# Patient Record
Sex: FEMALE | Race: WHITE | NOT HISPANIC OR LATINO | Employment: OTHER | URBAN - METROPOLITAN AREA
[De-identification: names, ages, dates, MRNs, and addresses within clinical notes are randomized per-mention and may not be internally consistent; named-entity substitution may affect disease eponyms.]

---

## 2017-01-12 ENCOUNTER — APPOINTMENT (OUTPATIENT)
Dept: GENERAL RADIOLOGY | Facility: HOSPITAL | Age: 67
End: 2017-01-12

## 2017-01-12 PROCEDURE — 73130 X-RAY EXAM OF HAND: CPT | Performed by: INTERNAL MEDICINE

## 2017-03-28 ENCOUNTER — TRANSCRIBE ORDERS (OUTPATIENT)
Dept: ADMINISTRATIVE | Facility: HOSPITAL | Age: 67
End: 2017-03-28

## 2017-03-28 DIAGNOSIS — R00.2 PALPITATIONS: Primary | ICD-10-CM

## 2017-04-04 ENCOUNTER — APPOINTMENT (OUTPATIENT)
Dept: CARDIOLOGY | Facility: HOSPITAL | Age: 67
End: 2017-04-04
Attending: INTERNAL MEDICINE

## 2017-04-05 ENCOUNTER — HOSPITAL ENCOUNTER (OUTPATIENT)
Dept: CARDIOLOGY | Facility: HOSPITAL | Age: 67
Discharge: HOME | End: 2017-04-05
Attending: INTERNAL MEDICINE | Admitting: INTERNAL MEDICINE

## 2017-04-05 DIAGNOSIS — R00.2 PALPITATIONS: ICD-10-CM

## 2017-04-05 PROCEDURE — 93225 XTRNL ECG REC<48 HRS REC: CPT

## 2017-04-05 PROCEDURE — 93226 XTRNL ECG REC<48 HR SCAN A/R: CPT

## 2017-04-08 PROCEDURE — 93227 XTRNL ECG REC<48 HR R&I: CPT | Performed by: INTERNAL MEDICINE

## 2017-04-24 ENCOUNTER — APPOINTMENT (OUTPATIENT)
Dept: WOMENS IMAGING | Facility: HOSPITAL | Age: 67
End: 2017-04-24

## 2017-04-24 PROCEDURE — G0202 SCR MAMMO BI INCL CAD: HCPCS | Performed by: RADIOLOGY

## 2017-04-24 PROCEDURE — 77063 BREAST TOMOSYNTHESIS BI: CPT | Performed by: RADIOLOGY

## 2017-07-19 ENCOUNTER — TRANSCRIBE ORDERS (OUTPATIENT)
Dept: ADMINISTRATIVE | Facility: HOSPITAL | Age: 67
End: 2017-07-19

## 2017-07-19 DIAGNOSIS — E05.80 SECONDARY HYPERTHYROIDISM: Primary | ICD-10-CM

## 2017-07-26 ENCOUNTER — HOSPITAL ENCOUNTER (OUTPATIENT)
Dept: MRI IMAGING | Facility: HOSPITAL | Age: 67
Discharge: HOME | End: 2017-07-26
Admitting: INTERNAL MEDICINE

## 2017-07-26 DIAGNOSIS — E05.80 SECONDARY HYPERTHYROIDISM: ICD-10-CM

## 2017-07-26 PROCEDURE — 82565 ASSAY OF CREATININE: CPT

## 2017-07-26 PROCEDURE — A9577 INJ MULTIHANCE: HCPCS | Performed by: INTERNAL MEDICINE

## 2017-07-26 PROCEDURE — 0 GADOBENATE DIMEGLUMINE 529 MG/ML SOLUTION: Performed by: INTERNAL MEDICINE

## 2017-07-26 PROCEDURE — 70553 MRI BRAIN STEM W/O & W/DYE: CPT

## 2017-07-26 RX ADMIN — GADOBENATE DIMEGLUMINE 16 ML: 529 INJECTION, SOLUTION INTRAVENOUS at 16:00

## 2017-07-27 LAB — CREAT BLDA-MCNC: 0.9 MG/DL (ref 0.6–1.3)

## 2017-09-22 ENCOUNTER — TELEPHONE (OUTPATIENT)
Dept: SLEEP MEDICINE | Facility: HOSPITAL | Age: 67
End: 2017-09-22

## 2017-10-19 ENCOUNTER — APPOINTMENT (OUTPATIENT)
Dept: SLEEP MEDICINE | Facility: HOSPITAL | Age: 67
End: 2017-10-19
Attending: INTERNAL MEDICINE

## 2017-10-19 ENCOUNTER — OFFICE VISIT (OUTPATIENT)
Dept: SLEEP MEDICINE | Facility: HOSPITAL | Age: 67
End: 2017-10-19
Attending: INTERNAL MEDICINE

## 2017-10-19 VITALS
BODY MASS INDEX: 33.68 KG/M2 | WEIGHT: 183 LBS | HEART RATE: 73 BPM | DIASTOLIC BLOOD PRESSURE: 93 MMHG | HEIGHT: 62 IN | TEMPERATURE: 98.9 F | SYSTOLIC BLOOD PRESSURE: 149 MMHG | OXYGEN SATURATION: 93 %

## 2017-10-19 DIAGNOSIS — Z99.89 OSA ON CPAP: Primary | ICD-10-CM

## 2017-10-19 DIAGNOSIS — G47.21 CIRCADIAN RHYTHM SLEEP DISORDER, DELAYED SLEEP PHASE TYPE: ICD-10-CM

## 2017-10-19 DIAGNOSIS — G47.33 OSA ON CPAP: Primary | ICD-10-CM

## 2017-10-19 DIAGNOSIS — G47.14 HYPERSOMNIA DUE TO ANOTHER MEDICAL CONDITION: ICD-10-CM

## 2017-10-19 PROCEDURE — 99204 OFFICE O/P NEW MOD 45 MIN: CPT | Performed by: INTERNAL MEDICINE

## 2017-10-19 PROCEDURE — G0463 HOSPITAL OUTPT CLINIC VISIT: HCPCS

## 2017-10-19 RX ORDER — AMLODIPINE BESYLATE AND BENAZEPRIL HYDROCHLORIDE 5; 20 MG/1; MG/1
CAPSULE ORAL
COMMUNITY
Start: 2013-02-08 | End: 2018-05-21

## 2017-10-19 RX ORDER — MENTHOL 5.8 MG/1
1 LOZENGE ORAL DAILY
COMMUNITY
Start: 2013-02-08 | End: 2019-11-04 | Stop reason: HOSPADM

## 2017-10-19 RX ORDER — SIMVASTATIN 10 MG
10 TABLET ORAL DAILY
COMMUNITY
Start: 2013-02-08 | End: 2019-01-10

## 2017-10-19 RX ORDER — TRAZODONE HYDROCHLORIDE 50 MG/1
25 TABLET ORAL NIGHTLY PRN
COMMUNITY
Start: 2013-02-08

## 2017-10-27 ENCOUNTER — OFFICE (OUTPATIENT)
Dept: URBAN - METROPOLITAN AREA OTHER 6 | Facility: OTHER | Age: 67
End: 2017-10-27

## 2017-10-27 VITALS
HEIGHT: 62 IN | DIASTOLIC BLOOD PRESSURE: 80 MMHG | HEART RATE: 80 BPM | SYSTOLIC BLOOD PRESSURE: 128 MMHG | WEIGHT: 182 LBS

## 2017-10-27 DIAGNOSIS — K58.9 IRRITABLE BOWEL SYNDROME WITHOUT DIARRHEA: ICD-10-CM

## 2017-10-27 DIAGNOSIS — K21.9 GASTRO-ESOPHAGEAL REFLUX DISEASE WITHOUT ESOPHAGITIS: ICD-10-CM

## 2017-10-27 PROCEDURE — 99212 OFFICE O/P EST SF 10 MIN: CPT

## 2017-10-27 RX ORDER — PANTOPRAZOLE SODIUM 40 MG/1
40 TABLET, DELAYED RELEASE ORAL
Qty: 30 | Refills: 11 | Status: COMPLETED
End: 2020-07-08

## 2017-10-28 PROBLEM — G47.21 CIRCADIAN RHYTHM SLEEP DISORDER, DELAYED SLEEP PHASE TYPE: Status: ACTIVE | Noted: 2017-10-28

## 2017-10-28 PROBLEM — G47.14 HYPERSOMNIA DUE TO ANOTHER MEDICAL CONDITION: Status: ACTIVE | Noted: 2017-10-28

## 2017-10-28 NOTE — PROGRESS NOTES
[facility] New Patient/Consultation       Reason for Consultation: FUENTES    Patient Care Team:  Pierre Chaudhry Jr., MD as PCP - Internal Medicine  Sukhjinder Connelly MD - Sleep Medicine    Chief complaint:  FUENTES    History of present illness:  Thank you for asking me to see your patient.  The patient is a 66 y.o. female who I last saw in 2010.  Reevaluation was needed with a new order for supplies requested.  The patient uses auto CPAP.  Her DME is [facility] and she uses nasal pillows.    She goes to bed between 4 and 6 AM and awakens at noon or 3 PM or later.  She is tired and sleepy upon awakening.  Occasionally she will take a nap.  She has complaints of hypersomnolence and her Frederick Sleepiness Scale is abnormal at 11.  She has gained about 10 pounds in the last 5 years.  She lives alone.  At times she sweats excessively during sleep.  Her dentist that she grinds her teeth.  She has problems falling asleep and staying asleep.  She awakens twice for the bathroom.  Her sleep is generally restless.    Review of Systems:  Recorded on the Sleep Questionnaire.  Unremarkable except for nasal congestion and postnasal drip, hoarseness, painful joints, GERD, cough, dizziness anxiety and depression, problems swallowing and diarrhea at times, she always feels too warm and has excessive thirst.    History:  Past Medical History:   Diagnosis Date   • Depression    • GERD (gastroesophageal reflux disease)    • Hyperlipidemia    • Hypertension    ,   Past Surgical History:   Procedure Laterality Date   • BACK SURGERY     • EXPLORATORY LAPAROTOMY     • HEMORRHOIDECTOMY     • RECTAL SURGERY     ,   Family History   Problem Relation Age of Onset   • Alzheimer's disease Mother    • Heart failure Father    • Diabetes Sister    • Atrial fibrillation Sister    • Stroke Paternal Grandmother     and   Social History   Substance Use Topics   • Smoking status: Never Smoker   • Smokeless tobacco: None   • Alcohol use No  "      Social History:  She is a retired RN.  She will have 1 or 2 teas a day.    Allergies:  Erythromycin     Medication Review: Her medication list was reviewed.    Vital Signs:    Vitals:    10/19/17 1112   BP: 149/93   BP Location: Left arm   Patient Position: Sitting   Pulse: 73   Temp: 98.9 °F (37.2 °C)   TempSrc: Oral   SpO2: 93%   Weight: 183 lb (83 kg)   Height: 62\" (157.5 cm)      Body mass index is 33.47 kg/(m^2).    Physical Exam:  Recorded on the [facility] Physical Exam Form and is unremarkable except for:  A large tongue and normal uvula and 1+ tonsillar hypertrophy.  She has moderate narrowing of the posterior pharyngeal opening and class II-III MP airway.  Trace pretibial edema is present.     Results Review:  Downloads between April 22 and October 18, 2017 compliances 89% and average usage is 5 hours and 39 minutes and average AHI is normal with a leak of 1 hour and 49 minutes.  Average auto CPAP pressure is 10 and her auto CPAP is 8-20.       Impression:   Obstructive sleep apnea adequately treated with auto titrating CPAP with good compliance and usage.    Circadian rhythm sleep disorder, delayed sleep phase type.  Hypersomnolence secondary to the above.    Plan:  Good sleep hygiene measures should be maintained.  Weight loss would be beneficial in this patient who is obese.    Pathophysiology of FUENTES re-described to the patient.  The patient is benefiting from auto CPAP therapy.      The patient will continue auto CPAP.  A new prescription will be sent to her DME for all needed supplies.  She will be evaluated for a new auto CPAP device.  I will see her back in 2 or 3 months if she obtained a new device or I will see her back in one year if she does not.     Thank you for requesting me to assist in this patient's care.    Sukhjinder Connelly MD  10/28/17  12:20 PM          "

## 2017-11-15 ENCOUNTER — TELEPHONE (OUTPATIENT)
Dept: SLEEP MEDICINE | Facility: HOSPITAL | Age: 67
End: 2017-11-15

## 2017-12-14 ENCOUNTER — AMBULATORY SURGICAL CENTER (OUTPATIENT)
Dept: URBAN - METROPOLITAN AREA SURGERY 20 | Facility: SURGERY | Age: 67
End: 2017-12-14

## 2017-12-14 ENCOUNTER — OFFICE (OUTPATIENT)
Dept: URBAN - METROPOLITAN AREA CLINIC 64 | Facility: CLINIC | Age: 67
End: 2017-12-14

## 2017-12-14 DIAGNOSIS — K29.50 UNSPECIFIED CHRONIC GASTRITIS WITHOUT BLEEDING: ICD-10-CM

## 2017-12-14 DIAGNOSIS — R13.10 DYSPHAGIA, UNSPECIFIED: ICD-10-CM

## 2017-12-14 DIAGNOSIS — K21.9 GASTRO-ESOPHAGEAL REFLUX DISEASE WITHOUT ESOPHAGITIS: ICD-10-CM

## 2017-12-14 DIAGNOSIS — K31.7 POLYP OF STOMACH AND DUODENUM: ICD-10-CM

## 2017-12-14 DIAGNOSIS — Z12.11 ENCOUNTER FOR SCREENING FOR MALIGNANT NEOPLASM OF COLON: ICD-10-CM

## 2017-12-14 DIAGNOSIS — K22.2 ESOPHAGEAL OBSTRUCTION: ICD-10-CM

## 2017-12-14 DIAGNOSIS — K57.30 DIVERTICULOSIS OF LARGE INTESTINE WITHOUT PERFORATION OR ABS: ICD-10-CM

## 2017-12-14 PROBLEM — K31.89 OTHER DISEASES OF STOMACH AND DUODENUM: Status: ACTIVE | Noted: 2017-12-14

## 2017-12-14 PROCEDURE — 45380 COLONOSCOPY AND BIOPSY: CPT | Mod: PT | Performed by: INTERNAL MEDICINE

## 2017-12-14 PROCEDURE — 43450 DILATE ESOPHAGUS 1/MULT PASS: CPT | Performed by: INTERNAL MEDICINE

## 2017-12-14 PROCEDURE — 88305 TISSUE EXAM BY PATHOLOGIST: CPT | Performed by: INTERNAL MEDICINE

## 2017-12-14 PROCEDURE — 43239 EGD BIOPSY SINGLE/MULTIPLE: CPT | Performed by: INTERNAL MEDICINE

## 2018-02-06 ENCOUNTER — OFFICE (OUTPATIENT)
Dept: URBAN - METROPOLITAN AREA OTHER 6 | Facility: OTHER | Age: 68
End: 2018-02-06

## 2018-02-06 VITALS
HEIGHT: 62 IN | WEIGHT: 180 LBS | DIASTOLIC BLOOD PRESSURE: 78 MMHG | HEART RATE: 68 BPM | SYSTOLIC BLOOD PRESSURE: 138 MMHG

## 2018-02-06 DIAGNOSIS — R10.11 RIGHT UPPER QUADRANT PAIN: ICD-10-CM

## 2018-02-06 DIAGNOSIS — K21.9 GASTRO-ESOPHAGEAL REFLUX DISEASE WITHOUT ESOPHAGITIS: ICD-10-CM

## 2018-02-06 DIAGNOSIS — K22.4 DYSKINESIA OF ESOPHAGUS: ICD-10-CM

## 2018-02-06 PROCEDURE — 99212 OFFICE O/P EST SF 10 MIN: CPT

## 2018-02-07 ENCOUNTER — TRANSCRIBE ORDERS (OUTPATIENT)
Dept: ADMINISTRATIVE | Facility: HOSPITAL | Age: 68
End: 2018-02-07

## 2018-02-07 DIAGNOSIS — K21.9 GASTRIC REFLUX: Primary | ICD-10-CM

## 2018-02-07 DIAGNOSIS — R10.11 RIGHT UPPER QUADRANT PAIN: ICD-10-CM

## 2018-02-09 ENCOUNTER — APPOINTMENT (OUTPATIENT)
Dept: ULTRASOUND IMAGING | Facility: HOSPITAL | Age: 68
End: 2018-02-09

## 2018-02-12 ENCOUNTER — HOSPITAL ENCOUNTER (OUTPATIENT)
Dept: ULTRASOUND IMAGING | Facility: HOSPITAL | Age: 68
Discharge: HOME | End: 2018-02-12
Admitting: NURSE PRACTITIONER

## 2018-02-12 DIAGNOSIS — K21.9 GASTRIC REFLUX: ICD-10-CM

## 2018-02-12 DIAGNOSIS — R10.11 RIGHT UPPER QUADRANT PAIN: ICD-10-CM

## 2018-02-12 PROCEDURE — 76705 ECHO EXAM OF ABDOMEN: CPT

## 2018-02-21 ENCOUNTER — APPOINTMENT (OUTPATIENT)
Dept: SLEEP MEDICINE | Facility: HOSPITAL | Age: 68
End: 2018-02-21
Attending: INTERNAL MEDICINE

## 2018-03-08 ENCOUNTER — APPOINTMENT (OUTPATIENT)
Dept: SLEEP MEDICINE | Facility: HOSPITAL | Age: 68
End: 2018-03-08
Attending: INTERNAL MEDICINE

## 2018-04-25 ENCOUNTER — APPOINTMENT (OUTPATIENT)
Dept: WOMENS IMAGING | Facility: HOSPITAL | Age: 68
End: 2018-04-25

## 2018-04-25 PROCEDURE — 77063 BREAST TOMOSYNTHESIS BI: CPT | Performed by: RADIOLOGY

## 2018-04-25 PROCEDURE — 77067 SCR MAMMO BI INCL CAD: CPT | Performed by: RADIOLOGY

## 2018-05-02 ENCOUNTER — APPOINTMENT (OUTPATIENT)
Dept: WOMENS IMAGING | Facility: HOSPITAL | Age: 68
End: 2018-05-02

## 2018-05-02 PROCEDURE — 77065 DX MAMMO INCL CAD UNI: CPT | Performed by: RADIOLOGY

## 2018-05-02 PROCEDURE — G0279 TOMOSYNTHESIS, MAMMO: HCPCS | Performed by: RADIOLOGY

## 2018-05-02 PROCEDURE — MDREVIEWSP: Performed by: RADIOLOGY

## 2018-05-02 PROCEDURE — 76641 ULTRASOUND BREAST COMPLETE: CPT | Performed by: RADIOLOGY

## 2018-05-21 ENCOUNTER — LAB (OUTPATIENT)
Dept: LAB | Facility: HOSPITAL | Age: 68
End: 2018-05-21
Attending: INTERNAL MEDICINE

## 2018-05-21 ENCOUNTER — TRANSCRIBE ORDERS (OUTPATIENT)
Dept: ADMINISTRATIVE | Facility: HOSPITAL | Age: 68
End: 2018-05-21

## 2018-05-21 ENCOUNTER — APPOINTMENT (OUTPATIENT)
Dept: GENERAL RADIOLOGY | Facility: HOSPITAL | Age: 68
End: 2018-05-21

## 2018-05-21 ENCOUNTER — HOSPITAL ENCOUNTER (INPATIENT)
Facility: HOSPITAL | Age: 68
LOS: 1 days | Discharge: HOME | End: 2018-05-23
Attending: EMERGENCY MEDICINE | Admitting: INTERNAL MEDICINE

## 2018-05-21 DIAGNOSIS — R07.89 CHEST WALL PAIN: ICD-10-CM

## 2018-05-21 DIAGNOSIS — R07.89 CHEST WALL PAIN: Primary | ICD-10-CM

## 2018-05-21 DIAGNOSIS — I21.4 NSTEMI (NON-ST ELEVATED MYOCARDIAL INFARCTION) (HCC): Primary | ICD-10-CM

## 2018-05-21 LAB
ALBUMIN SERPL-MCNC: 3.9 G/DL (ref 3.5–5.2)
ALBUMIN SERPL-MCNC: 4.1 G/DL (ref 3.5–5.2)
ALBUMIN/GLOB SERPL: 1.3 G/DL
ALBUMIN/GLOB SERPL: 1.5 G/DL
ALP SERPL-CCNC: 111 U/L (ref 39–117)
ALP SERPL-CCNC: 111 U/L (ref 39–117)
ALT SERPL W P-5'-P-CCNC: 11 U/L (ref 1–33)
ALT SERPL W P-5'-P-CCNC: 14 U/L (ref 1–33)
ANION GAP SERPL CALCULATED.3IONS-SCNC: 11.6 MMOL/L
ANION GAP SERPL CALCULATED.3IONS-SCNC: 14.9 MMOL/L
AST SERPL-CCNC: 15 U/L (ref 1–32)
AST SERPL-CCNC: 22 U/L (ref 1–32)
BASOPHILS # BLD AUTO: 0.02 10*3/MM3 (ref 0–0.2)
BASOPHILS NFR BLD AUTO: 0.2 % (ref 0–1.5)
BILIRUB SERPL-MCNC: 0.3 MG/DL (ref 0.1–1.2)
BILIRUB SERPL-MCNC: 0.3 MG/DL (ref 0.1–1.2)
BUN BLD-MCNC: 12 MG/DL (ref 8–23)
BUN BLD-MCNC: 12 MG/DL (ref 8–23)
BUN/CREAT SERPL: 13 (ref 7–25)
BUN/CREAT SERPL: 13.8 (ref 7–25)
CALCIUM SPEC-SCNC: 8.8 MG/DL (ref 8.6–10.5)
CALCIUM SPEC-SCNC: 9.1 MG/DL (ref 8.6–10.5)
CHLORIDE SERPL-SCNC: 103 MMOL/L (ref 98–107)
CHLORIDE SERPL-SCNC: 104 MMOL/L (ref 98–107)
CO2 SERPL-SCNC: 26.1 MMOL/L (ref 22–29)
CO2 SERPL-SCNC: 26.4 MMOL/L (ref 22–29)
CREAT BLD-MCNC: 0.87 MG/DL (ref 0.57–1)
CREAT BLD-MCNC: 0.92 MG/DL (ref 0.57–1)
DEPRECATED RDW RBC AUTO: 44.1 FL (ref 37–54)
EOSINOPHIL # BLD AUTO: 0.31 10*3/MM3 (ref 0–0.7)
EOSINOPHIL NFR BLD AUTO: 3.7 % (ref 0.3–6.2)
ERYTHROCYTE [DISTWIDTH] IN BLOOD BY AUTOMATED COUNT: 13.6 % (ref 11.7–13)
GFR SERPL CREATININE-BSD FRML MDRD: 61 ML/MIN/1.73
GFR SERPL CREATININE-BSD FRML MDRD: 65 ML/MIN/1.73
GLOBULIN UR ELPH-MCNC: 2.8 GM/DL
GLOBULIN UR ELPH-MCNC: 3 GM/DL
GLUCOSE BLD-MCNC: 90 MG/DL (ref 65–99)
GLUCOSE BLD-MCNC: 94 MG/DL (ref 65–99)
HCT VFR BLD AUTO: 40 % (ref 35.6–45.5)
HGB BLD-MCNC: 12.4 G/DL (ref 11.9–15.5)
IMM GRANULOCYTES # BLD: 0 10*3/MM3 (ref 0–0.03)
IMM GRANULOCYTES NFR BLD: 0 % (ref 0–0.5)
LYMPHOCYTES # BLD AUTO: 1.54 10*3/MM3 (ref 0.9–4.8)
LYMPHOCYTES NFR BLD AUTO: 18.4 % (ref 19.6–45.3)
MCH RBC QN AUTO: 27.6 PG (ref 26.9–32)
MCHC RBC AUTO-ENTMCNC: 31 G/DL (ref 32.4–36.3)
MCV RBC AUTO: 89.1 FL (ref 80.5–98.2)
MONOCYTES # BLD AUTO: 0.94 10*3/MM3 (ref 0.2–1.2)
MONOCYTES NFR BLD AUTO: 11.2 % (ref 5–12)
NEUTROPHILS # BLD AUTO: 5.58 10*3/MM3 (ref 1.9–8.1)
NEUTROPHILS NFR BLD AUTO: 66.5 % (ref 42.7–76)
PLATELET # BLD AUTO: 238 10*3/MM3 (ref 140–500)
PMV BLD AUTO: 9.4 FL (ref 6–12)
POTASSIUM BLD-SCNC: 3.7 MMOL/L (ref 3.5–5.2)
POTASSIUM BLD-SCNC: 3.7 MMOL/L (ref 3.5–5.2)
PROT SERPL-MCNC: 6.9 G/DL (ref 6–8.5)
PROT SERPL-MCNC: 6.9 G/DL (ref 6–8.5)
RBC # BLD AUTO: 4.49 10*6/MM3 (ref 3.9–5.2)
SODIUM BLD-SCNC: 141 MMOL/L (ref 136–145)
SODIUM BLD-SCNC: 145 MMOL/L (ref 136–145)
TROPONIN T SERPL-MCNC: 0.18 NG/ML (ref 0–0.03)
TROPONIN T SERPL-MCNC: 0.21 NG/ML (ref 0–0.03)
WBC NRBC COR # BLD: 8.39 10*3/MM3 (ref 4.5–10.7)

## 2018-05-21 PROCEDURE — 80053 COMPREHEN METABOLIC PANEL: CPT | Performed by: PHYSICIAN ASSISTANT

## 2018-05-21 PROCEDURE — 93005 ELECTROCARDIOGRAM TRACING: CPT | Performed by: EMERGENCY MEDICINE

## 2018-05-21 PROCEDURE — 25010000002 ENOXAPARIN PER 10 MG: Performed by: PHYSICIAN ASSISTANT

## 2018-05-21 PROCEDURE — G0378 HOSPITAL OBSERVATION PER HR: HCPCS

## 2018-05-21 PROCEDURE — 36415 COLL VENOUS BLD VENIPUNCTURE: CPT

## 2018-05-21 PROCEDURE — 84484 ASSAY OF TROPONIN QUANT: CPT | Performed by: PHYSICIAN ASSISTANT

## 2018-05-21 PROCEDURE — 80053 COMPREHEN METABOLIC PANEL: CPT

## 2018-05-21 PROCEDURE — 71045 X-RAY EXAM CHEST 1 VIEW: CPT

## 2018-05-21 PROCEDURE — 99284 EMERGENCY DEPT VISIT MOD MDM: CPT

## 2018-05-21 PROCEDURE — 85025 COMPLETE CBC W/AUTO DIFF WBC: CPT | Performed by: PHYSICIAN ASSISTANT

## 2018-05-21 PROCEDURE — 84484 ASSAY OF TROPONIN QUANT: CPT

## 2018-05-21 PROCEDURE — 93010 ELECTROCARDIOGRAM REPORT: CPT | Performed by: INTERNAL MEDICINE

## 2018-05-21 PROCEDURE — 93005 ELECTROCARDIOGRAM TRACING: CPT

## 2018-05-21 RX ORDER — ACETAMINOPHEN 325 MG/1
650 TABLET ORAL EVERY 4 HOURS PRN
Status: DISCONTINUED | OUTPATIENT
Start: 2018-05-21 | End: 2018-05-23 | Stop reason: HOSPADM

## 2018-05-21 RX ORDER — DULOXETIN HYDROCHLORIDE 60 MG/1
120 CAPSULE, DELAYED RELEASE ORAL DAILY
Status: DISCONTINUED | OUTPATIENT
Start: 2018-05-22 | End: 2018-05-23 | Stop reason: HOSPADM

## 2018-05-21 RX ORDER — METOPROLOL TARTRATE 100 MG/1
100 TABLET ORAL EVERY 12 HOURS SCHEDULED
Status: DISCONTINUED | OUTPATIENT
Start: 2018-05-22 | End: 2018-05-21

## 2018-05-21 RX ORDER — ASPIRIN 81 MG/1
81 TABLET ORAL DAILY
Status: DISCONTINUED | OUTPATIENT
Start: 2018-05-22 | End: 2018-05-23 | Stop reason: HOSPADM

## 2018-05-21 RX ORDER — LAMOTRIGINE 100 MG/1
100 TABLET ORAL DAILY
Status: DISCONTINUED | OUTPATIENT
Start: 2018-05-22 | End: 2018-05-23 | Stop reason: HOSPADM

## 2018-05-21 RX ORDER — AMLODIPINE BESYLATE 5 MG/1
5 TABLET ORAL DAILY
Status: DISCONTINUED | OUTPATIENT
Start: 2018-05-22 | End: 2018-05-22

## 2018-05-21 RX ORDER — RANITIDINE 300 MG/1
300 TABLET ORAL DAILY
COMMUNITY
End: 2019-11-04 | Stop reason: HOSPADM

## 2018-05-21 RX ORDER — SODIUM CHLORIDE 0.9 % (FLUSH) 0.9 %
1-10 SYRINGE (ML) INJECTION AS NEEDED
Status: DISCONTINUED | OUTPATIENT
Start: 2018-05-21 | End: 2018-05-23 | Stop reason: HOSPADM

## 2018-05-21 RX ORDER — ASPIRIN 325 MG
325 TABLET ORAL ONCE
Status: COMPLETED | OUTPATIENT
Start: 2018-05-21 | End: 2018-05-21

## 2018-05-21 RX ORDER — TRAZODONE HYDROCHLORIDE 50 MG/1
50 TABLET ORAL NIGHTLY
Status: DISCONTINUED | OUTPATIENT
Start: 2018-05-22 | End: 2018-05-22

## 2018-05-21 RX ORDER — ATORVASTATIN CALCIUM 10 MG/1
10 TABLET, FILM COATED ORAL DAILY
Status: DISCONTINUED | OUTPATIENT
Start: 2018-05-22 | End: 2018-05-23 | Stop reason: HOSPADM

## 2018-05-21 RX ORDER — METOPROLOL TARTRATE 50 MG/1
50 TABLET, FILM COATED ORAL EVERY 12 HOURS SCHEDULED
Status: DISCONTINUED | OUTPATIENT
Start: 2018-05-22 | End: 2018-05-23 | Stop reason: HOSPADM

## 2018-05-21 RX ORDER — LORAZEPAM 1 MG/1
1 TABLET ORAL EVERY 6 HOURS PRN
Status: DISCONTINUED | OUTPATIENT
Start: 2018-05-21 | End: 2018-05-23 | Stop reason: HOSPADM

## 2018-05-21 RX ORDER — PANTOPRAZOLE SODIUM 40 MG/1
40 TABLET, DELAYED RELEASE ORAL DAILY
Status: DISCONTINUED | OUTPATIENT
Start: 2018-05-22 | End: 2018-05-23 | Stop reason: HOSPADM

## 2018-05-21 RX ORDER — ESCITALOPRAM OXALATE 10 MG/1
10 TABLET ORAL DAILY
Status: DISCONTINUED | OUTPATIENT
Start: 2018-05-22 | End: 2018-05-23 | Stop reason: HOSPADM

## 2018-05-21 RX ORDER — SODIUM CHLORIDE 0.9 % (FLUSH) 0.9 %
10 SYRINGE (ML) INJECTION AS NEEDED
Status: DISCONTINUED | OUTPATIENT
Start: 2018-05-21 | End: 2018-05-23 | Stop reason: HOSPADM

## 2018-05-21 RX ADMIN — ASPIRIN 325 MG: 325 TABLET ORAL at 20:50

## 2018-05-21 RX ADMIN — ENOXAPARIN SODIUM 80 MG: 80 INJECTION SUBCUTANEOUS at 20:53

## 2018-05-21 RX ADMIN — NITROGLYCERIN 1 INCH: 20 OINTMENT TOPICAL at 20:51

## 2018-05-22 LAB
ANION GAP SERPL CALCULATED.3IONS-SCNC: 13.2 MMOL/L
BUN BLD-MCNC: 10 MG/DL (ref 8–23)
BUN/CREAT SERPL: 11.9 (ref 7–25)
CALCIUM SPEC-SCNC: 8.7 MG/DL (ref 8.6–10.5)
CHLORIDE SERPL-SCNC: 106 MMOL/L (ref 98–107)
CHOLEST SERPL-MCNC: 174 MG/DL (ref 0–200)
CO2 SERPL-SCNC: 23.8 MMOL/L (ref 22–29)
CREAT BLD-MCNC: 0.84 MG/DL (ref 0.57–1)
DEPRECATED RDW RBC AUTO: 43.9 FL (ref 37–54)
ERYTHROCYTE [DISTWIDTH] IN BLOOD BY AUTOMATED COUNT: 13.6 % (ref 11.7–13)
GFR SERPL CREATININE-BSD FRML MDRD: 68 ML/MIN/1.73
GLUCOSE BLD-MCNC: 92 MG/DL (ref 65–99)
GLUCOSE BLDC GLUCOMTR-MCNC: 99 MG/DL (ref 70–130)
HBA1C MFR BLD: 5.1 % (ref 4.8–5.6)
HCT VFR BLD AUTO: 37.7 % (ref 35.6–45.5)
HDLC SERPL-MCNC: 38 MG/DL (ref 40–60)
HGB BLD-MCNC: 11.8 G/DL (ref 11.9–15.5)
LDLC SERPL CALC-MCNC: 107 MG/DL (ref 0–100)
LDLC/HDLC SERPL: 2.81 {RATIO}
MCH RBC QN AUTO: 27.7 PG (ref 26.9–32)
MCHC RBC AUTO-ENTMCNC: 31.3 G/DL (ref 32.4–36.3)
MCV RBC AUTO: 88.5 FL (ref 80.5–98.2)
PLATELET # BLD AUTO: 234 10*3/MM3 (ref 140–500)
PMV BLD AUTO: 9.5 FL (ref 6–12)
POTASSIUM BLD-SCNC: 3.7 MMOL/L (ref 3.5–5.2)
RBC # BLD AUTO: 4.26 10*6/MM3 (ref 3.9–5.2)
SODIUM BLD-SCNC: 143 MMOL/L (ref 136–145)
TRIGL SERPL-MCNC: 147 MG/DL (ref 0–150)
TROPONIN T SERPL-MCNC: 0.13 NG/ML (ref 0–0.03)
TROPONIN T SERPL-MCNC: 0.17 NG/ML (ref 0–0.03)
VLDLC SERPL-MCNC: 29.4 MG/DL (ref 5–40)
WBC NRBC COR # BLD: 7.09 10*3/MM3 (ref 4.5–10.7)

## 2018-05-22 PROCEDURE — C1894 INTRO/SHEATH, NON-LASER: HCPCS | Performed by: INTERNAL MEDICINE

## 2018-05-22 PROCEDURE — 4A023N7 MEASUREMENT OF CARDIAC SAMPLING AND PRESSURE, LEFT HEART, PERCUTANEOUS APPROACH: ICD-10-PCS | Performed by: INTERNAL MEDICINE

## 2018-05-22 PROCEDURE — 93010 ELECTROCARDIOGRAM REPORT: CPT | Performed by: INTERNAL MEDICINE

## 2018-05-22 PROCEDURE — 25010000002 MIDAZOLAM PER 1 MG: Performed by: INTERNAL MEDICINE

## 2018-05-22 PROCEDURE — 84484 ASSAY OF TROPONIN QUANT: CPT | Performed by: INTERNAL MEDICINE

## 2018-05-22 PROCEDURE — 0 IOPAMIDOL PER 1 ML: Performed by: INTERNAL MEDICINE

## 2018-05-22 PROCEDURE — 80061 LIPID PANEL: CPT | Performed by: INTERNAL MEDICINE

## 2018-05-22 PROCEDURE — 99204 OFFICE O/P NEW MOD 45 MIN: CPT | Performed by: INTERNAL MEDICINE

## 2018-05-22 PROCEDURE — B2151ZZ FLUOROSCOPY OF LEFT HEART USING LOW OSMOLAR CONTRAST: ICD-10-PCS | Performed by: INTERNAL MEDICINE

## 2018-05-22 PROCEDURE — C1760 CLOSURE DEV, VASC: HCPCS | Performed by: INTERNAL MEDICINE

## 2018-05-22 PROCEDURE — 93005 ELECTROCARDIOGRAM TRACING: CPT | Performed by: INTERNAL MEDICINE

## 2018-05-22 PROCEDURE — 25010000002 ONDANSETRON PER 1 MG: Performed by: INTERNAL MEDICINE

## 2018-05-22 PROCEDURE — 93458 L HRT ARTERY/VENTRICLE ANGIO: CPT | Performed by: INTERNAL MEDICINE

## 2018-05-22 PROCEDURE — 25010000002 FENTANYL CITRATE (PF) 100 MCG/2ML SOLUTION: Performed by: INTERNAL MEDICINE

## 2018-05-22 PROCEDURE — C1769 GUIDE WIRE: HCPCS | Performed by: INTERNAL MEDICINE

## 2018-05-22 PROCEDURE — 83036 HEMOGLOBIN GLYCOSYLATED A1C: CPT | Performed by: INTERNAL MEDICINE

## 2018-05-22 PROCEDURE — B2111ZZ FLUOROSCOPY OF MULTIPLE CORONARY ARTERIES USING LOW OSMOLAR CONTRAST: ICD-10-PCS | Performed by: INTERNAL MEDICINE

## 2018-05-22 PROCEDURE — 82962 GLUCOSE BLOOD TEST: CPT

## 2018-05-22 PROCEDURE — 25010000002 HEPARIN (PORCINE) PER 1000 UNITS: Performed by: INTERNAL MEDICINE

## 2018-05-22 PROCEDURE — 85027 COMPLETE CBC AUTOMATED: CPT | Performed by: INTERNAL MEDICINE

## 2018-05-22 PROCEDURE — 80048 BASIC METABOLIC PNL TOTAL CA: CPT | Performed by: INTERNAL MEDICINE

## 2018-05-22 RX ORDER — MIDAZOLAM HYDROCHLORIDE 1 MG/ML
INJECTION INTRAMUSCULAR; INTRAVENOUS AS NEEDED
Status: DISCONTINUED | OUTPATIENT
Start: 2018-05-22 | End: 2018-05-22 | Stop reason: HOSPADM

## 2018-05-22 RX ORDER — TRAZODONE HYDROCHLORIDE 50 MG/1
25 TABLET ORAL NIGHTLY
Status: DISCONTINUED | OUTPATIENT
Start: 2018-05-22 | End: 2018-05-23 | Stop reason: HOSPADM

## 2018-05-22 RX ORDER — ONDANSETRON 4 MG/1
4 TABLET, FILM COATED ORAL EVERY 6 HOURS PRN
Status: DISCONTINUED | OUTPATIENT
Start: 2018-05-22 | End: 2018-05-23 | Stop reason: HOSPADM

## 2018-05-22 RX ORDER — SODIUM CHLORIDE 9 MG/ML
100 INJECTION, SOLUTION INTRAVENOUS CONTINUOUS
Status: DISCONTINUED | OUTPATIENT
Start: 2018-05-22 | End: 2018-05-23 | Stop reason: HOSPADM

## 2018-05-22 RX ORDER — ONDANSETRON 4 MG/1
4 TABLET, ORALLY DISINTEGRATING ORAL EVERY 6 HOURS PRN
Status: DISCONTINUED | OUTPATIENT
Start: 2018-05-22 | End: 2018-05-23 | Stop reason: HOSPADM

## 2018-05-22 RX ORDER — AMLODIPINE BESYLATE 5 MG/1
5 TABLET ORAL ONCE
Status: DISCONTINUED | OUTPATIENT
Start: 2018-05-22 | End: 2018-05-23 | Stop reason: HOSPADM

## 2018-05-22 RX ORDER — ACETAMINOPHEN 325 MG/1
650 TABLET ORAL EVERY 4 HOURS PRN
Status: DISCONTINUED | OUTPATIENT
Start: 2018-05-22 | End: 2018-05-23 | Stop reason: HOSPADM

## 2018-05-22 RX ORDER — ONDANSETRON 2 MG/ML
4 INJECTION INTRAMUSCULAR; INTRAVENOUS EVERY 6 HOURS PRN
Status: DISCONTINUED | OUTPATIENT
Start: 2018-05-22 | End: 2018-05-23 | Stop reason: HOSPADM

## 2018-05-22 RX ORDER — AMLODIPINE BESYLATE 10 MG/1
10 TABLET ORAL DAILY
Status: DISCONTINUED | OUTPATIENT
Start: 2018-05-23 | End: 2018-05-23 | Stop reason: HOSPADM

## 2018-05-22 RX ORDER — LIDOCAINE HYDROCHLORIDE 20 MG/ML
INJECTION, SOLUTION INFILTRATION; PERINEURAL AS NEEDED
Status: DISCONTINUED | OUTPATIENT
Start: 2018-05-22 | End: 2018-05-22 | Stop reason: HOSPADM

## 2018-05-22 RX ORDER — ONDANSETRON 2 MG/ML
4 INJECTION INTRAMUSCULAR; INTRAVENOUS ONCE
Status: COMPLETED | OUTPATIENT
Start: 2018-05-22 | End: 2018-05-22

## 2018-05-22 RX ORDER — HYDROCODONE BITARTRATE AND ACETAMINOPHEN 5; 325 MG/1; MG/1
1 TABLET ORAL EVERY 4 HOURS PRN
Status: DISCONTINUED | OUTPATIENT
Start: 2018-05-22 | End: 2018-05-23 | Stop reason: HOSPADM

## 2018-05-22 RX ORDER — SODIUM CHLORIDE 9 MG/ML
50 INJECTION, SOLUTION INTRAVENOUS CONTINUOUS
Status: ACTIVE | OUTPATIENT
Start: 2018-05-22 | End: 2018-05-23

## 2018-05-22 RX ORDER — SODIUM CHLORIDE 9 MG/ML
INJECTION, SOLUTION INTRAVENOUS CONTINUOUS PRN
Status: COMPLETED | OUTPATIENT
Start: 2018-05-22 | End: 2018-05-22

## 2018-05-22 RX ORDER — FENTANYL CITRATE 50 UG/ML
INJECTION, SOLUTION INTRAMUSCULAR; INTRAVENOUS AS NEEDED
Status: DISCONTINUED | OUTPATIENT
Start: 2018-05-22 | End: 2018-05-22 | Stop reason: HOSPADM

## 2018-05-22 RX ADMIN — ONDANSETRON 4 MG: 2 INJECTION INTRAMUSCULAR; INTRAVENOUS at 12:51

## 2018-05-22 RX ADMIN — DULOXETINE HYDROCHLORIDE 120 MG: 60 CAPSULE, DELAYED RELEASE ORAL at 08:37

## 2018-05-22 RX ADMIN — PANTOPRAZOLE SODIUM 40 MG: 40 TABLET, DELAYED RELEASE ORAL at 08:42

## 2018-05-22 RX ADMIN — LAMOTRIGINE 100 MG: 100 TABLET ORAL at 08:37

## 2018-05-22 RX ADMIN — LORAZEPAM 0.5 MG: 1 TABLET ORAL at 04:52

## 2018-05-22 RX ADMIN — METOPROLOL TARTRATE 50 MG: 50 TABLET, FILM COATED ORAL at 08:37

## 2018-05-22 RX ADMIN — ATORVASTATIN CALCIUM 10 MG: 10 TABLET, FILM COATED ORAL at 08:37

## 2018-05-22 RX ADMIN — NITROGLYCERIN 1 INCH: 20 OINTMENT TOPICAL at 04:57

## 2018-05-22 RX ADMIN — METOPROLOL TARTRATE 50 MG: 50 TABLET, FILM COATED ORAL at 01:12

## 2018-05-22 RX ADMIN — METOPROLOL TARTRATE 50 MG: 50 TABLET, FILM COATED ORAL at 21:38

## 2018-05-22 RX ADMIN — ACETAMINOPHEN 650 MG: 325 TABLET, FILM COATED ORAL at 08:37

## 2018-05-22 RX ADMIN — AMLODIPINE BESYLATE 5 MG: 5 TABLET ORAL at 08:37

## 2018-05-22 RX ADMIN — ASPIRIN 81 MG: 81 TABLET ORAL at 08:37

## 2018-05-22 RX ADMIN — ACETAMINOPHEN 650 MG: 325 TABLET, FILM COATED ORAL at 00:43

## 2018-05-22 RX ADMIN — ESCITALOPRAM 10 MG: 10 TABLET, FILM COATED ORAL at 08:37

## 2018-05-22 RX ADMIN — SODIUM CHLORIDE 100 ML/HR: 9 INJECTION, SOLUTION INTRAVENOUS at 16:28

## 2018-05-22 RX ADMIN — LORAZEPAM 1 MG: 1 TABLET ORAL at 12:32

## 2018-05-22 RX ADMIN — TRAZODONE HYDROCHLORIDE 25 MG: 50 TABLET ORAL at 01:11

## 2018-05-23 ENCOUNTER — APPOINTMENT (OUTPATIENT)
Dept: CARDIOLOGY | Facility: HOSPITAL | Age: 68
End: 2018-05-23
Attending: INTERNAL MEDICINE

## 2018-05-23 VITALS
RESPIRATION RATE: 18 BRPM | HEART RATE: 76 BPM | OXYGEN SATURATION: 95 % | WEIGHT: 182.8 LBS | DIASTOLIC BLOOD PRESSURE: 66 MMHG | HEIGHT: 62 IN | SYSTOLIC BLOOD PRESSURE: 134 MMHG | BODY MASS INDEX: 33.64 KG/M2 | TEMPERATURE: 98.3 F

## 2018-05-23 LAB
ANION GAP SERPL CALCULATED.3IONS-SCNC: 13.1 MMOL/L
ASCENDING AORTA: 2.5 CM
BH CV ECHO MEAS - % IVS THICK: 317.6 %
BH CV ECHO MEAS - ACS: 1.6 CM
BH CV ECHO MEAS - AO MAX PG: 27 MMHG
BH CV ECHO MEAS - AO MEAN PG (FULL): 1 MMHG
BH CV ECHO MEAS - AO MEAN PG: 3 MMHG
BH CV ECHO MEAS - AO ROOT AREA (BSA CORRECTED): 1.4
BH CV ECHO MEAS - AO ROOT AREA: 5.3 CM^2
BH CV ECHO MEAS - AO ROOT DIAM: 2.6 CM
BH CV ECHO MEAS - AO V2 MAX: 133 CM/SEC
BH CV ECHO MEAS - AO V2 MEAN: 83.1 CM/SEC
BH CV ECHO MEAS - AO V2 VTI: 26.5 CM
BH CV ECHO MEAS - AVA(I,A): 1.7 CM^2
BH CV ECHO MEAS - AVA(I,D): 1.7 CM^2
BH CV ECHO MEAS - BSA(HAYCOCK): 1.9 M^2
BH CV ECHO MEAS - BSA: 1.8 M^2
BH CV ECHO MEAS - BZI_BMI: 33.3 KILOGRAMS/M^2
BH CV ECHO MEAS - BZI_METRIC_HEIGHT: 157.5 CM
BH CV ECHO MEAS - BZI_METRIC_WEIGHT: 82.6 KG
BH CV ECHO MEAS - CONTRAST EF (2CH): 57.6 ML/M^2
BH CV ECHO MEAS - CONTRAST EF 4CH: 59.7 ML/M^2
BH CV ECHO MEAS - EDV(MOD-SP2): 59 ML
BH CV ECHO MEAS - EDV(MOD-SP4): 67 ML
BH CV ECHO MEAS - EDV(TEICH): 119.3 ML
BH CV ECHO MEAS - EF(MOD-BP): 59 %
BH CV ECHO MEAS - EF(MOD-SP2): 57.6 %
BH CV ECHO MEAS - EF(MOD-SP4): 59.7 %
BH CV ECHO MEAS - ESV(MOD-SP2): 25 ML
BH CV ECHO MEAS - ESV(MOD-SP4): 27 ML
BH CV ECHO MEAS - IVS/LVPW: 0.85
BH CV ECHO MEAS - IVSD: 0.81 CM
BH CV ECHO MEAS - IVSS: 3.4 CM
BH CV ECHO MEAS - LAT PEAK E' VEL: 10 CM/SEC
BH CV ECHO MEAS - LV DIASTOLIC VOL/BSA (35-75): 36.5 ML/M^2
BH CV ECHO MEAS - LV MASS(C)D: 154.7 GRAMS
BH CV ECHO MEAS - LV MASS(C)DI: 84.2 GRAMS/M^2
BH CV ECHO MEAS - LV MAX PG: 18 MMHG
BH CV ECHO MEAS - LV MEAN PG: 2 MMHG
BH CV ECHO MEAS - LV SYSTOLIC VOL/BSA (12-30): 14.7 ML/M^2
BH CV ECHO MEAS - LV V1 MAX: 91 CM/SEC
BH CV ECHO MEAS - LV V1 MEAN: 62.8 CM/SEC
BH CV ECHO MEAS - LV V1 VTI: 17.9 CM
BH CV ECHO MEAS - LVIDD: 5 CM
BH CV ECHO MEAS - LVLD AP2: 7.1 CM
BH CV ECHO MEAS - LVLD AP4: 7 CM
BH CV ECHO MEAS - LVLS AP2: 6.3 CM
BH CV ECHO MEAS - LVLS AP4: 6.4 CM
BH CV ECHO MEAS - LVOT AREA (M): 2.5 CM^2
BH CV ECHO MEAS - LVOT AREA: 2.5 CM^2
BH CV ECHO MEAS - LVOT DIAM: 1.8 CM
BH CV ECHO MEAS - LVPWD: 0.95 CM
BH CV ECHO MEAS - MED PEAK E' VEL: 13 CM/SEC
BH CV ECHO MEAS - MR MAX PG: 44.9 MMHG
BH CV ECHO MEAS - MR MAX VEL: 335 CM/SEC
BH CV ECHO MEAS - MV A DUR: 0.13 SEC
BH CV ECHO MEAS - MV A MAX VEL: 117 CM/SEC
BH CV ECHO MEAS - MV DEC SLOPE: 577 CM/SEC^2
BH CV ECHO MEAS - MV DEC TIME: 0.17 SEC
BH CV ECHO MEAS - MV E MAX VEL: 87.6 CM/SEC
BH CV ECHO MEAS - MV E/A: 0.75
BH CV ECHO MEAS - MV MEAN PG: 2 MMHG
BH CV ECHO MEAS - MV P1/2T MAX VEL: 90.7 CM/SEC
BH CV ECHO MEAS - MV P1/2T: 46 MSEC
BH CV ECHO MEAS - MV V2 MEAN: 72.7 CM/SEC
BH CV ECHO MEAS - MV V2 VTI: 31.3 CM
BH CV ECHO MEAS - MVA P1/2T LCG: 2.4 CM^2
BH CV ECHO MEAS - MVA(P1/2T): 4.8 CM^2
BH CV ECHO MEAS - MVA(VTI): 1.5 CM^2
BH CV ECHO MEAS - PA MAX PG: 3.8 MMHG
BH CV ECHO MEAS - PA V2 MAX: 97.7 CM/SEC
BH CV ECHO MEAS - PULM A REVS DUR: 0.11 SEC
BH CV ECHO MEAS - PULM A REVS VEL: 32.5 CM/SEC
BH CV ECHO MEAS - PULM DIAS VEL: 34.2 CM/SEC
BH CV ECHO MEAS - PULM S/D: 1.2
BH CV ECHO MEAS - PULM SYS VEL: 41.1 CM/SEC
BH CV ECHO MEAS - QP/QS: 1
BH CV ECHO MEAS - RAP SYSTOLE: 3 MMHG
BH CV ECHO MEAS - RV MEAN PG: 1 MMHG
BH CV ECHO MEAS - RV V1 MEAN: 47.8 CM/SEC
BH CV ECHO MEAS - RV V1 VTI: 13.5 CM
BH CV ECHO MEAS - RVOT AREA: 3.5 CM^2
BH CV ECHO MEAS - RVOT DIAM: 2.1 CM
BH CV ECHO MEAS - RVSP: 25 MMHG
BH CV ECHO MEAS - SI(AO): 76.6 ML/M^2
BH CV ECHO MEAS - SI(LVOT): 24.8 ML/M^2
BH CV ECHO MEAS - SI(MOD-SP2): 18.5 ML/M^2
BH CV ECHO MEAS - SI(MOD-SP4): 21.8 ML/M^2
BH CV ECHO MEAS - SUP REN AO DIAM: 2.1 CM
BH CV ECHO MEAS - SV(AO): 140.7 ML
BH CV ECHO MEAS - SV(LVOT): 45.5 ML
BH CV ECHO MEAS - SV(MOD-SP2): 34 ML
BH CV ECHO MEAS - SV(MOD-SP4): 40 ML
BH CV ECHO MEAS - SV(RVOT): 46.8 ML
BH CV ECHO MEAS - TAPSE (>1.6): 2.3 CM2
BH CV ECHO MEAS - TR MAX VEL: 234 CM/SEC
BH CV ECHO MEASUREMENTS AVERAGE E/E' RATIO: 7.62
BH CV XLRA - RV BASE: 3 CM
BH CV XLRA - TDI S': 13 CM/SEC
BUN BLD-MCNC: 6 MG/DL (ref 8–23)
BUN/CREAT SERPL: 8.7 (ref 7–25)
CALCIUM SPEC-SCNC: 8.6 MG/DL (ref 8.6–10.5)
CHLORIDE SERPL-SCNC: 107 MMOL/L (ref 98–107)
CO2 SERPL-SCNC: 22.9 MMOL/L (ref 22–29)
CREAT BLD-MCNC: 0.69 MG/DL (ref 0.57–1)
DEPRECATED RDW RBC AUTO: 44.4 FL (ref 37–54)
ERYTHROCYTE [DISTWIDTH] IN BLOOD BY AUTOMATED COUNT: 13.6 % (ref 11.7–13)
GFR SERPL CREATININE-BSD FRML MDRD: 85 ML/MIN/1.73
GLUCOSE BLD-MCNC: 82 MG/DL (ref 65–99)
HCT VFR BLD AUTO: 38 % (ref 35.6–45.5)
HGB BLD-MCNC: 11.7 G/DL (ref 11.9–15.5)
LEFT ATRIUM VOLUME INDEX: 26 ML/M2
LV EF 2D ECHO EST: 59 %
MAXIMAL PREDICTED HEART RATE: 155 BPM
MCH RBC QN AUTO: 27.7 PG (ref 26.9–32)
MCHC RBC AUTO-ENTMCNC: 30.8 G/DL (ref 32.4–36.3)
MCV RBC AUTO: 90 FL (ref 80.5–98.2)
PLATELET # BLD AUTO: 203 10*3/MM3 (ref 140–500)
PMV BLD AUTO: 9.6 FL (ref 6–12)
POTASSIUM BLD-SCNC: 3.9 MMOL/L (ref 3.5–5.2)
RBC # BLD AUTO: 4.22 10*6/MM3 (ref 3.9–5.2)
SINUS: 2.6 CM
SODIUM BLD-SCNC: 143 MMOL/L (ref 136–145)
STJ: 2.3 CM
STRESS TARGET HR: 132 BPM
WBC NRBC COR # BLD: 6.75 10*3/MM3 (ref 4.5–10.7)

## 2018-05-23 PROCEDURE — 93005 ELECTROCARDIOGRAM TRACING: CPT | Performed by: INTERNAL MEDICINE

## 2018-05-23 PROCEDURE — 99239 HOSP IP/OBS DSCHRG MGMT >30: CPT | Performed by: INTERNAL MEDICINE

## 2018-05-23 PROCEDURE — 93306 TTE W/DOPPLER COMPLETE: CPT

## 2018-05-23 PROCEDURE — 93010 ELECTROCARDIOGRAM REPORT: CPT | Performed by: INTERNAL MEDICINE

## 2018-05-23 PROCEDURE — 80048 BASIC METABOLIC PNL TOTAL CA: CPT | Performed by: INTERNAL MEDICINE

## 2018-05-23 PROCEDURE — 85027 COMPLETE CBC AUTOMATED: CPT | Performed by: INTERNAL MEDICINE

## 2018-05-23 PROCEDURE — 93306 TTE W/DOPPLER COMPLETE: CPT | Performed by: INTERNAL MEDICINE

## 2018-05-23 RX ORDER — CHLORTHALIDONE 25 MG/1
25 TABLET ORAL DAILY
Status: DISCONTINUED | OUTPATIENT
Start: 2018-05-23 | End: 2018-05-23 | Stop reason: HOSPADM

## 2018-05-23 RX ORDER — AMLODIPINE BESYLATE 10 MG/1
10 TABLET ORAL DAILY
Qty: 30 TABLET | Refills: 6 | Status: SHIPPED | OUTPATIENT
Start: 2018-05-23 | End: 2018-07-26

## 2018-05-23 RX ORDER — CHLORTHALIDONE 25 MG/1
25 TABLET ORAL DAILY
Qty: 30 TABLET | Refills: 6 | Status: SHIPPED | OUTPATIENT
Start: 2018-05-23 | End: 2018-05-30

## 2018-05-23 RX ADMIN — ASPIRIN 81 MG: 81 TABLET ORAL at 09:19

## 2018-05-23 RX ADMIN — AMLODIPINE BESYLATE 10 MG: 10 TABLET ORAL at 11:15

## 2018-05-23 RX ADMIN — ATORVASTATIN CALCIUM 10 MG: 10 TABLET, FILM COATED ORAL at 09:30

## 2018-05-23 RX ADMIN — PANTOPRAZOLE SODIUM 40 MG: 40 TABLET, DELAYED RELEASE ORAL at 09:19

## 2018-05-23 RX ADMIN — METOPROLOL TARTRATE 50 MG: 50 TABLET, FILM COATED ORAL at 09:19

## 2018-05-23 RX ADMIN — LAMOTRIGINE 100 MG: 100 TABLET ORAL at 09:19

## 2018-05-23 RX ADMIN — DULOXETINE HYDROCHLORIDE 120 MG: 60 CAPSULE, DELAYED RELEASE ORAL at 09:19

## 2018-05-23 RX ADMIN — CHLORTHALIDONE 25 MG: 25 TABLET ORAL at 11:15

## 2018-05-23 RX ADMIN — ESCITALOPRAM 10 MG: 10 TABLET, FILM COATED ORAL at 09:19

## 2018-05-24 ENCOUNTER — TELEPHONE (OUTPATIENT)
Dept: CARDIOLOGY | Facility: CLINIC | Age: 68
End: 2018-05-24

## 2018-05-24 NOTE — TELEPHONE ENCOUNTER
This is a CS pt and he is out this week, would you mind addressing this please? If you can't let me know and I will pass it on.  Pt called. She was discharged yesterday from [facility]. She was wanting to know what kind of activity she can do. She knows about the lifting 10lbs, but she wanted to know about the stairs in her home and cleaning activities. She is feeling fine, she just wants to make sure she isn't overdoing it. Please advise.    Thanks,  Karyn

## 2018-05-30 ENCOUNTER — TELEPHONE (OUTPATIENT)
Dept: CARDIOLOGY | Facility: CLINIC | Age: 68
End: 2018-05-30

## 2018-05-30 DIAGNOSIS — I10 ESSENTIAL HYPERTENSION: Primary | ICD-10-CM

## 2018-05-30 RX ORDER — CHLORTHALIDONE 50 MG/1
50 TABLET ORAL DAILY
Qty: 30 TABLET | Refills: 6 | Status: SHIPPED | OUTPATIENT
Start: 2018-05-30 | End: 2018-06-14

## 2018-05-30 NOTE — TELEPHONE ENCOUNTER
Pt called. She said that her BP has been running in 150's/90's. She noted that you added chlorthalidone 25mg qd and increased her amlodipine to 10mg qd. She would like to know if she needs to increase her medication again.   She is also taking metoprolol tart 50mg bid.  She can be reached at #[phone] if need be. Please advise.    Thanks,  Karyn

## 2018-06-13 ENCOUNTER — LAB (OUTPATIENT)
Dept: LAB | Facility: HOSPITAL | Age: 68
End: 2018-06-13

## 2018-06-13 DIAGNOSIS — I10 ESSENTIAL HYPERTENSION: ICD-10-CM

## 2018-06-13 LAB
ANION GAP SERPL CALCULATED.3IONS-SCNC: 16.7 MMOL/L
BUN BLD-MCNC: 25 MG/DL (ref 8–23)
BUN/CREAT SERPL: 16.1 (ref 7–25)
CALCIUM SPEC-SCNC: 9.8 MG/DL (ref 8.6–10.5)
CHLORIDE SERPL-SCNC: 98 MMOL/L (ref 98–107)
CO2 SERPL-SCNC: 26.3 MMOL/L (ref 22–29)
CREAT BLD-MCNC: 1.55 MG/DL (ref 0.57–1)
GFR SERPL CREATININE-BSD FRML MDRD: 34 ML/MIN/1.73
GLUCOSE BLD-MCNC: 96 MG/DL (ref 65–99)
POTASSIUM BLD-SCNC: 3.7 MMOL/L (ref 3.5–5.2)
SODIUM BLD-SCNC: 141 MMOL/L (ref 136–145)

## 2018-06-13 PROCEDURE — 36415 COLL VENOUS BLD VENIPUNCTURE: CPT

## 2018-06-13 PROCEDURE — 80048 BASIC METABOLIC PNL TOTAL CA: CPT

## 2018-06-14 RX ORDER — CARVEDILOL 6.25 MG/1
6.25 TABLET ORAL 2 TIMES DAILY
Qty: 60 TABLET | Refills: 11 | Status: SHIPPED | OUTPATIENT
Start: 2018-06-14 | End: 2018-06-29 | Stop reason: SDUPTHER

## 2018-06-20 ENCOUNTER — TELEPHONE (OUTPATIENT)
Dept: CARDIOLOGY | Facility: CLINIC | Age: 68
End: 2018-06-20

## 2018-06-20 NOTE — TELEPHONE ENCOUNTER
Pt called. She said that since you switched her from the chlorthalidone to the carvedilol 6.25mg bid, she fells much better. She said her BP is running around 120's/80's. She did have some swelling in her right foot, but it has improved some.   She would also like to know if she needs a repeat BMP prior to her 6/29/18 appt. Please advise.    Thanks,  Karyn

## 2018-06-21 ENCOUNTER — OFFICE VISIT (OUTPATIENT)
Dept: SLEEP MEDICINE | Facility: HOSPITAL | Age: 68
End: 2018-06-21
Attending: INTERNAL MEDICINE

## 2018-06-21 DIAGNOSIS — G47.33 OSA ON CPAP: Primary | ICD-10-CM

## 2018-06-21 DIAGNOSIS — G47.21 CIRCADIAN RHYTHM SLEEP DISORDER, DELAYED SLEEP PHASE TYPE: ICD-10-CM

## 2018-06-21 DIAGNOSIS — Z99.89 OSA ON CPAP: Primary | ICD-10-CM

## 2018-06-21 DIAGNOSIS — G47.14 HYPERSOMNIA DUE TO ANOTHER MEDICAL CONDITION: ICD-10-CM

## 2018-06-21 PROCEDURE — 99214 OFFICE O/P EST MOD 30 MIN: CPT | Performed by: INTERNAL MEDICINE

## 2018-06-21 PROCEDURE — G0463 HOSPITAL OUTPT CLINIC VISIT: HCPCS

## 2018-06-21 NOTE — PROGRESS NOTES
Follow Up [facility] Disorders [facility] Note     Chief Complaint:  FUENTES     Primary Care Physician: Pierre Chaudhry Jr., MD    Interval History:   The patient was last seen by me in October.  He did have malignant hypertension with subsequent non-ST elevation myocardial infarction in May of this year.  She is improving.    On the sleep standpoint she is stable.  She does go to bed around 3 or 5 AM and awakens between noon and 3 PM.  She feels like her nasal pillows don't fit well.  Her sinuses are congested.  [place] Sleepiness Scale is abnormal at 10.    Review of Systems:  Recorded on the Sleep Questionnaire.  Unremarkable except for nasal congestion and anxiety and depression.    Past medical history and past family history reviewed in [facility].    Social History:  Caffeine minimal.  She is a retired RN.  Social History     Social History   • Marital status: Single     Social History Main Topics   • Smoking status: Never Smoker   • Alcohol use No   • Drug use: Unknown     Other Topics Concern   • Not on file       Allergies:  Erythromycin     Medication Review:  Her list was reviewed.      Vital Signs:  Height 62 inches, weight 180 pounds and BMI obese at 33.    Physical Exam:    Constitutional:  Well developed white female and appears in no apparent distress.  Awake & oriented times 3.  Normal mood with normal recent and remote memory and normal judgement.  Eyes:  Conjunctivae normal.  Oropharynx:  moist mucous membranes without exudate and a large tongue and normal uvula and 1+ tonsillar hypertrophy narrowing of the posterior pharyngeal opening and class II-III MP airway   Please see sleep disorder Center physical exam form for further details.     Results Review:  DME is [facility] and she uses a nasal interface.  Downloads between December 23 in June 20, 2018 compliances 96%.  Average usage is 7 hours and 27 minutes.  Average AHI is normal without leak.  Average AutoCPAP pressure is 12.1 and her auto CPAP is  8-20.       Impression:   Obstructive sleep apnea adequately treated with auto titrating CPAP with good compliance and usage  Circadian rhythm sleep disorder, delayed sleep phase type  Hypersomnolence secondary to the above      Plan:  Good sleep hygiene measures should be maintained.  Weight loss would be beneficial in this patient who is obese by BMI.  The patient is benefiting from the treatment being provided.     The patient will continue auto CPAP.  The sleep disorder staff did show her a different interfaces.  A new prescription will be sent to her DME for supplies.    The patient will call for any problems and will follow up in 6 months.      Sukhjinder Connelly MD  Sleep Medicine  06/24/18  10:09 AM

## 2018-06-29 ENCOUNTER — OFFICE VISIT (OUTPATIENT)
Dept: CARDIOLOGY | Facility: CLINIC | Age: 68
End: 2018-06-29

## 2018-06-29 VITALS
SYSTOLIC BLOOD PRESSURE: 140 MMHG | BODY MASS INDEX: 33.42 KG/M2 | HEART RATE: 80 BPM | DIASTOLIC BLOOD PRESSURE: 100 MMHG | HEIGHT: 62 IN | WEIGHT: 181.6 LBS

## 2018-06-29 DIAGNOSIS — I10 ESSENTIAL HYPERTENSION: Primary | ICD-10-CM

## 2018-06-29 PROCEDURE — 99214 OFFICE O/P EST MOD 30 MIN: CPT | Performed by: INTERNAL MEDICINE

## 2018-06-29 RX ORDER — CARVEDILOL 12.5 MG/1
12.5 TABLET ORAL 2 TIMES DAILY
Qty: 60 TABLET | Refills: 3 | Status: SHIPPED | OUTPATIENT
Start: 2018-06-29 | End: 2018-07-13 | Stop reason: SDUPTHER

## 2018-06-29 NOTE — PROGRESS NOTES
Katelyn Sr  1950  Date of Office Visit: 06/29/2018  Encounter Provider: Aleks Velazquez MD  Place of Service: [facility] CARDIOLOGY      CHIEF COMPLAINT:  Essential hypertension    HISTORY OF PRESENT ILLNESS:  67 y.o. female with a history of hypertension, hyperlipidemia, sleep apnea (CPAP), GERD. She presented to the ED in May 2018 c/o chest pain that began earlier in the morning. It initially resolved but she had intermittent episodes of squeezing. Per her report her chest pain initially was severe in intensity and then the occasional squeezing was just mild in intensity.  It did not increase with deep inspiration or positional alteration.  She did have associated diaphoresis but no shortness of breath.  This went away on its own prior to coming into the emergency room.  She saw her PCP later in the day and was advised to come to the ED due to a troponin of 0.208. She took 81mg aspirin prior to arrival. On arrival her BP was 210/110 and heart rate was 81. EKG showed NSR, rate 75, non specific T wave changes in lead III and aVF. Labs were unremarkable except troponin of 0.208.  This down trended appropriately.  She underwent coronary angiography with no coronary artery disease.  Her echo showed no aortic pathology of the aortic root or ascending aorta.  She was not hypoxic or tachycardic.  Her blood pressure was controlled with increasing doses of amlodipine and addition of chlorthalidone    She had her chlorthalidone stopped because she had acute kidney injury and volume depletion actually while she was taking that. She was moved over to carvedilol which she has been tolerating very well. She still states that she has dyspnea on exertion with exercise and actually also complains of bilateral thigh discomfort and burning when she walks. This is moderate in intensity and tends to limit her exercise. It has been going on for multiple months. It is resolved with rest.  She denies any chest pain with activity.        Review of Systems   Constitution: Negative for fever, weakness and malaise/fatigue.   HENT: Negative for nosebleeds and sore throat.    Eyes: Negative for blurred vision and double vision.   Cardiovascular: Negative for chest pain, claudication, palpitations and syncope.   Respiratory: Negative for cough, shortness of breath and snoring.    Endocrine: Negative for cold intolerance, heat intolerance and polydipsia.   Skin: Negative for itching, poor wound healing and rash.   Musculoskeletal: Negative for joint pain, joint swelling, muscle weakness and myalgias.   Gastrointestinal: Negative for abdominal pain, melena, nausea and vomiting.   Neurological: Negative for light-headedness, loss of balance, seizures and vertigo.   Psychiatric/Behavioral: Negative for altered mental status and depression.       Past Medical History:   Diagnosis Date   • Depression    • GERD (gastroesophageal reflux disease)    • Hyperlipidemia    • Hypertension        The following portions of the patient's history were reviewed and updated as appropriate: Social history , Family history and Surgical history     Current Outpatient Prescriptions on File Prior to Visit   Medication Sig Dispense Refill   • amLODIPine (NORVASC) 10 MG tablet Take 1 tablet by mouth Daily. 30 tablet 6   • aspirin 81 MG EC tablet Take 81 mg by mouth Daily.     • carvedilol (COREG) 6.25 MG tablet Take 1 tablet by mouth 2 (Two) Times a Day. 60 tablet 11   • DULoxetine (CYMBALTA) 60 MG capsule Take 60 mg by mouth 2 (Two) Times a Day.     • escitalopram (LEXAPRO) 10 MG tablet Take 10 mg by mouth Daily.     • lamoTRIgine (LAMICTAL) 100 MG tablet Take 100 mg by mouth Daily.     • Multiple Vitamins-Iron (QC DAILY MULTIVITAMINS/IRON) tablet Take 1 tablet by mouth Daily.     • pantoprazole (PROTONIX) 40 MG EC tablet Take 40 mg by mouth Daily.     • raNITIdine (ZANTAC) 300 MG tablet Take 300 mg by mouth Daily.     • simvastatin  "(ZOCOR) 10 MG tablet Take 10 mg by mouth Daily.     • traZODone (DESYREL) 50 MG tablet Take 25 mg by mouth Every Night.       No current facility-administered medications on file prior to visit.        Allergies   Allergen Reactions   • Erythromycin Rash       Vitals:    06/29/18 1221   BP: 140/100   Pulse: 80   Weight: 82.4 kg (181 lb 9.6 oz)   Height: 157.5 cm (62\")     Physical Exam   Constitutional: She is oriented to person, place, and time. She appears well-developed and well-nourished.   HENT:   Head: Normocephalic and atraumatic.   Eyes: Conjunctivae and EOM are normal. No scleral icterus.   Neck: Normal range of motion. Neck supple. Normal carotid pulses, no hepatojugular reflux and no JVD present. Carotid bruit is not present. No tracheal deviation present. No thyromegaly present.   Cardiovascular: Normal rate and regular rhythm.  Exam reveals no gallop and no friction rub.    No murmur heard.  Pulses:       Carotid pulses are 2+ on the right side, and 2+ on the left side.       Radial pulses are 2+ on the right side, and 2+ on the left side.        Femoral pulses are 2+ on the right side, and 2+ on the left side.       Dorsalis pedis pulses are 2+ on the right side, and 2+ on the left side.        Posterior tibial pulses are 2+ on the right side, and 2+ on the left side.   Pulmonary/Chest: Breath sounds normal. No respiratory distress. She has no decreased breath sounds. She has no wheezes. She has no rhonchi. She has no rales. She exhibits no tenderness.   Abdominal: Soft. Bowel sounds are normal. She exhibits no distension. There is no tenderness. There is no rebound.   Musculoskeletal: She exhibits no edema or deformity.   Neurological: She is alert and oriented to person, place, and time. She has normal strength. No sensory deficit.   Skin: No rash noted. No erythema.   Psychiatric: She has a normal mood and affect. Her behavior is normal.     No components found for: CBC  No results found for: " CMP  No components found for: LIPID  No results found for: BMP    Procedures     5/23/18  · Left ventricular systolic function is normal. Calculated EF = 59%. Estimated EF was in agreement with the calculated EF. Estimated EF = 59%. Normal left ventricular cavity size and wall thickness noted. All left ventricular wall segments contract normally. Left ventricular diastolic dysfunction is noted (grade I) consistent with impaired relaxation.  · Trace mitral valve regurgitation is present.  · Physiologic tricuspid valve regurgitation is present. Estimated right ventricular systolic pressure from tricuspid regurgitation is normal (<35 mmHg). Calculated right ventricular systolic pressure from tricuspid regurgitation is 25 mmHg.      5/22/18  Conclusions:   1.  Normal coronary angiography  2.  Normal left ventricular size and systolic function.  Distal inferior wall hypokinesis.  Likely catheter induced    DISCUSSION/SUMMARYVery pleasant 67-year-old female with a medical history of hypertension, hyperlipidemia, malignant hypertension, and type 2 myocardial infarction in the setting of that who presented back for followup. She has no coronary artery disease. Her blood pressure is better controlled but still not at goal. She also complains of what sounds to be more myalgias than it does claudication. She has normal peripheral pulses.    1.  Essential hypertension, not quite at goal.  - I am going to continue her amlodipine but I would like to increase her carvedilol to 12.5 mg p.o. b.i.d.   2.  Hyperlipidemia: I am going to hold her simvastatin for the next couple of weeks and see if her myalgias improve. If they do, I will get her back on Pravachol or Livalo.  3.  Acute kidney injury: She had this in the setting of chlorthalidone. I will get her lab work to evaluate.

## 2018-07-13 ENCOUNTER — TELEPHONE (OUTPATIENT)
Dept: CARDIOLOGY | Facility: CLINIC | Age: 68
End: 2018-07-13

## 2018-07-13 RX ORDER — CARVEDILOL 12.5 MG/1
25 TABLET ORAL 2 TIMES DAILY
Qty: 60 TABLET | Refills: 3 | Status: SHIPPED | OUTPATIENT
Start: 2018-07-13 | End: 2018-07-16 | Stop reason: SDUPTHER

## 2018-07-13 NOTE — TELEPHONE ENCOUNTER
Patient called regarding her bp. You wanted an update.  Her bp is 138/82 and heart rate averages in the 80's. Last night though her bp was 155/101 and she took it two more times 146/106, 154/103    The night before that 121/77 after a nap.  Several pressures have been in the 130's/80's, she is on a strange sleep schedule and stated she needs to take the meds more regularly. When she was out of norvasc a couple of days, her bp goes up.  Patient left a second message because the phone cut off.  Her bp does go down with the carvedilol 12.5 and amlodipine 10 mg, but it does run in the 150's still at times.  Do you want to make any changes?    [phone]

## 2018-07-13 NOTE — TELEPHONE ENCOUNTER
I called pt and advised and sent it in. She also said her legs were a little better but not much off of the simvastatin.  She wanted to tell you how wonderful your care in the hospital was.    Karyn

## 2018-07-16 RX ORDER — CARVEDILOL 12.5 MG/1
25 TABLET ORAL 2 TIMES DAILY
Qty: 120 TABLET | Refills: 3 | Status: SHIPPED | OUTPATIENT
Start: 2018-07-16 | End: 2018-11-04 | Stop reason: SDUPTHER

## 2018-07-26 RX ORDER — NIFEDIPINE 90 MG/1
90 TABLET, EXTENDED RELEASE ORAL DAILY
Qty: 30 TABLET | Refills: 6 | Status: SHIPPED | OUTPATIENT
Start: 2018-07-26 | End: 2018-10-24

## 2018-07-26 NOTE — TELEPHONE ENCOUNTER
Patient called to report her BP has been creeping up  147/95, 149/84, 153/88, 156/98  She is taking carvedilol 25mg 1 po bid and norvasc 10mg q am    Also she has been off simvastatin for about a month and cannot tell much difference     [phone]

## 2018-07-26 NOTE — TELEPHONE ENCOUNTER
Please tell her to stop the amlodipine and I will call in a higher dose of nifedipine.  She should call us back in 1 week

## 2018-08-23 ENCOUNTER — TELEPHONE (OUTPATIENT)
Dept: CARDIOLOGY | Facility: CLINIC | Age: 68
End: 2018-08-23

## 2018-08-27 ENCOUNTER — LAB (OUTPATIENT)
Dept: LAB | Facility: HOSPITAL | Age: 68
End: 2018-08-27

## 2018-08-27 ENCOUNTER — TRANSCRIBE ORDERS (OUTPATIENT)
Dept: LAB | Facility: HOSPITAL | Age: 68
End: 2018-08-27

## 2018-08-27 ENCOUNTER — HOSPITAL ENCOUNTER (OUTPATIENT)
Dept: CT IMAGING | Facility: HOSPITAL | Age: 68
Discharge: HOME | End: 2018-08-27
Admitting: INTERNAL MEDICINE

## 2018-08-27 ENCOUNTER — TRANSCRIBE ORDERS (OUTPATIENT)
Dept: ADMINISTRATIVE | Facility: HOSPITAL | Age: 68
End: 2018-08-27

## 2018-08-27 DIAGNOSIS — R10.9 ACUTE ABDOMINAL PAIN: ICD-10-CM

## 2018-08-27 DIAGNOSIS — R10.9 ABDOMINAL PAIN, UNSPECIFIED ABDOMINAL LOCATION: Primary | ICD-10-CM

## 2018-08-27 DIAGNOSIS — R10.9 ACUTE ABDOMINAL PAIN: Primary | ICD-10-CM

## 2018-08-27 DIAGNOSIS — R10.9 ABDOMINAL PAIN, UNSPECIFIED ABDOMINAL LOCATION: ICD-10-CM

## 2018-08-27 PROCEDURE — 82565 ASSAY OF CREATININE: CPT

## 2018-08-27 PROCEDURE — 85652 RBC SED RATE AUTOMATED: CPT

## 2018-08-27 PROCEDURE — 85025 COMPLETE CBC W/AUTO DIFF WBC: CPT

## 2018-08-27 PROCEDURE — 82150 ASSAY OF AMYLASE: CPT

## 2018-08-27 PROCEDURE — 25010000002 IOPAMIDOL 61 % SOLUTION: Performed by: INTERNAL MEDICINE

## 2018-08-27 PROCEDURE — 36415 COLL VENOUS BLD VENIPUNCTURE: CPT

## 2018-08-27 PROCEDURE — 81001 URINALYSIS AUTO W/SCOPE: CPT

## 2018-08-27 PROCEDURE — 74177 CT ABD & PELVIS W/CONTRAST: CPT

## 2018-08-27 PROCEDURE — 80053 COMPREHEN METABOLIC PANEL: CPT

## 2018-08-27 PROCEDURE — 83690 ASSAY OF LIPASE: CPT

## 2018-08-27 RX ADMIN — IOPAMIDOL 85 ML: 612 INJECTION, SOLUTION INTRAVENOUS at 16:18

## 2018-09-18 ENCOUNTER — TELEPHONE (OUTPATIENT)
Dept: CARDIOLOGY | Facility: CLINIC | Age: 68
End: 2018-09-18

## 2018-09-18 NOTE — TELEPHONE ENCOUNTER
See 7/13/18 message.  Pt called. She would like to know if she qualifies for cardiac rehab.  She said she is still has been having leg weakness and cramping. She has also seen her endocrinologist Dr. Salcedo and the office note and labs are in your inbox.   You asked for this. In 7/13 msg.  Her BP has also been running in the 140-150/90's, however she has been off on her medication schedule. I told her to take it regularly and call us back in one week about that.    Can you please advise about the cardiac rehab.    Thanks,  Karyn

## 2018-09-19 NOTE — TELEPHONE ENCOUNTER
Called pt advising. And she said she will contact her PCP about physical therapy.  And will call back next week with BPs.    Karyn

## 2018-10-05 ENCOUNTER — TRANSCRIBE ORDERS (OUTPATIENT)
Dept: ADMINISTRATIVE | Facility: HOSPITAL | Age: 68
End: 2018-10-05

## 2018-10-05 DIAGNOSIS — G95.19 NEUROGENIC CLAUDICATION: Primary | ICD-10-CM

## 2018-10-12 ENCOUNTER — HOSPITAL ENCOUNTER (OUTPATIENT)
Dept: MRI IMAGING | Facility: HOSPITAL | Age: 68
Discharge: HOME | End: 2018-10-12
Attending: INTERNAL MEDICINE | Admitting: INTERNAL MEDICINE

## 2018-10-12 DIAGNOSIS — G95.19 NEUROGENIC CLAUDICATION: ICD-10-CM

## 2018-10-12 PROCEDURE — 72148 MRI LUMBAR SPINE W/O DYE: CPT

## 2018-10-22 ENCOUNTER — TELEPHONE (OUTPATIENT)
Dept: CARDIOLOGY | Facility: CLINIC | Age: 68
End: 2018-10-22

## 2018-10-22 NOTE — TELEPHONE ENCOUNTER
Pt called. She said that she had accidentally ran out of her Nifedipine 90 mg qd. She was going to the store to go pick it up after being out for a couple of days, and noticed she felt better off of it. She said the muscle fatigue she had been feeling was significantly better. And doesn't want to restart that particular medication.  See telephone notes from 7/13/18 and 9/18/18.  What she would like to know, is there anything else she could take in place of Nifedipine?  Please advise.    Thanks,  Karyn

## 2018-10-24 RX ORDER — LOSARTAN POTASSIUM 50 MG/1
50 TABLET ORAL DAILY
Qty: 30 TABLET | Refills: 5 | Status: SHIPPED | OUTPATIENT
Start: 2018-10-24 | End: 2019-01-31

## 2018-10-24 NOTE — TELEPHONE ENCOUNTER
I guess give her a call and see if she had the same problems wall she was taking the amlodipine therapy

## 2018-10-24 NOTE — TELEPHONE ENCOUNTER
She is already on that dose. So Dr. Velazquez said to put her on losartan 50mg.  I called the pt and sent it in.  Karyn

## 2018-11-05 RX ORDER — CARVEDILOL 12.5 MG/1
TABLET ORAL
Qty: 120 TABLET | Refills: 2 | Status: SHIPPED | OUTPATIENT
Start: 2018-11-05 | End: 2019-01-10

## 2018-11-09 ENCOUNTER — OFFICE VISIT (OUTPATIENT)
Dept: NEUROSURGERY | Facility: CLINIC | Age: 68
End: 2018-11-09

## 2018-11-09 VITALS
HEART RATE: 65 BPM | DIASTOLIC BLOOD PRESSURE: 85 MMHG | SYSTOLIC BLOOD PRESSURE: 155 MMHG | WEIGHT: 178.2 LBS | BODY MASS INDEX: 32.79 KG/M2 | HEIGHT: 62 IN

## 2018-11-09 DIAGNOSIS — M51.34 DDD (DEGENERATIVE DISC DISEASE), THORACIC: Primary | ICD-10-CM

## 2018-11-09 DIAGNOSIS — M50.30 DEGENERATION OF CERVICAL INTERVERTEBRAL DISC: ICD-10-CM

## 2018-11-09 PROCEDURE — 99204 OFFICE O/P NEW MOD 45 MIN: CPT | Performed by: PHYSICIAN ASSISTANT

## 2018-11-09 NOTE — PROGRESS NOTES
Subjective   Patient ID: Katelyn Sr is a 68 y.o. female is being seen for consultation today at the request of Pierre Chaudhry Jr.,* for general arm and leg weakness. She denies any cause injury.  She has seen a chiropractor with mild relief.  Mrs. Sr takes Advil 400 mg prn for pain.   Neck Pain    This is a chronic problem. The problem occurs constantly. The problem has been gradually worsening. The quality of the pain is described as aching. The pain is at a severity of 5/10. The pain is moderate. The symptoms are aggravated by position. The pain is worse during the day. Associated symptoms include weakness (bilateral arms anf legs ). She has tried ice and chiropractic manipulation for the symptoms. The treatment provided mild relief.   Back Pain   The problem has been gradually worsening since onset. The quality of the pain is described as aching. The pain is at a severity of 5/10. The pain is moderate. The pain is worse during the day. The symptoms are aggravated by position. Associated symptoms include weakness (bilateral arms anf legs ). Pertinent negatives include no bladder incontinence or bowel incontinence. She has tried ice and chiropractic manipulation for the symptoms. The treatment provided mild relief.   Extremity Weakness    The pain is present in the left arm, right arm, left upper leg and right upper leg. The quality of the pain is described as aching. The pain is at a severity of 2/10.       The following portions of the patient's history were reviewed and updated as appropriate: allergies, current medications, past family history, past medical history, past social history, past surgical history and problem list.    Review of Systems   Gastrointestinal: Negative for bowel incontinence.   Genitourinary: Negative for bladder incontinence and difficulty urinating.   Musculoskeletal: Positive for back pain, extremity weakness and neck pain.   Neurological: Positive for weakness (bilateral  arms anf legs ).   All other systems reviewed and are negative.      Objective   Physical Exam   Constitutional: She is oriented to person, place, and time. She appears well-developed and well-nourished.   HENT:   Head: Normocephalic and atraumatic.   Right Ear: External ear normal.   Left Ear: External ear normal.   Eyes: Pupils are equal, round, and reactive to light. Conjunctivae and EOM are normal. Right eye exhibits no discharge. Left eye exhibits no discharge.   Neck: Normal range of motion. Neck supple. No tracheal deviation present.   Pulmonary/Chest: Effort normal. No stridor. No respiratory distress.   Musculoskeletal: Normal range of motion. She exhibits no edema, tenderness or deformity.   Neurological: She is alert and oriented to person, place, and time. She has normal reflexes. She displays no atrophy, no tremor and normal reflexes. No cranial nerve deficit or sensory deficit. She exhibits normal muscle tone. She displays a negative Romberg sign. She displays no seizure activity. Coordination and gait normal.   No long tract signs    5-/5 bilateral iliopsoas weakness   Skin: Skin is warm and dry.   Psychiatric: She has a normal mood and affect. Her behavior is normal. Judgment and thought content normal.   Nursing note and vitals reviewed.    Neurologic Exam     Mental Status   Oriented to person, place, and time.     Cranial Nerves     CN III, IV, VI   Pupils are equal, round, and reactive to light.  Extraocular motions are normal.       Assessment/Plan   Independent Review of Radiographic Studies:    I did review the lumbar spine MRI from October 16, 2018.  Overall there is no significant change since December 2014.  It does show multilevel degenerative disc disease with dextroscoliosis with the apex at L3-L4.  There is moderate foraminal narrowing bilaterally at L3-L4 but no severe central stenosis.  Medical Decision Making:    Ms. Sr underwent a previous ACDF at C6-C7 by Dr. Nix in  2003.  She was referred back to us by Dr. Chaudhry. She has known lumbar degenerative disc disease and has suffered with intermittent back pain as well as intermittent neck pain for many years.  She states that over the past 3-5 years she has noticed progressive weakness in her arms and legs.  She suffered a NSTEMI in May 2018 secondary to malignant hypertension.  She was originally placed on multiple medications for her blood pressure including Procardia but felt that the Procardia made a sense of weakness worse.  She was then taken off of the Procardia with some improvement in her symptoms.  She denies any focal weakness but just admits to a sense of generalized weakness particularly after any prolonged walking or standing.  No change in her chronic neck or back pain.  No radicular symptoms or numbness or tingling or bowel or bladder dysfunction.    I did review the lumbar spine MRI with the patient.  I really do not see any severe stenosis or severe nerve compression.  I suggested getting a cervical and thoracic MRI to verify that there is no severe central stenosis in the cervical or thoracic spine that could account for her sense of weakness.  I also suggested she try a course of physical therapy.    She will follow-up after the new imaging and call sooner with concerns.        Ivan was seen today for neck pain, back pain and extremity weakness.    Diagnoses and all orders for this visit:    DDD (degenerative disc disease), thoracic  -     MRI Thoracic Spine Without Contrast; Future  -     Ambulatory Referral to Physical Therapy Evaluate and treat (2-3 times per week for 6wks for strengthening)    Degeneration of cervical intervertebral disc  -     MRI Cervical Spine With & Without Contrast; Future  -     Ambulatory Referral to Physical Therapy Evaluate and treat (2-3 times per week for 6wks for strengthening)      Return for follow up after radiology test.

## 2018-11-21 NOTE — PROGRESS NOTES
Subjective   Patient ID: Katelyn Sr is a 68 y.o. female is here today for follow-up for arm and leg weakness after Cervical and Thoracic MRI. She states her symptoms are unchanged since last visit. Mrs. Sr takes Advil 400 mg prn for pain.     She has not started PT.           Back Pain   The problem has been gradually worsening since onset. The quality of the pain is described as aching. The pain is at a severity of 2/10. The pain is mild. The pain is worse during the day. The symptoms are aggravated by position. Associated symptoms include weakness. Pertinent negatives include no bladder incontinence or bowel incontinence. She has tried ice and chiropractic manipulation for the symptoms. The treatment provided mild relief.   Extremity Weakness    The pain is present in the left arm, right arm, left upper leg and right upper leg. This is a chronic problem. The current episode started more than 1 year ago. The problem occurs intermittently. The quality of the pain is described as aching. The pain is at a severity of 2/10.       The following portions of the patient's history were reviewed and updated as appropriate: allergies, current medications, past family history, past medical history, past social history, past surgical history and problem list.    Review of Systems   Gastrointestinal: Negative for bowel incontinence.   Genitourinary: Negative for bladder incontinence and difficulty urinating.   Musculoskeletal: Positive for back pain and extremity weakness.   Neurological: Positive for weakness.   All other systems reviewed and are negative.      Objective   Physical Exam   Constitutional: She is oriented to person, place, and time. She appears well-developed and well-nourished.   HENT:   Head: Normocephalic and atraumatic.   Right Ear: External ear normal.   Left Ear: External ear normal.   Eyes: Conjunctivae and EOM are normal. Pupils are equal, round, and reactive to light. Right eye exhibits no  discharge. Left eye exhibits no discharge.   Neck: Normal range of motion. Neck supple. No tracheal deviation present.   Cardiovascular: Intact distal pulses.   Pulmonary/Chest: Effort normal. No stridor. No respiratory distress.   Musculoskeletal: Normal range of motion. She exhibits no edema, tenderness or deformity.   Neurological: She is alert and oriented to person, place, and time. She has normal strength and normal reflexes. She displays no atrophy, no tremor and normal reflexes. No cranial nerve deficit or sensory deficit. She exhibits normal muscle tone. She displays a negative Romberg sign. She displays no seizure activity. Coordination and gait normal.   No long tract signs   Skin: Skin is warm and dry.   Psychiatric: She has a normal mood and affect. Her behavior is normal. Judgment and thought content normal.   Nursing note and vitals reviewed.    Neurologic Exam     Mental Status   Oriented to person, place, and time.     Cranial Nerves     CN III, IV, VI   Pupils are equal, round, and reactive to light.  Extraocular motions are normal.     Motor Exam     Strength   Strength 5/5 throughout.       Assessment/Plan   Independent Review of Radiographic Studies:    Did review the cervical and thoracic MRIs from November 24.  They do demonstrate multilevel degeneration as well as her previous ACDF at C6-C7.  There is mild stenosis at C4-C5 C5-C6 but no significant stenosis in either the cervical or thoracic spine.  Medical Decision Making:    Ms. Sr underwent a previous ACDF at C6-C7 by Dr. Nix in 2003.  She was referred back to us by Dr. Chaudhry. She has known lumbar degenerative disc disease and has suffered with intermittent back pain as well as intermittent neck pain for many years.  She states that over the past 3-5 years she has noticed progressive weakness in her arms and legs.  She suffered a NSTEMI in May 2018 secondary to malignant hypertension.  She was originally placed on multiple  medications for her blood pressure including Procardia but felt that the Procardia the subjective weakness worse. She was then taken off of the Procardia with some improvement in her symptoms.  She denies any focal weakness but just admits to a sense of generalized weakness particularly after any prolonged walking or standing.  No change in her chronic neck or back pain.  No radicular symptoms or numbness or tingling or bowel or bladder dysfunction.  Her lumbar imaging revealed multilevel degeneration but no severe stenosis or severe nerve compression to account for her sense of leg weakness.  I sent her for the cervical and thoracic MRI as discussed above.    I did review the MRI results with her and explained that I do not appreciate any stenosis to explain her weakness. Her exam does not reveal any weakness or long tract signs.       She admits that she lives a very sedentary lifestyle.  I suggested that she try some PT for overall conditioning and strengthening. She agrees to proceed. She will call as needed.             Katelyn was seen today for neck pain, back pain and extremity weakness.    Diagnoses and all orders for this visit:    Degeneration of cervical intervertebral disc  -     Ambulatory Referral to Physical Therapy Evaluate and treat (2-3 times per week for 8wks for general conditioning and strengthening)    DDD (degenerative disc disease), thoracic  -     Ambulatory Referral to Physical Therapy Evaluate and treat (2-3 times per week for 8wks for general conditioning and strengthening)    Disc degeneration, lumbar  -     Ambulatory Referral to Physical Therapy Evaluate and treat (2-3 times per week for 8wks for general conditioning and strengthening)      Return if symptoms worsen or fail to improve.

## 2018-11-24 ENCOUNTER — HOSPITAL ENCOUNTER (OUTPATIENT)
Dept: MRI IMAGING | Facility: HOSPITAL | Age: 68
Discharge: HOME | End: 2018-11-24
Admitting: PHYSICIAN ASSISTANT

## 2018-11-24 ENCOUNTER — HOSPITAL ENCOUNTER (OUTPATIENT)
Dept: MRI IMAGING | Facility: HOSPITAL | Age: 68
Discharge: HOME | End: 2018-11-24

## 2018-11-24 DIAGNOSIS — M50.30 DEGENERATION OF CERVICAL INTERVERTEBRAL DISC: ICD-10-CM

## 2018-11-24 DIAGNOSIS — M51.34 DDD (DEGENERATIVE DISC DISEASE), THORACIC: ICD-10-CM

## 2018-11-24 PROCEDURE — 72146 MRI CHEST SPINE W/O DYE: CPT

## 2018-11-24 PROCEDURE — 72156 MRI NECK SPINE W/O & W/DYE: CPT

## 2018-11-24 PROCEDURE — A9577 INJ MULTIHANCE: HCPCS | Performed by: PHYSICIAN ASSISTANT

## 2018-11-24 PROCEDURE — 82565 ASSAY OF CREATININE: CPT

## 2018-11-24 PROCEDURE — 0 GADOBENATE DIMEGLUMINE 529 MG/ML SOLUTION: Performed by: PHYSICIAN ASSISTANT

## 2018-11-24 RX ADMIN — GADOBENATE DIMEGLUMINE 16 ML: 529 INJECTION, SOLUTION INTRAVENOUS at 15:02

## 2018-11-26 LAB — CREAT BLDA-MCNC: 0.9 MG/DL (ref 0.6–1.3)

## 2018-11-27 ENCOUNTER — OFFICE VISIT (OUTPATIENT)
Dept: NEUROSURGERY | Facility: CLINIC | Age: 68
End: 2018-11-27

## 2018-11-27 VITALS
DIASTOLIC BLOOD PRESSURE: 94 MMHG | HEART RATE: 85 BPM | SYSTOLIC BLOOD PRESSURE: 168 MMHG | BODY MASS INDEX: 32.76 KG/M2 | HEIGHT: 62 IN | WEIGHT: 178 LBS

## 2018-11-27 DIAGNOSIS — M51.36 DISC DEGENERATION, LUMBAR: ICD-10-CM

## 2018-11-27 DIAGNOSIS — M51.34 DDD (DEGENERATIVE DISC DISEASE), THORACIC: ICD-10-CM

## 2018-11-27 DIAGNOSIS — M50.30 DEGENERATION OF CERVICAL INTERVERTEBRAL DISC: Primary | ICD-10-CM

## 2018-11-27 PROBLEM — M51.369 DISC DEGENERATION, LUMBAR: Status: ACTIVE | Noted: 2018-11-27

## 2018-11-27 PROCEDURE — 99213 OFFICE O/P EST LOW 20 MIN: CPT | Performed by: PHYSICIAN ASSISTANT

## 2018-12-13 ENCOUNTER — APPOINTMENT (OUTPATIENT)
Dept: SLEEP MEDICINE | Facility: HOSPITAL | Age: 68
End: 2018-12-13
Attending: INTERNAL MEDICINE

## 2018-12-19 ENCOUNTER — APPOINTMENT (OUTPATIENT)
Dept: SLEEP MEDICINE | Facility: HOSPITAL | Age: 68
End: 2018-12-19
Attending: INTERNAL MEDICINE

## 2019-01-07 ENCOUNTER — TELEPHONE (OUTPATIENT)
Dept: NEUROSURGERY | Facility: CLINIC | Age: 69
End: 2019-01-07

## 2019-01-07 NOTE — TELEPHONE ENCOUNTER
Yaw with [facility] Physical [facility] [place] called to report that they had the patient scheduled for 2 visits which she no-showed both visits, they got her r/s a third time and she showed up 35 minutes to that visit and they were unable to see her and she would not r/s while there she left without being r/s.

## 2019-01-10 ENCOUNTER — OFFICE VISIT (OUTPATIENT)
Dept: CARDIOLOGY | Facility: CLINIC | Age: 69
End: 2019-01-10

## 2019-01-10 VITALS
BODY MASS INDEX: 31.83 KG/M2 | WEIGHT: 173 LBS | HEART RATE: 75 BPM | SYSTOLIC BLOOD PRESSURE: 128 MMHG | DIASTOLIC BLOOD PRESSURE: 88 MMHG | HEIGHT: 62 IN

## 2019-01-10 DIAGNOSIS — E78.2 MIXED HYPERLIPIDEMIA: ICD-10-CM

## 2019-01-10 DIAGNOSIS — I10 ESSENTIAL HYPERTENSION: Primary | ICD-10-CM

## 2019-01-10 PROCEDURE — 93000 ELECTROCARDIOGRAM COMPLETE: CPT | Performed by: INTERNAL MEDICINE

## 2019-01-10 PROCEDURE — 99214 OFFICE O/P EST MOD 30 MIN: CPT | Performed by: INTERNAL MEDICINE

## 2019-01-10 RX ORDER — CARVEDILOL 25 MG/1
25 TABLET ORAL 2 TIMES DAILY
Qty: 180 TABLET | Refills: 3 | Status: SHIPPED | OUTPATIENT
Start: 2019-01-10 | End: 2019-09-30 | Stop reason: SDUPTHER

## 2019-01-10 NOTE — PROGRESS NOTES
Katelyn Sr  1950  Date of Office Visit: 1/10/19  Encounter Provider: Aleks Velazquez MD  Place of Service: [facility] CARDIOLOGY      CHIEF COMPLAINT:  Essential hypertension    HISTORY OF PRESENT ILLNESS:  68 y.o. female with a history of hypertension, hyperlipidemia, sleep apnea (CPAP), GERD. She presented to the ED in May 2018 c/o chest pain that began earlier in the morning. It initially resolved but she had intermittent episodes of squeezing. Per her report her chest pain initially was severe in intensity and then the occasional squeezing was just mild in intensity.  It did not increase with deep inspiration or positional alteration.  She did have associated diaphoresis but no shortness of breath.  This went away on its own prior to coming into the emergency room.  She saw her PCP later in the day and was advised to come to the ED due to a troponin of 0.208. She took 81mg aspirin prior to arrival. On arrival her BP was 210/110 and heart rate was 81. EKG showed NSR, rate 75, non specific T wave changes in lead III and aVF. Labs were unremarkable except troponin of 0.208.  This down trended appropriately.  She underwent coronary angiography with no coronary artery disease.  Her echo showed no aortic pathology of the aortic root or ascending aorta.  She was not hypoxic or tachycardic.  Her blood pressure was controlled with increasing doses of amlodipine and addition of chlorthalidone    She had her chlorthalidone stopped because she had acute kidney injury and volume depletion actually while she was taking that. She was moved over to carvedilol which she has been tolerating very well.  Since our last visit she has intermittently continued to notice blood pressures that range from 150-160 mmHg systolic.  Her blood pressure today is 128/88.  She is currently on the carvedilol and losartan.  No chest pain consistent with angina.  She does occasionally still have  palpitations that are short in duration and not associated chest pain or syncope.  These come on 2-3 times a week.      Review of Systems   Constitution: Negative for fever, weakness and malaise/fatigue.   HENT: Negative for nosebleeds and sore throat.    Eyes: Negative for blurred vision and double vision.   Cardiovascular: Positive for palpitations. Negative for chest pain, claudication and syncope.   Respiratory: Negative for cough, shortness of breath and snoring.    Endocrine: Negative for cold intolerance, heat intolerance and polydipsia.   Skin: Negative for itching, poor wound healing and rash.   Musculoskeletal: Negative for joint pain, joint swelling, muscle weakness and myalgias.   Gastrointestinal: Negative for abdominal pain, melena, nausea and vomiting.   Neurological: Negative for light-headedness, loss of balance, seizures and vertigo.   Psychiatric/Behavioral: Negative for altered mental status and depression.       Past Medical History:   Diagnosis Date   • Depression    • GERD (gastroesophageal reflux disease)    • Hyperlipidemia    • Hypertension    • Non-STEMI (non-ST elevated myocardial infarction) (CMS/[facility])    • FUENTES on CPAP        The following portions of the patient's history were reviewed and updated as appropriate: Social history , Family history and Surgical history     Current Outpatient Medications on File Prior to Visit   Medication Sig Dispense Refill   • aspirin 81 MG EC tablet Take 81 mg by mouth Daily.     • carvedilol (COREG) 12.5 MG tablet TAKE 2 TABLETS BY MOUTH TWICE A  tablet 2   • DULoxetine (CYMBALTA) 60 MG capsule Take 60 mg by mouth 2 (Two) Times a Day.     • escitalopram (LEXAPRO) 10 MG tablet Take 10 mg by mouth Daily.     • lamoTRIgine (LAMICTAL) 100 MG tablet Take 100 mg by mouth Daily.     • losartan (COZAAR) 50 MG tablet Take 1 tablet by mouth Daily. 30 tablet 5   • Multiple Vitamins-Iron (QC DAILY MULTIVITAMINS/IRON) tablet Take 1 tablet by mouth Daily.     •  "pantoprazole (PROTONIX) 40 MG EC tablet Take 40 mg by mouth Daily.     • raNITIdine (ZANTAC) 300 MG tablet Take 300 mg by mouth Daily.     • traZODone (DESYREL) 50 MG tablet Take 25 mg by mouth Every Night.     • [DISCONTINUED] simvastatin (ZOCOR) 10 MG tablet Take 10 mg by mouth Daily.       No current facility-administered medications on file prior to visit.        Allergies   Allergen Reactions   • Erythromycin Rash       Vitals:    01/10/19 1558   BP: 128/88   BP Location: Left arm   Patient Position: Sitting   Pulse: 75   Weight: 78.5 kg (173 lb)   Height: 157.5 cm (62\")     Physical Exam   Constitutional: She is oriented to person, place, and time. She appears well-developed and well-nourished.   HENT:   Head: Normocephalic and atraumatic.   Eyes: Conjunctivae and EOM are normal. No scleral icterus.   Neck: Normal range of motion. Neck supple. Normal carotid pulses, no hepatojugular reflux and no JVD present. Carotid bruit is not present. No tracheal deviation present. No thyromegaly present.   Cardiovascular: Normal rate and regular rhythm. Exam reveals no gallop and no friction rub.   No murmur heard.  Pulses:       Carotid pulses are 2+ on the right side, and 2+ on the left side.       Radial pulses are 2+ on the right side, and 2+ on the left side.        Femoral pulses are 2+ on the right side, and 2+ on the left side.       Dorsalis pedis pulses are 2+ on the right side, and 2+ on the left side.        Posterior tibial pulses are 2+ on the right side, and 2+ on the left side.   Pulmonary/Chest: Breath sounds normal. No respiratory distress. She has no decreased breath sounds. She has no wheezes. She has no rhonchi. She has no rales. She exhibits no tenderness.   Abdominal: Soft. Bowel sounds are normal. She exhibits no distension. There is no tenderness. There is no rebound.   Musculoskeletal: She exhibits no edema or deformity.   Neurological: She is alert and oriented to person, place, and time. She " has normal strength. No sensory deficit.   Skin: No rash noted. No erythema.   Psychiatric: She has a normal mood and affect. Her behavior is normal.     No components found for: CBC  No results found for: CMP  No components found for: LIPID  No results found for: BMP      ECG 12 Lead  Date/Time: 1/10/2019 4:42 PM  Performed by: Aleks Velazquez MD  Authorized by: Aleks Velazquez MD   Comparison: compared with previous ECG from 6/29/2018  Similar to previous ECG  Rhythm: sinus rhythm  Rate: normal  QRS axis: normal  Clinical impression: normal ECG             5/23/18  · Left ventricular systolic function is normal. Calculated EF = 59%. Estimated EF was in agreement with the calculated EF. Estimated EF = 59%. Normal left ventricular cavity size and wall thickness noted. All left ventricular wall segments contract normally. Left ventricular diastolic dysfunction is noted (grade I) consistent with impaired relaxation.  · Trace mitral valve regurgitation is present.  · Physiologic tricuspid valve regurgitation is present. Estimated right ventricular systolic pressure from tricuspid regurgitation is normal (<35 mmHg). Calculated right ventricular systolic pressure from tricuspid regurgitation is 25 mmHg.      5/22/18  Conclusions:   1.  Normal coronary angiography  2.  Normal left ventricular size and systolic function.  Distal inferior wall hypokinesis.  Likely catheter induced    DISCUSSION/SUMMARYVery pleasant 68-year-old female with a medical history of hypertension, hyperlipidemia, malignant hypertension, and type 2 myocardial infarction in the setting of that who presented back for followup. She has no coronary artery disease. Her blood pressure is better controlled today, however she states that it is intermittently elevated at home.    1.  Essential hypertension, at goal today.  I do worry that her blood pressure cuff is not accurate as her blood pressure today is perfectly.  She will bring this in  for evaluation.  -Continue losartan and carvedilol at the current dose.  2.  Hyperlipidemia: Myalgias have improved off of simvastatin therapy.  I will place an order for a fasting lipid panel for her.

## 2019-01-24 ENCOUNTER — TELEPHONE (OUTPATIENT)
Dept: CARDIOLOGY | Facility: CLINIC | Age: 69
End: 2019-01-24

## 2019-01-24 NOTE — TELEPHONE ENCOUNTER
Pt called back with the following BPs since we saw her last on 1/10/19:    156/103,160/101,145/98  She says it has been running around 150-160's/. HR 60-90.  She said she hasn't had any chest pain, but she has been getting headaches.  She can be reached at #[phone].    Thanks,  Karyn

## 2019-01-25 NOTE — TELEPHONE ENCOUNTER
Pt called stating BP is Hypertensive 160/105, 188/100, 190/99, 188/103, 194/98, 194/105, 148/119 states she has been fatigued with nausea and headaches. Pt states she took her Carvedilol and Losartan. Please advise. Thanks, Sowmya

## 2019-01-25 NOTE — TELEPHONE ENCOUNTER
Pt is taking 50 mg Losartan daily, per Dr. Velazquez increase Losartan to 100 mg daily, have pt to come into office for BP check-up next week. Pt informed of increase of medication and appointment scheduled to come in for BP check per Ling (). Thanks, Sowmya

## 2019-01-25 NOTE — TELEPHONE ENCOUNTER
Please find out if she is taking 50 mg of losartan or 100 mg.  Also see if she has any time next week to bring her blood pressure cuff in for us to check it.  Blood pressure was normal when I saw

## 2019-01-31 ENCOUNTER — CLINICAL SUPPORT (OUTPATIENT)
Dept: CARDIOLOGY | Facility: CLINIC | Age: 69
End: 2019-01-31

## 2019-01-31 ENCOUNTER — LAB (OUTPATIENT)
Dept: LAB | Facility: HOSPITAL | Age: 69
End: 2019-01-31

## 2019-01-31 DIAGNOSIS — E78.2 MIXED HYPERLIPIDEMIA: ICD-10-CM

## 2019-01-31 DIAGNOSIS — I10 ESSENTIAL HYPERTENSION: Primary | ICD-10-CM

## 2019-01-31 DIAGNOSIS — I10 ESSENTIAL HYPERTENSION: ICD-10-CM

## 2019-01-31 LAB
CHOLEST SERPL-MCNC: 284 MG/DL (ref 0–200)
HDLC SERPL-MCNC: 41 MG/DL (ref 40–60)
LDLC SERPL CALC-MCNC: 216 MG/DL (ref 0–100)
LDLC/HDLC SERPL: 5.28 {RATIO}
TRIGL SERPL-MCNC: 133 MG/DL (ref 0–150)
VLDLC SERPL-MCNC: 26.6 MG/DL (ref 5–40)

## 2019-01-31 PROCEDURE — 36415 COLL VENOUS BLD VENIPUNCTURE: CPT

## 2019-01-31 PROCEDURE — 80061 LIPID PANEL: CPT

## 2019-01-31 PROCEDURE — 99211 OFF/OP EST MAY X REQ PHY/QHP: CPT | Performed by: INTERNAL MEDICINE

## 2019-01-31 RX ORDER — HYDRALAZINE HYDROCHLORIDE 50 MG/1
50 TABLET, FILM COATED ORAL 3 TIMES DAILY
Qty: 60 TABLET | Refills: 6 | Status: SHIPPED | OUTPATIENT
Start: 2019-01-31 | End: 2019-09-23 | Stop reason: SDUPTHER

## 2019-01-31 RX ORDER — PRAVASTATIN SODIUM 20 MG
20 TABLET ORAL DAILY
Qty: 30 TABLET | Refills: 6 | Status: SHIPPED | OUTPATIENT
Start: 2019-01-31 | End: 2019-07-29 | Stop reason: SDUPTHER

## 2019-01-31 RX ORDER — NIFEDIPINE 30 MG/1
30 TABLET, EXTENDED RELEASE ORAL DAILY
Qty: 30 TABLET | Refills: 6 | Status: SHIPPED | OUTPATIENT
Start: 2019-01-31 | End: 2019-01-31

## 2019-01-31 RX ORDER — LOSARTAN POTASSIUM 100 MG/1
100 TABLET ORAL DAILY
COMMUNITY
End: 2019-03-18 | Stop reason: SDUPTHER

## 2019-03-18 RX ORDER — LOSARTAN POTASSIUM 50 MG/1
TABLET ORAL
Qty: 90 TABLET | Refills: 1 | OUTPATIENT
Start: 2019-03-18

## 2019-03-18 RX ORDER — LOSARTAN POTASSIUM 100 MG/1
100 TABLET ORAL DAILY
Qty: 90 TABLET | Refills: 1 | Status: SHIPPED | OUTPATIENT
Start: 2019-03-18 | End: 2019-09-19 | Stop reason: SDUPTHER

## 2019-03-18 NOTE — TELEPHONE ENCOUNTER
Left patient a voicemail to call back with correct dose of losartan.    There is 50mg and 100mg listed on her last office visit.

## 2019-07-08 ENCOUNTER — TELEPHONE (OUTPATIENT)
Dept: CARDIOLOGY | Facility: CLINIC | Age: 69
End: 2019-07-08

## 2019-07-08 NOTE — TELEPHONE ENCOUNTER
Pt called. She is having increased palpitations, but is also due for her 6 month follow up.  I called her back and put her on with Catalina for 7/23/19.     Karyn

## 2019-07-23 PROBLEM — R00.2 PALPITATIONS: Status: ACTIVE | Noted: 2019-07-23

## 2019-07-26 ENCOUNTER — OFFICE VISIT (OUTPATIENT)
Dept: CARDIOLOGY | Facility: CLINIC | Age: 69
End: 2019-07-26

## 2019-07-26 ENCOUNTER — LAB (OUTPATIENT)
Dept: LAB | Facility: HOSPITAL | Age: 69
End: 2019-07-26

## 2019-07-26 VITALS
BODY MASS INDEX: 32.61 KG/M2 | OXYGEN SATURATION: 98 % | DIASTOLIC BLOOD PRESSURE: 90 MMHG | HEART RATE: 68 BPM | SYSTOLIC BLOOD PRESSURE: 130 MMHG | HEIGHT: 62 IN | WEIGHT: 177.2 LBS

## 2019-07-26 DIAGNOSIS — R00.2 PALPITATIONS: Primary | ICD-10-CM

## 2019-07-26 DIAGNOSIS — E78.2 MIXED HYPERLIPIDEMIA: ICD-10-CM

## 2019-07-26 DIAGNOSIS — I10 ESSENTIAL HYPERTENSION: ICD-10-CM

## 2019-07-26 DIAGNOSIS — I21.4 NSTEMI (NON-ST ELEVATED MYOCARDIAL INFARCTION) (HCC): ICD-10-CM

## 2019-07-26 DIAGNOSIS — R00.2 PALPITATIONS: ICD-10-CM

## 2019-07-26 PROBLEM — M79.609 EXTREMITY PAIN: Status: ACTIVE | Noted: 2019-07-26

## 2019-07-26 PROBLEM — M51.369 DDD (DEGENERATIVE DISC DISEASE), LUMBAR: Status: ACTIVE | Noted: 2019-07-26

## 2019-07-26 PROBLEM — M54.50 LBP (LOW BACK PAIN): Status: ACTIVE | Noted: 2019-07-26

## 2019-07-26 PROBLEM — M51.36 DDD (DEGENERATIVE DISC DISEASE), LUMBAR: Status: ACTIVE | Noted: 2019-07-26

## 2019-07-26 LAB
ALBUMIN SERPL-MCNC: 4.2 G/DL (ref 3.5–5.2)
ALBUMIN/GLOB SERPL: 1.6 G/DL
ALP SERPL-CCNC: 118 U/L (ref 39–117)
ALT SERPL W P-5'-P-CCNC: 14 U/L (ref 1–33)
ANION GAP SERPL CALCULATED.3IONS-SCNC: 9.9 MMOL/L (ref 5–15)
AST SERPL-CCNC: 16 U/L (ref 1–32)
BILIRUB SERPL-MCNC: 0.5 MG/DL (ref 0.2–1.2)
BUN BLD-MCNC: 16 MG/DL (ref 8–23)
BUN/CREAT SERPL: 16 (ref 7–25)
CALCIUM SPEC-SCNC: 9 MG/DL (ref 8.6–10.5)
CHLORIDE SERPL-SCNC: 103 MMOL/L (ref 98–107)
CHOLEST SERPL-MCNC: 221 MG/DL (ref 0–200)
CO2 SERPL-SCNC: 26.1 MMOL/L (ref 22–29)
CREAT BLD-MCNC: 1 MG/DL (ref 0.57–1)
GFR SERPL CREATININE-BSD FRML MDRD: 55 ML/MIN/1.73
GLOBULIN UR ELPH-MCNC: 2.7 GM/DL
GLUCOSE BLD-MCNC: 95 MG/DL (ref 65–99)
HDLC SERPL-MCNC: 39 MG/DL (ref 40–60)
LDLC SERPL CALC-MCNC: 147 MG/DL (ref 0–100)
LDLC/HDLC SERPL: 3.78 {RATIO}
POTASSIUM BLD-SCNC: 4.3 MMOL/L (ref 3.5–5.2)
PROT SERPL-MCNC: 6.9 G/DL (ref 6–8.5)
SODIUM BLD-SCNC: 139 MMOL/L (ref 136–145)
TRIGL SERPL-MCNC: 173 MG/DL (ref 0–150)
VLDLC SERPL-MCNC: 34.6 MG/DL (ref 5–40)

## 2019-07-26 PROCEDURE — 80061 LIPID PANEL: CPT

## 2019-07-26 PROCEDURE — 80053 COMPREHEN METABOLIC PANEL: CPT

## 2019-07-26 PROCEDURE — 36415 COLL VENOUS BLD VENIPUNCTURE: CPT

## 2019-07-26 PROCEDURE — 93000 ELECTROCARDIOGRAM COMPLETE: CPT | Performed by: NURSE PRACTITIONER

## 2019-07-26 PROCEDURE — 99214 OFFICE O/P EST MOD 30 MIN: CPT | Performed by: NURSE PRACTITIONER

## 2019-07-26 RX ORDER — AMLODIPINE BESYLATE 2.5 MG/1
2.5 TABLET ORAL DAILY
Qty: 30 TABLET | Refills: 0 | Status: SHIPPED | OUTPATIENT
Start: 2019-07-26 | End: 2019-08-22 | Stop reason: SDUPTHER

## 2019-07-26 RX ORDER — LORAZEPAM 0.5 MG/1
0.5 TABLET ORAL EVERY 8 HOURS PRN
Status: ON HOLD | COMMUNITY
End: 2020-07-15

## 2019-07-26 RX ORDER — ARIPIPRAZOLE 5 MG/1
5 TABLET ORAL DAILY
COMMUNITY
End: 2020-05-01 | Stop reason: ALTCHOICE

## 2019-07-26 NOTE — PROGRESS NOTES
Date of Office Visit: 2019  Encounter Provider: CARLIE Reno  Place of Service: [facility] CARDIOLOGY  Patient Name: Katelyn Sr  :1950      Subjective:     Chief Complaint:  Hypertension, palpitations    History of Present Illness:  Katelyn Sr is a pleasant 68 y.o. female who is new to me.  Outside records have been obtained and reviewed by me.     The patient with a history of NSTEMI, hypertension, hyperlipidemia, obstructive sleep apnea, obesity, GERD, hx of cataract surgeries.    2018 presented with chest pain.  She had an elevated troponin and was hypertensive on arrival.  She underwent coronary angiography which showed no coronary disease.  Her echo showed no aortic pathology of the aortic root or ascending aorta.  Medications were adjusted.    1/10/2019 she saw Dr. Velazquez in the office.  Her chlorthalidone had been stopped due to acute kidney injury and volume depletion.  She was transitioned to carvedilol which she had been tolerating well.  She was having high blood pressure readings at home 1 50-1 60 systolic but her blood pressure was good in the office.  She was reporting palpitations short duration occurring a couple times a week.  Apparently she had myalgias to simvastatin which resolved.  Her losartan was increased in 2019. Also looks like she was started on hydralazine for hypertension.    She is presenting today for a 6 follow-up visit and evaluation of increasing palpitations.  At today's visit she reports she has not been monitoring her blood pressure very frequently at home.  She reports she has intermittent palpitations that lasts for a few seconds to a few minutes and her heart rate will get as fast as 160.  They have been occurring more frequently recently.  Occasionally they can happen 2-3 times a day and at least happens several times a week.  It is not really associated with other symptoms.  Typically when she does  check her blood pressure runs around 150s/90s.  She denies any significant shortness of breath or exertional chest pain.  She reports she does not do any exercising due to chronic fatigue, depression and right knee pain.  She reports she does not watch the amount of salt she eats and does admit to eating a lot of lean cuisine meals.  She reports she has a occasional episodes of chest pain which is felt similar to esophageal spasm in the past.  It can radiate up into the neck and jaw and is resolved with tepid water.  She has had an EGD in December in which Dr. Weinberg dilated her esophagus and for a while she did not have recurrence of esophageal spasm.  It started picking up again intermittently over the last couple weeks.  Is no exertional component to her symptoms and does not cause increased shortness of breath, nausea or diaphoresis.  She does have some vision issues and has had right cataract surgery.  She is due to his follow-up with her ophthalmologist.  She does have occasional posterior headaches which she sometimes feels is attributed to cervical spine disease.      Past Medical History:   Diagnosis Date   • Depression    • GERD (gastroesophageal reflux disease)    • Hyperlipidemia    • Hypertension    • Non-STEMI (non-ST elevated myocardial infarction) (CMS/[facility])    • FUENTES on CPAP      Past Surgical History:   Procedure Laterality Date   • BACK SURGERY     • CARDIAC CATHETERIZATION N/A 5/22/2018    Procedure: Left Heart Cath;  Surgeon: Aleks Velazquez MD;  Location: [facility] INVASIVE LOCATION;  Service: Cardiovascular   • CARDIAC CATHETERIZATION N/A 5/22/2018    Procedure: Coronary angiography;  Surgeon: Aleks Velazquez MD;  Location: [facility] INVASIVE LOCATION;  Service: Cardiovascular   • CARDIAC CATHETERIZATION N/A 5/22/2018    Procedure: Left ventriculography;  Surgeon: Aleks Velazquez MD;  Location: [facility] INVASIVE LOCATION;  Service: Cardiovascular   • CARDIAC  CATHETERIZATION N/A 5/22/2018    Procedure: Peripheral angiography;  Surgeon: Aleks Velazquez MD;  Location: [facility] INVASIVE LOCATION;  Service: Cardiovascular   • EXPLORATORY LAPAROTOMY     • HEMORRHOIDECTOMY     • NECK SURGERY  12/03/2003    ACDF C6-7-Dr. Nix    • RECTAL SURGERY       Outpatient Medications Prior to Visit   Medication Sig Dispense Refill   • ARIPiprazole (ABILIFY) 5 MG tablet Take 5 mg by mouth Daily.     • aspirin 81 MG EC tablet Take 81 mg by mouth Daily.     • carvedilol (COREG) 25 MG tablet Take 1 tablet by mouth 2 (Two) Times a Day. 180 tablet 3   • DULoxetine (CYMBALTA) 60 MG capsule Take 60 mg by mouth 2 (Two) Times a Day.     • escitalopram (LEXAPRO) 10 MG tablet Take 10 mg by mouth Daily.     • hydrALAZINE (APRESOLINE) 50 MG tablet Take 1 tablet by mouth 3 (Three) Times a Day. 60 tablet 6   • lamoTRIgine (LAMICTAL) 100 MG tablet Take 100 mg by mouth Daily.     • LORazepam (ATIVAN) 0.5 MG tablet Take 0.5 mg by mouth Every 8 (Eight) Hours As Needed for Anxiety.     • losartan (COZAAR) 100 MG tablet Take 1 tablet by mouth Daily. 90 tablet 1   • Multiple Vitamins-Iron (QC DAILY MULTIVITAMINS/IRON) tablet Take 1 tablet by mouth Daily.     • pantoprazole (PROTONIX) 40 MG EC tablet Take 40 mg by mouth Daily.     • pravastatin (PRAVACHOL) 20 MG tablet Take 1 tablet by mouth Daily. 30 tablet 6   • raNITIdine (ZANTAC) 300 MG tablet Take 300 mg by mouth Daily.     • traZODone (DESYREL) 50 MG tablet Take 25 mg by mouth Every Night.       No facility-administered medications prior to visit.        Allergies as of 07/26/2019 - Reviewed 07/26/2019   Allergen Reaction Noted   • Erythromycin Rash 01/12/2017     Social History     Socioeconomic History   • Marital status: Single     Spouse name: Not on file   • Number of children: 0   • Years of education: BS    • Highest education level: Not on file   Occupational History   • Occupation: RETIRED RN    Tobacco Use   • Smoking status:  Never Smoker   • Tobacco comment: caffeine use    Substance and Sexual Activity   • Alcohol use: No     Family History   Problem Relation Age of Onset   • Alzheimer's disease Mother    • Heart failure Father    • Diabetes Sister    • Atrial fibrillation Sister    • Stroke Paternal Grandmother      Review of Systems   Constitution: Positive for malaise/fatigue. Negative for chills and fever.   HENT: Negative for ear pain, hearing loss, nosebleeds and sore throat.    Eyes: Positive for blurred vision. Negative for double vision, pain, vision loss in left eye and vision loss in right eye.   Cardiovascular: Positive for chest pain (associated with GERD/esophageal spasm - resolves with tepid water), leg swelling (occasional ankles- mild) and palpitations (2-3 times per day several times). Negative for claudication, dyspnea on exertion, irregular heartbeat, near-syncope, orthopnea, paroxysmal nocturnal dyspnea and syncope.   Respiratory: Negative for cough, shortness of breath, snoring (cpap) and wheezing.    Endocrine: Negative for cold intolerance and heat intolerance.   Hematologic/Lymphatic: Negative for bleeding problem.   Skin: Positive for poor wound healing. Negative for color change, itching, rash and unusual hair distribution.   Musculoskeletal: Negative for joint pain and joint swelling.   Gastrointestinal: Negative for abdominal pain, diarrhea, hematochezia, melena, nausea and vomiting.   Genitourinary: Negative for decreased libido, frequency, hematuria, hesitancy and incomplete emptying.   Neurological: Positive for excessive daytime sleepiness, dizziness and light-headedness. Negative for headaches, loss of balance, numbness and seizures.   Psychiatric/Behavioral: Positive for depression. The patient is nervous/anxious.           Objective:     Vitals:    07/26/19 1442 07/26/19 1515   BP: 130/90    BP Location: Left arm    Patient Position: Sitting    Pulse: 67 68   SpO2:  98%   Weight: 80.4 kg (177 lb 3.2  "oz)    Height: 157.5 cm (62\")      Body mass index is 32.41 kg/m².    PHYSICAL EXAM:  Physical Exam   Constitutional: She is oriented to person, place, and time. She appears well-developed and well-nourished. No distress.   Obese   HENT:   Head: Normocephalic and atraumatic.   Eyes: Conjunctivae and EOM are normal. Pupils are equal, round, and reactive to light.   glasses   Neck: No JVD present. Carotid bruit is not present.   Cardiovascular: Normal rate, regular rhythm, normal heart sounds and intact distal pulses.   No murmur heard.  Pulses:       Radial pulses are 2+ on the right side, and 2+ on the left side.        Dorsalis pedis pulses are 2+ on the right side, and 2+ on the left side.        Posterior tibial pulses are 2+ on the right side, and 2+ on the left side.   Trace nonpitting lower extremity edema   Pulmonary/Chest: Effort normal and breath sounds normal. No accessory muscle usage. No tachypnea. No respiratory distress. She has no decreased breath sounds. She has no wheezes. She has no rhonchi. She has no rales. She exhibits no tenderness.   Abdominal: Soft. Bowel sounds are normal. She exhibits no distension. There is no tenderness. There is no rebound and no guarding.   Musculoskeletal: Normal range of motion. She exhibits no edema.   Neurological: She is alert and oriented to person, place, and time.   Skin: Skin is warm, dry and intact. She is not diaphoretic. No erythema.   Psychiatric: She has a normal mood and affect. Her speech is normal and behavior is normal. Judgment and thought content normal. Cognition and memory are normal.   Nursing note and vitals reviewed.        ECG 12 Lead  Date/Time: 7/26/2019 2:46 PM  Performed by: Joan Chambers APRN  Authorized by: Joan Chambers APRN   Comparison: compared with previous ECG from 1/10/2019  Rhythm: sinus rhythm  Rate: normal  BPM: 67    Clinical impression: normal ECG  Comments: Indication: CAD            Assessment:       Diagnosis Plan "   1. Palpitations  Lipid Panel    Comprehensive Metabolic Panel    ECG 12 Lead    Holter Monitor - 48 Hour   2. Essential hypertension  Lipid Panel    Comprehensive Metabolic Panel    ECG 12 Lead   3. Mixed hyperlipidemia  Lipid Panel    Comprehensive Metabolic Panel    ECG 12 Lead   4. NSTEMI (non-ST elevated myocardial infarction) (CMS/[facility])  Lipid Panel    Comprehensive Metabolic Panel    ECG 12 Lead       Plan:     1.  Palpitations: On carvedilol.  Will evaluate with 48-hour Holter monitor.  2.  Hypertension: At times has had issues with significantly elevated blood pressure.  Not been monitoring much at home but her blood pressure is 130/90 today typically when she monitors at home she gets readings in the 150s/90s..  I educated her on low-salt diet.  Will start low-dose amlodipine 2.5 mg daily.  Come back in 2 weeks for blood pressure check with her log and her home blood pressure cuff for verification.  3.  Hyperlipidemia:LDL was 216, total cholesterol 284 January 2019.  I believe she has had an intolerance to simvastatin and was placed on pravastatin.  He is due for labs.  I placed a fasting lipid panel and CMP labs to be checked today.  4.  History of chest pain with elevated troponin in setting hypertension: 5/2018 cardiac catheterization showed normal coronary arteries.  5.  Esophageal spasm: Her chest pain symptoms sound similar to her previous esophageal spasms.  There is no exertional component or exertional shortness of breath.  I am going to try her back on some low-dose amlodipine 2.5 mg daily.  I have asked her to monitor her blood pressure.  Looks like she was taken off of this in the past (10 mg dose) and she cannot recall why but does not believe that she had any type of allergic reaction.  She believes it may have been due to lower extremity edema.  6.  Blurred vision/history of cataracts: She was advised to make an appointment with her ophthalmologist as she is due for follow-up.  7.  Obesity:  She suffers from chronic fatigue, depression and arthritis.  I recommend she try recumbent bike or water aerobics.  8.  FUENTES: CPAP      Further recommendations to follow blood pressure check in 2 weeks and 48-hour Holter monitor/lab results.    Follow up with Dr. Velazquez in 6 months, unless otherwise needed sooner.  I advised the patient to contact our office with any questions or concerns.      It has been a pleasure to participate in this patient's care. Please feel free to contact me with any questions or concerns.     CARLIE Reno  07/26/2019            Your medication list           Accurate as of 7/26/19  3:45 PM. If you have any questions, ask your nurse or doctor.               START taking these medications      Instructions Last Dose Given Next Dose Due   amLODIPine 2.5 MG tablet  Commonly known as:  NORVASC  Started by:  CARLIE Reno      Take 1 tablet by mouth Daily.          CONTINUE taking these medications      Instructions Last Dose Given Next Dose Due   ARIPiprazole 5 MG tablet  Commonly known as:  ABILIFY      Take 5 mg by mouth Daily.       aspirin 81 MG EC tablet      Take 81 mg by mouth Daily.       carvedilol 25 MG tablet  Commonly known as:  COREG      Take 1 tablet by mouth 2 (Two) Times a Day.       DULoxetine 60 MG capsule  Commonly known as:  CYMBALTA      Take 60 mg by mouth 2 (Two) Times a Day.       hydrALAZINE 50 MG tablet  Commonly known as:  APRESOLINE      Take 1 tablet by mouth 3 (Three) Times a Day.       LAMICTAL 100 MG tablet  Generic drug:  lamoTRIgine      Take 100 mg by mouth Daily.       LEXAPRO 10 MG tablet  Generic drug:  escitalopram      Take 10 mg by mouth Daily.       LORazepam 0.5 MG tablet  Commonly known as:  ATIVAN      Take 0.5 mg by mouth Every 8 (Eight) Hours As Needed for Anxiety.       losartan 100 MG tablet  Commonly known as:  COZAAR      Take 1 tablet by mouth Daily.       pantoprazole 40 MG EC tablet  Commonly known as:  PROTONIX      Take 40  mg by mouth Daily.       pravastatin 20 MG tablet  Commonly known as:  PRAVACHOL      Take 1 tablet by mouth Daily.       QC DAILY MULTIVITAMINS/IRON tablet      Take 1 tablet by mouth Daily.       raNITIdine 300 MG tablet  Commonly known as:  ZANTAC      Take 300 mg by mouth Daily.       traZODone 50 MG tablet  Commonly known as:  DESYREL      Take 25 mg by mouth Every Night.             Where to Get Your Medications      These medications were sent to [facility]/pharmacy #[id number] - [place], [place] - [address] AT [facility] - [phone]  - [phone]   [address], [facility] [address]    Phone:  [phone]   · amLODIPine 2.5 MG tablet         The above medication changes may not have been made by this provider.  Medication list was updated to reflect medications patient is currently taking including medication changes and discontinuations made by other healthcare providers.     Dictated utilizing Dragon Dictation System.

## 2019-07-29 ENCOUNTER — TELEPHONE (OUTPATIENT)
Dept: CARDIOLOGY | Facility: CLINIC | Age: 69
End: 2019-07-29

## 2019-07-29 DIAGNOSIS — E78.2 MIXED HYPERLIPIDEMIA: Primary | ICD-10-CM

## 2019-07-29 RX ORDER — PRAVASTATIN SODIUM 40 MG
40 TABLET ORAL DAILY
Qty: 90 TABLET | Refills: 1 | Status: SHIPPED | OUTPATIENT
Start: 2019-07-29 | End: 2019-10-14 | Stop reason: SDUPTHER

## 2019-07-29 NOTE — TELEPHONE ENCOUNTER
Patient was notified of lab results.  Will increase pravastatin from 20 mg daily to 40 mg daily and recheck labs in a few months.  Orders have been placed an d Rx sent to pharmacy.  Educated on diet and exercise. I also recommended that she follow-up with Dr. Chaudhry her PCP and have repeat labs including hemoglobin A1c due to her elevated triglycerides.  She was also encouraged due to low GFR to increase her fluid intake make sure she is drinking eight 8 oz glasses of water daily.  She demonstrated understanding of the plan of care.

## 2019-08-02 ENCOUNTER — TELEPHONE (OUTPATIENT)
Dept: CARDIOLOGY | Facility: CLINIC | Age: 69
End: 2019-08-02

## 2019-08-02 NOTE — TELEPHONE ENCOUNTER
Patient was notified regarding monitor results.  She was encouraged to let us know if she has episodes of palpitations that sustained.

## 2019-08-05 ENCOUNTER — TELEPHONE (OUTPATIENT)
Dept: CARDIOLOGY | Facility: CLINIC | Age: 69
End: 2019-08-05

## 2019-08-05 NOTE — PROGRESS NOTES
Subjective   Patient ID: Katelyn Sr is a 68 y.o. female is here today for follow-up for back pain.     History of Present Illness    {Common H&P Review Areas:77962}    Review of Systems    Objective   Physical Exam  Neurologic Exam    Assessment/Plan   Independent Review of Radiographic Studies:    ***  Medical Decision Making:    ***  There are no diagnoses linked to this encounter.  No Follow-up on file.

## 2019-08-06 ENCOUNTER — OFFICE VISIT (OUTPATIENT)
Dept: NEUROSURGERY | Facility: CLINIC | Age: 69
End: 2019-08-06

## 2019-08-06 VITALS
BODY MASS INDEX: 32.94 KG/M2 | HEART RATE: 69 BPM | SYSTOLIC BLOOD PRESSURE: 131 MMHG | WEIGHT: 179 LBS | HEIGHT: 62 IN | DIASTOLIC BLOOD PRESSURE: 83 MMHG

## 2019-08-06 DIAGNOSIS — M51.34 DDD (DEGENERATIVE DISC DISEASE), THORACIC: ICD-10-CM

## 2019-08-06 DIAGNOSIS — M50.30 DEGENERATION OF CERVICAL INTERVERTEBRAL DISC: ICD-10-CM

## 2019-08-06 DIAGNOSIS — M53.3 SACROILIAC JOINT PAIN: ICD-10-CM

## 2019-08-06 DIAGNOSIS — M51.36 DISC DEGENERATION, LUMBAR: Primary | ICD-10-CM

## 2019-08-06 PROCEDURE — 99213 OFFICE O/P EST LOW 20 MIN: CPT | Performed by: NURSE PRACTITIONER

## 2019-08-06 NOTE — PROGRESS NOTES
"Subjective   Patient ID: Katelyn Sr is a 68 y.o. female is here today for follow-up for back and left thigh pain. She tried PT last visit with no improvement. She would like to have more aggressive PT.    She had a flare up of neck pain over the past month( ACDF 2003 by Dr. Nix). She used ice and massaged the area which helped.  Mrs. Sr takes otc Advil prn for pain.     History of Present Illness  She has chronic neck and back pain and wants referral to a therapist like she had several years ago. She denies persistent radicular symptoms, numbness or weakness in the extremities.       The following portions of the patient's history were reviewed and updated as appropriate: allergies, current medications, past family history, past medical history, past social history, past surgical history and problem list.    Review of Systems   Genitourinary: Negative for difficulty urinating.   Musculoskeletal: Positive for back pain (left thigh ), neck pain and neck stiffness.   Neurological: Negative for weakness and numbness.   All other systems reviewed and are negative.      Objective     Vitals:    08/06/19 1142   BP: 131/83   Pulse: 69   Weight: 81.2 kg (179 lb)   Height: 157.5 cm (62\")     Body mass index is 32.74 kg/m².      Physical Exam   Constitutional: She appears well-developed and well-nourished. No distress.   Eyes: EOM are normal. Pupils are equal, round, and reactive to light.   Neck: Normal range of motion.   Pulmonary/Chest: Effort normal.   Neurological: She has normal strength. No cranial nerve deficit or sensory deficit.   Skin: Skin is dry.   Psychiatric: She has a normal mood and affect.     Neurologic Exam     Cranial Nerves     CN III, IV, VI   Pupils are equal, round, and reactive to light.  Extraocular motions are normal.     Motor Exam     Strength   Strength 5/5 throughout.         Assessment/Plan   Independent Review of Radiographic Studies:      No recent testing    Medical Decision " Making:    She does not have any new symptoms since the MRIs last year. She wants to continue conservative treatment. She has some pain associated with the SI joint so we will also ask the therapists to focus on that as well as the neck and back. We discussed pain management and she says she only has had epidurals several years ago which did not help, but she is interested in discussing other injectable options for pain. She will be referred to Dr Bermudez to discuss those options. Since she has no radiculopathy, weakness or numbness to warrant surgery at this point, we will only plan on seeing her back as needed.     Katelyn was seen today for back pain.    Diagnoses and all orders for this visit:    Disc degeneration, lumbar  -     Ambulatory Referral to Physical Therapy Evaluate and treat (LBP, SI joint pain, neck pain); (any modalities )  -     Ambulatory Referral to Pain Management    Degeneration of cervical intervertebral disc  -     Ambulatory Referral to Physical Therapy Evaluate and treat (LBP, SI joint pain, neck pain); (any modalities )  -     Ambulatory Referral to Pain Management    DDD (degenerative disc disease), thoracic  -     Ambulatory Referral to Physical Therapy Evaluate and treat (LBP, SI joint pain, neck pain); (any modalities )  -     Ambulatory Referral to Pain Management    Sacroiliac joint pain  -     Ambulatory Referral to Physical Therapy Evaluate and treat (LBP, SI joint pain, neck pain); (any modalities )  -     Ambulatory Referral to Pain Management      Return if symptoms worsen or fail to improve.

## 2019-08-20 ENCOUNTER — TELEPHONE (OUTPATIENT)
Dept: NEUROSURGERY | Facility: CLINIC | Age: 69
End: 2019-08-20

## 2019-08-20 NOTE — TELEPHONE ENCOUNTER
Left message on voicemail regarding Dr. Bermudez's office has been trying to get a hold of the patient.  Left Dr. Bermudez's phone number on the patient's voicemail.

## 2019-08-22 RX ORDER — AMLODIPINE BESYLATE 2.5 MG/1
TABLET ORAL
Qty: 30 TABLET | Refills: 0 | Status: SHIPPED | OUTPATIENT
Start: 2019-08-22 | End: 2019-09-24 | Stop reason: SDUPTHER

## 2019-09-19 RX ORDER — LOSARTAN POTASSIUM 100 MG/1
TABLET ORAL
Qty: 90 TABLET | Refills: 1 | Status: SHIPPED | OUTPATIENT
Start: 2019-09-19 | End: 2020-05-18

## 2019-09-23 RX ORDER — HYDRALAZINE HYDROCHLORIDE 50 MG/1
TABLET, FILM COATED ORAL
Qty: 90 TABLET | Refills: 6 | Status: SHIPPED | OUTPATIENT
Start: 2019-09-23 | End: 2020-06-16

## 2019-09-27 RX ORDER — AMLODIPINE BESYLATE 2.5 MG/1
TABLET ORAL
Qty: 30 TABLET | Refills: 11 | Status: SHIPPED | OUTPATIENT
Start: 2019-09-27 | End: 2020-05-19 | Stop reason: SDUPTHER

## 2019-09-30 RX ORDER — CARVEDILOL 25 MG/1
25 TABLET ORAL 2 TIMES DAILY
Qty: 180 TABLET | Refills: 3 | Status: SHIPPED | OUTPATIENT
Start: 2019-09-30 | End: 2020-12-08

## 2019-10-14 RX ORDER — PRAVASTATIN SODIUM 40 MG
40 TABLET ORAL DAILY
Qty: 90 TABLET | Refills: 3 | Status: SHIPPED | OUTPATIENT
Start: 2019-10-14 | End: 2020-11-12

## 2019-10-25 ENCOUNTER — TELEPHONE (OUTPATIENT)
Dept: CARDIOLOGY | Facility: CLINIC | Age: 69
End: 2019-10-25

## 2019-10-25 NOTE — TELEPHONE ENCOUNTER
Pt called. She said that she has been having intermittent sharp chest pain that last 1-2 seconds. She said that she has been having off and on all day for the last few days. She also has been having arm heaviness as well. She said her BP has been running 140-150/80-90s HR 60-80's.   She would like to know if she needs to be seen or have her meds changed.  She can be reached at #[phone].  Please advise.    Thanks,  Karyn

## 2019-10-28 NOTE — TELEPHONE ENCOUNTER
Sounds atypical.  No prior history of coronary disease.  I would see if you can get her in to see Catalina or pain in the next couple of weeks.

## 2019-10-30 PROBLEM — R07.89 ATYPICAL CHEST PAIN: Status: ACTIVE | Noted: 2019-10-30

## 2019-11-04 ENCOUNTER — OFFICE VISIT (OUTPATIENT)
Dept: CARDIOLOGY | Facility: CLINIC | Age: 69
End: 2019-11-04

## 2019-11-04 VITALS
BODY MASS INDEX: 32.46 KG/M2 | DIASTOLIC BLOOD PRESSURE: 78 MMHG | WEIGHT: 176.4 LBS | SYSTOLIC BLOOD PRESSURE: 122 MMHG | HEIGHT: 62 IN | HEART RATE: 67 BPM | OXYGEN SATURATION: 96 % | RESPIRATION RATE: 18 BRPM

## 2019-11-04 DIAGNOSIS — I10 ESSENTIAL HYPERTENSION: ICD-10-CM

## 2019-11-04 DIAGNOSIS — R07.89 ATYPICAL CHEST PAIN: Primary | ICD-10-CM

## 2019-11-04 PROCEDURE — 99213 OFFICE O/P EST LOW 20 MIN: CPT | Performed by: NURSE PRACTITIONER

## 2019-11-04 PROCEDURE — 93000 ELECTROCARDIOGRAM COMPLETE: CPT | Performed by: NURSE PRACTITIONER

## 2019-11-04 NOTE — PROGRESS NOTES
Date of Office Visit: 2019  Encounter Provider: CARLIE Lynne  Place of Service: [facility] CARDIOLOGY  Patient Name: Katelyn Sr  :1950    Chief Complaint   Patient presents with   • Chest Pain     FOLLOW UP   • NSTEMI   :     HPI: Katelyn Sr is a 68 y.o. female, new to me, who presents today for follow-up.  Old records have been obtained and reviewed by me.  She is a patient of Dr. Velazquez's with a past cardiac history significant for hypertension and hyperlipidemia.  In May 2018, she was hospitalized with chest pain.  Her troponin was 0.208 and she had nonspecific T wave changes in leads III and aVF.  She underwent coronary angiography which revealed normal coronary arteries, normal LV size and function, wall hypokinesis which was likely catheter induced.   She was felt to have suffered a type II myocardial infarction secondary to malignant hypertension.  She had an echocardiogram during that hospitalization which revealed a normal LV function with an EF of 59%, grade 1 diastolic dysfunction, and no significant valvular abnormalities.  Her blood pressure was controlled with amlodipine and chlorthalidone.  The chlorthalidone was subsequently discontinued due to acute kidney injury and volume depletion.  She was switched to carvedilol.  She was last seen in the office by Joan Chambers 2019 for increased palpitations.  She did wear a Holter monitor which was benign.  On 10/25/2019, the patient called the office to report an sharp chest pains lasting 1 to 2 seconds well as arm heaviness.  Dr. Velazquez felt the pain sounded atypical but recommended she come in for further evaluation.  She was scheduled to see me for follow-up.   Since she was last in the office, she has overall been doing well from a cardiac standpoint.  She continues to have occasional and random, localized, sharp pains in the center of her chest that last only a few seconds.  She  reports that sometimes these happen 2-3 times a day and sometimes they will not happen for a week.  It is not associated with exertion.  She continues to have somewhat frequent palpitations that are also nonsustained.  She has dealt with positional lightheadedness for quite some time.  She denies any shortness of breath or syncope.  She is a retired cardiac care nurse.  She just finished 8 weeks of physical therapy for her lower back and neck.  She was participating in therapy twice a week and during her appointments would ride the stationary bike.  She was able to do this activity without any difficulty at all.    Past Medical History:   Diagnosis Date   • Depression    • GERD (gastroesophageal reflux disease)    • Hyperlipidemia    • Hypertension    • Non-STEMI (non-ST elevated myocardial infarction) (CMS/[facility])    • FUENTES on CPAP        Past Surgical History:   Procedure Laterality Date   • BACK SURGERY     • CARDIAC CATHETERIZATION N/A 5/22/2018    Procedure: Left Heart Cath;  Surgeon: Aleks Velazquez MD;  Location: [facility] CATH INVASIVE LOCATION;  Service: Cardiovascular   • CARDIAC CATHETERIZATION N/A 5/22/2018    Procedure: Coronary angiography;  Surgeon: Aleks Velazquez MD;  Location:  [facility] CATH INVASIVE LOCATION;  Service: Cardiovascular   • CARDIAC CATHETERIZATION N/A 5/22/2018    Procedure: Left ventriculography;  Surgeon: Aleks Velazquez MD;  Location: [facility] CATH INVASIVE LOCATION;  Service: Cardiovascular   • CARDIAC CATHETERIZATION N/A 5/22/2018    Procedure: Peripheral angiography;  Surgeon: Aleks Velazquez MD;  Location: [facility] CATH INVASIVE LOCATION;  Service: Cardiovascular   • EXPLORATORY LAPAROTOMY     • HEMORRHOIDECTOMY     • NECK SURGERY  12/03/2003    ACDF C6-7-Dr. Nix    • RECTAL SURGERY         Social History     Socioeconomic History   • Marital status: Single     Spouse name: Not on file   • Number of children: 0   • Years of education: BS    • Highest education  level: Not on file   Occupational History   • Occupation: RETIRED RN    Tobacco Use   • Smoking status: Never Smoker   • Smokeless tobacco: Never Used   • Tobacco comment: caffeine use: TEA OCCASSIONALLY   Substance and Sexual Activity   • Alcohol use: No   • Drug use: No   • Sexual activity: Defer       Family History   Problem Relation Age of Onset   • Alzheimer's disease Mother    • Heart failure Father    • Diabetes Sister    • Atrial fibrillation Sister    • Stroke Paternal Grandmother        Review of Systems   Constitution: Positive for malaise/fatigue. Negative for chills and fever.   Cardiovascular: Positive for chest pain, leg swelling and palpitations. Negative for dyspnea on exertion, near-syncope, orthopnea, paroxysmal nocturnal dyspnea and syncope.   Respiratory: Negative for cough and shortness of breath.    Musculoskeletal: Negative for joint pain, joint swelling and myalgias.   Gastrointestinal: Negative for abdominal pain, diarrhea, melena, nausea and vomiting.   Genitourinary: Negative for frequency and hematuria.   Neurological: Positive for light-headedness, numbness and paresthesias. Negative for seizures.   Allergic/Immunologic: Negative.    All other systems reviewed and are negative.      Allergies   Allergen Reactions   • Erythromycin Rash         Current Outpatient Medications:   •  amLODIPine (NORVASC) 2.5 MG tablet, TAKE 1 TABLET BY MOUTH EVERY DAY, Disp: 30 tablet, Rfl: 11  •  aspirin 81 MG EC tablet, Take 81 mg by mouth Daily., Disp: , Rfl:   •  carvedilol (COREG) 25 MG tablet, Take 1 tablet by mouth 2 (Two) Times a Day., Disp: 180 tablet, Rfl: 3  •  DULoxetine (CYMBALTA) 60 MG capsule, Take 60 mg by mouth 2 (Two) Times a Day., Disp: , Rfl:   •  escitalopram (LEXAPRO) 10 MG tablet, Take 10 mg by mouth Daily., Disp: , Rfl:   •  hydrALAZINE (APRESOLINE) 50 MG tablet, TAKE 1 TABLET BY MOUTH THREE TIMES A DAY, Disp: 90 tablet, Rfl: 6  •  lamoTRIgine (LAMICTAL) 100 MG tablet, Take 100 mg by  "mouth Daily., Disp: , Rfl:   •  losartan (COZAAR) 100 MG tablet, TAKE 1 TABLET BY MOUTH EVERY DAY, Disp: 90 tablet, Rfl: 1  •  pantoprazole (PROTONIX) 40 MG EC tablet, Take 40 mg by mouth Daily., Disp: , Rfl:   •  pravastatin (PRAVACHOL) 40 MG tablet, Take 1 tablet by mouth Daily., Disp: 90 tablet, Rfl: 3  •  traZODone (DESYREL) 50 MG tablet, Take 25 mg by mouth Every Night., Disp: , Rfl:   •  ARIPiprazole (ABILIFY) 5 MG tablet, Take 5 mg by mouth Daily., Disp: , Rfl:   •  LORazepam (ATIVAN) 0.5 MG tablet, Take 0.5 mg by mouth Every 8 (Eight) Hours As Needed for Anxiety., Disp: , Rfl:       Objective:     Vitals:    11/04/19 1441 11/04/19 1452   BP: 128/82 122/78   BP Location: Right arm Left arm   Patient Position: Sitting Sitting   Pulse: 67    Resp: 18    SpO2: 96%    Weight: 80 kg (176 lb 6.4 oz)    Height: 157.5 cm (62\")      Body mass index is 32.26 kg/m².    PHYSICAL EXAM:    Physical Exam   Constitutional: She is oriented to person, place, and time. She appears well-developed and well-nourished. No distress.   HENT:   Head: Normocephalic and atraumatic.   Eyes: Pupils are equal, round, and reactive to light.   Neck: No JVD present. No thyromegaly present.   Cardiovascular: Normal rate, regular rhythm, normal heart sounds and intact distal pulses.   No murmur heard.  Pulmonary/Chest: Effort normal and breath sounds normal. No respiratory distress.   Abdominal: Soft. Bowel sounds are normal. She exhibits no distension. There is no splenomegaly or hepatomegaly. There is no tenderness.   Musculoskeletal: Normal range of motion. She exhibits no edema.   Neurological: She is alert and oriented to person, place, and time.   Skin: Skin is warm and dry. She is not diaphoretic. No erythema.   Psychiatric: She has a normal mood and affect. Her behavior is normal. Judgment normal.         ECG 12 Lead  Date/Time: 11/4/2019 3:03 PM  Performed by: Catalina Rojas APRN  Authorized by: Catalina Rojas APRN "   Comparison: compared with previous ECG from 7/26/2019  Similar to previous ECG  Rhythm: sinus rhythm  Rate: normal  BPM: 61    Clinical impression: normal ECG  Comments: Indication: Chest pain              Assessment:       Diagnosis Plan   1. Atypical chest pain  ECG 12 Lead   2. Essential hypertension       Orders Placed This Encounter   Procedures   • ECG 12 Lead     This order was created via procedure documentation          Plan:       Overall, I think she is stable.  The localized and sharp pain that she is describing sounds very atypical.  I do not think it is cardiac in etiology.  She has been able to exert herself in physical therapy without any difficulty.  Her EKG is unchanged.  She had a normal cardiac catheterization in May 2018.  She also had normal LV function at that time.  I think it is unlikely that there has been a significant change since that time.  Her blood pressure today looks well controlled.  I do not think we need to make any changes to her medical regimen.  She will follow-up with Dr. Velazquez in 6 months or sooner if needed.      As always, it has been a pleasure to participate in your patient's care.      Sincerely,         CARLIE Canas

## 2019-12-20 ENCOUNTER — HOSPITAL ENCOUNTER (EMERGENCY)
Facility: HOSPITAL | Age: 69
Discharge: HOME | End: 2019-12-20
Attending: EMERGENCY MEDICINE | Admitting: EMERGENCY MEDICINE

## 2019-12-20 ENCOUNTER — APPOINTMENT (OUTPATIENT)
Dept: GENERAL RADIOLOGY | Facility: HOSPITAL | Age: 69
End: 2019-12-20

## 2019-12-20 VITALS
DIASTOLIC BLOOD PRESSURE: 67 MMHG | RESPIRATION RATE: 16 BRPM | SYSTOLIC BLOOD PRESSURE: 117 MMHG | BODY MASS INDEX: 32.39 KG/M2 | WEIGHT: 176 LBS | TEMPERATURE: 98.4 F | OXYGEN SATURATION: 96 % | HEART RATE: 61 BPM | HEIGHT: 62 IN

## 2019-12-20 DIAGNOSIS — R11.2 NON-INTRACTABLE VOMITING WITH NAUSEA, UNSPECIFIED VOMITING TYPE: ICD-10-CM

## 2019-12-20 DIAGNOSIS — J10.1 INFLUENZA A: Primary | ICD-10-CM

## 2019-12-20 LAB
ALBUMIN SERPL-MCNC: 3.9 G/DL (ref 3.5–5.2)
ALBUMIN/GLOB SERPL: 1.1 G/DL
ALP SERPL-CCNC: 99 U/L (ref 39–117)
ALT SERPL W P-5'-P-CCNC: 19 U/L (ref 1–33)
ANION GAP SERPL CALCULATED.3IONS-SCNC: 15 MMOL/L (ref 5–15)
ANISOCYTOSIS BLD QL: ABNORMAL
AST SERPL-CCNC: 25 U/L (ref 1–32)
B PARAPERT DNA SPEC QL NAA+PROBE: NOT DETECTED
B PERT DNA SPEC QL NAA+PROBE: NOT DETECTED
BASOPHILS # BLD MANUAL: 0.04 10*3/MM3 (ref 0–0.2)
BASOPHILS NFR BLD AUTO: 1 % (ref 0–1.5)
BILIRUB SERPL-MCNC: 0.3 MG/DL (ref 0.2–1.2)
BILIRUB UR QL STRIP: NEGATIVE
BUN BLD-MCNC: 20 MG/DL (ref 8–23)
BUN/CREAT SERPL: 22.7 (ref 7–25)
C PNEUM DNA NPH QL NAA+NON-PROBE: NOT DETECTED
CALCIUM SPEC-SCNC: 8.6 MG/DL (ref 8.6–10.5)
CHLORIDE SERPL-SCNC: 103 MMOL/L (ref 98–107)
CLARITY UR: CLEAR
CO2 SERPL-SCNC: 21 MMOL/L (ref 22–29)
COLOR UR: YELLOW
CREAT BLD-MCNC: 0.88 MG/DL (ref 0.57–1)
DEPRECATED RDW RBC AUTO: 40 FL (ref 37–54)
EOSINOPHIL # BLD MANUAL: 0.08 10*3/MM3 (ref 0–0.4)
EOSINOPHIL NFR BLD MANUAL: 2 % (ref 0.3–6.2)
ERYTHROCYTE [DISTWIDTH] IN BLOOD BY AUTOMATED COUNT: 13.4 % (ref 12.3–15.4)
FLUAV H1 2009 PAND RNA NPH QL NAA+PROBE: DETECTED
FLUAV H1 HA GENE NPH QL NAA+PROBE: NOT DETECTED
FLUAV H3 RNA NPH QL NAA+PROBE: NOT DETECTED
FLUBV RNA ISLT QL NAA+PROBE: NOT DETECTED
GFR SERPL CREATININE-BSD FRML MDRD: 64 ML/MIN/1.73
GLOBULIN UR ELPH-MCNC: 3.4 GM/DL
GLUCOSE BLD-MCNC: 126 MG/DL (ref 65–99)
GLUCOSE UR STRIP-MCNC: NEGATIVE MG/DL
HADV DNA SPEC NAA+PROBE: NOT DETECTED
HCOV 229E RNA SPEC QL NAA+PROBE: NOT DETECTED
HCOV HKU1 RNA SPEC QL NAA+PROBE: NOT DETECTED
HCOV NL63 RNA SPEC QL NAA+PROBE: NOT DETECTED
HCOV OC43 RNA SPEC QL NAA+PROBE: NOT DETECTED
HCT VFR BLD AUTO: 39.4 % (ref 34–46.6)
HGB BLD-MCNC: 13 G/DL (ref 12–15.9)
HGB UR QL STRIP.AUTO: NEGATIVE
HMPV RNA NPH QL NAA+NON-PROBE: NOT DETECTED
HPIV1 RNA SPEC QL NAA+PROBE: NOT DETECTED
HPIV2 RNA SPEC QL NAA+PROBE: NOT DETECTED
HPIV3 RNA NPH QL NAA+PROBE: NOT DETECTED
HPIV4 P GENE NPH QL NAA+PROBE: NOT DETECTED
KETONES UR QL STRIP: NEGATIVE
LEUKOCYTE ESTERASE UR QL STRIP.AUTO: NEGATIVE
LYMPHOCYTES # BLD MANUAL: 0.59 10*3/MM3 (ref 0.7–3.1)
LYMPHOCYTES NFR BLD MANUAL: 15 % (ref 19.6–45.3)
LYMPHOCYTES NFR BLD MANUAL: 9 % (ref 5–12)
M PNEUMO IGG SER IA-ACNC: NOT DETECTED
MCH RBC QN AUTO: 27.1 PG (ref 26.6–33)
MCHC RBC AUTO-ENTMCNC: 33 G/DL (ref 31.5–35.7)
MCV RBC AUTO: 82.3 FL (ref 79–97)
MICROCYTES BLD QL: ABNORMAL
MONOCYTES # BLD AUTO: 0.35 10*3/MM3 (ref 0.1–0.9)
NEUTROPHILS # BLD AUTO: 2.85 10*3/MM3 (ref 1.7–7)
NEUTROPHILS NFR BLD MANUAL: 73 % (ref 42.7–76)
NITRITE UR QL STRIP: NEGATIVE
PH UR STRIP.AUTO: <=5 [PH] (ref 5–8)
PLAT MORPH BLD: NORMAL
PLATELET # BLD AUTO: 218 10*3/MM3 (ref 140–450)
PMV BLD AUTO: 9.4 FL (ref 6–12)
POTASSIUM BLD-SCNC: 3.9 MMOL/L (ref 3.5–5.2)
PROT SERPL-MCNC: 7.3 G/DL (ref 6–8.5)
PROT UR QL STRIP: NEGATIVE
RBC # BLD AUTO: 4.79 10*6/MM3 (ref 3.77–5.28)
RHINOVIRUS RNA SPEC NAA+PROBE: NOT DETECTED
RSV RNA NPH QL NAA+NON-PROBE: NOT DETECTED
SMUDGE CELLS BLD QL SMEAR: ABNORMAL
SODIUM BLD-SCNC: 139 MMOL/L (ref 136–145)
SP GR UR STRIP: 1.02 (ref 1–1.03)
TROPONIN T SERPL-MCNC: <0.01 NG/ML (ref 0–0.03)
UROBILINOGEN UR QL STRIP: NORMAL
WBC NRBC COR # BLD: 3.91 10*3/MM3 (ref 3.4–10.8)

## 2019-12-20 PROCEDURE — 96374 THER/PROPH/DIAG INJ IV PUSH: CPT

## 2019-12-20 PROCEDURE — 85007 BL SMEAR W/DIFF WBC COUNT: CPT | Performed by: EMERGENCY MEDICINE

## 2019-12-20 PROCEDURE — 81003 URINALYSIS AUTO W/O SCOPE: CPT | Performed by: EMERGENCY MEDICINE

## 2019-12-20 PROCEDURE — 0100U HC BIOFIRE FILMARRAY RESP PANEL 2: CPT | Performed by: EMERGENCY MEDICINE

## 2019-12-20 PROCEDURE — 25010000002 ONDANSETRON PER 1 MG: Performed by: EMERGENCY MEDICINE

## 2019-12-20 PROCEDURE — 36415 COLL VENOUS BLD VENIPUNCTURE: CPT

## 2019-12-20 PROCEDURE — 93010 ELECTROCARDIOGRAM REPORT: CPT | Performed by: INTERNAL MEDICINE

## 2019-12-20 PROCEDURE — 84484 ASSAY OF TROPONIN QUANT: CPT | Performed by: EMERGENCY MEDICINE

## 2019-12-20 PROCEDURE — 71046 X-RAY EXAM CHEST 2 VIEWS: CPT

## 2019-12-20 PROCEDURE — 80053 COMPREHEN METABOLIC PANEL: CPT | Performed by: EMERGENCY MEDICINE

## 2019-12-20 PROCEDURE — 93005 ELECTROCARDIOGRAM TRACING: CPT | Performed by: EMERGENCY MEDICINE

## 2019-12-20 PROCEDURE — 99284 EMERGENCY DEPT VISIT MOD MDM: CPT

## 2019-12-20 PROCEDURE — 85025 COMPLETE CBC W/AUTO DIFF WBC: CPT | Performed by: EMERGENCY MEDICINE

## 2019-12-20 RX ORDER — ONDANSETRON 4 MG/1
4 TABLET, FILM COATED ORAL EVERY 6 HOURS
Qty: 12 TABLET | Refills: 0 | Status: SHIPPED | OUTPATIENT
Start: 2019-12-20 | End: 2020-05-01

## 2019-12-20 RX ORDER — OSELTAMIVIR PHOSPHATE 30 MG/1
30 CAPSULE ORAL 2 TIMES DAILY
Qty: 10 CAPSULE | Refills: 0 | Status: SHIPPED | OUTPATIENT
Start: 2019-12-20 | End: 2020-05-01

## 2019-12-20 RX ORDER — ONDANSETRON 2 MG/ML
4 INJECTION INTRAMUSCULAR; INTRAVENOUS ONCE
Status: COMPLETED | OUTPATIENT
Start: 2019-12-20 | End: 2019-12-20

## 2019-12-20 RX ADMIN — ONDANSETRON 4 MG: 2 INJECTION INTRAMUSCULAR; INTRAVENOUS at 10:43

## 2019-12-20 RX ADMIN — SODIUM CHLORIDE 1000 ML: 9 INJECTION, SOLUTION INTRAVENOUS at 08:10

## 2019-12-20 RX ADMIN — SODIUM CHLORIDE 500 ML: 9 INJECTION, SOLUTION INTRAVENOUS at 10:43

## 2019-12-20 NOTE — ED PROVIDER NOTES
" EMERGENCY DEPARTMENT ENCOUNTER    CHIEF COMPLAINT  Chief Complaint: near-syncope  History given by: Pt  History limited by: none  Room Number: 09/09  PMD: Pierre Chaudhry Jr., MD      HPI:  Pt is a 69 y.o. female who presents complaining of intermittent episodes of \"feeling like she was about to pass out\" that began at 345 am this morning. Pt reports body aches, cough, fever (TMax = 100.8F), and chills that began 5 nights ago. She states she has done her best to drink plenty of fluids but is concerned that she is dehydrated. Pt states she is concerned for the \"flu/pneumonia\". She denies any recent diarrhea. She did not get her flu shot this year.     PAST MEDICAL HISTORY  Active Ambulatory Problems     Diagnosis Date Noted   • FUENTES on autoCPAP 10/19/2017   • Circadian rhythm sleep disorder, delayed sleep phase type 10/28/2017   • Hypersomnia due to another medical condition 10/28/2017   • NSTEMI (non-ST elevated myocardial infarction) (CMS/HCC) 05/21/2018   • Essential hypertension 06/29/2018   • Degeneration of cervical intervertebral disc 11/09/2018   • DDD (degenerative disc disease), thoracic 11/09/2018   • Disc degeneration, lumbar 11/27/2018   • Mixed hyperlipidemia 01/10/2019   • Palpitations 07/23/2019   • Extremity pain 07/26/2019   • LBP (low back pain) 07/26/2019   • DDD (degenerative disc disease), lumbar 07/26/2019   • Atypical chest pain 10/30/2019     Resolved Ambulatory Problems     Diagnosis Date Noted   • No Resolved Ambulatory Problems     Past Medical History:   Diagnosis Date   • Depression    • GERD (gastroesophageal reflux disease)    • Hyperlipidemia    • Hypertension    • Non-STEMI (non-ST elevated myocardial infarction) (CMS/[facility])        PAST SURGICAL HISTORY  Past Surgical History:   Procedure Laterality Date   • BACK SURGERY     • CARDIAC CATHETERIZATION N/A 5/22/2018    Procedure: Left Heart Cath;  Surgeon: Aleks Velazquez MD;  Location: [facility] INVASIVE LOCATION;  Service: " Cardiovascular   • CARDIAC CATHETERIZATION N/A 5/22/2018    Procedure: Coronary angiography;  Surgeon: Aleks Velazquez MD;  Location:  [facility] CATH INVASIVE LOCATION;  Service: Cardiovascular   • CARDIAC CATHETERIZATION N/A 5/22/2018    Procedure: Left ventriculography;  Surgeon: Aleks Velazquez MD;  Location:  [facility] CATH INVASIVE LOCATION;  Service: Cardiovascular   • CARDIAC CATHETERIZATION N/A 5/22/2018    Procedure: Peripheral angiography;  Surgeon: Aleks Velazquez MD;  Location:  [facility] CATH INVASIVE LOCATION;  Service: Cardiovascular   • EXPLORATORY LAPAROTOMY     • HEMORRHOIDECTOMY     • NECK SURGERY  12/03/2003    ACDF C6-7-Dr. Nix    • RECTAL SURGERY         FAMILY HISTORY  Family History   Problem Relation Age of Onset   • Alzheimer's disease Mother    • Heart failure Father    • Diabetes Sister    • Atrial fibrillation Sister    • Stroke Paternal Grandmother        SOCIAL HISTORY  Social History     Socioeconomic History   • Marital status: Single     Spouse name: Not on file   • Number of children: 0   • Years of education: BS    • Highest education level: Not on file   Occupational History   • Occupation: RETIRED RN    Tobacco Use   • Smoking status: Never Smoker   • Smokeless tobacco: Never Used   • Tobacco comment: caffeine use: TEA OCCASSIONALLY   Substance and Sexual Activity   • Alcohol use: No   • Drug use: No   • Sexual activity: Defer       ALLERGIES  Erythromycin    REVIEW OF SYSTEMS  Review of Systems   Constitutional: Positive for appetite change, chills and fever.   HENT: Positive for congestion. Negative for sore throat.    Eyes: Positive for pain and discharge.   Respiratory: Positive for cough. Negative for chest tightness and shortness of breath.    Cardiovascular: Negative for chest pain, palpitations and leg swelling.   Gastrointestinal: Positive for nausea and vomiting. Negative for abdominal pain.   Genitourinary: Negative for difficulty urinating.    Musculoskeletal: Positive for arthralgias.   Skin: Negative for rash.   Neurological: Negative for dizziness, speech difficulty, numbness and headaches.       PHYSICAL EXAM  ED Triage Vitals [12/20/19 0735]   Temp Heart Rate Resp BP SpO2   98.4 °F (36.9 °C) 64 16 138/92 98 %      Temp src Heart Rate Source Patient Position BP Location FiO2 (%)   Oral -- -- -- --       Physical Exam   Constitutional: She is oriented to person, place, and time. No distress.   HENT:   Head: Normocephalic and atraumatic.   Eyes: Pupils are equal, round, and reactive to light. EOM are normal.   Neck: Normal range of motion. Neck supple.   Cardiovascular: Normal rate, regular rhythm, normal heart sounds and intact distal pulses. Exam reveals no gallop and no friction rub.   No murmur heard.  Pulmonary/Chest: Effort normal. No respiratory distress. She has no wheezes. She has rhonchi. She has no rales.   Abdominal: Soft. Bowel sounds are normal. She exhibits no distension and no mass. There is no tenderness. There is no rebound and no guarding.   Musculoskeletal: Normal range of motion. She exhibits no edema.   Neurological: She is alert and oriented to person, place, and time. She has normal sensation and normal strength.   Skin: Skin is warm and dry. No rash noted.   Psychiatric: Mood and affect normal.   Nursing note and vitals reviewed.      LAB RESULTS  Lab Results (last 24 hours)     Procedure Component Value Units Date/Time    Respiratory Panel, PCR - Swab, Nasopharynx [[id number]]  (Abnormal) Collected:  12/20/19 0812    Specimen:  Swab from Nasopharynx Updated:  12/20/19 1008     ADENOVIRUS, PCR Not Detected     Coronavirus 229E Not Detected     Coronavirus HKU1 Not Detected     Coronavirus NL63 Not Detected     Coronavirus OC43 Not Detected     Human Metapneumovirus Not Detected     Human Rhinovirus/Enterovirus Not Detected     Influenza B PCR Not Detected     Parainfluenza Virus 1 Not Detected     Parainfluenza Virus 2 Not  Detected     Parainfluenza Virus 3 Not Detected     Parainfluenza Virus 4 Not Detected     Bordetella pertussis pcr Not Detected     Influenza A H1 2009 PCR Detected     Chlamydophila pneumoniae PCR Not Detected     Mycoplasma pneumo by PCR Not Detected     Influenza A H3 Not Detected     Influenza A H1 Not Detected     RSV, PCR Not Detected     Bordetella parapertussis PCR Not Detected    CBC & Differential [[id number]] Collected:  12/20/19 0815    Specimen:  Blood Updated:  12/20/19 0905    Narrative:       The following orders were created for panel order CBC & Differential.  Procedure                               Abnormality         Status                     ---------                               -----------         ------                     CBC Auto Differential[[id number]]        Normal              Final result                 Please view results for these tests on the individual orders.    Comprehensive Metabolic Panel [[id number]]  (Abnormal) Collected:  12/20/19 0815    Specimen:  Blood Updated:  12/20/19 0847     Glucose 126 mg/dL      BUN 20 mg/dL      Creatinine 0.88 mg/dL      Sodium 139 mmol/L      Potassium 3.9 mmol/L      Chloride 103 mmol/L      CO2 21.0 mmol/L      Calcium 8.6 mg/dL      Total Protein 7.3 g/dL      Albumin 3.90 g/dL      ALT (SGPT) 19 U/L      AST (SGOT) 25 U/L      Alkaline Phosphatase 99 U/L      Total Bilirubin 0.3 mg/dL      eGFR Non African Amer 64 mL/min/1.73      Globulin 3.4 gm/dL      A/G Ratio 1.1 g/dL      BUN/Creatinine Ratio 22.7     Anion Gap 15.0 mmol/L     Narrative:       GFR Normal >60  Chronic Kidney Disease <60  Kidney Failure <15      Troponin [[id number]]  (Normal) Collected:  12/20/19 0815    Specimen:  Blood Updated:  12/20/19 0846     Troponin T <0.010 ng/mL     Narrative:       Troponin T Reference Range:  <= 0.03 ng/mL-   Negative for AMI  >0.03 ng/mL-     Abnormal for myocardial necrosis.  Clinicians would have to utilize clinical acumen, EKG, Troponin  and serial changes to determine if it is an Acute Myocardial Infarction or myocardial injury due to an underlying chronic condition.     CBC Auto Differential [[id number]]  (Normal) Collected:  12/20/19 0815    Specimen:  Blood Updated:  12/20/19 0905     WBC 3.91 10*3/mm3      RBC 4.79 10*6/mm3      Hemoglobin 13.0 g/dL      Hematocrit 39.4 %      MCV 82.3 fL      MCH 27.1 pg      MCHC 33.0 g/dL      RDW 13.4 %      RDW-SD 40.0 fl      MPV 9.4 fL      Platelets 218 10*3/mm3     Manual Differential [[id number]]  (Abnormal) Collected:  12/20/19 0815    Specimen:  Blood Updated:  12/20/19 0905     Neutrophil % 73.0 %      Lymphocyte % 15.0 %      Monocyte % 9.0 %      Eosinophil % 2.0 %      Basophil % 1.0 %      Neutrophils Absolute 2.85 10*3/mm3      Lymphocytes Absolute 0.59 10*3/mm3      Monocytes Absolute 0.35 10*3/mm3      Eosinophils Absolute 0.08 10*3/mm3      Basophils Absolute 0.04 10*3/mm3      Anisocytosis Slight/1+     Microcytes Slight/1+     Smudge Cells Slight/1+     Platelet Morphology Normal    Urinalysis With Microscopic If Indicated (No Culture) - Urine, Clean Catch [[id number]]  (Normal) Collected:  12/20/19 0840    Specimen:  Urine, Clean Catch Updated:  12/20/19 0921     Color, UA Yellow     Appearance, UA Clear     pH, UA <=5.0     Specific Gravity, UA 1.017     Glucose, UA Negative     Ketones, UA Negative     Bilirubin, UA Negative     Blood, UA Negative     Protein, UA Negative     Leuk Esterase, UA Negative     Nitrite, UA Negative     Urobilinogen, UA 0.2 E.U./dL    Narrative:       Urine microscopic not indicated.          I ordered the above labs and reviewed the results    RADIOLOGY  XR Chest 2 View   Final Result   Negative.       This report was finalized on 12/20/2019 9:06 AM by Dr. Kevin Casper M.D.               I ordered the above noted radiological studies. Interpreted by radiologist. Discussed with radiologist (). Reviewed by me in PACS.       PROCEDURES  Procedures    EKG           EKG time: 0805  Rhythm/Rate: NSR, 65  P waves and AL: nml  QRS, axis: LAD, PRWP anteriorly   ST and T waves: normal     Interpreted Contemporaneously by me, independently viewed  unchanged compared to prior 5/23/18      PROGRESS AND CONSULTS        1007 labs reviewed. Her respiratory panel is positive for flu A. Troponin negative; Her CBC, UA, and CMP are normal.    1009 Pt recheck. Pt resting in bed comfortably. Notified pt of test results, including respiratory panel that is positive for the flu. Discussed the plan to discharge the pt home. Pt requests more IVFs and something for nausea before discharge. Pt understands and agrees with the plan, all concerns addressed.     1010 Ordered 500 cc bolus of IVF's and 4 of IV Zofran.      MEDICAL DECISION MAKING  Results were reviewed/discussed with the patient and they were also made aware of online access. Pt also made aware that some labs, such as cultures, will not be resulted during ER visit and follow up with PMD is necessary.     MDM       DIAGNOSIS  Final diagnoses:   Influenza A   Non-intractable vomiting with nausea, unspecified vomiting type       DISPOSITION  DISCHARGE    Patient discharged in stable condition.    Reviewed implications of results, diagnosis, meds, responsibility to follow up, warning signs and symptoms of possible worsening, potential complications and reasons to return to ER.    Patient/Family voiced understanding of above instructions.    Discussed plan for discharge, as there is no emergent indication for admission. Patient referred to primary care provider for BP management due to today's BP. Pt/family is agreeable and understands need for follow up and repeat testing.  Pt is aware that discharge does not mean that nothing is wrong but it indicates no emergency is present that requires admission and they must continue care with follow-up as given below or physician of their choice.     FOLLOW-UP  Pierre Chaudhry Jr., MD  [id number]  [address] [address]  [phone]    Schedule an appointment as soon as possible for a visit   If symptoms worsen or do not improve         Medication List      New Prescriptions    ondansetron 4 MG tablet  Commonly known as:  ZOFRAN  Take 1 tablet by mouth Every 6 (Six) Hours.     oseltamivir 30 MG capsule  Commonly known as:  TAMIFLU  Take 1 capsule by mouth 2 (Two) Times a Day.              Latest Documented Vital Signs:  As of 10:16 AM  BP- 117/67 HR- 61 Temp- 98.4 °F (36.9 °C) (Oral) O2 sat- 96%    --  Documentation assistance provided by joseluis Johns for Dr. Hylton.  Information recorded by the scribe was done at my direction and has been verified and validated by me.        Davi Johns  12/20/19 1016       Baldo Hylton MD  12/20/19 1147

## 2020-05-01 ENCOUNTER — TELEMEDICINE (OUTPATIENT)
Dept: CARDIOLOGY | Facility: CLINIC | Age: 70
End: 2020-05-01

## 2020-05-01 VITALS
DIASTOLIC BLOOD PRESSURE: 94 MMHG | WEIGHT: 176 LBS | BODY MASS INDEX: 32.39 KG/M2 | HEART RATE: 88 BPM | HEIGHT: 62 IN | SYSTOLIC BLOOD PRESSURE: 135 MMHG

## 2020-05-01 DIAGNOSIS — R00.2 PALPITATIONS: ICD-10-CM

## 2020-05-01 DIAGNOSIS — I10 ESSENTIAL HYPERTENSION: Primary | ICD-10-CM

## 2020-05-01 DIAGNOSIS — E78.2 MIXED HYPERLIPIDEMIA: ICD-10-CM

## 2020-05-01 PROCEDURE — 99214 OFFICE O/P EST MOD 30 MIN: CPT | Performed by: INTERNAL MEDICINE

## 2020-05-01 RX ORDER — RANITIDINE 300 MG/1
300 TABLET ORAL
COMMUNITY
Start: 2020-03-19 | End: 2020-05-01 | Stop reason: ALTCHOICE

## 2020-05-01 NOTE — PROGRESS NOTES
Date of Office Visit: 2020  Encounter Provider: Aleks Velazquez MD  Place of Service: [facility] CARDIOLOGY  Patient Name: Katelyn Sr  :1950   TELEHEALTH VISIT  VIDEO    Chief Complaint   Patient presents with   • Follow-up     6 month   • Hypertension         HPI  This patient has consented to a telehealth visit via phone The visit was scheduled as a video visit to comply with patient safety concerns in accordance with CDC recommendations.  All vitals recorded within this visit are reported by the patient.  I spent 25 minutes in total including but not limited to the 15 minutes spent in direct conversation with this patient.   68 y.o. female  who presents today for follow-up.  Old records have been obtained and reviewed by me.  She is a patient of mine  with a past cardiac history significant for hypertension and hyperlipidemia.  In May 2018, she was hospitalized with chest pain.  Her troponin was 0.208 and she had nonspecific T wave changes in leads III and aVF.  She underwent coronary angiography which revealed normal coronary arteries, normal LV size and function, wall hypokinesis which was likely catheter induced.   She was felt to have suffered a type II myocardial infarction secondary to malignant hypertension.  She had an echocardiogram during that hospitalization which revealed a normal LV function with an EF of 59%, grade 1 diastolic dysfunction, and no significant valvular abnormalities.  Her blood pressure was controlled with amlodipine and chlorthalidone.  The chlorthalidone was subsequently discontinued due to acute kidney injury and volume depletion.  She was switched to carvedilol.   Per her report she has been doing well with no recurrence of chest pain or dyspnea on exertion. Her blood pressure has been running a little bit up as of late and today it is quite elevated at 181/101, that was early this morning and followup blood pressure that she  got was 135/94. It sounds like it fluctuates and does not seem to be persistently elevated.          Past Medical History:   Diagnosis Date   • Depression    • GERD (gastroesophageal reflux disease)    • Hyperlipidemia    • Hypertension    • Non-STEMI (non-ST elevated myocardial infarction) (CMS/[facility])    • FUENTES on CPAP        Past Surgical History:   Procedure Laterality Date   • BACK SURGERY     • CARDIAC CATHETERIZATION N/A 5/22/2018    Procedure: Left Heart Cath;  Surgeon: Aleks Velazquez MD;  Location:  [facility];  Service: Cardiovascular   • CARDIAC CATHETERIZATION N/A 5/22/2018    Procedure: Coronary angiography;  Surgeon: Aleks Velazquez MD;  Location:  [facility];  Service: Cardiovascular   • CARDIAC CATHETERIZATION N/A 5/22/2018    Procedure: Left ventriculography;  Surgeon: Aleks Velazquez MD;  Location:  [facility];  Service: Cardiovascular   • CARDIAC CATHETERIZATION N/A 5/22/2018    Procedure: Peripheral angiography;  Surgeon: Aleks Velazquez MD;  Location:  [facility];  Service: Cardiovascular   • EXPLORATORY LAPAROTOMY     • HEMORRHOIDECTOMY     • NECK SURGERY  12/03/2003    ACDF C6-7-Dr. Nix    • RECTAL SURGERY         Social History     Socioeconomic History   • Marital status: Single     Spouse name: Not on file   • Number of children: 0   • Years of education: BS    • Highest education level: Not on file   Occupational History   • Occupation: RETIRED RN    Tobacco Use   • Smoking status: Never Smoker   • Smokeless tobacco: Never Used   • Tobacco comment: caffeine use: TEA OCCASSIONALLY   Substance and Sexual Activity   • Alcohol use: No   • Drug use: No   • Sexual activity: Defer       Family History   Problem Relation Age of Onset   • Alzheimer's disease Mother    • Heart failure Father    • Diabetes Sister    • Atrial fibrillation Sister    • Stroke Paternal Grandmother        Review of Systems    Constitution: Positive for malaise/fatigue. Negative for chills and fever.   Cardiovascular: Positive for chest pain, leg swelling and palpitations. Negative for dyspnea on exertion, near-syncope, orthopnea, paroxysmal nocturnal dyspnea and syncope.   Respiratory: Negative for cough and shortness of breath.    Musculoskeletal: Negative for joint pain, joint swelling and myalgias.   Gastrointestinal: Negative for abdominal pain, diarrhea, melena, nausea and vomiting.   Genitourinary: Negative for frequency and hematuria.   Neurological: Positive for light-headedness, numbness and paresthesias. Negative for seizures.   Allergic/Immunologic: Negative.    All other systems reviewed and are negative.      Allergies   Allergen Reactions   • Erythromycin Rash         Current Outpatient Medications:   •  amLODIPine (NORVASC) 2.5 MG tablet, TAKE 1 TABLET BY MOUTH EVERY DAY, Disp: 30 tablet, Rfl: 11  •  aspirin 81 MG EC tablet, Take 81 mg by mouth Daily., Disp: , Rfl:   •  carvedilol (COREG) 25 MG tablet, Take 1 tablet by mouth 2 (Two) Times a Day., Disp: 180 tablet, Rfl: 3  •  DULoxetine (CYMBALTA) 60 MG capsule, Take 60 mg by mouth 2 (Two) Times a Day., Disp: , Rfl:   •  escitalopram (LEXAPRO) 10 MG tablet, Take 10 mg by mouth Daily., Disp: , Rfl:   •  hydrALAZINE (APRESOLINE) 50 MG tablet, TAKE 1 TABLET BY MOUTH THREE TIMES A DAY, Disp: 90 tablet, Rfl: 6  •  lamoTRIgine (LAMICTAL) 100 MG tablet, Take 100 mg by mouth Daily., Disp: , Rfl:   •  LORazepam (ATIVAN) 0.5 MG tablet, Take 0.5 mg by mouth Every 8 (Eight) Hours As Needed for Anxiety., Disp: , Rfl:   •  losartan (COZAAR) 100 MG tablet, TAKE 1 TABLET BY MOUTH EVERY DAY, Disp: 90 tablet, Rfl: 1  •  pantoprazole (PROTONIX) 40 MG EC tablet, Take 40 mg by mouth Daily., Disp: , Rfl:   •  pravastatin (PRAVACHOL) 40 MG tablet, Take 1 tablet by mouth Daily., Disp: 90 tablet, Rfl: 3  •  traZODone (DESYREL) 50 MG tablet, Take 25 mg by mouth Every Night., Disp: , Rfl:      "  Objective:     Vitals:    05/01/20 0600 05/01/20 1245   BP: (!) 181/101 135/94   Pulse:  88   Weight:  79.8 kg (176 lb)   Height:  157.5 cm (62\")     Body mass index is 32.19 kg/m².    PHYSICAL EXAM:  Appears well.   No distress.   Normal respiratory effort.   Normocephalic.   No additional examination.   Telehealth visit secondary to Covid-19 outbreak.           Procedures   5/23/18  · Left ventricular systolic function is normal. Calculated EF = 59%. Estimated EF was in agreement with the calculated EF. Estimated EF = 59%. Normal left ventricular cavity size and wall thickness noted. All left ventricular wall segments contract normally. Left ventricular diastolic dysfunction is noted (grade I) consistent with impaired relaxation.  · Trace mitral valve regurgitation is present.  · Physiologic tricuspid valve regurgitation is present. Estimated right ventricular systolic pressure from tricuspid regurgitation is normal (<35 mmHg). Calculated right ventricular systolic pressure from tricuspid regurgitation is 25 mmHg.      Assessment:      Plan:     This is a very pleasant 69-year-old female with a medical history of type 2 myocardial infarction in the setting of malignant hypertension, normal left ventricular function, who presents back to me for follow up. She last saw Catalina Rojas and at that time, it sounds like she was doing pretty well. As of late, her blood pressure has been a little bit elevated, however, it seems to be variable throughout the day.     1. Essential hypertension.        -I have told her to continue her amlodipine, Carvedilol and hydralazine therapy at the current dose.        -She will monitor her blood pressure over the next couple of weeks and give me a call. I do think that she would tolerate 5        mg of amlodipine daily and probably not have much of an issue of swelling with that. I will await her call back before           calling that higher dose in.        -Continue carvedilol and " hydralazine.        -Duloxetine does cause a slight elevation in blood pressure secondary to its mechanism of action; however, certainly not to the level that she was earlier this morning. I do no think we need to discontinue that medicine.   2. Hyperlipidemia: Continue Pravachol therapy at the current dose.

## 2020-05-08 ENCOUNTER — TELEPHONE (OUTPATIENT)
Dept: CARDIOLOGY | Facility: CLINIC | Age: 70
End: 2020-05-08

## 2020-05-08 NOTE — TELEPHONE ENCOUNTER
Pt called back.  She said that she had another idea what might be causing her high BP. She said she has been seeing Dr. Salcedo for her Thyroid. She said that in the past Dr. Salcedo had mentioned that she was on the Hyperthyroid side, which is why she isn't taking anything for it.  Please advise.    Thanks,  Karyn          On 5/1/20:  Plan:     This is a very pleasant 69-year-old female with a medical history of type 2 myocardial infarction in the setting of malignant hypertension, normal left ventricular function, who presents back to me for follow up. She last saw Catalina Rojas and at that time, it sounds like she was doing pretty well. As of late, her blood pressure has been a little bit elevated, however, it seems to be variable throughout the day.      1. Essential hypertension.        -I have told her to continue her amlodipine, Carvedilol and hydralazine therapy at the current dose.        -She will monitor her blood pressure over the next couple of weeks and give me a call. I do think that she would tolerate 5        mg of amlodipine daily and probably not have much of an issue of swelling with that. I will await her call back before           calling that higher dose in.        -Continue carvedilol and hydralazine.        -Duloxetine does cause a slight elevation in blood pressure secondary to its mechanism of action; however, certainly not to the level that she was earlier this morning. I do no think we need to discontinue that medicine.

## 2020-05-14 NOTE — TELEPHONE ENCOUNTER
I had called her back. She wanted to know if her thyroid could be the cause of her increased blood pressure.  She also said her BP is still running 151/98. She would like to know if she needs to make any medication changes.  Please advise.    Thanks,  Karyn

## 2020-05-15 NOTE — TELEPHONE ENCOUNTER
Pt states she is seeing an endo and she last checked her levels a year ago.  She does state her T3 and T4 slightly elevated and TSH usually normal or elevated. She will contact her PCP since she has not seen endo in over a year. [facility]    Her BP listed from 5/14/20 was before her meds.

## 2020-05-18 RX ORDER — LOSARTAN POTASSIUM 100 MG/1
TABLET ORAL
Qty: 90 TABLET | Refills: 0 | Status: SHIPPED | OUTPATIENT
Start: 2020-05-18 | End: 2020-05-26

## 2020-05-19 RX ORDER — AMLODIPINE BESYLATE 5 MG/1
5 TABLET ORAL DAILY
Qty: 90 TABLET | Refills: 1 | Status: SHIPPED | OUTPATIENT
Start: 2020-05-19 | End: 2021-03-04

## 2020-05-26 RX ORDER — LOSARTAN POTASSIUM 100 MG/1
TABLET ORAL
Qty: 90 TABLET | Refills: 0 | Status: SHIPPED | OUTPATIENT
Start: 2020-05-26 | End: 2021-02-01

## 2020-06-11 ENCOUNTER — OFFICE (OUTPATIENT)
Dept: URBAN - METROPOLITAN AREA CLINIC 66 | Facility: CLINIC | Age: 70
End: 2020-06-11

## 2020-06-11 ENCOUNTER — TELEPHONE (OUTPATIENT)
Dept: CARDIOLOGY | Facility: CLINIC | Age: 70
End: 2020-06-11

## 2020-06-11 VITALS
HEART RATE: 94 BPM | HEIGHT: 62 IN | DIASTOLIC BLOOD PRESSURE: 79 MMHG | WEIGHT: 177 LBS | TEMPERATURE: 97.7 F | SYSTOLIC BLOOD PRESSURE: 128 MMHG

## 2020-06-11 DIAGNOSIS — K21.9 GASTRO-ESOPHAGEAL REFLUX DISEASE WITHOUT ESOPHAGITIS: ICD-10-CM

## 2020-06-11 DIAGNOSIS — R10.13 EPIGASTRIC PAIN: ICD-10-CM

## 2020-06-11 DIAGNOSIS — I10 ESSENTIAL (PRIMARY) HYPERTENSION: ICD-10-CM

## 2020-06-11 DIAGNOSIS — R13.19 OTHER DYSPHAGIA: ICD-10-CM

## 2020-06-11 PROCEDURE — 99214 OFFICE O/P EST MOD 30 MIN: CPT | Performed by: NURSE PRACTITIONER

## 2020-06-11 NOTE — TELEPHONE ENCOUNTER
Pt called back.  She said that her BP has been running 120s/70s, but for the last 3 days it has been running 150s/90s.     She said that doubling the amlodipine to 5mg qd. Has helped.  But admits that she has only been taking Hydralazine 50 bid instead of tid this whole time. She is forgetting mid-day dose.  Still taking carvedilol 25mg bid as prescribed.    I asked for a call back about how long she has been missing the mid-day dose of Hydralazine. Is there another medication she could take that doesn't involve tid dosing?    Please advise.    Thanks,  Karyn

## 2020-06-11 NOTE — TELEPHONE ENCOUNTER
I like the current plan.  5 mg daily of amlodipine and then call us back in 1 week.  I would leave the hydralazine twice daily as it

## 2020-06-16 RX ORDER — HYDRALAZINE HYDROCHLORIDE 50 MG/1
50 TABLET, FILM COATED ORAL 2 TIMES DAILY
Qty: 180 TABLET | Refills: 1 | Status: SHIPPED | OUTPATIENT
Start: 2020-06-16 | End: 2021-04-19

## 2020-06-25 ENCOUNTER — OFFICE (OUTPATIENT)
Dept: URBAN - METROPOLITAN AREA PATHOLOGY 4 | Facility: PATHOLOGY | Age: 70
End: 2020-06-25

## 2020-06-25 ENCOUNTER — AMBULATORY SURGICAL CENTER (OUTPATIENT)
Dept: URBAN - METROPOLITAN AREA SURGERY 20 | Facility: SURGERY | Age: 70
End: 2020-06-25

## 2020-06-25 DIAGNOSIS — K31.89 OTHER DISEASES OF STOMACH AND DUODENUM: ICD-10-CM

## 2020-06-25 DIAGNOSIS — K31.7 POLYP OF STOMACH AND DUODENUM: ICD-10-CM

## 2020-06-25 DIAGNOSIS — D13.1 BENIGN NEOPLASM OF STOMACH: ICD-10-CM

## 2020-06-25 DIAGNOSIS — K29.40 CHRONIC ATROPHIC GASTRITIS WITHOUT BLEEDING: ICD-10-CM

## 2020-06-25 DIAGNOSIS — R13.10 DYSPHAGIA, UNSPECIFIED: ICD-10-CM

## 2020-06-25 DIAGNOSIS — K21.9 GASTRO-ESOPHAGEAL REFLUX DISEASE WITHOUT ESOPHAGITIS: ICD-10-CM

## 2020-06-25 DIAGNOSIS — K22.2 ESOPHAGEAL OBSTRUCTION: ICD-10-CM

## 2020-06-25 PROCEDURE — 43450 DILATE ESOPHAGUS 1/MULT PASS: CPT | Performed by: INTERNAL MEDICINE

## 2020-06-25 PROCEDURE — 43239 EGD BIOPSY SINGLE/MULTIPLE: CPT | Performed by: INTERNAL MEDICINE

## 2020-06-25 PROCEDURE — 88305 TISSUE EXAM BY PATHOLOGIST: CPT | Performed by: INTERNAL MEDICINE

## 2020-07-08 ENCOUNTER — OFFICE (OUTPATIENT)
Dept: URBAN - METROPOLITAN AREA TELEHEALTH 5 | Facility: TELEHEALTH | Age: 70
End: 2020-07-08

## 2020-07-08 VITALS — HEIGHT: 62 IN

## 2020-07-08 DIAGNOSIS — K29.70 GASTRITIS, UNSPECIFIED, WITHOUT BLEEDING: ICD-10-CM

## 2020-07-08 DIAGNOSIS — R13.10 DYSPHAGIA, UNSPECIFIED: ICD-10-CM

## 2020-07-08 DIAGNOSIS — R05 COUGH: ICD-10-CM

## 2020-07-08 DIAGNOSIS — K21.9 GASTRO-ESOPHAGEAL REFLUX DISEASE WITHOUT ESOPHAGITIS: ICD-10-CM

## 2020-07-08 PROCEDURE — 99443: CPT | Mod: 95 | Performed by: NURSE PRACTITIONER

## 2020-07-08 RX ORDER — PANTOPRAZOLE SODIUM 40 MG/1
40 TABLET, DELAYED RELEASE ORAL
Qty: 30 | Refills: 11 | Status: COMPLETED
End: 2020-07-08

## 2020-07-08 RX ORDER — PANTOPRAZOLE SODIUM 40 MG/1
80 TABLET, DELAYED RELEASE ORAL
Qty: 180 | Refills: 3 | Status: ACTIVE
Start: 2020-07-08

## 2020-07-13 ENCOUNTER — HOSPITAL ENCOUNTER (OUTPATIENT)
Facility: HOSPITAL | Age: 70
Setting detail: OBSERVATION
Discharge: HOME | End: 2020-07-17
Attending: EMERGENCY MEDICINE | Admitting: HOSPITALIST

## 2020-07-13 ENCOUNTER — TELEPHONE (OUTPATIENT)
Dept: CARDIOLOGY | Facility: CLINIC | Age: 70
End: 2020-07-13

## 2020-07-13 ENCOUNTER — APPOINTMENT (OUTPATIENT)
Dept: CT IMAGING | Facility: HOSPITAL | Age: 70
End: 2020-07-13

## 2020-07-13 ENCOUNTER — APPOINTMENT (OUTPATIENT)
Dept: GENERAL RADIOLOGY | Facility: HOSPITAL | Age: 70
End: 2020-07-13

## 2020-07-13 DIAGNOSIS — R42 VERTIGO: ICD-10-CM

## 2020-07-13 DIAGNOSIS — H81.21 VESTIBULAR NEURONITIS OF RIGHT EAR: Primary | ICD-10-CM

## 2020-07-13 DIAGNOSIS — R42 DIZZINESS: ICD-10-CM

## 2020-07-13 LAB
ALBUMIN SERPL-MCNC: 4.5 G/DL (ref 3.5–5.2)
ALBUMIN/GLOB SERPL: 1.3 G/DL
ALP SERPL-CCNC: 103 U/L (ref 39–117)
ALT SERPL W P-5'-P-CCNC: 15 U/L (ref 1–33)
ANION GAP SERPL CALCULATED.3IONS-SCNC: 12.7 MMOL/L (ref 5–15)
AST SERPL-CCNC: 15 U/L (ref 1–32)
BASOPHILS # BLD AUTO: 0.02 10*3/MM3 (ref 0–0.2)
BASOPHILS NFR BLD AUTO: 0.3 % (ref 0–1.5)
BILIRUB SERPL-MCNC: 0.5 MG/DL (ref 0–1.2)
BUN SERPL-MCNC: 17 MG/DL (ref 8–23)
BUN/CREAT SERPL: 19.3 (ref 7–25)
CALCIUM SPEC-SCNC: 9.8 MG/DL (ref 8.6–10.5)
CHLORIDE SERPL-SCNC: 104 MMOL/L (ref 98–107)
CO2 SERPL-SCNC: 25.3 MMOL/L (ref 22–29)
CREAT SERPL-MCNC: 0.88 MG/DL (ref 0.57–1)
DEPRECATED RDW RBC AUTO: 42.5 FL (ref 37–54)
EOSINOPHIL # BLD AUTO: 0.26 10*3/MM3 (ref 0–0.4)
EOSINOPHIL NFR BLD AUTO: 3.3 % (ref 0.3–6.2)
ERYTHROCYTE [DISTWIDTH] IN BLOOD BY AUTOMATED COUNT: 14 % (ref 12.3–15.4)
GFR SERPL CREATININE-BSD FRML MDRD: 64 ML/MIN/1.73
GLOBULIN UR ELPH-MCNC: 3.5 GM/DL
GLUCOSE SERPL-MCNC: 100 MG/DL (ref 65–99)
HCT VFR BLD AUTO: 43.9 % (ref 34–46.6)
HGB BLD-MCNC: 14.4 G/DL (ref 12–15.9)
IMM GRANULOCYTES # BLD AUTO: 0.04 10*3/MM3 (ref 0–0.05)
IMM GRANULOCYTES NFR BLD AUTO: 0.5 % (ref 0–0.5)
LYMPHOCYTES # BLD AUTO: 1.46 10*3/MM3 (ref 0.7–3.1)
LYMPHOCYTES NFR BLD AUTO: 18.3 % (ref 19.6–45.3)
MCH RBC QN AUTO: 27.7 PG (ref 26.6–33)
MCHC RBC AUTO-ENTMCNC: 32.8 G/DL (ref 31.5–35.7)
MCV RBC AUTO: 84.6 FL (ref 79–97)
MONOCYTES # BLD AUTO: 0.73 10*3/MM3 (ref 0.1–0.9)
MONOCYTES NFR BLD AUTO: 9.1 % (ref 5–12)
NEUTROPHILS NFR BLD AUTO: 5.49 10*3/MM3 (ref 1.7–7)
NEUTROPHILS NFR BLD AUTO: 68.5 % (ref 42.7–76)
NRBC BLD AUTO-RTO: 0 /100 WBC (ref 0–0.2)
PLATELET # BLD AUTO: 365 10*3/MM3 (ref 140–450)
PMV BLD AUTO: 8.7 FL (ref 6–12)
POTASSIUM SERPL-SCNC: 4 MMOL/L (ref 3.5–5.2)
PROT SERPL-MCNC: 8 G/DL (ref 6–8.5)
RBC # BLD AUTO: 5.19 10*6/MM3 (ref 3.77–5.28)
SODIUM SERPL-SCNC: 142 MMOL/L (ref 136–145)
TROPONIN T SERPL-MCNC: <0.01 NG/ML (ref 0–0.03)
WBC # BLD AUTO: 8 10*3/MM3 (ref 3.4–10.8)

## 2020-07-13 PROCEDURE — 25010000002 ONDANSETRON PER 1 MG: Performed by: PHYSICIAN ASSISTANT

## 2020-07-13 PROCEDURE — 70450 CT HEAD/BRAIN W/O DYE: CPT

## 2020-07-13 PROCEDURE — 93010 ELECTROCARDIOGRAM REPORT: CPT | Performed by: INTERNAL MEDICINE

## 2020-07-13 PROCEDURE — 93005 ELECTROCARDIOGRAM TRACING: CPT | Performed by: PHYSICIAN ASSISTANT

## 2020-07-13 PROCEDURE — 99284 EMERGENCY DEPT VISIT MOD MDM: CPT

## 2020-07-13 PROCEDURE — 84484 ASSAY OF TROPONIN QUANT: CPT | Performed by: PHYSICIAN ASSISTANT

## 2020-07-13 PROCEDURE — 71045 X-RAY EXAM CHEST 1 VIEW: CPT

## 2020-07-13 PROCEDURE — 85025 COMPLETE CBC W/AUTO DIFF WBC: CPT | Performed by: PHYSICIAN ASSISTANT

## 2020-07-13 PROCEDURE — 80053 COMPREHEN METABOLIC PANEL: CPT | Performed by: PHYSICIAN ASSISTANT

## 2020-07-13 PROCEDURE — 96374 THER/PROPH/DIAG INJ IV PUSH: CPT

## 2020-07-13 RX ORDER — ONDANSETRON 2 MG/ML
4 INJECTION INTRAMUSCULAR; INTRAVENOUS ONCE
Status: COMPLETED | OUTPATIENT
Start: 2020-07-13 | End: 2020-07-13

## 2020-07-13 RX ORDER — MECLIZINE HYDROCHLORIDE 25 MG/1
50 TABLET ORAL ONCE
Status: COMPLETED | OUTPATIENT
Start: 2020-07-13 | End: 2020-07-13

## 2020-07-13 RX ADMIN — MECLIZINE HYDROCHLORIDE 50 MG: 25 TABLET ORAL at 23:55

## 2020-07-13 RX ADMIN — ONDANSETRON 4 MG: 2 INJECTION INTRAMUSCULAR; INTRAVENOUS at 23:00

## 2020-07-13 RX ADMIN — SODIUM CHLORIDE 1000 ML: 9 INJECTION, SOLUTION INTRAVENOUS at 23:00

## 2020-07-13 NOTE — TELEPHONE ENCOUNTER
Pt L/M re: having had vertigo all weekend and stated she s/w her PCP and he advised pt to go to ED. Pt stated she did not want to go to the ED due to COVID-19 and wanted to know if we had any other recommendations.  Please advise of recommendations.    RICH Corral

## 2020-07-14 ENCOUNTER — APPOINTMENT (OUTPATIENT)
Dept: MRI IMAGING | Facility: HOSPITAL | Age: 70
End: 2020-07-14

## 2020-07-14 PROBLEM — R42 DIZZINESS: Status: ACTIVE | Noted: 2020-07-14

## 2020-07-14 PROBLEM — H81.93 PERIPHERAL VESTIBULOPATHY OF BOTH EARS: Status: ACTIVE | Noted: 2020-07-14

## 2020-07-14 PROBLEM — M48.02 CERVICAL STENOSIS OF SPINAL CANAL: Status: ACTIVE | Noted: 2020-07-14

## 2020-07-14 PROBLEM — F41.1 GENERALIZED ANXIETY DISORDER: Status: ACTIVE | Noted: 2020-07-14

## 2020-07-14 PROBLEM — F32.A DEPRESSION: Status: ACTIVE | Noted: 2020-07-14

## 2020-07-14 LAB
ANION GAP SERPL CALCULATED.3IONS-SCNC: 10.9 MMOL/L (ref 5–15)
BUN SERPL-MCNC: 13 MG/DL (ref 8–23)
BUN/CREAT SERPL: 16.3 (ref 7–25)
CALCIUM SPEC-SCNC: 8.9 MG/DL (ref 8.6–10.5)
CHLORIDE SERPL-SCNC: 108 MMOL/L (ref 98–107)
CO2 SERPL-SCNC: 22.1 MMOL/L (ref 22–29)
CREAT SERPL-MCNC: 0.8 MG/DL (ref 0.57–1)
DEPRECATED RDW RBC AUTO: 44.2 FL (ref 37–54)
ERYTHROCYTE [DISTWIDTH] IN BLOOD BY AUTOMATED COUNT: 13.7 % (ref 12.3–15.4)
GFR SERPL CREATININE-BSD FRML MDRD: 72 ML/MIN/1.73
GLUCOSE SERPL-MCNC: 89 MG/DL (ref 65–99)
HCT VFR BLD AUTO: 39.3 % (ref 34–46.6)
HGB BLD-MCNC: 12.5 G/DL (ref 12–15.9)
HOLD SPECIMEN: NORMAL
HOLD SPECIMEN: NORMAL
MCH RBC QN AUTO: 27.8 PG (ref 26.6–33)
MCHC RBC AUTO-ENTMCNC: 31.8 G/DL (ref 31.5–35.7)
MCV RBC AUTO: 87.5 FL (ref 79–97)
PLATELET # BLD AUTO: 257 10*3/MM3 (ref 140–450)
PMV BLD AUTO: 8.6 FL (ref 6–12)
POTASSIUM SERPL-SCNC: 3.9 MMOL/L (ref 3.5–5.2)
RBC # BLD AUTO: 4.49 10*6/MM3 (ref 3.77–5.28)
SODIUM SERPL-SCNC: 141 MMOL/L (ref 136–145)
WBC # BLD AUTO: 7.01 10*3/MM3 (ref 3.4–10.8)
WHOLE BLOOD HOLD SPECIMEN: NORMAL
WHOLE BLOOD HOLD SPECIMEN: NORMAL

## 2020-07-14 PROCEDURE — 99204 OFFICE O/P NEW MOD 45 MIN: CPT | Performed by: PSYCHIATRY & NEUROLOGY

## 2020-07-14 PROCEDURE — 70551 MRI BRAIN STEM W/O DYE: CPT

## 2020-07-14 PROCEDURE — 72141 MRI NECK SPINE W/O DYE: CPT

## 2020-07-14 PROCEDURE — 80048 BASIC METABOLIC PNL TOTAL CA: CPT | Performed by: NURSE PRACTITIONER

## 2020-07-14 PROCEDURE — 25010000002 METHYLPREDNISOLONE PER 125 MG: Performed by: PSYCHIATRY & NEUROLOGY

## 2020-07-14 PROCEDURE — G0378 HOSPITAL OBSERVATION PER HR: HCPCS

## 2020-07-14 PROCEDURE — 36415 COLL VENOUS BLD VENIPUNCTURE: CPT | Performed by: NURSE PRACTITIONER

## 2020-07-14 PROCEDURE — 99284 EMERGENCY DEPT VISIT MOD MDM: CPT

## 2020-07-14 PROCEDURE — 85027 COMPLETE CBC AUTOMATED: CPT | Performed by: NURSE PRACTITIONER

## 2020-07-14 PROCEDURE — 96376 TX/PRO/DX INJ SAME DRUG ADON: CPT

## 2020-07-14 PROCEDURE — 96375 TX/PRO/DX INJ NEW DRUG ADDON: CPT

## 2020-07-14 PROCEDURE — 25010000002 LORAZEPAM PER 2 MG: Performed by: PSYCHIATRY & NEUROLOGY

## 2020-07-14 PROCEDURE — 97116 GAIT TRAINING THERAPY: CPT

## 2020-07-14 PROCEDURE — 97162 PT EVAL MOD COMPLEX 30 MIN: CPT

## 2020-07-14 PROCEDURE — 25010000002 ONDANSETRON PER 1 MG: Performed by: NURSE PRACTITIONER

## 2020-07-14 PROCEDURE — 96361 HYDRATE IV INFUSION ADD-ON: CPT

## 2020-07-14 RX ORDER — PANTOPRAZOLE SODIUM 40 MG/1
40 TABLET, DELAYED RELEASE ORAL 2 TIMES DAILY
Status: DISCONTINUED | OUTPATIENT
Start: 2020-07-14 | End: 2020-07-17 | Stop reason: HOSPADM

## 2020-07-14 RX ORDER — CARVEDILOL 25 MG/1
25 TABLET ORAL 2 TIMES DAILY
Status: DISCONTINUED | OUTPATIENT
Start: 2020-07-14 | End: 2020-07-17 | Stop reason: HOSPADM

## 2020-07-14 RX ORDER — SODIUM CHLORIDE 9 MG/ML
100 INJECTION, SOLUTION INTRAVENOUS CONTINUOUS
Status: DISCONTINUED | OUTPATIENT
Start: 2020-07-14 | End: 2020-07-14

## 2020-07-14 RX ORDER — LOSARTAN POTASSIUM 100 MG/1
100 TABLET ORAL DAILY
Status: DISCONTINUED | OUTPATIENT
Start: 2020-07-14 | End: 2020-07-17 | Stop reason: HOSPADM

## 2020-07-14 RX ORDER — ACETAMINOPHEN 160 MG/5ML
650 SOLUTION ORAL EVERY 4 HOURS PRN
Status: DISCONTINUED | OUTPATIENT
Start: 2020-07-14 | End: 2020-07-14

## 2020-07-14 RX ORDER — DULOXETIN HYDROCHLORIDE 60 MG/1
120 CAPSULE, DELAYED RELEASE ORAL NIGHTLY
Status: DISCONTINUED | OUTPATIENT
Start: 2020-07-14 | End: 2020-07-17 | Stop reason: HOSPADM

## 2020-07-14 RX ORDER — TRAZODONE HYDROCHLORIDE 50 MG/1
25 TABLET ORAL NIGHTLY
Status: DISCONTINUED | OUTPATIENT
Start: 2020-07-14 | End: 2020-07-17 | Stop reason: HOSPADM

## 2020-07-14 RX ORDER — ESCITALOPRAM OXALATE 10 MG/1
10 TABLET ORAL DAILY
Status: DISCONTINUED | OUTPATIENT
Start: 2020-07-14 | End: 2020-07-17 | Stop reason: HOSPADM

## 2020-07-14 RX ORDER — ONDANSETRON 4 MG/1
4 TABLET, FILM COATED ORAL EVERY 6 HOURS PRN
Status: DISCONTINUED | OUTPATIENT
Start: 2020-07-14 | End: 2020-07-17 | Stop reason: HOSPADM

## 2020-07-14 RX ORDER — FLUTICASONE PROPIONATE 50 MCG
2 SPRAY, SUSPENSION (ML) NASAL DAILY
COMMUNITY

## 2020-07-14 RX ORDER — LAMOTRIGINE 100 MG/1
100 TABLET ORAL NIGHTLY
Status: DISCONTINUED | OUTPATIENT
Start: 2020-07-14 | End: 2020-07-17 | Stop reason: HOSPADM

## 2020-07-14 RX ORDER — CLONAZEPAM 0.5 MG/1
0.5 TABLET ORAL 2 TIMES DAILY
Status: DISCONTINUED | OUTPATIENT
Start: 2020-07-15 | End: 2020-07-17 | Stop reason: HOSPADM

## 2020-07-14 RX ORDER — METHYLPREDNISOLONE SODIUM SUCCINATE 125 MG/2ML
80 INJECTION, POWDER, LYOPHILIZED, FOR SOLUTION INTRAMUSCULAR; INTRAVENOUS DAILY
Status: COMPLETED | OUTPATIENT
Start: 2020-07-14 | End: 2020-07-15

## 2020-07-14 RX ORDER — SODIUM CHLORIDE 0.9 % (FLUSH) 0.9 %
10 SYRINGE (ML) INJECTION AS NEEDED
Status: DISCONTINUED | OUTPATIENT
Start: 2020-07-14 | End: 2020-07-14

## 2020-07-14 RX ORDER — ACETAMINOPHEN 325 MG/1
650 TABLET ORAL EVERY 4 HOURS PRN
Status: DISCONTINUED | OUTPATIENT
Start: 2020-07-14 | End: 2020-07-17 | Stop reason: HOSPADM

## 2020-07-14 RX ORDER — CLONAZEPAM 0.5 MG/1
0.5 TABLET ORAL ONCE
Status: COMPLETED | OUTPATIENT
Start: 2020-07-14 | End: 2020-07-14

## 2020-07-14 RX ORDER — HYDRALAZINE HYDROCHLORIDE 50 MG/1
50 TABLET, FILM COATED ORAL 2 TIMES DAILY
Status: DISCONTINUED | OUTPATIENT
Start: 2020-07-14 | End: 2020-07-17 | Stop reason: HOSPADM

## 2020-07-14 RX ORDER — FLUTICASONE PROPIONATE 50 MCG
2 SPRAY, SUSPENSION (ML) NASAL NIGHTLY
Status: DISCONTINUED | OUTPATIENT
Start: 2020-07-14 | End: 2020-07-17 | Stop reason: HOSPADM

## 2020-07-14 RX ORDER — RANITIDINE HCL 75 MG
300 TABLET ORAL NIGHTLY
COMMUNITY
End: 2020-11-23

## 2020-07-14 RX ORDER — BISACODYL 5 MG/1
5 TABLET, DELAYED RELEASE ORAL DAILY PRN
Status: DISCONTINUED | OUTPATIENT
Start: 2020-07-14 | End: 2020-07-17 | Stop reason: HOSPADM

## 2020-07-14 RX ORDER — ASPIRIN 81 MG/1
81 TABLET ORAL DAILY
Status: DISCONTINUED | OUTPATIENT
Start: 2020-07-14 | End: 2020-07-17 | Stop reason: HOSPADM

## 2020-07-14 RX ORDER — LORAZEPAM 2 MG/ML
2 INJECTION INTRAMUSCULAR ONCE
Status: COMPLETED | OUTPATIENT
Start: 2020-07-14 | End: 2020-07-14

## 2020-07-14 RX ORDER — SODIUM CHLORIDE 0.9 % (FLUSH) 0.9 %
10 SYRINGE (ML) INJECTION EVERY 12 HOURS SCHEDULED
Status: DISCONTINUED | OUTPATIENT
Start: 2020-07-14 | End: 2020-07-14

## 2020-07-14 RX ORDER — AMLODIPINE BESYLATE 5 MG/1
5 TABLET ORAL DAILY
Status: DISCONTINUED | OUTPATIENT
Start: 2020-07-14 | End: 2020-07-17 | Stop reason: HOSPADM

## 2020-07-14 RX ORDER — PRAVASTATIN SODIUM 40 MG
40 TABLET ORAL NIGHTLY
Status: DISCONTINUED | OUTPATIENT
Start: 2020-07-14 | End: 2020-07-17 | Stop reason: HOSPADM

## 2020-07-14 RX ORDER — ONDANSETRON 2 MG/ML
4 INJECTION INTRAMUSCULAR; INTRAVENOUS EVERY 6 HOURS PRN
Status: DISCONTINUED | OUTPATIENT
Start: 2020-07-14 | End: 2020-07-17 | Stop reason: HOSPADM

## 2020-07-14 RX ORDER — ACETAMINOPHEN 650 MG/1
650 SUPPOSITORY RECTAL EVERY 4 HOURS PRN
Status: DISCONTINUED | OUTPATIENT
Start: 2020-07-14 | End: 2020-07-14

## 2020-07-14 RX ORDER — CALCIUM CARBONATE 200(500)MG
2 TABLET,CHEWABLE ORAL 2 TIMES DAILY PRN
Status: DISCONTINUED | OUTPATIENT
Start: 2020-07-14 | End: 2020-07-17 | Stop reason: HOSPADM

## 2020-07-14 RX ADMIN — CLONAZEPAM 0.5 MG: 0.5 TABLET ORAL at 16:25

## 2020-07-14 RX ADMIN — CARVEDILOL 25 MG: 25 TABLET, FILM COATED ORAL at 20:54

## 2020-07-14 RX ADMIN — ONDANSETRON 4 MG: 2 INJECTION INTRAMUSCULAR; INTRAVENOUS at 20:19

## 2020-07-14 RX ADMIN — DULOXETINE HYDROCHLORIDE 120 MG: 60 CAPSULE, DELAYED RELEASE ORAL at 20:53

## 2020-07-14 RX ADMIN — SODIUM CHLORIDE 100 ML/HR: 9 INJECTION, SOLUTION INTRAVENOUS at 15:13

## 2020-07-14 RX ADMIN — METHYLPREDNISOLONE SODIUM SUCCINATE 80 MG: 125 INJECTION, POWDER, FOR SOLUTION INTRAMUSCULAR; INTRAVENOUS at 16:25

## 2020-07-14 RX ADMIN — ONDANSETRON 4 MG: 2 INJECTION INTRAMUSCULAR; INTRAVENOUS at 03:10

## 2020-07-14 RX ADMIN — SODIUM CHLORIDE 100 ML/HR: 9 INJECTION, SOLUTION INTRAVENOUS at 03:06

## 2020-07-14 RX ADMIN — LORAZEPAM 2 MG: 2 INJECTION INTRAMUSCULAR; INTRAVENOUS at 21:04

## 2020-07-14 RX ADMIN — LAMOTRIGINE 100 MG: 100 TABLET ORAL at 20:56

## 2020-07-14 RX ADMIN — PANTOPRAZOLE SODIUM 40 MG: 40 TABLET, DELAYED RELEASE ORAL at 20:56

## 2020-07-14 RX ADMIN — ASPIRIN 81 MG: 81 TABLET, COATED ORAL at 20:56

## 2020-07-14 RX ADMIN — SODIUM CHLORIDE, PRESERVATIVE FREE 10 ML: 5 INJECTION INTRAVENOUS at 03:09

## 2020-07-14 RX ADMIN — ESCITALOPRAM 10 MG: 10 TABLET, FILM COATED ORAL at 20:53

## 2020-07-14 RX ADMIN — HYDRALAZINE HYDROCHLORIDE 50 MG: 50 TABLET, FILM COATED ORAL at 20:54

## 2020-07-14 NOTE — ED PROVIDER NOTES
Pt presents to the ED c/o  2 days of intermittent dizziness that is worse with head movements or positional change.  Improves when she lies still.  Has some nausea and vomiting with it, has had a couple of episodes in the past of similar.  Denies any associated diarrhea, fevers, chills, chest pains, shortness breath, cough.  Currently after receiving medications, reports feeling somewhat better.     On exam,   General: Awake, alert, no acute distress  HEENT: Mucous membranes moist, atraumatic, normocephalic, EOMI  Neck: Full ROM  Pulm: Symmetric, nonlabored, lungs CTAB  Cardiovascular: Regular rate and rhythm, normal S1/S2, intact distal pulses  GI: Soft, nontender, nondistended, no rebound, no guarding, bowel sounds present  MSK: Full ROM, no deformity  Skin: Warm, dry  Neuro: Alert and oriented x 3, GCS 15, moving all extremities, no focal deficits  Psych: Calm, cooperative      Surgical mask and gloves used during this encounter. Patient in surgical mask.    Plan:   ED Course as of Jul 14 0145   Mon Jul 13, 2020   2329 My interpretation of the EKG performed at 2314 is sinus rhythm rate 67 normal SUMMER QRS and QTc.  No ST elevation or T wave inversion.  No significant change from 12/20/2019    [KA]   2330 My interpretation of the chest x-ray is no acute infiltrate.    [KA]   Tue Jul 14, 2020   0036 Glucose(!): 100 [KA]   0036 Creatinine: 0.88 [KA]   0036 BUN: 17 [KA]   0036 WBC: 8.00 [KA]   0036 Hemoglobin: 14.4 [KA]   0036 Troponin T: <0.010 [KA]   0105 Attempted to ambulate the patient and she was too dizzy and unsteady.  Will call for admission.    [KA]   0143 I discussed the patient with CARLIE Anand for A who agrees to admit on behalf of Dr. Pisano.    [KA]      ED Course User Index  [KA] Orly Babcock PA     Patient's blood work is reassuring, she was feeling better as we tried to ambulate her but she really could not ambulate excessively because she was dizzy and unsteady.  At that point decided  to admit her for further symptomatic care.  This is likely BPPV that is just not improving with medications in the emergency department, may need to consider on the inpatient side potential further neuroimaging if symptoms do not improve or become more constant.     Attestation:  The PATTI and I have discussed this patient's history, physical exam, and treatment plan.  I have reviewed the documentation and personally had a face to face interaction with the patient. I affirm the documentation and agree with the treatment and plan.  The attached note describes my personal findings.          Jeremías Guillen MD  07/14/20 0146

## 2020-07-14 NOTE — H&P
"      Patient Name:  Katelyn Sr  YOB: 1950  MRN:  [id number]  Admit Date:  7/13/2020  Patient Care Team:  Pierre Chaudhry Jr., MD as PCP - General (Internal Medicine)  Pierre Chaudhry Jr., MD as PCP - Internal Medicine      Subjective   History Present Illness     Chief Complaint   Patient presents with   • Dizziness     Vertigo       Ms. Sr is a 69 y.o. non-smoker with a history of hypertension, obstructive sleep apnea, hyperlipidemia, anxiety, depression, NSTEMI, and degenerative disc disease that presents to [facility] complaining of dizziness.  She reports the symptoms began 3 days ago and initially lasted for about 6 hours.  She reports that she was sitting on her couch reading from her tablet when the words on the page began to blur.  She reports she looked up from the tablet to the ceiling and the walls \"were turning.\"  She reports associated nausea at that time.  She states the symptoms did not resolve right away, so she went to bed.  She states that when she woke up around 7 AM, she was still nauseous, but the dizziness had dissipated.  She states that around 7 PM that night, the symptoms returned and she also vomited at that time.  She reports a total of 3 episodes of emesis.  She reports a history of vertigo, states that in the past, the symptoms resolved in 15-20 mins.  She reports chronic neck pain, denies headache, weakness, paresthesias, facial asymmetry, and slurred speech.  Chest x-ray and CT of the head performed in the ED were negative for an acute process.  EKG was negative for ischemic changes.  An attempt to ambulate in the ED was unsuccessful.  She received Zofran and Antivert.       History of Present Illness  Review of Systems   Constitutional: Positive for diaphoresis. Negative for chills and fever.   HENT: Negative.  Negative for congestion and sore throat.    Eyes: Positive for photophobia and visual disturbance.   Respiratory: Negative.  " Negative for cough and shortness of breath.    Cardiovascular: Negative.  Negative for chest pain and palpitations.   Gastrointestinal: Positive for nausea and vomiting. Negative for abdominal pain.   Genitourinary: Negative.    Musculoskeletal: Positive for back pain (chronic) and neck pain (chronic).   Skin: Negative.  Negative for color change and wound.   Neurological: Positive for dizziness and light-headedness. Negative for tremors, facial asymmetry, speech difficulty, weakness, numbness and headaches.   Psychiatric/Behavioral: Negative.         Personal History     Past Medical History:   Diagnosis Date   • Depression    • GERD (gastroesophageal reflux disease)    • Hyperlipidemia    • Hypertension    • Non-STEMI (non-ST elevated myocardial infarction) (CMS/[facility])    • FUENTES on CPAP      Past Surgical History:   Procedure Laterality Date   • BACK SURGERY     • CARDIAC CATHETERIZATION N/A 5/22/2018    Procedure: Left Heart Cath;  Surgeon: Aleks Velazquez MD;  Location: [facility] CATH INVASIVE LOCATION;  Service: Cardiovascular   • CARDIAC CATHETERIZATION N/A 5/22/2018    Procedure: Coronary angiography;  Surgeon: Aleks Velazquez MD;  Location: [facility] CATH INVASIVE LOCATION;  Service: Cardiovascular   • CARDIAC CATHETERIZATION N/A 5/22/2018    Procedure: Left ventriculography;  Surgeon: Aleks Velazquez MD;  Location: [facility] CATH INVASIVE LOCATION;  Service: Cardiovascular   • CARDIAC CATHETERIZATION N/A 5/22/2018    Procedure: Peripheral angiography;  Surgeon: Aleks Velazquez MD;  Location: [facility] CATH INVASIVE LOCATION;  Service: Cardiovascular   • EXPLORATORY LAPAROTOMY     • HEMORRHOIDECTOMY     • NECK SURGERY  12/03/2003    ACDF C6-7-Dr. Nix    • RECTAL SURGERY       Family History   Problem Relation Age of Onset   • Alzheimer's disease Mother    • Heart failure Father    • Diabetes Sister    • Atrial fibrillation Sister    • Stroke Paternal Grandmother      Social History      Tobacco Use   • Smoking status: Never Smoker   • Smokeless tobacco: Never Used   • Tobacco comment: caffeine use: TEA OCCASSIONALLY   Substance Use Topics   • Alcohol use: No   • Drug use: No       (Not in a hospital admission)  Allergies:    Allergies   Allergen Reactions   • Erythromycin Rash       Objective    Objective     Vital Signs  Temp:  [97.4 °F (36.3 °C)] 97.4 °F (36.3 °C)  Heart Rate:  [70-71] 71  Resp:  [16-18] 18  BP: (141-154)/(87-94) 141/94  SpO2:  [96 %-97 %] 96 %  on   ;      Body mass index is 32.01 kg/m².    Physical Exam   Constitutional: She is oriented to person, place, and time. She appears well-developed and well-nourished.   HENT:   Head: Normocephalic and atraumatic.   Eyes: Pupils are equal, round, and reactive to light. Conjunctivae are normal.   Neck: Normal range of motion. Neck supple.   Cardiovascular: Normal rate, regular rhythm and normal heart sounds.   No murmur heard.  Pulmonary/Chest: Effort normal and breath sounds normal.   Abdominal: Soft. Bowel sounds are normal.   Musculoskeletal: Normal range of motion. She exhibits no edema.   Neurological: She is alert and oriented to person, place, and time.   Skin: Skin is warm and dry.   Psychiatric: She has a normal mood and affect. Her behavior is normal.   Nursing note and vitals reviewed.      Results Review:  I reviewed the patient's new clinical results.  I reviewed the patient's new imaging results and agree with the interpretation.  I reviewed the patient's other test results and agree with the interpretation  I personally viewed and interpreted the patient's EKG/Telemetry data  Discussed with ED provider.    Lab Results (last 24 hours)     Procedure Component Value Units Date/Time    Comprehensive Metabolic Panel [[id number]]  (Abnormal) Collected:  07/13/20 2248    Specimen:  Blood Updated:  07/13/20 2327     Glucose 100 mg/dL      BUN 17 mg/dL      Creatinine 0.88 mg/dL      Sodium 142 mmol/L      Potassium 4.0 mmol/L       Chloride 104 mmol/L      CO2 25.3 mmol/L      Calcium 9.8 mg/dL      Total Protein 8.0 g/dL      Albumin 4.50 g/dL      ALT (SGPT) 15 U/L      AST (SGOT) 15 U/L      Alkaline Phosphatase 103 U/L      Total Bilirubin 0.5 mg/dL      eGFR Non African Amer 64 mL/min/1.73      Globulin 3.5 gm/dL      A/G Ratio 1.3 g/dL      BUN/Creatinine Ratio 19.3     Anion Gap 12.7 mmol/L     Narrative:       GFR Normal >60  Chronic Kidney Disease <60  Kidney Failure <15      CBC & Differential [[id number]] Collected:  07/13/20 2248    Specimen:  Blood Updated:  07/13/20 2320    Narrative:       The following orders were created for panel order CBC & Differential.  Procedure                               Abnormality         Status                     ---------                               -----------         ------                     CBC Auto Differential[[id number]]        Abnormal            Final result                 Please view results for these tests on the individual orders.    Troponin [[id number]]  (Normal) Collected:  07/13/20 2248    Specimen:  Blood Updated:  07/13/20 2327     Troponin T <0.010 ng/mL     Narrative:       Troponin T Reference Range:  <= 0.03 ng/mL-   Negative for AMI  >0.03 ng/mL-     Abnormal for myocardial necrosis.  Clinicians would have to utilize clinical acumen, EKG, Troponin and serial changes to determine if it is an Acute Myocardial Infarction or myocardial injury due to an underlying chronic condition.       Results may be falsely decreased if patient taking Biotin.      CBC Auto Differential [[id number]]  (Abnormal) Collected:  07/13/20 2248    Specimen:  Blood Updated:  07/13/20 2320     WBC 8.00 10*3/mm3      RBC 5.19 10*6/mm3      Hemoglobin 14.4 g/dL      Hematocrit 43.9 %      MCV 84.6 fL      MCH 27.7 pg      MCHC 32.8 g/dL      RDW 14.0 %      RDW-SD 42.5 fl      MPV 8.7 fL      Platelets 365 10*3/mm3      Neutrophil % 68.5 %      Lymphocyte % 18.3 %      Monocyte % 9.1 %       Eosinophil % 3.3 %      Basophil % 0.3 %      Immature Grans % 0.5 %      Neutrophils, Absolute 5.49 10*3/mm3      Lymphocytes, Absolute 1.46 10*3/mm3      Monocytes, Absolute 0.73 10*3/mm3      Eosinophils, Absolute 0.26 10*3/mm3      Basophils, Absolute 0.02 10*3/mm3      Immature Grans, Absolute 0.04 10*3/mm3      nRBC 0.0 /100 WBC           Imaging Results (Last 24 Hours)     Procedure Component Value Units Date/Time    CT Head Without Contrast [[id number]] Collected:  07/13/20 2346     Updated:  07/13/20 2356    Narrative:       CT HEAD WITHOUT CONTRAST     HISTORY: Dizziness     COMPARISON: None available.     TECHNIQUE: Axial CT imaging was obtained through the brain. No IV  contrast was administered.     FINDINGS:  No acute intracranial hemorrhage is identified. There is mild atrophy.  There is no midline shift or mass effect. There is mild periventricular  and deep white matter microangiopathic change. The paranasal sinuses and  mastoid air cells appear clear.       Impression:       No acute intracranial findings.     Radiation dose reduction techniques were utilized, including automated  exposure control and exposure modulation based on body size.     This report was finalized on 7/13/2020 11:53 PM by Dr. Samantha Alfredo M.D.       XR Chest 1 View [[id number]] Collected:  07/13/20 2305     Updated:  07/13/20 2309    Narrative:       PORTABLE CHEST RADIOGRAPH     HISTORY: Dizziness     COMPARISON: 05/21/2018     FINDINGS:  Heart size is within normal limits for portable technique. No  pneumothorax, pleural effusion, or acute infiltrate is seen.       Impression:       No acute findings.     This report was finalized on 7/13/2020 11:06 PM by Dr. Samantha Alfredo M.D.             Results for orders placed during the hospital encounter of 05/21/18   Adult Transthoracic Echo Complete W/ Cont if Necessary Per Protocol    Narrative · Left ventricular systolic function is normal. Calculated EF = 59%.    Estimated EF was in agreement with the calculated EF. Estimated EF = 59%.   Normal left ventricular cavity size and wall thickness noted. All left   ventricular wall segments contract normally. Left ventricular diastolic   dysfunction is noted (grade I) consistent with impaired relaxation.  · Trace mitral valve regurgitation is present.  · Physiologic tricuspid valve regurgitation is present. Estimated right   ventricular systolic pressure from tricuspid regurgitation is normal (<35   mmHg). Calculated right ventricular systolic pressure from tricuspid   regurgitation is 25 mmHg.          ECG 12 Lead   Preliminary Result   HEART RATE= 67  bpm   RR Interval= 896  ms   IN Interval= 174  ms   P Horizontal Axis= -12  deg   P Front Axis= 46  deg   QRSD Interval= 79  ms   QT Interval= 425  ms   QRS Axis= 5  deg   T Wave Axis= 38  deg   - OTHERWISE NORMAL ECG -   Sinus rhythm   Low voltage, precordial leads   Electronically Signed By:    Date and Time of Study: 2020-07-13 23:14:41           Assessment/Plan     Active Hospital Problems    Diagnosis POA   • **Dizziness [R42] Yes   • Depression [F32.9] Unknown   • Generalized anxiety disorder [F41.1] Unknown   • Mixed hyperlipidemia [E78.2] Yes   • DDD (degenerative disc disease), thoracic [M51.34] Yes   • Essential hypertension [I10] Yes   • FUENTES on autoCPAP [G47.33, Z99.89] Not Applicable       Dizziness  -Symptoms consistent with vertigo  -No focal neurological deficits on exam  -CT head negative  -Neurology consult, will defer any further diagnostics  -PT/OT consults    Hypertension  -Stable. Continue home regimen  -Monitor    Hyperlipidemia  -Continue daily Aspirin and statin    Obstructive Sleep Apnea  -She did not bring her home CPAP  -Supplemental oxygen q HS PRN to keep sats greater than or equal to 92%    Anxiety/Depression  -Stable. Continue home regimen    -Will address and continue any further appropriate home medications once med rec is completed.    -I  discussed the patients findings and my recommendations with patient.    VTE Prophylaxis - SCDs.  Code Status - Full code.       CARLIE Ndiaye  [facility]  07/14/20  04:57

## 2020-07-14 NOTE — ED NOTES
This rn attempted to ambulate pt. Pt gate unsteady and pt was not able to ambulate. Provider notified      Pankaj Shelton RN  07/14/20 0057

## 2020-07-14 NOTE — PLAN OF CARE
Problem: Patient Care Overview  Goal: Plan of Care Review  Outcome: Ongoing (interventions implemented as appropriate)  Flowsheets (Taken 7/14/2020 1339)  Progress: no change  Plan of Care Reviewed With: patient  Outcome Summary: A&OX4, TRANSFERRED FROM CVI, C/O DIZZINESS, BLURRED VISION, AND OCCIPITAL HA, IVF SOLU MEDROL GIVEN, KLONOPIN GIVEN FOR VERTIGO, MRI SCHEDULED FOR HEAD AND NECK.  VSS, NVI, VOIDING PER BRP, UP C STANDBY ASSISTANCE.

## 2020-07-14 NOTE — ED PROVIDER NOTES
EMERGENCY DEPARTMENT ENCOUNTER    Room Number:  06/06  Date seen:  7/13/2020  Time seen: 11:28 PM  PCP: Pierre Chaudhry Jr., MD  Historian: patient      HPI:  Chief Complaint: dizziness    A complete HPI/ROS/PMH/PSH/SH/FH are unobtainable due to: none    Context: Katelyn Sr is a 69 y.o. female who presents to the ED for evaluation of symptoms that she describes as a spinning sensation that onset acutely 2 days ago and is intermittent, severe, worse changing position or moving her head and improves when she lies still.  It is associated with nausea and vomiting.  She has had vertigo a couple years ago which lasted much more briefly.  It began 2 nights ago and then she was asymptomatic for much of the day yesterday, reoccurred last night and has been severe most of the day today.  She is ambulatory, denies any vision changes, headache, numbness or weakness or speech difficulty.  She also denies fevers chills cough congestion diarrhea abdominal pain and urinary symptoms.        PAST MEDICAL HISTORY  Active Ambulatory Problems     Diagnosis Date Noted   • FUENTES on autoCPAP 10/19/2017   • Circadian rhythm sleep disorder, delayed sleep phase type 10/28/2017   • Hypersomnia due to another medical condition 10/28/2017   • NSTEMI (non-ST elevated myocardial infarction) (CMS/[facility]) 05/21/2018   • Essential hypertension 06/29/2018   • Degeneration of cervical intervertebral disc 11/09/2018   • DDD (degenerative disc disease), thoracic 11/09/2018   • Disc degeneration, lumbar 11/27/2018   • Mixed hyperlipidemia 01/10/2019   • Palpitations 07/23/2019   • Extremity pain 07/26/2019   • LBP (low back pain) 07/26/2019   • DDD (degenerative disc disease), lumbar 07/26/2019   • Atypical chest pain 10/30/2019     Resolved Ambulatory Problems     Diagnosis Date Noted   • No Resolved Ambulatory Problems     Past Medical History:   Diagnosis Date   • Depression    • GERD (gastroesophageal reflux disease)    • Hyperlipidemia    •  Hypertension    • Non-STEMI (non-ST elevated myocardial infarction) (CMS/[facility])          PAST SURGICAL HISTORY  Past Surgical History:   Procedure Laterality Date   • BACK SURGERY     • CARDIAC CATHETERIZATION N/A 5/22/2018    Procedure: Left Heart Cath;  Surgeon: Aleks Velazquez MD;  Location: [facility] CATH INVASIVE LOCATION;  Service: Cardiovascular   • CARDIAC CATHETERIZATION N/A 5/22/2018    Procedure: Coronary angiography;  Surgeon: Aleks Velazquez MD;  Location:  [facility] CATH INVASIVE LOCATION;  Service: Cardiovascular   • CARDIAC CATHETERIZATION N/A 5/22/2018    Procedure: Left ventriculography;  Surgeon: Aleks Velazquez MD;  Location:  [facility] CATH INVASIVE LOCATION;  Service: Cardiovascular   • CARDIAC CATHETERIZATION N/A 5/22/2018    Procedure: Peripheral angiography;  Surgeon: Aleks Velazquez MD;  Location: [facility] CATH [facility]VASIVE LOC[facility];  Service: Cardiovascular   • EXPLORATORY LAPAROTOMY     • HEMORRHOIDECTOMY     • NECK SURGERY  12/03/2003    ACDF C6-7-Dr. Nix    • RECTAL SURGERY           FAMILY HISTORY  Family History   Problem Relation Age of Onset   • Alzheimer's disease Mother    • Heart failure Father    • Diabetes Sister    • Atrial fibrillation Sister    • Stroke Paternal Grandmother          SOCIAL HISTORY  Social History     Socioeconomic History   • Marital status: Single     Spouse name: Not on file   • Number of children: 0   • Years of education: BS    • Highest education level: Not on file   Occupational History   • Occupation: RETIRED RN    Tobacco Use   • Smoking status: Never Smoker   • Smokeless tobacco: Never Used   • Tobacco comment: caffeine use: TEA OCCASSIONALLY   Substance and Sexual Activity   • Alcohol use: No   • Drug use: No   • Sexual activity: Defer         ALLERGIES  Erythromycin        REVIEW OF SYSTEMS  Review of Systems     All systems reviewed and negative except for those discussed in HPI.       PHYSICAL EXAM  ED Triage Vitals   Temp Heart Rate  Resp BP SpO2   07/13/20 2004 07/13/20 2004 07/13/20 2004 07/13/20 2010 07/13/20 2004   97.4 °F (36.3 °C) 70 16 153/92 97 %      Temp src Heart Rate Source Patient Position BP Location FiO2 (%)   07/13/20 2004 07/13/20 2004 -- -- --   Tympanic Monitor             GENERAL: not distressed, appears to not feel well  HENT: atraumatic, normocephalic, cerumen impaction in the right canal, left TM normal  EYES: no scleral icterus PERRLA extraocular movements intact  CV: regular rhythm, regular rate  RESPIRATORY: normal effort CTA B  ABDOMEN: soft, nontender  MUSCULOSKELETAL: no deformity  NEURO: GCS is 15  Cranial nerves II-XII are intact.  No facial droop. Uvula is midline. No tongue deviation. PERRL and EOMI. No nystagmus. There is no dysarthria, aphasia or slurred speech.  Sensation is intact to light touch bilaterally and is symmetrical in the face, BUE and BLE.  Strength is 5/5 and symmetrical in the BUE and BLE.  Finger to nose, heel to shin, and rapid alternating movements are intact.    SKIN: warm, dry    Vital signs and nursing notes reviewed.          LAB RESULTS  Recent Results (from the past 24 hour(s))   Comprehensive Metabolic Panel    Collection Time: 07/13/20 10:48 PM   Result Value Ref Range    Glucose 100 (H) 65 - 99 mg/dL    BUN 17 8 - 23 mg/dL    Creatinine 0.88 0.57 - 1.00 mg/dL    Sodium 142 136 - 145 mmol/L    Potassium 4.0 3.5 - 5.2 mmol/L    Chloride 104 98 - 107 mmol/L    CO2 25.3 22.0 - 29.0 mmol/L    Calcium 9.8 8.6 - 10.5 mg/dL    Total Protein 8.0 6.0 - 8.5 g/dL    Albumin 4.50 3.50 - 5.20 g/dL    ALT (SGPT) 15 1 - 33 U/L    AST (SGOT) 15 1 - 32 U/L    Alkaline Phosphatase 103 39 - 117 U/L    Total Bilirubin 0.5 0.0 - 1.2 mg/dL    eGFR Non African Amer 64 >60 mL/min/1.73    Globulin 3.5 gm/dL    A/G Ratio 1.3 g/dL    BUN/Creatinine Ratio 19.3 7.0 - 25.0    Anion Gap 12.7 5.0 - 15.0 mmol/L   Troponin    Collection Time: 07/13/20 10:48 PM   Result Value Ref Range    Troponin T <0.010 0.000 -  0.030 ng/mL   CBC Auto Differential    Collection Time: 07/13/20 10:48 PM   Result Value Ref Range    WBC 8.00 3.40 - 10.80 10*3/mm3    RBC 5.19 3.77 - 5.28 10*6/mm3    Hemoglobin 14.4 12.0 - 15.9 g/dL    Hematocrit 43.9 34.0 - 46.6 %    MCV 84.6 79.0 - 97.0 fL    MCH 27.7 26.6 - 33.0 pg    MCHC 32.8 31.5 - 35.7 g/dL    RDW 14.0 12.3 - 15.4 %    RDW-SD 42.5 37.0 - 54.0 fl    MPV 8.7 6.0 - 12.0 fL    Platelets 365 140 - 450 10*3/mm3    Neutrophil % 68.5 42.7 - 76.0 %    Lymphocyte % 18.3 (L) 19.6 - 45.3 %    Monocyte % 9.1 5.0 - 12.0 %    Eosinophil % 3.3 0.3 - 6.2 %    Basophil % 0.3 0.0 - 1.5 %    Immature Grans % 0.5 0.0 - 0.5 %    Neutrophils, Absolute 5.49 1.70 - 7.00 10*3/mm3    Lymphocytes, Absolute 1.46 0.70 - 3.10 10*3/mm3    Monocytes, Absolute 0.73 0.10 - 0.90 10*3/mm3    Eosinophils, Absolute 0.26 0.00 - 0.40 10*3/mm3    Basophils, Absolute 0.02 0.00 - 0.20 10*3/mm3    Immature Grans, Absolute 0.04 0.00 - 0.05 10*3/mm3    nRBC 0.0 0.0 - 0.2 /100 WBC       Ordered the above labs and independently reviewed the results.        RADIOLOGY  XR Chest 1 View   Final Result   No acute findings.       This report was finalized on 7/13/2020 11:06 PM by Dr. Samantha Alfredo M.D.          CT Head Without Contrast    (Results Pending)       I ordered the above noted radiological studies. Reviewed by me and discussed with radiologist.  See dictation for official radiology interpretation.    PROCEDURES  Procedures        MEDICATIONS GIVEN IN ER  Medications   sodium chloride 0.9 % bolus 1,000 mL (1,000 mL Intravenous New Bag 7/13/20 2300)   meclizine (ANTIVERT) tablet 50 mg (has no administration in time range)   ondansetron (ZOFRAN) injection 4 mg (4 mg Intravenous Given 7/13/20 2300)             PROGRESS AND CONSULTS    DDX includes but not limited to stroke, peripheral vertigo,     ED Course as of Jul 14 0516   Mon Jul 13, 2020 2329 My interpretation of the EKG performed at 2314 is sinus rhythm rate 67 normal [facility]  QRS and QTc.  No ST elevation or T wave inversion.  No significant change from 12/20/2019    [KA]   2330 My interpretation of the chest x-ray is no acute infiltrate.    [KA]   Tue Jul 14, 2020   0036 Glucose(!): 100 [KA]   0036 Creatinine: 0.88 [KA]   0036 BUN: 17 [KA]   0036 WBC: 8.00 [KA]   0036 Hemoglobin: 14.4 [KA]   0036 Troponin T: <0.010 [KA]   0105 Attempted to ambulate the patient and she was too dizzy and unsteady.  Will call for admission.    [KA]   0143 I discussed the patient with CARLIE Anand for [facility] who agrees to admit on behalf of Dr. Pisano.    [KA]      ED Course User Index  [KA] Orly Babcock PA   The patient has no neurologic deficits.  She does have intractable dizziness, it seems more peripheral as it is exacerbated by position changes and was very intense and abrupt in onset.  At rest her symptoms significantly improved.  Symptoms started 3 days ago, CT brain imaging is unremarkable however due to her persistent symptoms and difficulty with ambulation admission for symptom control and further stroke work-up indicated.  I discussed with her and she is agreeable with the plan.     Reviewed pt's history and workup with Dr. Guillen.  After a bedside evaluation; they agree with the plan of care      Patient was placed in face mask in first look. Patient was wearing facemask each time I entered the room and throughout our encounter. I wore protective equipment throughout this patient encounter including a face mask, eye shield and gloves. Hand hygiene was performed before donning protective equipment and after removal when leaving the room.        DIAGNOSIS  Final diagnoses:   Dizziness               Latest Documented Vital Signs:  As of 23:28  BP- 153/92 HR- 70 Temp- 97.4 °F (36.3 °C) (Tympanic) O2 sat- 97%       Orly Babcock PA  07/14/20 0516       Orly Babcock PA  07/14/20 0517

## 2020-07-14 NOTE — PLAN OF CARE
Problem: Patient Care Overview  Goal: Plan of Care Review  7/14/2020 1312 by Keyur Dasilva, PT Student  Flowsheets (Taken 7/14/2020 1033)  Outcome Summary: Pt is 68 y/o F admitted to hospital c/o dizziness. She reports having dizziness with rotation to the right and with quick neck flexion/extension some of the time.  Pt also c/o intermittent pain behind eyes. Pt had 1-2 brief instances of dizziness with head movements that resolved in under 30 seconds; no nystagmus with bed mobility or head movements.  Pt required CGA to ambulate 250 ft. BP 160s/80 after bed mobility and after gait.  D/c to home with outpatient PT services to address deconditioning and balance deficits, discussed using a cane at home, pt aware.  (Pended)    Note:   Patient was wearing a face mask during this therapy encounter. Therapist used appropriate personal protective equipment including mask and gloves.  Mask used was standard procedure mask. Appropriate PPE was worn during the entire therapy session. Hand hygiene was completed before and after therapy session. Patient is not in enhanced droplet precautions.

## 2020-07-14 NOTE — PLAN OF CARE
Problem: Patient Care Overview  Goal: Plan of Care Review  Flowsheets (Taken 7/14/2020 1033)  Outcome Summary: Pt is 68 y/o F admitted to hospital c/o dizziness. She states she has had spells of vertigo twice in the past that resolved in about 30 minutes. She reports having dizziness with rotation to the right and with quick neck flexion/extension.  Pt also c/o intermittent pain behind eyes. Pt had 1-2 brief instances of dizziness with head movements that resolved in under 30 seconds; no nystagmus with bed mobility or head movements. Pt independently performed bed mobility and sit>stand transfer. Pt required CGA to ambulate 250 ft. Pt BP was elevated pre/during/post treatment. D/c to home with outpatient PT services to address deconditioning and balance deficits. (Pended)  Note:   Patient was wearing a face mask during this therapy encounter. Therapist used appropriate personal protective equipment including mask and gloves.  Mask used was standard procedure mask. Appropriate PPE was worn during the entire therapy session. Hand hygiene was completed before and after therapy session. Patient is not in enhanced droplet precautions.

## 2020-07-14 NOTE — CONSULTS
"  Patient Identification:  NAME:  Katelyn Sr  Age:  69 y.o.   Sex:  female   :  1950   MRN:  [id number]       Chief complaint: Patient's chief complaint is \"vertigo\" reason for the consult dizziness    History of present illness: Patient is a 69-year-old right-handed white female with history of hyperlipidemia hypertension depression who comes to the hospital with a complaint of vertigo it is been going on for a day or 2 in duration quality is true vertigo modifying factors holding her head still when she moves quickly it seems like that she gets a spinning sensation and she can see things moving on the wall for a few seconds in duration.  Context the patient has had vertigo previously similar to this associated symptoms she has left neck pain that has bothered her for a while up to the upper left neck at the base of the skull on the left side and she points to this as thinking that has something to do with her vertigo possibly.  She has no body paresthesias paralysis no syncope  CT of the head by my independent eyeball review is normal    Past medical history:  Past Medical History:   Diagnosis Date   • Depression    • GERD (gastroesophageal reflux disease)    • Hyperlipidemia    • Hypertension    • Non-STEMI (non-ST elevated myocardial infarction) (CMS/[facility])    • FUENTES on CPAP        Past surgical history:  Past Surgical History:   Procedure Laterality Date   • BACK SURGERY     • CARDIAC CATHETERIZATION N/A 2018    Procedure: Left Heart Cath;  Surgeon: Aleks Velazquez MD;  Location: [facility] INVASIVE LOCATION;  Service: Cardiovascular   • CARDIAC CATHETERIZATION N/A 2018    Procedure: Coronary angiography;  Surgeon: Aleks Velazquez MD;  Location: [facility] INVASIVE LOCATION;  Service: Cardiovascular   • CARDIAC CATHETERIZATION N/A 2018    Procedure: Left ventriculography;  Surgeon: Aleks Velazquez MD;  Location: [facility] CATH INVASIVE LOCATION;  Service: " Cardiovascular   • CARDIAC CATHETERIZATION N/A 5/22/2018    Procedure: Peripheral angiography;  Surgeon: Aleks Velazquez MD;  Location: [facility] INVASIVE LOCATION;  Service: Cardiovascular   • EXPLORATORY LAPAROTOMY     • HEMORRHOIDECTOMY     • NECK SURGERY  12/03/2003    ACDF C6-7-Dr. Nix    • RECTAL SURGERY         Allergies:  Erythromycin    Home medications:  Medications Prior to Admission   Medication Sig Dispense Refill Last Dose   • amLODIPine (NORVASC) 5 MG tablet Take 1 tablet by mouth Daily. 90 tablet 1 7/11/2020   • aspirin 81 MG EC tablet Take 81 mg by mouth Daily.   7/11/2020   • carvedilol (COREG) 25 MG tablet Take 1 tablet by mouth 2 (Two) Times a Day. 180 tablet 3 7/11/2020   • DULoxetine (CYMBALTA) 60 MG capsule Take 120 mg by mouth Every Night.   7/11/2020   • escitalopram (LEXAPRO) 10 MG tablet Take 10 mg by mouth Daily.   7/11/2020   • fluticasone (FLONASE) 50 MCG/ACT nasal spray 2 sprays into the nostril(s) as directed by provider Every Night.   7/11/2020   • hydrALAZINE (APRESOLINE) 50 MG tablet Take 1 tablet by mouth 2 (two) times a day. 180 tablet 1 7/11/2020   • lamoTRIgine (LAMICTAL) 100 MG tablet Take 100 mg by mouth Every Night.   7/11/2020   • losartan (COZAAR) 100 MG tablet TAKE 1 TABLET BY MOUTH EVERY DAY 90 tablet 0 7/11/2020   • pantoprazole (PROTONIX) 40 MG EC tablet Take 40 mg by mouth 2 (Two) Times a Day.   7/11/2020   • pravastatin (PRAVACHOL) 40 MG tablet Take 1 tablet by mouth Daily. (Patient taking differently: Take 40 mg by mouth Every Night.) 90 tablet 3 7/11/2020   • raNITIdine (ZANTAC) 75 MG tablet Take 300 mg by mouth Every Night.   7/11/2020   • traZODone (DESYREL) 50 MG tablet Take 25 mg by mouth Every Night.   7/11/2020   • LORazepam (ATIVAN) 0.5 MG tablet Take 0.5 mg by mouth Every 8 (Eight) Hours As Needed for Anxiety.   Taking        Hospital medications:    clonazePAM 0.5 mg Oral Once   [START ON 7/15/2020] clonazePAM 0.5 mg Oral BID   LORazepam 2  mg Intravenous Once   methylPREDNISolone sodium succinate 80 mg Intravenous Daily       sodium chloride 100 mL/hr Last Rate: 100 mL/hr (07/14/20 1513)     •  acetaminophen **OR** [DISCONTINUED] acetaminophen **OR** [DISCONTINUED] acetaminophen  •  bisacodyl  •  calcium carbonate  •  ondansetron **OR** ondansetron    Family history:  Family History   Problem Relation Age of Onset   • Alzheimer's disease Mother    • Heart failure Father    • Diabetes Sister    • Atrial fibrillation Sister    • Stroke Paternal Grandmother        Social history:  Social History     Tobacco Use   • Smoking status: Never Smoker   • Smokeless tobacco: Never Used   • Tobacco comment: caffeine use: TEA OCCASSIONALLY   Substance Use Topics   • Alcohol use: No   • Drug use: No       Review of systems:    She does not hear as well out of her right ear she has some pain in the neck in the upper left part where it meets the skull no double vision ENT complaint shortness of breath chest pain bowel bladder incontinence fever chills or rash no constipation diarrhea no focal body paresthesias no focal paralysis no seizure activity no double vision and currently no vertigo    Objective:  Vitals Ranges:   Temp:  [97.4 °F (36.3 °C)-98.8 °F (37.1 °C)] 98.6 °F (37 °C)  Heart Rate:  [70-89] 89  Resp:  [16-18] 16  BP: (141-166)/(79-94) 166/89      Physical Exam:  Patient is awake alert oriented x3 in no distress she is not the greatest history giver fund of knowledge fair attention span concentration good recent remote memory good language function normal well-developed well-nourished in no distress moderately obese pupils 2 constricting to one 1/2 unable to visualize disc retinas visual fields are full extraocular movements full without nystagmus nasolabial folds palate tongue symmetrical normal hearing facial sensation head turning shoulder shrug motor 5 out of 5 x 4 extremities not a great effort no rigidity atrophy or fasciculations reflexes trace  throughout symmetrical toes downgoing bilaterally sensation normal light touch face both arms and both legs coordination normal in the upper extremities Station and gait was impossible she had enough vertigo I thought she would fall heart regular without murmur neck supple without bruits she is tender in the left upper neck region where at the base of the skull but there is no mass palpable or abnormal bony structure.  Heart regular without murmur extremities no clubbing cyanosis or significant edema visual acuity normal at 3 feet  Results review:   I reviewed the patient's new clinical results.    Data review:  Lab Results (last 24 hours)     Procedure Component Value Units Date/Time    Basic Metabolic Panel [[id number]]  (Abnormal) Collected:  07/14/20 0732    Specimen:  Blood Updated:  07/14/20 0823     Glucose 89 mg/dL      BUN 13 mg/dL      Creatinine 0.80 mg/dL      Sodium 141 mmol/L      Potassium 3.9 mmol/L      Comment: Specimen hemolyzed.  Results may be affected.        Chloride 108 mmol/L      CO2 22.1 mmol/L      Calcium 8.9 mg/dL      eGFR Non African Amer 72 mL/min/1.73      BUN/Creatinine Ratio 16.3     Anion Gap 10.9 mmol/L     Narrative:       GFR Normal >60  Chronic Kidney Disease <60  Kidney Failure <15      CBC (No Diff) [[id number]]  (Normal) Collected:  07/14/20 0733    Specimen:  Blood Updated:  07/14/20 0803     WBC 7.01 10*3/mm3      RBC 4.49 10*6/mm3      Hemoglobin 12.5 g/dL      Hematocrit 39.3 %      MCV 87.5 fL      MCH 27.8 pg      MCHC 31.8 g/dL      RDW 13.7 %      RDW-SD 44.2 fl      MPV 8.6 fL      Platelets 257 10*3/mm3     [place] Draw [[id number]] Collected:  07/13/20 2248    Specimen:  Blood Updated:  07/14/20 0000    Narrative:       The following orders were created for panel order [place] Draw.  Procedure                               Abnormality         Status                     ---------                               -----------         ------                     Light Blue  Top[[id number]]                                   Final result               Green Top (Gel)[[id number]]                                  Final result               Lavender Top[[id number]]                                     Final result               Gold Top - SST[[id number]]                                   Final result                 Please view results for these tests on the individual orders.    Light Blue Top [[id number]] Collected:  07/13/20 2248    Specimen:  Blood Updated:  07/14/20 0000     Extra Tube hold for add-on     Comment: Auto resulted       Green Top (Gel) [[id number]] Collected:  07/13/20 2248    Specimen:  Blood Updated:  07/14/20 0000     Extra Tube Hold for add-ons.     Comment: Auto resulted.       Lavender Top [[id number]] Collected:  07/13/20 2248    Specimen:  Blood Updated:  07/14/20 0000     Extra Tube hold for add-on     Comment: Auto resulted       Gold Top - SST [[id number]] Collected:  07/13/20 2248    Specimen:  Blood Updated:  07/14/20 0000     Extra Tube Hold for add-ons.     Comment: Auto resulted.       Comprehensive Metabolic Panel [[id number]]  (Abnormal) Collected:  07/13/20 2248    Specimen:  Blood Updated:  07/13/20 2327     Glucose 100 mg/dL      BUN 17 mg/dL      Creatinine 0.88 mg/dL      Sodium 142 mmol/L      Potassium 4.0 mmol/L      Chloride 104 mmol/L      CO2 25.3 mmol/L      Calcium 9.8 mg/dL      Total Protein 8.0 g/dL      Albumin 4.50 g/dL      ALT (SGPT) 15 U/L      AST (SGOT) 15 U/L      Alkaline Phosphatase 103 U/L      Total Bilirubin 0.5 mg/dL      eGFR Non African Amer 64 mL/min/1.73      Globulin 3.5 gm/dL      A/G Ratio 1.3 g/dL      BUN/Creatinine Ratio 19.3     Anion Gap 12.7 mmol/L     Narrative:       GFR Normal >60  Chronic Kidney Disease <60  Kidney Failure <15      Troponin [[id number]]  (Normal) Collected:  07/13/20 2248    Specimen:  Blood Updated:  07/13/20 2327     Troponin T <0.010 ng/mL     Narrative:       Troponin T Reference Range:  <= 0.03  ng/mL-   Negative for AMI  >0.03 ng/mL-     Abnormal for myocardial necrosis.  Clinicians would have to utilize clinical acumen, EKG, Troponin and serial changes to determine if it is an Acute Myocardial Infarction or myocardial injury due to an underlying chronic condition.       Results may be falsely decreased if patient taking Biotin.      CBC & Differential [[id number]] Collected:  07/13/20 2248    Specimen:  Blood Updated:  07/13/20 2320    Narrative:       The following orders were created for panel order CBC & Differential.  Procedure                               Abnormality         Status                     ---------                               -----------         ------                     CBC Auto Differential[[id number]]        Abnormal            Final result                 Please view results for these tests on the individual orders.    CBC Auto Differential [[id number]]  (Abnormal) Collected:  07/13/20 2248    Specimen:  Blood Updated:  07/13/20 2320     WBC 8.00 10*3/mm3      RBC 5.19 10*6/mm3      Hemoglobin 14.4 g/dL      Hematocrit 43.9 %      MCV 84.6 fL      MCH 27.7 pg      MCHC 32.8 g/dL      RDW 14.0 %      RDW-SD 42.5 fl      MPV 8.7 fL      Platelets 365 10*3/mm3      Neutrophil % 68.5 %      Lymphocyte % 18.3 %      Monocyte % 9.1 %      Eosinophil % 3.3 %      Basophil % 0.3 %      Immature Grans % 0.5 %      Neutrophils, Absolute 5.49 10*3/mm3      Lymphocytes, Absolute 1.46 10*3/mm3      Monocytes, Absolute 0.73 10*3/mm3      Eosinophils, Absolute 0.26 10*3/mm3      Basophils, Absolute 0.02 10*3/mm3      Immature Grans, Absolute 0.04 10*3/mm3      nRBC 0.0 /100 WBC            Imaging:  Imaging Results (Last 24 Hours)     Procedure Component Value Units Date/Time    CT Head Without Contrast [[id number]] Collected:  07/13/20 2346     Updated:  07/13/20 2356    Narrative:       CT HEAD WITHOUT CONTRAST     HISTORY: Dizziness     COMPARISON: None available.     TECHNIQUE: Axial CT imaging  was obtained through the brain. No IV  contrast was administered.     FINDINGS:  No acute intracranial hemorrhage is identified. There is mild atrophy.  There is no midline shift or mass effect. There is mild periventricular  and deep white matter microangiopathic change. The paranasal sinuses and  mastoid air cells appear clear.       Impression:       No acute intracranial findings.     Radiation dose reduction techniques were utilized, including automated  exposure control and exposure modulation based on body size.     This report was finalized on 7/13/2020 11:53 PM by Dr. Samantha Alrfedo M.D.       XR Chest 1 View [[id number]] Collected:  07/13/20 2305     Updated:  07/13/20 2309    Narrative:       PORTABLE CHEST RADIOGRAPH     HISTORY: Dizziness     COMPARISON: 05/21/2018     FINDINGS:  Heart size is within normal limits for portable technique. No  pneumothorax, pleural effusion, or acute infiltrate is seen.       Impression:       No acute findings.     This report was finalized on 7/13/2020 11:06 PM by Dr. Samantha Alfredo M.D.                Assessment and Plan:     Peripheral vestibulopathy with true vertigo made worse with head movement.  She also has a headache and upper left neck pain suggesting cervical spondylosis and possibly cervical stenosis  Given the vertigo along with the neck pain I will check additional diagnostic testing in the form of a plain MRI of the brain and plain MRI of the cervical spine I will give her Klonopin scheduled for her vertigo and she will get Solu-Medrol 80 mg IV twice.  Thanks      Kevni Barrios MD  07/14/20  16:00

## 2020-07-14 NOTE — PROGRESS NOTES
Discharge Planning Assessment  [facility]     Patient Name: Katelyn Sr  MRN: [id number]  Today's Date: 7/14/2020    Admit Date: 7/13/2020    Discharge Needs Assessment     Row Name 07/14/20 1712       Living Environment    Lives With  alone    Current Living Arrangements  home/apartment/condo    Primary Care Provided by  self    Provides Primary Care For  no one, unable/limited ability to care for self    Family Caregiver if Needed  none    Quality of Family Relationships  helpful    Able to Return to Prior Arrangements  yes       Resource/Environmental Concerns    Resource/Environmental Concerns  home accessibility    Home Accessibility Concerns  stairs to enter home 15 steps with 2 handrails to get to her 2nd floor condo       Transition Planning    Patient/Family Anticipates Transition to  home    Patient/Family Anticipated Services at Transition  none    Transportation Anticipated  family or friend will provide       Discharge Needs Assessment    Concerns to be Addressed  discharge planning    Equipment Currently Used at Home  none    Anticipated Changes Related to Illness  none    Equipment Needed After Discharge  none    Outpatient/Agency/Support Group Needs  outpatient therapy    Discharge Facility/Level of Care Needs  outpatient therapy    Current Discharge Risk  physical impairment        Discharge Plan     Row Name 07/14/20 1702       Plan    Plan  Plans home.      Provided Post Acute Provider List?  Yes    Post Acute Provider List  Home Health;Nursing Home    Provided Post Acute Provider Quality & Resource List?  N/A    N/A Quality & Resource List Comment  The patient was provided with a HH/SNF list and not a print out of the HH/nursing home compare list from Medicare.gov as the patient plans to go to [facility] on [facility] [phone] if outpatient therapy or vestibular rehab is needed.      Delivered To  Patient    Method of Delivery  In person    Patient/Family in Agreement  with Plan  yes    Plan Comments  Met with the patient at bedside; explained role of CCP, verified facesheet, checked Pedro notice and discussed discharge planning needs. The patient plans to return home where she resides alone with her cat.  The patient uses no DME, has 15 steps with 2 handrails to get to her 2nd story condo.  The patient's PCP is Pierre Chaudhry, pharmacy is [facility] on [address] [facility] and she denies any trouble remembering to take her medication or with affording her medication.  The patient denies any POA documents and states that although she drives herself to her appointments she has some friends that can transport her home upon d/c.  The patient was provided with a HH/SNF list and not a print out of the HH/nursing home compare list from Medicare.gov as the patient plans to go to [facility] on [facility] [phone] if outpatient therapy or vestibular rehab is needed.  CCP called to confirm that they do provide vestibular rehab.   CCP will follow for outpatient therapy orders and will assist with arranging any needed therapy upon d/c.  BROOKLYN Martinez         Destination      Coordination has not been started for this encounter.      Durable Medical Equipment      Coordination has not been started for this encounter.      Dialysis/Infusion      Coordination has not been started for this encounter.      Home Medical Care      Coordination has not been started for this encounter.      Therapy      Coordination has not been started for this encounter.      Community Resources      Coordination has not been started for this encounter.          Demographic Summary     Row Name 07/14/20 1706       General Information    Admission Type  observation    Arrived From  home    Referral Source  admission list    Reason for Consult  discharge planning    Preferred Language  English     Used During This Interaction  no        Functional Status     Row Name 07/14/20 4339        Functional Status    Usual Activity Tolerance  good    Current Activity Tolerance  moderate       Functional Status, IADL    Medications  independent    Meal Preparation  independent    Housekeeping  independent    Laundry  independent    Shopping  independent       Mental Status    General Appearance WDL  WDL       Mental Status Summary    Recent Changes in Mental Status/Cognitive Functioning  no changes        Psychosocial    No documentation.       Abuse/Neglect    No documentation.       Legal    No documentation.       Substance Abuse    No documentation.       Patient Forms     Row Name 07/14/20 7998       Patient Forms    Provider Choice List  Delivered    Delivered to  Patient    Method of delivery  In person            BROOKLYN Subramanian

## 2020-07-14 NOTE — THERAPY EVALUATION
Patient Name: Katelyn Sr  : 1950    MRN: [id number]                              Today's Date: 2020       Admit Date: 2020    Visit Dx:     ICD-10-CM ICD-9-CM   1. Dizziness R42 780.4     Patient Active Problem List   Diagnosis   • FUENTES on autoCPAP   • Circadian rhythm sleep disorder, delayed sleep phase type   • Hypersomnia due to another medical condition   • NSTEMI (non-ST elevated myocardial infarction) (CMS/[facility])   • Essential hypertension   • Degeneration of cervical intervertebral disc   • DDD (degenerative disc disease), thoracic   • Disc degeneration, lumbar   • Mixed hyperlipidemia   • Palpitations   • Extremity pain   • LBP (low back pain)   • DDD (degenerative disc disease), lumbar   • Atypical chest pain   • Dizziness   • Depression   • Generalized anxiety disorder     Past Medical History:   Diagnosis Date   • Depression    • GERD (gastroesophageal reflux disease)    • Hyperlipidemia    • Hypertension    • Non-STEMI (non-ST elevated myocardial infarction) (CMS/[facility])    • FUENTES on CPAP      Past Surgical History:   Procedure Laterality Date   • BACK SURGERY     • CARDIAC CATHETERIZATION N/A 2018    Procedure: Left Heart Cath;  Surgeon: Aleks Velazquez MD;  Location: [facility] CATH INVASIVE LOCATION;  Service: Cardiovascular   • CARDIAC CATHETERIZATION N/A 2018    Procedure: Coronary angiography;  Surgeon: Aleks Velazquez MD;  Location: [facility] CATH INVASIVE LOCATION;  Service: Cardiovascular   • CARDIAC CATHETERIZATION N/A 2018    Procedure: Left ventriculography;  Surgeon: lAeks Velazquez MD;  Location: [facility] CATH INVASIVE LOCATION;  Service: Cardiovascular   • CARDIAC CATHETERIZATION N/A 2018    Procedure: Peripheral angiography;  Surgeon: Aleks Velazquez MD;  Location: [facility] INVASIVE LOCATION;  Service: Cardiovascular   • EXPLORATORY LAPAROTOMY     • HEMORRHOIDECTOMY     • NECK SURGERY  2003    ACDF [id number]-Dr. Nix    •  RECTAL SURGERY       General Information     Row Name 07/14/20 1025          PT Evaluation Time/Intention    Document Type  evaluation  (Pended)   -AR     Mode of Treatment  physical therapy;individual therapy  (Pended)   -AR     Row Name 07/14/20 1025          General Information    Patient Profile Reviewed?  yes  (Pended)   -AR     Prior Level of Function  independent:  (Pended)   -AR     Existing Precautions/Restrictions  fall  (Pended)   -AR     Barriers to Rehab  none identified  (Pended)   -AR     Row Name 07/14/20 1025          Relationship/Environment    Lives With  alone  (Pended)   -AR     Row Name 07/14/20 1025          Resource/Environmental Concerns    Current Living Arrangements  home/apartment/condo  (Pended)   -AR     Row Name 07/14/20 1025          Home Main Entrance    Number of Stairs, Main Entrance  ten  (Pended)   -AR     Stair Railings, Main Entrance  railings safe and in good condition  (Pended)   -AR     Row Name 07/14/20 1025          Stairs Within Home, Primary    Number of Stairs, Within Home, Primary  none  (Pended)   -AR     Row Name 07/14/20 1025          Cognitive Assessment/Intervention- PT/OT    Orientation Status (Cognition)  oriented x 4  (Pended)   -AR     Row Name 07/14/20 1025          Safety Issues, Functional Mobility    Impairments Affecting Function (Mobility)  balance;coordination;endurance/activity tolerance;shortness of breath  (Pended)  dizziness  -AR       User Key  (r) = Recorded By, (t) = Taken By, (c) = Cosigned By    Initials Name Provider Type    Keyur Lange, PT Student PT Student        Mobility     Row Name 07/14/20 1027          Bed Mobility Assessment/Treatment    Bed Mobility Assessment/Treatment  bed mobility (all) activities  (Pended)   -AR     [place] Level (Bed Mobility)  independent  (Pended)   -AR     Comment (Bed Mobility)  Bed mobility did not provoke symptoms  (Pended)   -AR     [facility] Name 07/14/20 1027          Sit-Stand Transfer    Sit-Stand  [place] (Transfers)  independent  (Pended)   -AR     Row Name 07/14/20 1027          Gait/Stairs Assessment/Training    [place] Level (Gait)  contact guard  (Pended)   -AR     Distance in Feet (Gait)  250 ft  (Pended)   -AR     Comment (Gait/Stairs)  Pt ambulated with CGA d/t dizziness  (Pended)   -AR       User Key  (r) = Recorded By, (t) = Taken By, (c) = Cosigned By    Initials Name Provider Type    Keyur Lange, PT Student PT Student        Obj/Interventions     Row Name 07/14/20 1029          General ROM    GENERAL ROM COMMENTS  WNL  (Pended)   -AR     Row Name 07/14/20 1029          MMT (Manual Muscle Testing)    General MMT Comments  BLE 4/5; WFL  (Pended)   -AR     Row Name 07/14/20 1029          Static Sitting Balance    Level of [place] (Unsupported Sitting, Static Balance)  independent  (Pended)   -AR     Sitting Position (Unsupported Sitting, Static Balance)  sitting in chair  (Pended)   -AR     Time Able to Maintain Position (Unsupported Sitting, Static Balance)  more than 5 minutes  (Pended)   -AR     Row Name 07/14/20 1029          Dynamic Standing Balance    Level of [place], Reaches Outside Midline (Standing, Dynamic Balance)  contact guard assist  (Pended)   -AR     Comment, Reaches Outside Midline (Standing, Dynamic Balance)  Backwards walking, toe walking, heel walking, side shuffle, and tandem walking all tested; tandem difficult  (Pended)   -AR       User Key  (r) = Recorded By, (t) = Taken By, (c) = Cosigned By    Initials Name Provider Type    Keyur Lange, PT Student PT Student        Goals/Plan     Row Name 07/14/20 1046          Gait Training Goal 1 (PT)    Activity/Assistive Device (Gait Training Goal 1, PT)  gait (walking locomotion)  (Pended)   -AR     [place] Level (Gait Training Goal 1, PT)  independent  (Pended)   -AR     Distance (Gait Goal 1, PT)  300  (Pended)   -AR     Time Frame (Gait Training Goal 1, PT)  short term goal (STG);1 week  (Pended)   -AR        User Key  (r) = Recorded By, (t) = Taken By, (c) = Cosigned By    Initials Name Provider Type    Keyur Lange, PT Student PT Student        Clinical Impression     Row Name 07/14/20 1033          Pain Assessment    Additional Documentation  Pain Scale: Numbers Pre/Post-Treatment (Group)  (Pended)   -AR     Row Name 07/14/20 1033          Pain Scale: Numbers Pre/Post-Treatment    Pain Scale: Numbers, Pretreatment  0/10 - no pain  (Pended)   -AR     Pain Scale: Numbers, Post-Treatment  0/10 - no pain  (Pended)   -AR     Pain Intervention(s)  Repositioned  (Pended)   -AR     Row Name 07/14/20 1033          Plan of Care Review    Plan of Care Reviewed With  patient  (Pended)   -AR     Progress  improving  (Pended)   -AR     Outcome Summary  Pt is 68 y/o F admitted to hospital c/o dizziness. She states she has had spells of vertigo twice in the past that resolved in about 30 minutes. She reports having dizziness with rotation to the right and with quick neck flexion/extension.  Pt also c/o intermittent pain behind eyes. Pt had 1-2 brief instances of dizziness with head movements that resolved in under 30 seconds; no nystagmus with bed mobility or head movements. Pt independently performed bed mobility and sit>stand transfer. Pt required CGA to ambulate 250 ft. Pt BP was elevated pre/during/post treatment. D/c to home with outpatient PT services to address deconditioning and balance deficits.  (Pended)   -AR     Row Name 07/14/20 1033          Physical Therapy Clinical Impression    Criteria for Skilled Interventions Met (PT Clinical Impression)  yes  (Pended)   -AR     Rehab Potential (PT Clinical Summary)  good, to achieve stated therapy goals  (Pended)   -AR     Row Name 07/14/20 1033          Vital Signs    Pre Systolic BP Rehab  148  (Pended)   -AR     Pre Treatment Diastolic BP  85  (Pended)   -AR     Intra Systolic BP Rehab  164  (Pended)   -AR     Intra Treatment Diastolic BP  95  (Pended)   -AR     Post  Systolic BP Rehab  162  (Pended)   -AR     Post Treatment Diastolic BP  88  (Pended)   -AR     Pretreatment Heart Rate (beats/min)  84  (Pended)   -AR     Posttreatment Heart Rate (beats/min)  85  (Pended)   -AR     O2 Delivery Pre Treatment  room air  (Pended)   -AR     O2 Delivery Intra Treatment  room air  (Pended)   -AR     O2 Delivery Post Treatment  room air  (Pended)   -AR     Row Name 07/14/20 1033          Positioning and Restraints    Pre-Treatment Position  in bed  (Pended)   -AR     Post Treatment Position  chair  (Pended)   -AR     In Chair  reclined;notified nsg;call light within reach;encouraged to call for assist  (Pended)   -AR       User Key  (r) = Recorded By, (t) = Taken By, (c) = Cosigned By    Initials Name Provider Type    Keyur Lange, PT Student PT Student        Outcome Measures     Row Name 07/14/20 1048          How much help from another person do you currently need...    Turning from your back to your side while in flat bed without using bedrails?  4  (Pended)   -AR     Moving from lying on back to sitting on the side of a flat bed without bedrails?  4  (Pended)   -AR     Moving to and from a bed to a chair (including a wheelchair)?  4  (Pended)   -AR     Standing up from a chair using your arms (e.g., wheelchair, bedside chair)?  4  (Pended)   -AR     Climbing 3-5 steps with a railing?  3  (Pended)   -AR     To walk in hospital room?  3  (Pended)   -AR     AM-PAC 6 Clicks Score (PT)  22  (Pended)   -AR     Row Name 07/14/20 1048          Functional Assessment    Outcome Measure Options  AM-PAC 6 Clicks Basic Mobility (PT)  (Pended)   -AR       User Key  (r) = Recorded By, (t) = Taken By, (c) = Cosigned By    Initials Name Provider Type    Keyur Lange, PT Student PT Student        Physical Therapy Education                 Title: PT OT SLP Therapies (Done)     Topic: Physical Therapy (Done)     Point: Mobility training (Done)     Description:   Instruct learner(s) on safety and  technique for assisting patient out of bed, chair or wheelchair.  Instruct in the proper use of assistive devices, such as walker, crutches, cane or brace.              Patient Friendly Description:   It's important to get you on your feet again, but we need to do so in a way that is safe for you. Falling has serious consequences, and your personal safety is the most important thing of all.        When it's time to get out of bed, one of us or a family member will sit next to you on the bed to give you support.     If your doctor or nurse tells you to use a walker, crutches, a cane, or a brace, be sure you use it every time you get out of bed, even if you think you don't need it.    Learning Progress Summary           Patient Acceptance, E,TB, VU by AR at 7/14/2020 1049                   Point: Home exercise program (Done)     Description:   Instruct learner(s) on appropriate technique for monitoring, assisting and/or progressing patient with therapeutic exercises and activities.              Learning Progress Summary           Patient Acceptance, E,TB, VU by AR at 7/14/2020 1049                   Point: Body mechanics (Done)     Description:   Instruct learner(s) on proper positioning and spine alignment for patient and/or caregiver during mobility tasks and/or exercises.              Learning Progress Summary           Patient Acceptance, E,TB, VU by AR at 7/14/2020 1049                   Point: Precautions (Done)     Description:   Instruct learner(s) on prescribed precautions during mobility and gait tasks              Learning Progress Summary           Patient Acceptance, E,TB, VU by AR at 7/14/2020 1049                               User Key     Initials Effective Dates Name Provider Type Discipline    AR 07/02/20 -  Keyur Dasilva, PT Student PT Student PT              PT Recommendation and Plan  Planned Therapy Interventions (PT Eval): (P) balance training, gait training, bed mobility training, home exercise  program, manual therapy techniques, motor coordination training, stair training, ROM (range of motion), neuromuscular re-education, strengthening, stretching, vestibular therapy, patient/family education, transfer training  Outcome Summary/Treatment Plan (PT)  Anticipated Discharge Disposition (PT): (P) home with OP services  Plan of Care Reviewed With: (P) patient  Progress: (P) improving  Outcome Summary: (P) Pt is 70 y/o F admitted to hospital c/o dizziness. She states she has had spells of vertigo twice in the past that resolved in about 30 minutes. She reports having dizziness with rotation to the right and with quick neck flexion/extension.  Pt also c/o intermittent pain behind eyes. Pt had 1-2 brief instances of dizziness with head movements that resolved in under 30 seconds; no nystagmus with bed mobility or head movements. Pt independently performed bed mobility and sit>stand transfer. Pt required CGA to ambulate 250 ft. Pt BP was elevated pre/during/post treatment. D/c to home with outpatient PT services to address deconditioning and balance deficits.     Time Calculation:   PT Charges     Row Name 07/14/20 1055             Time Calculation    Start Time  0842  (Pended)   -AR      Stop Time  0904  (Pended)   -AR      Time Calculation (min)  22 min  (Pended)   -AR      PT Non-Billable Time (min)  7 min  (Pended)   -AR      PT Received On  07/14/20  (Pended)   -AR      PT - Next Appointment  07/15/20  (Pended)   -AR      PT Goal Re-Cert Due Date  07/21/20  (Pended)   -AR         Time Calculation- PT    Total Timed Code Minutes- PT  15 minute(s)  (Pended)   -AR        User Key  (r) = Recorded By, (t) = Taken By, (c) = Cosigned By    Initials Name Provider Type    Keyur Lange, PT Student PT Student        Therapy Charges for Today     Code Description Service Date Service Provider Modifiers Qty    95135554608  PT EVAL MOD COMPLEXITY 2 7/14/2020 Keyur Dasilva, PT Student GP 1    29362285132 HC PT EVAL MOD  COMPLEXITY 2 7/14/2020 Keyur Dasilva, PT Student GP 1    [id number] HC GAIT TRAINING EA 15 MIN 7/14/2020 Keyur Dasilva, PT Student GP 1          PT G-Codes  Outcome Measure Options: (P) AM-PAC 6 Clicks Basic Mobility (PT)  AM-PAC 6 Clicks Score (PT): (P) 22    Keyur Dasilva, PT Student  7/14/2020

## 2020-07-15 PROCEDURE — 97166 OT EVAL MOD COMPLEX 45 MIN: CPT

## 2020-07-15 PROCEDURE — 97110 THERAPEUTIC EXERCISES: CPT

## 2020-07-15 PROCEDURE — 25010000002 METHYLPREDNISOLONE PER 125 MG: Performed by: PSYCHIATRY & NEUROLOGY

## 2020-07-15 PROCEDURE — G0378 HOSPITAL OBSERVATION PER HR: HCPCS

## 2020-07-15 PROCEDURE — 99213 OFFICE O/P EST LOW 20 MIN: CPT | Performed by: PSYCHIATRY & NEUROLOGY

## 2020-07-15 PROCEDURE — 97535 SELF CARE MNGMENT TRAINING: CPT

## 2020-07-15 PROCEDURE — 97530 THERAPEUTIC ACTIVITIES: CPT

## 2020-07-15 PROCEDURE — 96376 TX/PRO/DX INJ SAME DRUG ADON: CPT

## 2020-07-15 RX ADMIN — METHYLPREDNISOLONE SODIUM SUCCINATE 80 MG: 125 INJECTION, POWDER, FOR SOLUTION INTRAMUSCULAR; INTRAVENOUS at 08:46

## 2020-07-15 RX ADMIN — ESCITALOPRAM 10 MG: 10 TABLET, FILM COATED ORAL at 08:47

## 2020-07-15 RX ADMIN — PRAVASTATIN SODIUM 40 MG: 40 TABLET ORAL at 20:16

## 2020-07-15 RX ADMIN — LAMOTRIGINE 100 MG: 100 TABLET ORAL at 20:16

## 2020-07-15 RX ADMIN — PANTOPRAZOLE SODIUM 40 MG: 40 TABLET, DELAYED RELEASE ORAL at 20:14

## 2020-07-15 RX ADMIN — CARVEDILOL 25 MG: 25 TABLET, FILM COATED ORAL at 08:47

## 2020-07-15 RX ADMIN — AMLODIPINE BESYLATE 5 MG: 5 TABLET ORAL at 08:47

## 2020-07-15 RX ADMIN — CLONAZEPAM 0.5 MG: 0.5 TABLET ORAL at 08:46

## 2020-07-15 RX ADMIN — HYDRALAZINE HYDROCHLORIDE 50 MG: 50 TABLET, FILM COATED ORAL at 08:47

## 2020-07-15 RX ADMIN — CLONAZEPAM 0.5 MG: 0.5 TABLET ORAL at 20:15

## 2020-07-15 RX ADMIN — DULOXETINE HYDROCHLORIDE 120 MG: 60 CAPSULE, DELAYED RELEASE ORAL at 20:15

## 2020-07-15 RX ADMIN — PANTOPRAZOLE SODIUM 40 MG: 40 TABLET, DELAYED RELEASE ORAL at 08:47

## 2020-07-15 RX ADMIN — LOSARTAN POTASSIUM 100 MG: 100 TABLET, FILM COATED ORAL at 08:47

## 2020-07-15 RX ADMIN — HYDRALAZINE HYDROCHLORIDE 50 MG: 50 TABLET, FILM COATED ORAL at 20:15

## 2020-07-15 RX ADMIN — ASPIRIN 81 MG: 81 TABLET, COATED ORAL at 08:47

## 2020-07-15 RX ADMIN — CARVEDILOL 25 MG: 25 TABLET, FILM COATED ORAL at 20:16

## 2020-07-15 NOTE — PLAN OF CARE
Problem: Patient Care Overview  Goal: Plan of Care Review  Flowsheets  Taken 7/15/2020 0800 by Noelle Montiel, RN  Plan of Care Reviewed With: patient  Taken 7/15/2020 1147 by Alondra Stark, PT  Outcome Summary: pt is unsteady w ambulation 2' persistent dizziness. ambulated 200ft SBA/CGA, navigated stairs w BUE support of handrail SBA/CGA pt educated on vestbular HEP including Mickey Arauz. recommend OP PT to address vertigo/cervicogenic deficits. noted plans for DC home today. recommend assist from family/friends 2' dizziness/unsteadiness.   ..Patient was wearing a face mask during this therapy encounter. Therapist used appropriate personal protective equipment including eye protection, mask, and gloves.  Mask used was standard procedure mask. Appropriate PPE was worn during the entire therapy session. Hand hygiene was completed before and after therapy session. Patient is not in enhanced droplet precautions.

## 2020-07-15 NOTE — PLAN OF CARE
Problem: Patient Care Overview  Goal: Plan of Care Review  Flowsheets (Taken 7/15/2020 9505)  Outcome Summary: Pt is a 69 year old female who lives alone and was previously independent will all ADLs, iADL, and functional and community mobility. Pt has hx of vertigo, but has never had an episode last so long and has had to go to hospital. Pt reports mild dizziness todays date when transtioning from supine to sit. No other report of dizziness during encounter. Pt displays unsteady functional mobility to bathroom CGA. Pt completed toileting and standing grooming ADLs at sink with CGA to SBA for safety with no epsiodes of LOB static standing. Pt anticipated to go home today or tomorrow with OP PT referal to address vestibular issues. Pt annticipated to not need OT services after d/c. Recommend d/c home with assist from friends.    Note:   Appropriate PPE worn throughout encounter. Pt and therapist wore mask. Therapist wore face shield and gloves and completed hand hygiene prior and after encounter. Pt not on advanced droplet precautions. Mask is standard procedure.

## 2020-07-15 NOTE — PLAN OF CARE
Problem: Patient Care Overview  Goal: Plan of Care Review  Outcome: Ongoing (interventions implemented as appropriate)  Flowsheets  Taken 7/14/2020 1652 by Barb Gloria, RN  Progress: no change  Taken 7/15/2020 0002 by Shabnam Almanzar RN  Plan of Care Reviewed With: patient  Note:   Pt alert and oriented when taking over care at midnight, pt tired and resting well, no s.s of distress, vss will cont to monitor

## 2020-07-15 NOTE — THERAPY EVALUATION
Acute Care - Occupational Therapy Initial Evaluation  [facility]     Patient Name: Katelyn Sr  : 1950  MRN: [id number]  Today's Date: 7/15/2020             Admit Date: 2020       ICD-10-CM ICD-9-CM   1. Dizziness R42 780.4     Patient Active Problem List   Diagnosis   • FUENTES on autoCPAP   • Circadian rhythm sleep disorder, delayed sleep phase type   • Hypersomnia due to another medical condition   • NSTEMI (non-ST elevated myocardial infarction) (CMS/[facility])   • Essential hypertension   • Degeneration of cervical intervertebral disc   • DDD (degenerative disc disease), thoracic   • Disc degeneration, lumbar   • Mixed hyperlipidemia   • Palpitations   • Extremity pain   • LBP (low back pain)   • DDD (degenerative disc disease), lumbar   • Atypical chest pain   • Vertigo   • Depression   • Generalized anxiety disorder   • Peripheral vestibulopathy of both ears   • Cervical stenosis of spinal canal     Past Medical History:   Diagnosis Date   • Depression    • GERD (gastroesophageal reflux disease)    • Hyperlipidemia    • Hypertension    • Non-STEMI (non-ST elevated myocardial infarction) (CMS/[facility])    • FUENTES on CPAP      Past Surgical History:   Procedure Laterality Date   • BACK SURGERY     • CARDIAC CATHETERIZATION N/A 2018    Procedure: Left Heart Cath;  Surgeon: Aleks Velazquez MD;  Location: [facility] INVASIVE LOCATION;  Service: Cardiovascular   • CARDIAC CATHETERIZATION N/A 2018    Procedure: Coronary angiography;  Surgeon: Aleks Velazquez MD;  Location: [facility] CATH INVASIVE LOCATION;  Service: Cardiovascular   • CARDIAC CATHETERIZATION N/A 2018    Procedure: Left ventriculography;  Surgeon: Aleks Velazquez MD;  Location: [facility] CATH INVASIVE LOCATION;  Service: Cardiovascular   • CARDIAC CATHETERIZATION N/A 2018    Procedure: Peripheral angiography;  Surgeon: Aleks Velazquez MD;  Location: [facility] CATH INVASIVE LOCATION;  Service: Cardiovascular   •  EXPLORATORY LAPAROTOMY     • HEMORRHOIDECTOMY     • NECK SURGERY  12/03/2003    ACDF C6-7-Dr. Nix    • RECTAL SURGERY            OT ASSESSMENT FLOWSHEET (last 12 hours)      Occupational Therapy Evaluation     Row Name 07/15/20 1349                   OT Evaluation Time/Intention    Subjective Information  no complaints  -        Document Type  evaluation  -        Mode of Treatment  occupational therapy  -        Patient Effort  good  -        Comment  Pt resting in bed, agreeable to OT. Pt reports mild dizziness when transitioning from supine to sit. No other symtpoms reported throughout session.   -           General Information    Patient Profile Reviewed?  yes  -        Patient Observations  alert;cooperative;agree to therapy  -        General Observations of Patient  Pt pleasant and agreeable to therapy.   -        Prior Level of Function  independent:;all household mobility;community mobility;ADL's;home management;cooking;cleaning;driving;shopping  -        Equipment Currently Used at Home  none  -        Existing Precautions/Restrictions  fall  -           Cognitive Assessment/Intervention- PT/OT    Orientation Status (Cognition)  oriented x 4  -        Follows Commands (Cognition)  follows one step commands;over 90% accuracy  -           Bed Mobility Assessment/Treatment    Bed Mobility Assessment/Treatment  supine-sit-supine  -SM        Supine-Sit [place] (Bed Mobility)  conditional independence  -        Sit-Supine [place] (Bed Mobility)  conditional independence  -SM           Functional Mobility    Functional Mobility- Ind. Level  contact guard assist  -        Functional Mobility-Distance (Feet)  30  -        Functional Mobility- Comment  To bathroom and back for ADLs.   -           Transfer Assessment/Treatment    Transfer Assessment/Treatment  toilet transfer  -           Sit-Stand Transfer    Sit-Stand [place] (Transfers)  stand by assist  -            Toilet Transfer    Type (Toilet Transfer)  sit-stand  -SM        [place] Level (Toilet Transfer)  stand by assist  -        Assistive Device (Toilet Transfer)  grab bars/safety frame  -           ADL Assessment/Intervention    BADL Assessment/Intervention  lower body dressing;bathing;upper body dressing;grooming;toileting  -           Lower Body Dressing Assessment/Training    Lower Body Dressing [place] Level  doff;shoes/slippers;don;conditional independence  -SM        Lower Body Dressing Position  edge of bed sitting  -SM        Comment (Lower Body Dressing)  No new symptoms when reaching down to feet.   -SM           Grooming Assessment/Training    [place] Level (Grooming)  grooming skills;supervision  -SM        Grooming Position  sink side;unsupported standing  -        Comment (Grooming)  No LOB with static standing at sink, no AD.  -           Toileting Assessment/Training    [place] Level (Toileting)  toileting skills;perform perineal hygiene;adjust/manage clothing;supervision  -        Assistive Devices (Toileting)  grab bar/safety frame  -        Toileting Position  unsupported sitting;unsupported standing  -SM           General ROM    GENERAL ROM COMMENTS  BUE AROM WFL  -           MMT (Manual Muscle Testing)    General MMT Comments  BUE grossly 4+/5  -SM           Motor Assessment/Interventions    Additional Documentation  Balance (Group)  -SM           Static Sitting Balance    Level of [place] (Unsupported Sitting, Static Balance)  conditional independence  -SM        Sitting Position (Unsupported Sitting, Static Balance)  sitting on edge of bed  -        Time Able to Maintain Position (Unsupported Sitting, Static Balance)  more than 5 minutes  -SM           Static Standing Balance    Level of [place] (Supported Standing, Static Balance)  standby assist;contact guard assist  -           Positioning and Restraints    Pre-Treatment Position  in  "bed  -SM        Post Treatment Position  bed  -SM        In Bed  supine;exit alarm on;encouraged to call for assist;call light within reach  -           Pain Scale: Numbers Pre/Post-Treatment    Pain Scale: Numbers, Pretreatment  0/10 - no pain  -SM        Pain Scale: Numbers, Post-Treatment  0/10 - no pain  -SM           Plan of Care Review    Plan of Care Reviewed With  patient  -SM        Outcome Summary  Pt is a 69 year old female who lives alone and was previously independent will all ADLs, iADL, and functional and community mobility. Pt has hx of vertigo, but has never had an episode last so long and has had to go to hospital. Pt reports mild dizziness todays date when transtioning from supine to sit. No other report of dizziness during encounter. Pt displays unsteady functional mobility to bathroom CGA. Pt completed toileting and standing grooming ADLs at sink with CGA to SBA for safety with no epsiodes of LOB static standing. Pt anticipated to go home today or tomorrow with OP PT referal to address vestibular issues. Pt annticipated to not need OT services after d/c. Recommend d/c home with assist from friends.    -           Clinical Impression (OT)    Functional Level at Time of Evaluation (OT Eval)  Impaired balance with functional mobility.   -SM        Patient/Family Goals Statement (OT Eval)  \"I want to go home and not loose my independence.\"   -        Criteria for Skilled Therapeutic Interventions Met (OT Eval)  yes;treatment indicated  -        Rehab Potential (OT Eval)  good, to achieve stated therapy goals  -        Therapy Frequency (OT Eval)  evaluation only  -        Care Plan Review (OT)  evaluation/treatment results reviewed;care plan/treatment goals reviewed  -SM        Anticipated Discharge Disposition (OT)  home with assist  -SM           OT Goals    Caregiver Training Goal Selection (OT)  caregiver training, OT goal 1  -SM        Additional Documentation  Caregiver Training " Goal Selection (OT) (Row)  -           Caregiver Training Goal 1 (OT)    Caregiver Training Goal 1 (OT)  Pt will demonstrate safe transfer techniques for toileting.   -        Time Frame (Caregiver Training Goal 1, OT)  short term goal (STG);2 weeks  -        Progress/Outcomes (Caregiver Training Goal 1, OT)  goal met  -          User Key  (r) = Recorded By, (t) = Taken By, (c) = Cosigned By    Initials Name Effective Dates     Christine Matamoros OT 04/02/20 -          Occupational Therapy Education                 Title: PT OT SLP Therapies (Done)     Topic: Occupational Therapy (Done)     Point: ADL training (Done)     Description:   Instruct learner(s) on proper safety adaptation and remediation techniques during self care or transfers.   Instruct in proper use of assistive devices.              Learning Progress Summary           Patient Acceptance, E, VU by  at 7/15/2020 0213    Comment:  Pt educated safety with transfer techniques for improved safety with ADLs.                   Point: Home exercise program (Done)     Description:   Instruct learner(s) on appropriate technique for monitoring, assisting and/or progressing therapeutic exercises/activities.              Learning Progress Summary           Patient Acceptance, E, VU by  at 7/15/2020 7894    Comment:  Pt educated safety with transfer techniques for improved safety with ADLs.                   Point: Precautions (Done)     Description:   Instruct learner(s) on prescribed precautions during self-care and functional transfers.              Learning Progress Summary           Patient Acceptance, E, VU by  at 7/15/2020 3258    Comment:  Pt educated safety with transfer techniques for improved safety with ADLs.                   Point: Body mechanics (Done)     Description:   Instruct learner(s) on proper positioning and spine alignment during self-care, functional mobility activities and/or exercises.              Learning Progress  Summary           Patient Acceptance, E, VU by  at 7/15/2020 8947    Comment:  Pt educated safety with transfer techniques for improved safety with ADLs.                               User Key     Initials Effective Dates Name Provider Type Discipline     04/02/20 -  Christine Matamoros OT Occupational Therapist OT                  OT Recommendation and Plan  Outcome Summary/Treatment Plan (OT)  Anticipated Discharge Disposition (OT): home with assist  Therapy Frequency (OT Eval): evaluation only  Plan of Care Review  Plan of Care Reviewed With: patient  Plan of Care Reviewed With: patient  Outcome Summary: Pt is a 69 year old female who lives alone and was previously independent will all ADLs, iADL, and functional and community mobility. Pt has hx of vertigo, but has never had an episode last so long and has had to go to hospital. Pt reports mild dizziness todays date when transtioning from supine to sit. No other report of dizziness during encounter. Pt displays unsteady functional mobility to bathroom CGA. Pt completed toileting and standing grooming ADLs at sink with CGA to SBA for safety with no epsiodes of LOB static standing. Pt anticipated to go home today or tomorrow with OP PT referal to address vestibular issues. Pt annticipated to not need OT services after d/c. Recommend d/c home with assist from friends.      Outcome Measures     Row Name 07/15/20 1500 07/15/20 1200          How much help from another person do you currently need...    Turning from your back to your side while in flat bed without using bedrails?  --  4  -LH     Moving from lying on back to sitting on the side of a flat bed without bedrails?  --  4  -LH     Moving to and from a bed to a chair (including a wheelchair)?  --  3  -LH     Standing up from a chair using your arms (e.g., wheelchair, bedside chair)?  --  3  -LH     Climbing 3-5 steps with a railing?  --  3  -LH     To walk in hospital room?  --  3  -LH     AM-PAC 6 Clicks  Score (PT)  --  20  -        How much help from another is currently needed...    Putting on and taking off regular lower body clothing?  4  -SM  --     Bathing (including washing, rinsing, and drying)  3  -SM  --     Toileting (which includes using toilet bed pan or urinal)  3  -SM  --     Putting on and taking off regular upper body clothing  4  -SM  --     Taking care of personal grooming (such as brushing teeth)  3  -SM  --     Eating meals  3  -SM  --     AM-PAC 6 Clicks Score (OT)  20  -SM  --        Functional Assessment    Outcome Measure Options  AM-PAC 6 Clicks Daily Activity (OT)  -SM  AM-[facility] 6 Clicks Basic Mobility (PT)  -       User Key  (r) = Recorded By, (t) = Taken By, (c) = Cosigned By    Initials Name Provider Type     Alondra Stark, PT Physical Therapist     Christine Matamoros OT Occupational Therapist          Time Calculation:   Time Calculation- OT     Row Name 07/15/20 1504             Time Calculation- OT    OT Start Time  1315  -      OT Stop Time  1348  -      OT Time Calculation (min)  33 min  -      Total Timed Code Minutes- OT  25 minute(s)  -      OT Received On  07/15/20  -        User Key  (r) = Recorded By, (t) = Taken By, (c) = Cosigned By    Initials Name Provider Type    Christine Melo OT Occupational Therapist        Therapy Charges for Today     Code Description Service Date Service Provider Modifiers Qty    54679606057  OT EVAL MOD COMPLEXITY 2 7/15/2020 Christine Matamoros OT GO 1    [id number]  OT SELF CARE/MGMT/TRAIN EA 15 MIN 7/15/2020 Christine Matamoros OT GO 1    [id number]  OT THERAPEUTIC ACT EA 15 MIN 7/15/2020 Christine Matamoros OT GO 1               Christine Matamoros OT  7/15/2020

## 2020-07-15 NOTE — PROGRESS NOTES
Name: Katelyn Sr ADMIT: 2020   : 1950  PCP: Pierre Chaudhry Jr., MD    MRN: [id number] LOS: 0 days   AGE/SEX: 69 y.o. female  ROOM: [facility]     Subjective   Subjective   Nausea has resolved. Continues with seeing wall/ceiling in motion w/turning head. Feels unsteady at times; feels she needs to hold onto objects when walking despite walking a long distance. Lives alone and has 15 steps to get into condo.    Review of Systems   Constitutional: Negative.    HENT: Negative.    Respiratory: Negative.    Cardiovascular: Negative.    Gastrointestinal: Negative.    Genitourinary: Negative.    Musculoskeletal: Positive for neck pain.        Chronic neck pain   Skin: Negative.    Neurological: Positive for dizziness.   Psychiatric/Behavioral: Negative.         Objective   Objective   Vital Signs  Temp:  [97.6 °F (36.4 °C)-98.9 °F (37.2 °C)] 98.3 °F (36.8 °C)  Heart Rate:  [61-85] 79  Resp:  [16-18] 17  BP: (133-160)/() 133/69  SpO2:  [90 %-97 %] 95 %  on  Flow (L/min):  [2] 2;   Device (Oxygen Therapy): room air  Body mass index is 32.01 kg/m².  Physical Exam   Constitutional: She is oriented to person, place, and time. No distress.   HENT:   Head: Normocephalic.   Eyes: Conjunctivae are normal.   Neck: Normal range of motion. Neck supple. No tracheal deviation present.   Cardiovascular: Normal rate and regular rhythm.   Pulmonary/Chest: Effort normal and breath sounds normal. No respiratory distress.   Abdominal: Soft. Bowel sounds are normal.   Musculoskeletal: She exhibits no edema.   Neurological: She is alert and oriented to person, place, and time.   Skin: Skin is warm and dry.   Psychiatric: She has a normal mood and affect.   Vitals reviewed.      Results Review:       I reviewed the patient's new clinical results.  Results from last 7 days   Lab Units 20  0733 20  2248   WBC 10*3/mm3 7.01 8.00   HEMOGLOBIN g/dL 12.5 14.4   PLATELETS 10*3/mm3 257 365     Results from last 7  days   Lab Units 07/14/20  0732 07/13/20  2248   SODIUM mmol/L 141 142   POTASSIUM mmol/L 3.9 4.0   CHLORIDE mmol/L 108* 104   CO2 mmol/L 22.1 25.3   BUN mg/dL 13 17   CREATININE mg/dL 0.80 0.88   GLUCOSE mg/dL 89 100*   Estimated Creatinine Clearance: 66.6 mL/min (by C-G formula based on SCr of 0.8 mg/dL).  Results from last 7 days   Lab Units 07/13/20  2248   ALBUMIN g/dL 4.50   BILIRUBIN mg/dL 0.5   ALK PHOS U/L 103   AST (SGOT) U/L 15   ALT (SGPT) U/L 15     Results from last 7 days   Lab Units 07/14/20  0732 07/13/20  2248   CALCIUM mg/dL 8.9 9.8   ALBUMIN g/dL  --  4.50       No results found for: HGBA1C, POCGLU    MRI Cervical Spine Without Contrast  Narrative: CERVICAL SPINE MRI WITHOUT GADOLINIUM     HISTORY: cervical spondylosis, left neck pain near base of skull;  R42-Dizziness and giddiness     COMPARISON:  11/24/2018.     FINDINGS:  Multiplanar images of the cervical spine obtained without  gadolinium.  Axial images obtained from C2-T1.     The patient is status post anterior cervical discectomy and fusion at  C6-C7. Vertebral body alignment appears stable when compared to that  examination. No marrow signal abnormalities are seen. No acute fracture  or subluxation is identified.     C2-C3: There is no canal stenosis or neural foraminal narrowing.  C3-C4: Broad-based disc bulge results in flattening the thecal sac.  There is some neural foraminal narrowing noted on the left, secondary to  uncovertebral joint hypertrophy.  C4-C5: Broad-based disc osteophyte complex results in flattening of the  sac. There is no significant neural foraminal narrowing.  C5-C6: Disc osteophyte complex is noted. This does result in narrowing  of the canal. The appearance is stable when compared to prior exam.  There is also some neural foraminal narrowing noted on the right, again  not significantly changed.  C6-C7: There is a central disc protrusion, there does appear to be some  canal narrowing. There is no significant  neural foraminal narrowing.  C7-T1: There is no significant canal stenosis or neural foraminal  narrowing.     Perineural cysts are suspected at T1 bilaterally, and T2 on the left.     Impression: 1. Stable degenerative changes, as noted above.     This report was finalized on 7/14/2020 11:22 PM by Dr. Samantha Alfredo M.D.     MRI Brain Without Contrast  Narrative: BRAIN MRI WITHOUT CONTRAST     HISTORY: Syncope/fainting     COMPARISON: 07/26/2017.     FINDINGS:  Multiplanar images of the head were obtained without  gadolinium. No areas of restricted diffusion are seen to suggest acute  infarct. There is diffuse atrophy. There is mild periventricular and  deep white matter microangiopathic change. There is no midline shift or  mass effect. The intracranial flow voids appear intact. There is no  evidence of hemorrhage on susceptibility weighted imaging. Prior MRI on  07/20/2017 suggested asymmetry in the anterior lobe of the pituitary  gland with volume loss posterior laterally to the right with associated  CSF density. 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 The  appearance is stable on the current MRI.     Impression: 1. No acute intracranial abnormality.     This report was finalized on 7/14/2020 11:09 PM by Dr. Samantha Alfredo M.D.           amLODIPine 5 mg Oral Daily   aspirin 81 mg Oral Daily   carvedilol 25 mg Oral BID   clonazePAM 0.5 mg Oral BID   DULoxetine 120 mg Oral Nightly   escitalopram 10 mg Oral Daily   fluticasone 2 spray Nasal Nightly   hydrALAZINE 50 mg Oral BID   lamoTRIgine 100 mg Oral Nightly   losartan 100 mg Oral Daily   pantoprazole 40 mg Oral BID   pravastatin 40 mg Oral Nightly   traZODone 25 mg Oral Nightly      Diet Regular       Assessment/Plan     Active Hospital Problems    Diagnosis  POA   • **Vertigo [R42]  Unknown   • Depression [F32.9]  Unknown   • Generalized anxiety disorder [F41.1]  Unknown   • Peripheral vestibulopathy of both ears [H81.93]  Unknown   •  Cervical stenosis of spinal canal [M48.02]  Unknown   • Mixed hyperlipidemia [E78.2]  Yes   • DDD (degenerative disc disease), thoracic [M51.34]  Yes   • Essential hypertension [I10]  Yes   • FUENTES on autoCPAP [G47.33, Z99.89]  Not Applicable      Resolved Hospital Problems   No resolved problems to display.       69 y.o. female admitted with Vertigo.    · Vertigo sx's still present; nausea has resolved. Received 2 doses IV solumedrol and receiving klonopin BID. MRI brain negative for acute CVA. MRI C spine reported as stable compared to previous.  · PT now recommending assistance/supervision at home due to unsteadiness  · Await Neurology re-eval for further recommendations; feel pt is not safe to discharge today  · F/U JASMINE as needed for chronic neck pain    · SCDs for DVT prophylaxis.  · Full code.  · Discussed with patient, nursing staff, CCP and Dr. Calderon.  · Anticipate discharge home with home health tomorrow       CARLIE May  [facility]  07/15/20  14:31    Patient was wearing facemask when I entered the room and throughout our encounter.  I wore protective equipment throughout this patient encounter including a face mask and gloves.  Hand hygiene was performed before donning protective equipment and after removal when leaving the room.

## 2020-07-15 NOTE — THERAPY TREATMENT NOTE
Acute Care - Physical Therapy Treatment Note  [facility]     Patient Name: Katelyn Sr  : 1950  MRN: [id number]  Today's Date: 7/15/2020             Admit Date: 2020    Visit Dx:    ICD-10-CM ICD-9-CM   1. Dizziness R42 780.4     Patient Active Problem List   Diagnosis   • FUENTES on autoCPAP   • Circadian rhythm sleep disorder, delayed sleep phase type   • Hypersomnia due to another medical condition   • NSTEMI (non-ST elevated myocardial infarction) (CMS/[facility])   • Essential hypertension   • Degeneration of cervical intervertebral disc   • DDD (degenerative disc disease), thoracic   • Disc degeneration, lumbar   • Mixed hyperlipidemia   • Palpitations   • Extremity pain   • LBP (low back pain)   • DDD (degenerative disc disease), lumbar   • Atypical chest pain   • Vertigo   • Depression   • Generalized anxiety disorder   • Peripheral vestibulopathy of both ears   • Cervical stenosis of spinal canal       Therapy Treatment    Rehabilitation Treatment Summary     Row Name 07/15/20 1147             Treatment Time/Intention    Discipline  physical therapist  -      Document Type  therapy note (daily note)  -      Subjective Information  complains of;dizziness  -      Mode of Treatment  individual therapy;physical therapy  -      Existing Precautions/Restrictions  fall  -LH      Recorded by [] Alondra Stark, PT 07/15/20 1152      Row Name 07/15/20 1147             Cognitive Assessment/Intervention- PT/OT    Orientation Status (Cognition)  oriented x 4  -LH      Recorded by [] Alondra Stark, PT 07/15/20 1152      Row Name 07/15/20 1147             Bed Mobility Assessment/Treatment    Bed Mobility Assessment/Treatment  supine-sit;sit-supine  -      Supine-Sit [place] (Bed Mobility)  conditional independence  -      Sit-Supine [place] (Bed Mobility)  conditional independence  -LH      Recorded by [] Alondra Stark, PT 07/15/20 1152      Row Name 07/15/20 1147             Transfer  Assessment/Treatment    Transfer Assessment/Treatment  sit-stand transfer;stand-sit transfer  -LH      Recorded by [] Alondra Stark, PT 07/15/20 1152      Row Name 07/15/20 1147             Sit-Stand Transfer    Sit-Stand [place] (Transfers)  stand by assist  -LH      Recorded by [LH] Alondra Stark, PT 07/15/20 1207      Row Name 07/15/20 1147             Stand-Sit Transfer    Stand-Sit [place] (Transfers)  stand by assist  -LH      Recorded by [] Alondra Stark, PT 07/15/20 1207      Row Name 07/15/20 1147             Gait/Stairs Assessment/Training    [place] Level (Gait)  contact guard;stand by assist  -      Distance in Feet (Gait)  200  -      Pattern (Gait)  swing-through  -      Deviations/Abnormal Patterns (Gait)  [place] decreased  -      [place] Level (Stairs)  contact guard;stand by assist  -      Handrail Location (Stairs)  right side (ascending) BUEs holding onto 1 HR  -      Number of Steps (Stairs)  6  -      Ascending Technique (Stairs)  step-over-step  -      Descending Technique (Stairs)  step-to-step  -      Comment (Gait/Stairs)  occassional staggering LOB 2' dizziness, needing CGA to recover  -LH      Recorded by [] Alondra Stark, PT 07/15/20 1207      Row Name 07/15/20 1147             Motor Skills Assessment/Interventions    Additional Documentation  Therapeutic Exercise (Group);Therapeutic Exercise Interventions (Group)  -LH      Recorded by [] Alondra Stark, PT 07/15/20 1207      Row Name 07/15/20 1147             Therapeutic Exercise    Comment (Therapeutic Exercise)  pt screened for vestibular assessment -  noted nystagmus w tracking to R. pt reports long hx of cervical issues - ACDF 2003, vertigo spell 8 years ago. [place] deferred 2' hx cervical issues. pt educated on and performed Mickey Arauz, 30 sec ea position, 2 cycles. good tolerance, no n/v. HEP bedside for pt.    -LH      Recorded by [] Alondra Stark, PT 07/15/20 1207      Row  Name 07/15/20 1147             Static Sitting Balance    Level of [place] (Unsupported Sitting, Static Balance)  independent  -      Sitting Position (Unsupported Sitting, Static Balance)  sitting on edge of bed  -      Time Able to Maintain Position (Unsupported Sitting, Static Balance)  more than 5 minutes  -      Recorded by [] Alondra Stark, PT 07/15/20 1207      Row Name 07/15/20 1147             Static Standing Balance    Level of [place] (Supported Standing, Static Balance)  standby assist;contact guard assist  -      Recorded by [] Alondra Stark, PT 07/15/20 1207      Row Name 07/15/20 1147             Positioning and Restraints    Pre-Treatment Position  in bed  -      Post Treatment Position  bed  -      In Bed  fowlers;call light within reach;encouraged to call for assist;exit alarm on  -      Recorded by [] Alondra Stark, PT 07/15/20 1207      Row Name 07/15/20 1147             Pain Scale: Numbers Pre/Post-Treatment    Pain Scale: Numbers, Pretreatment  0/10 - no pain  -      Pain Scale: Numbers, Post-Treatment  0/10 - no pain  -      Recorded by [] Alondra Stark, PT 07/15/20 1207        User Key  (r) = Recorded By, (t) = Taken By, (c) = Cosigned By    Initials Name Effective Dates Discipline     Alondra Stark, PT 04/03/18 -  PT                   Physical Therapy Education                 Title: PT OT SLP Therapies (Done)     Topic: Physical Therapy (Done)     Point: Mobility training (Done)     Description:   Instruct learner(s) on safety and technique for assisting patient out of bed, chair or wheelchair.  Instruct in the proper use of assistive devices, such as walker, crutches, cane or brace.              Patient Friendly Description:   It's important to get you on your feet again, but we need to do so in a way that is safe for you. Falling has serious consequences, and your personal safety is the most important thing of all.        When it's time to get out of  bed, one of us or a family member will sit next to you on the bed to give you support.     If your doctor or nurse tells you to use a walker, crutches, a cane, or a brace, be sure you use it every time you get out of bed, even if you think you don't need it.    Learning Progress Summary           Patient Acceptance, H, VU,DU by  at 7/15/2020 1207    Comment:  Mickey Arauz HEP    Acceptance, E,TB, VU by AR at 7/14/2020 1049                   Point: Home exercise program (Done)     Description:   Instruct learner(s) on appropriate technique for monitoring, assisting and/or progressing patient with therapeutic exercises and activities.              Learning Progress Summary           Patient Acceptance, H, VU,DU by  at 7/15/2020 1207    Comment:  Mickey Arauz HEP    Acceptance, E,TB, VU by AR at 7/14/2020 1049                   Point: Body mechanics (Done)     Description:   Instruct learner(s) on proper positioning and spine alignment for patient and/or caregiver during mobility tasks and/or exercises.              Learning Progress Summary           Patient Acceptance, H, VU,DU by  at 7/15/2020 1207    Comment:  Mickey Arauz HEP    Acceptance, E,TB, VU by AR at 7/14/2020 1049                   Point: Precautions (Done)     Description:   Instruct learner(s) on prescribed precautions during mobility and gait tasks              Learning Progress Summary           Patient Acceptance, H, VU,DU by  at 7/15/2020 1207    Comment:  Mickey Arauz HEP    Acceptance, E,TB, VU by AR at 7/14/2020 1049                               User Key     Initials Effective Dates Name Provider Type Discipline     04/03/18 -  Alondra Stark, PT Physical Therapist PT    AR 07/02/20 -  Keyur Dasilva, PT Student PT Student PT                PT Recommendation and Plan     Outcome Summary: pt is unsteady w ambulation 2' persistent dizziness. ambulated 200ft SBA/CGA, navigated stairs w BUE support of handrail. pt educated on vestbular HEP  including Mickey Arauz. recommend OP PT to address vertigo/cerviogenic deficits. noted plans for DC home today. recommend assist from family/friends 2' dizziness/unsteadiness.  Outcome Measures     Row Name 07/15/20 1200             How much help from another person do you currently need...    Turning from your back to your side while in flat bed without using bedrails?  4  -LH      Moving from lying on back to sitting on the side of a flat bed without bedrails?  4  -LH      Moving to and from a bed to a chair (including a wheelchair)?  3  -LH      Standing up from a chair using your arms (e.g., wheelchair, bedside chair)?  3  -LH      Climbing 3-5 steps with a railing?  3  -LH      To walk in hospital room?  3  -      AM-PAC 6 Clicks Score (PT)  20  -         Functional Assessment    Outcome Measure Options  AM-PAC 6 Clicks Basic Mobility (PT)  -        User Key  (r) = Recorded By, (t) = Taken By, (c) = Cosigned By    Initials Name Provider Type     Alondra Stark, PT Physical Therapist         Time Calculation:   PT Charges     Row Name 07/15/20 1208             Time Calculation    Start Time  1048  -      Stop Time  1121  -      Time Calculation (min)  33 min  -      PT Received On  07/15/20  -      PT - Next Appointment  07/16/20  -        User Key  (r) = Recorded By, (t) = Taken By, (c) = Cosigned By    Initials Name Provider Type     Alondra Stark, PT Physical Therapist        Therapy Charges for Today     Code Description Service Date Service Provider Modifiers Qty    58178539485 HC PT THER PROC EA 15 MIN 7/15/2020 Alondra Stark, PT GP 2          PT G-Codes  Outcome Measure Options: AM-PAC 6 Clicks Basic Mobility (PT)  AM-PAC 6 Clicks Score (PT): 20    Alondra Stark PT  7/15/2020

## 2020-07-15 NOTE — PROGRESS NOTES
Case Management Discharge Note           Provided Post Acute Provider List?: Yes  Post Acute Provider List: Home Health, Nursing Home  Provided Post Acute Provider Quality & Resource List?: N/A  N/A Quality & Resource List Comment: The patient was provided with a HH/SNF list and not a print out of the HH/nursing home compare list from Medicare.gov as the patient plans to go to [facility] on [facility] [phone] if outpatient therapy or vestibular rehab is needed.    Delivered To: Patient  Method of Delivery: In person    Destination      No service has been selected for the patient.      Durable Medical Equipment      No service has been selected for the patient.      Dialysis/Infusion      No service has been selected for the patient.      Home Medical Care      No service has been selected for the patient.      Therapy      No service has been selected for the patient.      Community Resources      No service has been selected for the patient.

## 2020-07-15 NOTE — PROGRESS NOTES
Patient Identification:  NAME:  Katelyn Sr  Age:  69 y.o.   Sex:  female   :  1950   MRN:  [id number]       Chief complaint: Vertigo peripheral vestibulopathy cervical spondylosis    History of present illness: Patient feels like her vertigo is better.  She is not talking about her neck discomfort at all I described the normal MRI of the brain and mild spondylosis type changes in the neck but nothing acute.  She asked if she can go home today      Past medical history:  Past Medical History:   Diagnosis Date   • Depression    • GERD (gastroesophageal reflux disease)    • Hyperlipidemia    • Hypertension    • Non-STEMI (non-ST elevated myocardial infarction) (CMS/[facility])    • FUENTES on CPAP        Allergies:  Erythromycin    Home medications:  Medications Prior to Admission   Medication Sig Dispense Refill Last Dose   • amLODIPine (NORVASC) 5 MG tablet Take 1 tablet by mouth Daily. 90 tablet 1 2020   • aspirin 81 MG EC tablet Take 81 mg by mouth Daily.   2020   • carvedilol (COREG) 25 MG tablet Take 1 tablet by mouth 2 (Two) Times a Day. 180 tablet 3 2020   • DULoxetine (CYMBALTA) 60 MG capsule Take 120 mg by mouth Every Night.   2020   • escitalopram (LEXAPRO) 10 MG tablet Take 10 mg by mouth Daily.   2020   • fluticasone (FLONASE) 50 MCG/ACT nasal spray 2 sprays into the nostril(s) as directed by provider Every Night.   2020   • hydrALAZINE (APRESOLINE) 50 MG tablet Take 1 tablet by mouth 2 (two) times a day. 180 tablet 1 2020   • lamoTRIgine (LAMICTAL) 100 MG tablet Take 100 mg by mouth Every Night.   2020   • losartan (COZAAR) 100 MG tablet TAKE 1 TABLET BY MOUTH EVERY DAY 90 tablet 0 2020   • pantoprazole (PROTONIX) 40 MG EC tablet Take 40 mg by mouth 2 (Two) Times a Day.   2020   • pravastatin (PRAVACHOL) 40 MG tablet Take 1 tablet by mouth Daily. (Patient taking differently: Take 40 mg by mouth Every Night.) 90 tablet 3 2020   • raNITIdine  (ZANTAC) 75 MG tablet Take 300 mg by mouth Every Night.   7/11/2020   • traZODone (DESYREL) 50 MG tablet Take 25 mg by mouth Every Night.   7/11/2020   • LORazepam (ATIVAN) 0.5 MG tablet Take 0.5 mg by mouth Every 8 (Eight) Hours As Needed for Anxiety.   Taking        Hospital medications:    amLODIPine 5 mg Oral Daily   aspirin 81 mg Oral Daily   carvedilol 25 mg Oral BID   clonazePAM 0.5 mg Oral BID   DULoxetine 120 mg Oral Nightly   escitalopram 10 mg Oral Daily   fluticasone 2 spray Nasal Nightly   hydrALAZINE 50 mg Oral BID   lamoTRIgine 100 mg Oral Nightly   losartan 100 mg Oral Daily   pantoprazole 40 mg Oral BID   pravastatin 40 mg Oral Nightly   traZODone 25 mg Oral Nightly        •  acetaminophen **OR** [DISCONTINUED] acetaminophen **OR** [DISCONTINUED] acetaminophen  •  bisacodyl  •  calcium carbonate  •  ondansetron **OR** ondansetron      Objective:  Vitals Ranges:   Temp:  [97.6 °F (36.4 °C)-98.9 °F (37.2 °C)] 98.3 °F (36.8 °C)  Heart Rate:  [61-85] 79  Resp:  [16-18] 17  BP: (133-160)/() 133/69      Physical Exam:  She is awake alert seems to move her head a bit more freely extraocular movements full without nystagmus nasolabial folds palate and tongue are symmetrical moves all extremities equally although not the best effort reflexes absent throughout toes downgoing bilaterally    Results review:   I reviewed the patient's new clinical results.    Data review:  Lab Results (last 24 hours)     ** No results found for the last 24 hours. **           Imaging:  Imaging Results (Last 24 Hours)     Procedure Component Value Units Date/Time    MRI Cervical Spine Without Contrast [[id number]] Collected:  07/14/20 2310     Updated:  07/14/20 2325    Narrative:       CERVICAL SPINE MRI WITHOUT GADOLINIUM     HISTORY: cervical spondylosis, left neck pain near base of skull;  R42-Dizziness and giddiness     COMPARISON:  11/24/2018.     FINDINGS:  Multiplanar images of the cervical spine obtained  without  gadolinium.  Axial images obtained from C2-T1.     The patient is status post anterior cervical discectomy and fusion at  C6-C7. Vertebral body alignment appears stable when compared to that  examination. No marrow signal abnormalities are seen. No acute fracture  or subluxation is identified.     C2-C3: There is no canal stenosis or neural foraminal narrowing.  C3-C4: Broad-based disc bulge results in flattening the thecal sac.  There is some neural foraminal narrowing noted on the left, secondary to  uncovertebral joint hypertrophy.  C4-C5: Broad-based disc osteophyte complex results in flattening of the  sac. There is no significant neural foraminal narrowing.  C5-C6: Disc osteophyte complex is noted. This does result in narrowing  of the canal. The appearance is stable when compared to prior exam.  There is also some neural foraminal narrowing noted on the right, again  not significantly changed.  C6-C7: There is a central disc protrusion, there does appear to be some  canal narrowing. There is no significant neural foraminal narrowing.  C7-T1: There is no significant canal stenosis or neural foraminal  narrowing.     Perineural cysts are suspected at T1 bilaterally, and T2 on the left.       Impression:       1. Stable degenerative changes, as noted above.     This report was finalized on 7/14/2020 11:22 PM by Dr. Samantha Alfredo M.D.       MRI Brain Without Contrast [[id number]] Collected:  07/14/20 2302     Updated:  07/14/20 2312    Narrative:       BRAIN MRI WITHOUT CONTRAST     HISTORY: Syncope/fainting     COMPARISON: 07/26/2017.     FINDINGS:  Multiplanar images of the head were obtained without  gadolinium. No areas of restricted diffusion are seen to suggest acute  infarct. There is diffuse atrophy. There is mild periventricular and  deep white matter microangiopathic change. There is no midline shift or  mass effect. The intracranial flow voids appear intact. There is no  evidence of  hemorrhage on susceptibility weighted imaging. Prior MRI on  07/20/2017 suggested asymmetry in the anterior lobe of the pituitary  gland with volume loss posterior laterally to the right with associated  CSF density. 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 The  appearance is stable on the current MRI.       Impression:       1. No acute intracranial abnormality.     This report was finalized on 7/14/2020 11:09 PM by Dr. Samantha Alfredo M.D.                Assessment and Plan:       Vertigo    FUENTES on autoCPAP    Essential hypertension    DDD (degenerative disc disease), thoracic    Mixed hyperlipidemia    Depression    Generalized anxiety disorder    Peripheral vestibulopathy of both ears    Cervical stenosis of spinal canal    She feels like her vertigo is better she wants to go home and I would be okay with that I would stop the Solu-Medrol at this point as she has had 2 doses and she can go home with PRN Klonopin.  She does have mild cervical spondylosis based upon my independent eyeball review of the MRI the neck but there is no evidence of stroke or cord compression.  I will sign off and follow-up PRN reconsult thank      Kevin Barrios MD  07/15/20  15:06

## 2020-07-15 NOTE — PROGRESS NOTES
Clinical Pharmacy Services: Medication History    Katelyn Sr is a 69 y.o. female presenting to [facility] for Dizziness [R42]    She  has a past medical history of Depression, GERD (gastroesophageal reflux disease), Hyperlipidemia, Hypertension, Non-STEMI (non-ST elevated myocardial infarction) (CMS/HCC), and FUENTES on CPAP.    Allergies as of 07/13/2020 - Reviewed 07/13/2020   Allergen Reaction Noted   • Erythromycin Rash 01/12/2017       Medication information was obtained from: Patient, pharmacy and [facility]  Pharmacy and Phone Number:     [facility]/pharmacy #[id number] - [place]  [address] AT [facility]  [phone] [facility] [phone] FX    Prior to Admission Medications     Prescriptions Last Dose Informant Patient Reported? Taking?    amLODIPine (NORVASC) 5 MG tablet 7/11/2020  No Yes    Take 1 tablet by mouth Daily.    aspirin 81 MG EC tablet 7/11/2020 Self Yes Yes    Take 81 mg by mouth Daily.    carvedilol (COREG) 25 MG tablet 7/11/2020  No Yes    Take 1 tablet by mouth 2 (Two) Times a Day.    DULoxetine (CYMBALTA) 60 MG capsule 7/11/2020 Self Yes Yes    Take 120 mg by mouth Every Night.    escitalopram (LEXAPRO) 10 MG tablet 7/11/2020 Self Yes Yes    Take 10 mg by mouth Daily.    fluticasone (FLONASE) 50 MCG/ACT nasal spray 7/11/2020  Yes Yes    2 sprays into the nostril(s) as directed by provider Daily.    hydrALAZINE (APRESOLINE) 50 MG tablet 7/11/2020  No Yes    Take 1 tablet by mouth 2 (two) times a day.    Patient taking differently:  Take 50 mg by mouth 3 (Three) Times a Day.    lamoTRIgine (LAMICTAL) 100 MG tablet 7/11/2020 Self Yes Yes    Take 100 mg by mouth Every Night.    losartan (COZAAR) 100 MG tablet 7/11/2020  No Yes    TAKE 1 TABLET BY MOUTH EVERY DAY    pantoprazole (PROTONIX) 40 MG EC tablet 7/11/2020 Self Yes Yes    Take 40 mg by mouth 2 (Two) Times a Day.    pravastatin (PRAVACHOL) 40 MG tablet 7/11/2020  No Yes    Take 1 tablet by mouth Daily.     Patient taking differently:  Take 40 mg by mouth Every Night.    raNITIdine (ZANTAC) 75 MG tablet 7/11/2020  Yes Yes    Take 300 mg by mouth Every Night.    traZODone (DESYREL) 50 MG tablet 7/11/2020 Self Yes Yes    Take 25 mg by mouth At Night As Needed for Sleep.    LORazepam (ATIVAN) 0.5 MG tablet   Yes No    Take 0.5 mg by mouth Every 8 (Eight) Hours As Needed for Anxiety.            Medication notes: Patient does NOT take ativan at home. Discontinued off admission med rec.    This medication list is complete to the best of my knowledge as of 7/15/2020    Please call if questions.    Catalina Grover RPh  7/15/2020 19:20

## 2020-07-15 NOTE — TELEPHONE ENCOUNTER
12:30 pm  [phone]    Pt L/M stating she went to the ED Monday and was currently admitted in the hosp.     I...    RICH Corral

## 2020-07-16 PROCEDURE — 63710000001 PREDNISONE PER 5 MG: Performed by: NURSE PRACTITIONER

## 2020-07-16 PROCEDURE — 97110 THERAPEUTIC EXERCISES: CPT

## 2020-07-16 PROCEDURE — G0378 HOSPITAL OBSERVATION PER HR: HCPCS

## 2020-07-16 RX ADMIN — AMLODIPINE BESYLATE 5 MG: 5 TABLET ORAL at 09:15

## 2020-07-16 RX ADMIN — ESCITALOPRAM 10 MG: 10 TABLET, FILM COATED ORAL at 09:15

## 2020-07-16 RX ADMIN — LAMOTRIGINE 100 MG: 100 TABLET ORAL at 21:06

## 2020-07-16 RX ADMIN — LOSARTAN POTASSIUM 100 MG: 100 TABLET, FILM COATED ORAL at 09:15

## 2020-07-16 RX ADMIN — DULOXETINE HYDROCHLORIDE 120 MG: 60 CAPSULE, DELAYED RELEASE ORAL at 21:04

## 2020-07-16 RX ADMIN — HYDRALAZINE HYDROCHLORIDE 50 MG: 50 TABLET, FILM COATED ORAL at 09:15

## 2020-07-16 RX ADMIN — PANTOPRAZOLE SODIUM 40 MG: 40 TABLET, DELAYED RELEASE ORAL at 21:03

## 2020-07-16 RX ADMIN — CLONAZEPAM 0.5 MG: 0.5 TABLET ORAL at 21:03

## 2020-07-16 RX ADMIN — CARBAMIDE PEROXIDE 6.5% 5 DROP: 6.5 LIQUID AURICULAR (OTIC) at 12:19

## 2020-07-16 RX ADMIN — TRAZODONE HYDROCHLORIDE 25 MG: 50 TABLET ORAL at 21:05

## 2020-07-16 RX ADMIN — PRAVASTATIN SODIUM 40 MG: 40 TABLET ORAL at 21:06

## 2020-07-16 RX ADMIN — ASPIRIN 81 MG: 81 TABLET, COATED ORAL at 09:15

## 2020-07-16 RX ADMIN — CLONAZEPAM 0.5 MG: 0.5 TABLET ORAL at 09:29

## 2020-07-16 RX ADMIN — FLUTICASONE PROPIONATE 2 SPRAY: 50 SPRAY, METERED NASAL at 21:08

## 2020-07-16 RX ADMIN — HYDRALAZINE HYDROCHLORIDE 50 MG: 50 TABLET, FILM COATED ORAL at 21:03

## 2020-07-16 RX ADMIN — PANTOPRAZOLE SODIUM 40 MG: 40 TABLET, DELAYED RELEASE ORAL at 09:29

## 2020-07-16 RX ADMIN — PREDNISONE 60 MG: 50 TABLET ORAL at 16:03

## 2020-07-16 RX ADMIN — CARBAMIDE PEROXIDE 6.5% 5 DROP: 6.5 LIQUID AURICULAR (OTIC) at 21:07

## 2020-07-16 RX ADMIN — CARVEDILOL 25 MG: 25 TABLET, FILM COATED ORAL at 09:15

## 2020-07-16 RX ADMIN — CARVEDILOL 25 MG: 25 TABLET, FILM COATED ORAL at 21:04

## 2020-07-16 NOTE — PROGRESS NOTES
Name: Katelyn Sr ADMIT: 2020   : 1950  PCP: Pierre Chaudhry Jr., MD    MRN: [id number] LOS: 0 days   AGE/SEX: 69 y.o. female  ROOM: [facility]     Subjective   Subjective   Dizziness still present, possibly worse. No n/v. Occurs still w/turning head to right. Also c/o rt ear ringing, fullness.     Review of Systems   Constitutional: Negative.    HENT: Positive for tinnitus.    Respiratory: Negative.    Cardiovascular: Negative.    Gastrointestinal: Negative.    Genitourinary: Negative.    Musculoskeletal: Negative.    Skin: Negative.    Neurological: Positive for dizziness.   Psychiatric/Behavioral: Negative.         Objective   Objective   Vital Signs  Temp:  [97.6 °F (36.4 °C)-98.5 °F (36.9 °C)] 97.6 °F (36.4 °C)  Heart Rate:  [61-74] 61  Resp:  [16-18] 17  BP: (103-151)/(59-87) 103/59  SpO2:  [91 %-97 %] 96 %  on   ;   Device (Oxygen Therapy): room air  Body mass index is 32.01 kg/m².  Physical Exam   Constitutional: She is oriented to person, place, and time. She appears well-developed. No distress.   HENT:   Head: Normocephalic.   Right Ear: External ear and ear canal normal. There is tenderness.   Left Ear: External ear and ear canal normal.   Rt ear canal w/large amount cerumen; unable to completely visualize TM  Lt ear canal clear; TM dull   Eyes: Conjunctivae are normal.   Neck: No JVD present.   Cardiovascular: Normal rate and regular rhythm.   Pulmonary/Chest: Effort normal and breath sounds normal. No respiratory distress.   Abdominal: Soft. Bowel sounds are normal.   Musculoskeletal: She exhibits edema.   Gianfranco ankle trace   Neurological: She is alert and oriented to person, place, and time.   Skin: Skin is warm and dry.   Psychiatric: She has a normal mood and affect.   Vitals reviewed.      Results Review:       I reviewed the patient's new clinical results.  Results from last 7 days   Lab Units 20  0733 20  2248   WBC 10*3/mm3 7.01 8.00   HEMOGLOBIN g/dL 12.5 14.4    PLATELETS 10*3/mm3 257 365     Results from last 7 days   Lab Units 07/14/20  0732 07/13/20  2248   SODIUM mmol/L 141 142   POTASSIUM mmol/L 3.9 4.0   CHLORIDE mmol/L 108* 104   CO2 mmol/L 22.1 25.3   BUN mg/dL 13 17   CREATININE mg/dL 0.80 0.88   GLUCOSE mg/dL 89 100*   Estimated Creatinine Clearance: 66.6 mL/min (by C-G formula based on SCr of 0.8 mg/dL).  Results from last 7 days   Lab Units 07/13/20  2248   ALBUMIN g/dL 4.50   BILIRUBIN mg/dL 0.5   ALK PHOS U/L 103   AST (SGOT) U/L 15   ALT (SGPT) U/L 15     Results from last 7 days   Lab Units 07/14/20  0732 07/13/20  2248   CALCIUM mg/dL 8.9 9.8   ALBUMIN g/dL  --  4.50       No results found for: HGBA1C, POCGLU    MRI Cervical Spine Without Contrast  Narrative: CERVICAL SPINE MRI WITHOUT GADOLINIUM     HISTORY: cervical spondylosis, left neck pain near base of skull;  R42-Dizziness and giddiness     COMPARISON:  11/24/2018.     FINDINGS:  Multiplanar images of the cervical spine obtained without  gadolinium.  Axial images obtained from C2-T1.     The patient is status post anterior cervical discectomy and fusion at  C6-C7. Vertebral body alignment appears stable when compared to that  examination. No marrow signal abnormalities are seen. No acute fracture  or subluxation is identified.     C2-C3: There is no canal stenosis or neural foraminal narrowing.  C3-C4: Broad-based disc bulge results in flattening the thecal sac.  There is some neural foraminal narrowing noted on the left, secondary to  uncovertebral joint hypertrophy.  C4-C5: Broad-based disc osteophyte complex results in flattening of the  sac. There is no significant neural foraminal narrowing.  C5-C6: Disc osteophyte complex is noted. This does result in narrowing  of the canal. The appearance is stable when compared to prior exam.  There is also some neural foraminal narrowing noted on the right, again  not significantly changed.  C6-C7: There is a central disc protrusion, there does appear to  be some  canal narrowing. There is no significant neural foraminal narrowing.  C7-T1: There is no significant canal stenosis or neural foraminal  narrowing.     Perineural cysts are suspected at T1 bilaterally, and T2 on the left.     Impression: 1. Stable degenerative changes, as noted above.     This report was finalized on 7/14/2020 11:22 PM by Dr. Samantha Alfredo M.D.     MRI Brain Without Contrast  Narrative: BRAIN MRI WITHOUT CONTRAST     HISTORY: Syncope/fainting     COMPARISON: 07/26/2017.     FINDINGS:  Multiplanar images of the head were obtained without  gadolinium. No areas of restricted diffusion are seen to suggest acute  infarct. There is diffuse atrophy. There is mild periventricular and  deep white matter microangiopathic change. There is no midline shift or  mass effect. The intracranial flow voids appear intact. There is no  evidence of hemorrhage on susceptibility weighted imaging. Prior MRI on  07/20/2017 suggested asymmetry in the anterior lobe of the pituitary  gland with volume loss posterior laterally to the right with associated  CSF density. 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 The  appearance is stable on the current MRI.     Impression: 1. No acute intracranial abnormality.     This report was finalized on 7/14/2020 11:09 PM by Dr. Samantha Alfredo M.D.           amLODIPine 5 mg Oral Daily   aspirin 81 mg Oral Daily   carbamide peroxide 5 drop Right Ear BID   carvedilol 25 mg Oral BID   clonazePAM 0.5 mg Oral BID   DULoxetine 120 mg Oral Nightly   escitalopram 10 mg Oral Daily   fluticasone 2 spray Nasal Nightly   hydrALAZINE 50 mg Oral BID   lamoTRIgine 100 mg Oral Nightly   losartan 100 mg Oral Daily   pantoprazole 40 mg Oral BID   pravastatin 40 mg Oral Nightly   predniSONE 60 mg Oral Daily With Breakfast   traZODone 25 mg Oral Nightly      Diet Regular       Assessment/Plan     Active Hospital Problems    Diagnosis  POA   • **Vertigo [R42]  Unknown   •  Depression [F32.9]  Unknown   • Generalized anxiety disorder [F41.1]  Unknown   • Peripheral vestibulopathy of both ears [H81.93]  Unknown   • Cervical stenosis of spinal canal [M48.02]  Unknown   • Mixed hyperlipidemia [E78.2]  Yes   • DDD (degenerative disc disease), thoracic [M51.34]  Yes   • Essential hypertension [I10]  Yes   • FUENTES on autoCPAP [G47.33, Z99.89]  Not Applicable      Resolved Hospital Problems   No resolved problems to display.       69 y.o. female admitted with Vertigo.    · Vertigo sx's still present, feels dizziness is worse. Now w/reports of rt ear ringing/fullness. Ears examined; large amount cerumen rt ear; add debrox gtts. +tenderness w/otoscope. D/W ENT Dr. Rizvi via telephone who suspects vestibular neuronitis. Will add prednisone 60 mg daily x 10 days then taper. Will need outpt follow up. Received 2 doses IV solumedrol previously and receiving klonopin BID. MRI brain negative for acute CVA. MRI C spine reported as stable compared to previous. Neurology has followed, now signed off  · PT now recommending assistance/supervision at home due to unsteadiness  · F/U JASMINE as needed for chronic neck pain     · SCDs for DVT prophylaxis.  · Full code.  · Discussed with patient, nursing staff, Dr. Rizvi and Dr. Calderon.  · Anticipate discharge home vs SNF    CARLIE May  [facility]  07/16/20  15:18    Patient was wearing facemask when I entered the room and throughout our encounter.  I wore protective equipment throughout this patient encounter including a face mask, goggles and gloves.  Hand hygiene was performed before donning protective equipment and after removal when leaving the room.

## 2020-07-16 NOTE — PROGRESS NOTES
Continued Stay Note  [facility]     Patient Name: Katelyn Sr  MRN: [id number]  Today's Date: 7/16/2020    Admit Date: 7/13/2020    Discharge Plan     Row Name 07/16/20 1629       Plan    Plan  Home with [facility] and a cane.      Plan Comments  Spoke to the patient at bedside who states that she does not have any one who can stay with her at home and because of COVID she does not want to stay with her 80 year old sisters in [place] and [place].  The patient adamantly refuses to consider SNF for rehab as an option upon d/c because of COVID.  The patient states that she will be fine at home if CCP can assist her in obtaining a cane.  The patient denied wanting a bedside commode.  The patient states that she has a friend who can assist her with grocery shopping.  The patient states that she would be agreeable to using HH services but not with going to rehab even though she has complained about being dizzy.  The patient stated that she wanted to use [facility] and referral was made to [facility] to follow for HH orders.  [facility] was informed that the patient would need vestibular rehab.  The patient states that after completion of HH P.T. she would want to transfer to [facility] on [facility] [phone] for outpatient P.T.  CCP will follow for ENT consult and will see if the patient continues to refuse SNF for rehab upon d/c. CCP will follow to assist with the patient's d/c home with [facility] and will assist her in obtaining a cane also.  BROOKLYN Martinez        Discharge Codes    No documentation.       Expected Discharge Date and Time     Expected Discharge Date Expected Discharge Time    Jul 15, 2020             BROOKLYN Subramanian

## 2020-07-16 NOTE — PLAN OF CARE
Problem: Patient Care Overview  Goal: Plan of Care Review  Flowsheets (Taken 7/16/2020 0439)  Progress: no change  Plan of Care Reviewed With: patient  Outcome Summary: Pt ambulated to the bathroom x1 assist, and has worsened dizziness with movement. She is very unsteady at times.  No c/o nausea this shift. Will CTM. Hopefully pt can go home today.

## 2020-07-16 NOTE — THERAPY TREATMENT NOTE
Acute Care - Physical Therapy Treatment Note  [facility]     Patient Name: Katelyn Sr  : 1950  MRN: [id number]  Today's Date: 2020             Admit Date: 2020    Visit Dx:    ICD-10-CM ICD-9-CM   1. Dizziness R42 780.4     Patient Active Problem List   Diagnosis   • FUENTES on autoCPAP   • Circadian rhythm sleep disorder, delayed sleep phase type   • Hypersomnia due to another medical condition   • NSTEMI (non-ST elevated myocardial infarction) (CMS/[facility])   • Essential hypertension   • Degeneration of cervical intervertebral disc   • DDD (degenerative disc disease), thoracic   • Disc degeneration, lumbar   • Mixed hyperlipidemia   • Palpitations   • Extremity pain   • LBP (low back pain)   • DDD (degenerative disc disease), lumbar   • Atypical chest pain   • Vertigo   • Depression   • Generalized anxiety disorder   • Peripheral vestibulopathy of both ears   • Cervical stenosis of spinal canal       Therapy Treatment    Rehabilitation Treatment Summary     Row Name 20 1100             Treatment Time/Intention    Discipline  physical therapist  -      Document Type  therapy note (daily note)  -      Subjective Information  complains of;dizziness  -      Mode of Treatment  individual therapy;physical therapy  -      Therapy Frequency (PT Clinical Impression)  daily  -LH      Existing Precautions/Restrictions  fall  -LH      Recorded by [] Alondra Stark, PT 20 1104      Row Name 20 1100             Cognitive Assessment/Intervention- PT/OT    Orientation Status (Cognition)  oriented x 4  -LH      Follows Commands (Cognition)  WNL  -LH      Recorded by [] Alondra Stark, PT 20 1104      Row Name 20 1100             Bed Mobility Assessment/Treatment    Supine-Sit [place] (Bed Mobility)  conditional independence  -      Sit-Supine [place] (Bed Mobility)  conditional independence  -LH      Recorded by [] Alondra Stark, PT 20 1104      Row  Name 07/16/20 1100             Sit-Stand Transfer    Sit-Stand [place] (Transfers)  supervision  -      Recorded by [] Alondra Stark, PT 07/16/20 1104      Row Name 07/16/20 1100             Stand-Sit Transfer    Stand-Sit [place] (Transfers)  supervision  -      Recorded by [] Alondra Stark, PT 07/16/20 1104      Row Name 07/16/20 1100             Gait/Stairs Assessment/Training    [place] Level (Gait)  stand by assist;contact guard  -LH      Distance in Feet (Gait)  90  -LH      Pattern (Gait)  swing-through  -LH      Deviations/Abnormal Patterns (Gait)  alek decreased  -      Comment (Gait/Stairs)  SBA however required CGA 2' 1 lateral LOB, amb in room this AM  -LH      Recorded by [] Alondra Stark, PT 07/16/20 1104      Row Name 07/16/20 1100             Therapeutic Exercise    Comment (Therapeutic Exercise)  persistent R horitzontal nystagmus w tracking. Rashel/Epley again deferred 2' hx ACDF. performed 1 cycle Arevalo Daroff, 30 sec ea position. good tolerance, no nausea however persistent dizziness. noted ENT consult.   -LH      Recorded by [] Alondra Stark, PT 07/16/20 1104      Row Name 07/16/20 1100             Positioning and Restraints    Pre-Treatment Position  in bed  -LH      Post Treatment Position  chair  -LH      In Chair  reclined;call light within reach;encouraged to call for assist;exit alarm on  -LH      Recorded by [] Alondra Stark, PT 07/16/20 1104      Row Name 07/16/20 1100             Pain Scale: Numbers Pre/Post-Treatment    Pain Scale: Numbers, Pretreatment  0/10 - no pain  -      Pain Scale: Numbers, Post-Treatment  0/10 - no pain  -LH      Recorded by [] Alondra Stark, PT 07/16/20 1104        User Key  (r) = Recorded By, (t) = Taken By, (c) = Cosigned By    Initials Name Effective Dates Discipline     Alondra Stark, PT 04/03/18 -  PT                   Physical Therapy Education                 Title: PT OT SLP Therapies (Done)     Topic: Physical  Therapy (Done)     Point: Mobility training (Done)     Description:   Instruct learner(s) on safety and technique for assisting patient out of bed, chair or wheelchair.  Instruct in the proper use of assistive devices, such as walker, crutches, cane or brace.              Patient Friendly Description:   It's important to get you on your feet again, but we need to do so in a way that is safe for you. Falling has serious consequences, and your personal safety is the most important thing of all.        When it's time to get out of bed, one of us or a family member will sit next to you on the bed to give you support.     If your doctor or nurse tells you to use a walker, crutches, a cane, or a brace, be sure you use it every time you get out of bed, even if you think you don't need it.    Learning Progress Summary           Patient Acceptance, H, VU,DU by  at 7/15/2020 1207    Comment:  Mickey Arauz HEP    Acceptance, E,TB, VU by AR at 7/14/2020 1049                   Point: Home exercise program (Done)     Description:   Instruct learner(s) on appropriate technique for monitoring, assisting and/or progressing patient with therapeutic exercises and activities.              Learning Progress Summary           Patient Acceptance, H, VU,DU by  at 7/16/2020 1104    Acceptance, H, VU,DU by  at 7/15/2020 1207    Comment:  Mickey Arauz HEP    Acceptance, E,TB, VU by AR at 7/14/2020 1049                   Point: Body mechanics (Done)     Description:   Instruct learner(s) on proper positioning and spine alignment for patient and/or caregiver during mobility tasks and/or exercises.              Learning Progress Summary           Patient Acceptance, H, VU,DU by  at 7/15/2020 1207    Comment:  Mickey Arauz HEP    Acceptance, E,TB, VU by AR at 7/14/2020 1049                   Point: Precautions (Done)     Description:   Instruct learner(s) on prescribed precautions during mobility and gait tasks              Learning  Progress Summary           Patient Acceptance, H, KOBI,DU by  at 7/15/2020 1207    Comment:  Mickey Arauz HEP    Acceptance, E,TB, KOBI by AR at 7/14/2020 1049                               User Key     Initials Effective Dates Name Provider Type Discipline     04/03/18 -  Alondra Stark, PT Physical Therapist PT    AR 07/02/20 -  Keyur Dasilva, PT Student PT Student PT                PT Recommendation and Plan  Therapy Frequency (PT Clinical Impression): daily  Outcome Summary: pt w persistent dizziness, noted R lateral nystagmus w scanning activity. educated on vestibular therex. pt ambulatory household distances this AM SBA however 1 staggering LOB req CGA to recover. noted ENT consult- pt c/o R ear fullness and loss of hearing/dullness at times. will cont to monitor.  Outcome Measures     Row Name 07/16/20 1100 07/15/20 1500 07/15/20 1200       How much help from another person do you currently need...    Turning from your back to your side while in flat bed without using bedrails?  4  -LH  --  4  -LH    Moving from lying on back to sitting on the side of a flat bed without bedrails?  4  -LH  --  4  -LH    Moving to and from a bed to a chair (including a wheelchair)?  3  -LH  --  3  -LH    Standing up from a chair using your arms (e.g., wheelchair, bedside chair)?  3  -LH  --  3  -LH    Climbing 3-5 steps with a railing?  3  -LH  --  3  -LH    To walk in hospital room?  3  -LH  --  3  -LH    AM-PAC 6 Clicks Score (PT)  20  -LH  --  20  -LH       How much help from another is currently needed...    Putting on and taking off regular lower body clothing?  --  4  -SM  --    Bathing (including washing, rinsing, and drying)  --  3  -SM  --    Toileting (which includes using toilet bed pan or urinal)  --  3  -SM  --    Putting on and taking off regular upper body clothing  --  4  -SM  --    Taking care of personal grooming (such as brushing teeth)  --  3  -SM  --    Eating meals  --  3  -SM  --    AM-PAC 6 Clicks Score  (OT)  --  20  -  --       Functional Assessment    Outcome Measure Options  AM-PAC 6 Clicks Basic Mobility (PT)  -  AM-PAC 6 Clicks Daily Activity (OT)  -  AM-PAC 6 Clicks Basic Mobility (PT)  -      User Key  (r) = Recorded By, (t) = Taken By, (c) = Cosigned By    Initials Name Provider Type     Alondra Stark, PT Physical Therapist     Christine Matamoros, OT Occupational Therapist         Time Calculation:   PT Charges     Row Name 07/16/20 1105             Time Calculation    Start Time  1020  -      Stop Time  1049  -      Time Calculation (min)  29 min  -      PT Received On  07/16/20  -      PT - Next Appointment  07/17/20  -        User Key  (r) = Recorded By, (t) = Taken By, (c) = Cosigned By    Initials Name Provider Type     Alondra Stark, PT Physical Therapist        Therapy Charges for Today     Code Description Service Date Service Provider Modifiers Qty    26724990834 HC PT THER PROC EA 15 MIN 7/15/2020 Alondra Stark, PT GP 2    53732044811 HC PT THER PROC EA 15 MIN 7/16/2020 Alondra Stark, PT GP 2          PT G-Codes  Outcome Measure Options: AM-PAC 6 Clicks Basic Mobility (PT)  AM-PAC 6 Clicks Score (PT): 20  AM-PAC 6 Clicks Score (OT): 20    Alondra Stark PT  7/16/2020

## 2020-07-16 NOTE — PLAN OF CARE
Problem: Patient Care Overview  Goal: Plan of Care Review  Flowsheets  Taken 7/16/2020 0431 by Allie Elmore, RN  Plan of Care Reviewed With: patient  Taken 7/16/2020 1059 by Alondra Stark, PT  Outcome Summary: pt w persistent dizziness, noted persistent R horizontal nystagmus w scanning activity. educated on vestibular therex. pt ambulatory household distances this AM SBA however 1 staggering LOB req CGA to recover. noted ENT consult- pt c/o R ear fullness and loss of hearing/dullness at times. will cont to monitor.   ..Patient was wearing a face mask during this therapy encounter. Therapist used appropriate personal protective equipment including eye protection, mask, and gloves.  Mask used was standard procedure mask. Appropriate PPE was worn during the entire therapy session. Hand hygiene was completed before and after therapy session. Patient is not in enhanced droplet precautions.

## 2020-07-17 VITALS
HEIGHT: 62 IN | BODY MASS INDEX: 32.2 KG/M2 | SYSTOLIC BLOOD PRESSURE: 172 MMHG | RESPIRATION RATE: 16 BRPM | HEART RATE: 80 BPM | TEMPERATURE: 97.7 F | DIASTOLIC BLOOD PRESSURE: 96 MMHG | WEIGHT: 175 LBS | OXYGEN SATURATION: 95 %

## 2020-07-17 PROBLEM — H81.20 VESTIBULAR NEURONITIS: Status: ACTIVE | Noted: 2020-07-17

## 2020-07-17 PROCEDURE — G0378 HOSPITAL OBSERVATION PER HR: HCPCS

## 2020-07-17 PROCEDURE — 63710000001 PREDNISONE PER 5 MG: Performed by: NURSE PRACTITIONER

## 2020-07-17 PROCEDURE — 97110 THERAPEUTIC EXERCISES: CPT

## 2020-07-17 RX ORDER — CLONAZEPAM 0.5 MG/1
0.5 TABLET ORAL 2 TIMES DAILY
Qty: 15 TABLET | Refills: 0 | Status: SHIPPED | OUTPATIENT
Start: 2020-07-17 | End: 2020-07-25

## 2020-07-17 RX ORDER — PREDNISONE 10 MG/1
60 TABLET ORAL
Qty: 40 TABLET | Refills: 0 | Status: SHIPPED | OUTPATIENT
Start: 2020-07-18 | End: 2021-08-17

## 2020-07-17 RX ADMIN — PANTOPRAZOLE SODIUM 40 MG: 40 TABLET, DELAYED RELEASE ORAL at 09:26

## 2020-07-17 RX ADMIN — LOSARTAN POTASSIUM 100 MG: 100 TABLET, FILM COATED ORAL at 09:27

## 2020-07-17 RX ADMIN — CARVEDILOL 25 MG: 25 TABLET, FILM COATED ORAL at 09:27

## 2020-07-17 RX ADMIN — AMLODIPINE BESYLATE 5 MG: 5 TABLET ORAL at 09:27

## 2020-07-17 RX ADMIN — ASPIRIN 81 MG: 81 TABLET, COATED ORAL at 09:26

## 2020-07-17 RX ADMIN — CARBAMIDE PEROXIDE 6.5% 5 DROP: 6.5 LIQUID AURICULAR (OTIC) at 09:25

## 2020-07-17 RX ADMIN — HYDRALAZINE HYDROCHLORIDE 50 MG: 50 TABLET, FILM COATED ORAL at 09:26

## 2020-07-17 RX ADMIN — ESCITALOPRAM 10 MG: 10 TABLET, FILM COATED ORAL at 09:25

## 2020-07-17 RX ADMIN — CLONAZEPAM 0.5 MG: 0.5 TABLET ORAL at 09:26

## 2020-07-17 RX ADMIN — PREDNISONE 60 MG: 50 TABLET ORAL at 09:25

## 2020-07-17 NOTE — PROGRESS NOTES
Continued Stay Note  [facility]     Patient Name: Katelyn Sr  MRN: [id number]  Today's Date: 7/17/2020    Admit Date: 7/13/2020    Discharge Plan     Row Name 07/17/20 1237       Plan    Plan  Home via private auto    Patient/Family in Agreement with Plan  yes    Plan Comments  spoke with pt on the phone after talking with [facility]. Confirmed with pt that she no longer wants to go home with HH, she would prefer to go to OP PT at DC. Updated pt to contact her PCP after DC should she change her mind about HH once she is home. Pt verbalized understanding. Pt states her friend Ling Guerra [phone] will drive her home at DC. CCP to follow....................JW        Discharge Codes    No documentation.       Expected Discharge Date and Time     Expected Discharge Date Expected Discharge Time    Jul 17, 2020             Noelle Arciniega RN

## 2020-07-17 NOTE — PROGRESS NOTES
Case Management Discharge Note      Final Note: Home with outpatient therapy.  BROOKLYN Martinez    Provided Post Acute Provider List?: Yes  Post Acute Provider List: Home Health, Nursing Home  Provided Post Acute Provider Quality & Resource List?: N/A  N/A Quality & Resource List Comment: The patient was provided with a HH/SNF list and not a print out of the HH/nursing home compare list from Medicare.gov as the patient plans to go to [facility] on [facility] [phone] if outpatient therapy or vestibular rehab is needed.    Delivered To: Patient  Method of Delivery: In person    Destination      No service has been selected for the patient.      Durable Medical Equipment      No service has been selected for the patient.      Dialysis/Infusion      No service has been selected for the patient.      Home Medical Care      No service has been selected for the patient.      Therapy      No service has been selected for the patient.      Community Resources      No service has been selected for the patient.        Transportation Services  Private: Car    Final Discharge Disposition Code: 01 - home or self-care

## 2020-07-17 NOTE — PLAN OF CARE
Problem: Patient Care Overview  Goal: Plan of Care Review  Flowsheets (Taken 7/17/2020 0926)  Outcome Summary: Pt noted dec in symptoms upon waking up for therapy this AM. Pt independent in bed mobility and STS. Pt noted no dizziness with standing and walking but still demonstrates unsteadiness at times. Pt appears to demonstrate anticipatory unstreadiness from first day being asymptomatic since tuesday. Pt demonstrated improvement in walking req supervision and no overt LOB but notes inc fatigue. Pt performed 1 cycle of Arevalo-Daroff independently with noted nausea that lasted under 5 sec with left position but otherwise no nystagmus noted throughout treatment. Will continue to monistor progress.   ..Patient was intermittently wearing a face mask during this therapy encounter. Therapist used appropriate personal protective equipment including eye protection, mask, and gloves.  Mask used was standard procedure mask. Appropriate PPE was worn during the entire therapy session. Hand hygiene was completed before and after therapy session. Patient is not in enhanced droplet precautions.

## 2020-07-17 NOTE — PROGRESS NOTES
Continued Stay Note  [facility]     Patient Name: Katelyn Sr  MRN: [id number]  Today's Date: 7/17/2020    Admit Date: 7/13/2020    Discharge Plan     Row Name 07/17/20 1431       Plan    Plan  Home with outpatient therapy.    Plan Comments  Discussed the order for vestibular rehab with the patient and she confirmed that she wanted to go to [facility] on [address] [phone]. Spoke to Divya with [facility] who requested the patient's facesheet and order for therapy be faxed to her which was sent with the patient's permission.  Fax number [phone].  Divya stated that she would contact the patient to arrange for her therapy.  The patient was provided with the address and phone number for [facility] and a copy of the order.  BROOKLYN Martinez    Row Name 07/17/20 3977       Plan    Plan  Home via private auto    Patient/Family in Agreement with Plan  yes    Plan Comments  spoke with pt on the phone after talking with E[facility]. Confirmed with pt that she no longer wants to go home with HH, she would prefer to go to [place] at [place]. Updated pt to contact her PCP after DC should she change her mind about HH once she is home. Pt verbalized understanding. Pt states her friend Ling Guerra [phone] will drive her home at [place]. CCP to follow....................JW        Discharge Codes    No documentation.       Expected Discharge Date and Time     Expected Discharge Date Expected Discharge Time    Jul 17, 2020             BROOKLYN Subramanian

## 2020-07-17 NOTE — THERAPY TREATMENT NOTE
Acute Care - Physical Therapy Treatment Note  [facility]     Patient Name: Katelyn Sr  : 1950  MRN: [id number]  Today's Date: 2020             Admit Date: 2020    Visit Dx:    ICD-10-CM ICD-9-CM   1. Dizziness R42 780.4     Patient Active Problem List   Diagnosis   • FUENTES on autoCPAP   • Circadian rhythm sleep disorder, delayed sleep phase type   • Hypersomnia due to another medical condition   • NSTEMI (non-ST elevated myocardial infarction) (CMS/HCC)   • Essential hypertension   • Degeneration of cervical intervertebral disc   • DDD (degenerative disc disease), thoracic   • Disc degeneration, lumbar   • Mixed hyperlipidemia   • Palpitations   • Extremity pain   • LBP (low back pain)   • DDD (degenerative disc disease), lumbar   • Atypical chest pain   • Vertigo   • Depression   • Generalized anxiety disorder   • Peripheral vestibulopathy of both ears   • Cervical stenosis of spinal canal       Therapy Treatment    Rehabilitation Treatment Summary     Row Name 20             Treatment Time/Intention    Discipline  physical therapist  (Pended)   -AP      Document Type  therapy note (daily note)  (Pended)   -AP      Subjective Information  no complaints  (Pended)   -AP      Mode of Treatment  individual therapy;physical therapy  (Pended)   -AP      Therapy Frequency (PT Clinical Impression)  5 times/wk  (Pended)   -AP      Patient Effort  good  (Pended)   -AP      Existing Precautions/Restrictions  fall  (Pended)   -AP      Recorded by [AP] Enrrique Christian, PT Student 20      Row Name 20             Cognitive Assessment/Intervention- PT/OT    Orientation Status (Cognition)  oriented x 4  (Pended)   -AP      Recorded by [AP] Enrrique Christian, PT Student 20      Row Name 20             Safety Issues, Functional Mobility    Impairments Affecting Function (Mobility)  balance;coordination;endurance/activity tolerance;shortness of breath  (Pended)    -AP      Recorded by [AP] Enrrique Christian PT Student 07/17/20 0925      Row Name 07/17/20 0900             Bed Mobility Assessment/Treatment    Supine-Sit Galax (Bed Mobility)  independent  (Pended)   -AP      Comment (Bed Mobility)  Pt demonstrated improvement in bed mobility and reduced symptoms today  (Pended)   -AP      Recorded by [AP] Enrrique Christian, PT Student 07/17/20 0925      Row Name 07/17/20 0900             Sit-Stand Transfer    Sit-Stand Galax (Transfers)  supervision  (Pended)   -AP      Recorded by [AP] Enrrique Christian PT Student 07/17/20 0925      Row Name 07/17/20 0900             Stand-Sit Transfer    Stand-Sit Galax (Transfers)  independent  (Pended)   -AP      Recorded by [AP] Enrrique Christian PT Student 07/17/20 0925      Row Name 07/17/20 0900             Gait/Stairs Assessment/Training    Gait/Stairs Assessment/Training  gait/ambulation independence  (Pended)   -AP      Galax Level (Gait)  supervision  (Pended)   -AP      Distance in Feet (Gait)  150ft  (Pended)   -AP      Pattern (Gait)  swing-through  (Pended)   -AP      Deviations/Abnormal Patterns (Gait)  alek decreased  (Pended)   -AP      Comment (Gait/Stairs)  Pt reported asymptomatic today however req supervision with noted recovery from veering to left or right. but no overt LOB.   (Pended)   -AP      Recorded by [AP] Enrrique Christian, PT Student 07/17/20 0925      Row Name 07/17/20 0900             Therapeutic Exercise    Comment (Therapeutic Exercise)  Patito White/ Epley differed 2' hx of ACDF. Performed one cycle of Arevalo-Daroff independently with good tolerance but noted slight nausea that lasted less than 5 sec when coming up to sitting from left postition during Arevalo-Daroff.  No noted nystagmus.   (Pended)   -AP      Recorded by [AP] Enrrique Christian, PT Student 07/17/20 0925      Row Name 07/17/20 0900             Static Sitting Balance    Level of Galax (Unsupported Sitting, Static Balance)   independent  (Pended)   -AP      Sitting Position (Unsupported Sitting, Static Balance)  long sitting on bed  (Pended)   -AP      Time Able to Maintain Position (Unsupported Sitting, Static Balance)  more than 5 minutes  (Pended)   -AP      Recorded by [AP] Enrrique Christian PT Student 07/17/20 0925      Row Name 07/17/20 0900             Positioning and Restraints    Pre-Treatment Position  in bed  (Pended)   -AP      Post Treatment Position  chair  (Pended)   -AP      In Chair  notified nsg;reclined;call light within reach;encouraged to call for assist;exit alarm on  (Pended)   -AP      Recorded by [AP] Enrrique Christian PT Student 07/17/20 0925      Row Name 07/17/20 0900             Pain Scale: Numbers Pre/Post-Treatment    Pain Scale: Numbers, Pretreatment  0/10 - no pain  (Pended)   -AP      Pain Scale: Numbers, Post-Treatment  0/10 - no pain  (Pended)   -AP      Recorded by [AP] Enrrique Christian PT Student 07/17/20 0925        User Key  (r) = Recorded By, (t) = Taken By, (c) = Cosigned By    Initials Name Effective Dates Discipline    AP Enrrique Christian PT Student 06/23/20 -  PT                   Physical Therapy Education                 Title: PT OT SLP Therapies (Done)     Topic: Physical Therapy (Done)     Point: Mobility training (Done)     Description:   Instruct learner(s) on safety and technique for assisting patient out of bed, chair or wheelchair.  Instruct in the proper use of assistive devices, such as walker, crutches, cane or brace.              Patient Friendly Description:   It's important to get you on your feet again, but we need to do so in a way that is safe for you. Falling has serious consequences, and your personal safety is the most important thing of all.        When it's time to get out of bed, one of us or a family member will sit next to you on the bed to give you support.     If your doctor or nurse tells you to use a walker, crutches, a cane, or a brace, be sure you use it every time you get  out of bed, even if you think you don't need it.    Learning Progress Summary           Patient Acceptance, E,TB, VU,DU by AP at 7/17/2020 0925    Acceptance, H, VU,DU by  at 7/15/2020 1207    Comment:  Arevalo Carlitocarlos HEP    Acceptance, E,TB, VU by AR at 7/14/2020 1049                   Point: Home exercise program (Done)     Description:   Instruct learner(s) on appropriate technique for monitoring, assisting and/or progressing patient with therapeutic exercises and activities.              Learning Progress Summary           Patient Acceptance, E,TB, VU,DU by AP at 7/17/2020 0925    Acceptance, H, VU,DU by  at 7/16/2020 1104    Acceptance, H, VU,DU by  at 7/15/2020 1207    Comment:  Mickey Estebancarlos HEP    Acceptance, E,TB, VU by AR at 7/14/2020 1049                   Point: Body mechanics (Done)     Description:   Instruct learner(s) on proper positioning and spine alignment for patient and/or caregiver during mobility tasks and/or exercises.              Learning Progress Summary           Patient Acceptance, E,TB, VU,DU by AP at 7/17/2020 0925    Acceptance, H, VU,DU by  at 7/15/2020 1207    Comment:  Mickey Estebancarlos HEP    Acceptance, E,TB, VU by AR at 7/14/2020 1049                   Point: Precautions (Done)     Description:   Instruct learner(s) on prescribed precautions during mobility and gait tasks              Learning Progress Summary           Patient Acceptance, E,TB, VU,DU by AP at 7/17/2020 0925    Acceptance, H, VU,DU by  at 7/15/2020 1207    Comment:  Mickey Estebancarlos HEP    Acceptance, E,TB, VU by AR at 7/14/2020 1049                               User Key     Initials Effective Dates Name Provider Type Discipline     04/03/18 -  Alondra Stark, PT Physical Therapist PT    AP 06/23/20 -  Enrrique Christian, PT Student PT Student PT    AR 07/02/20 -  Keyur Dasilva, PT Student PT Student PT                PT Recommendation and Plan  Therapy Frequency (PT Clinical Impression): (P) 5 times/wk  Outcome  Summary: (P) Pt noted dec in symptoms upon waking up for therapy this AM. Pt independent in bed mobility and STS. Pt noted no dizziness with standing and walking but still demonstrates unsteadiness at times. Pt appears to demonstrate anticipatory unstreadiness from first day being asymptomatic since tuesday. Pt demonstrated improvement in walking req supervision and no overt LOB but notes inc fatigue. Will continue to monistor progress.  Outcome Measures     Row Name 07/17/20 0900 07/16/20 1100 07/15/20 1500       How much help from another person do you currently need...    Turning from your back to your side while in flat bed without using bedrails?  4  (Pended)   -AP  4  -LH  --    Moving from lying on back to sitting on the side of a flat bed without bedrails?  4  (Pended)   -AP  4  -LH  --    Moving to and from a bed to a chair (including a wheelchair)?  4  (Pended)   -AP  3  -LH  --    Standing up from a chair using your arms (e.g., wheelchair, bedside chair)?  4  (Pended)   -AP  3  -LH  --    Climbing 3-5 steps with a railing?  3  (Pended)   -AP  3  -LH  --    To walk in hospital room?  4  (Pended)   -AP  3  -LH  --    AM-PAC 6 Clicks Score (PT)  23  (Pended)   -AP  20  -LH  --       How much help from another is currently needed...    Putting on and taking off regular lower body clothing?  --  --  4  -SM    Bathing (including washing, rinsing, and drying)  --  --  3  -SM    Toileting (which includes using toilet bed pan or urinal)  --  --  3  -SM    Putting on and taking off regular upper body clothing  --  --  4  -SM    Taking care of personal grooming (such as brushing teeth)  --  --  3  -SM    Eating meals  --  --  3  -SM    AM-PAC 6 Clicks Score (OT)  --  --  20  -SM       Functional Assessment    Outcome Measure Options  AM-PAC 6 Clicks Basic Mobility (PT)  (Pended)   -AP  AM-PAC 6 Clicks Basic Mobility (PT)  -  AM-PAC 6 Clicks Daily Activity (OT)  -    Row Name 07/15/20 1200             How much  help from another person do you currently need...    Turning from your back to your side while in flat bed without using bedrails?  4  -LH      Moving from lying on back to sitting on the side of a flat bed without bedrails?  4  -LH      Moving to and from a bed to a chair (including a wheelchair)?  3  -LH      Standing up from a chair using your arms (e.g., wheelchair, bedside chair)?  3  -LH      Climbing 3-5 steps with a railing?  3  -LH      To walk in hospital room?  3  -LH      AM-PAC 6 Clicks Score (PT)  20  -         Functional Assessment    Outcome Measure Options  AM-PAC 6 Clicks Basic Mobility (PT)  -        User Key  (r) = Recorded By, (t) = Taken By, (c) = Cosigned By    Initials Name Provider Type     Alondra Stark, PT Physical Therapist    Christine Melo, OT Occupational Therapist    Enrrique Briggs, PT Student PT Student         Time Calculation:   PT Charges     Row Name 07/17/20 0925             Time Calculation    Start Time  0838  (Pended)   -AP      Stop Time  0856  (Pended)   -AP      Time Calculation (min)  18 min  (Pended)   -AP      PT Received On  07/17/20  (Pended)   -AP      PT - Next Appointment  07/20/20  (Pended)   -AP        User Key  (r) = Recorded By, (t) = Taken By, (c) = Cosigned By    Initials Name Provider Type    Enrrique Briggs, PT Student PT Student        Therapy Charges for Today     Code Description Service Date Service Provider Modifiers Qty    [id number] HC PT THER PROC EA 15 MIN 7/17/2020 Enrrique Christian PT Student GP 1          PT G-Codes  Outcome Measure Options: (P) AM-PAC 6 Clicks Basic Mobility (PT)  AM-PAC 6 Clicks Score (PT): (P) 23  AM-PAC 6 Clicks Score (OT): 20    Enrrique Christian PT Student  7/17/2020

## 2020-07-17 NOTE — PLAN OF CARE
"PATIENT HAD A PLEASANT NIGHT, VSS, I ENCOURAGED PATIENT TO STAY IN HOSPITAL AFTER SHE STATED \"SHE SHOULD JUST GO HOME, SHE COULD STAY AT HOME AND GET THE SAME THING DONE AS IN THIS HOSPITAL,\" DID EDUCATION ONCE AGAIN ON EAR DROPS. NO OTHER ISSUES NOTED.      Problem: Patient Care Overview  Goal: Plan of Care Review  Outcome: Ongoing (interventions implemented as appropriate)  Flowsheets  Taken 7/16/2020 0431 by Allie Elmore, RN  Progress: no change  Taken 7/16/2020 2024 by Gil Hernández, RN  Plan of Care Reviewed With: patient  Goal: Individualization and Mutuality  Outcome: Ongoing (interventions implemented as appropriate)  Goal: Discharge Needs Assessment  Outcome: Ongoing (interventions implemented as appropriate)  Goal: Interprofessional Rounds/Family Conf  Outcome: Ongoing (interventions implemented as appropriate)     "

## 2020-07-17 NOTE — DISCHARGE SUMMARY
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        Hospital Course     Ms. Sr is a 69 y.o. female with a history of hypertension, obstructive sleep apnea, hyperlipidemia, anxiety, depression, NSTEMI, and degenerative disc disease who presented to [facility] initially complaining of 3 day hx of dizziness described as ceiling and walls \"turning\".  Please see the admitting history and physical for further details.  She was found to have vertigo and was admitted to the hospital for further evaluation and treatment. CTH was negative, followed by MRI brain that showed no acute abnormality. Neurology was consulted. MRI C spine was performed due to neck pain that showed stable degenerative changes. She was given IV solumedrol and klonopin without much improvement in symptoms. She mentioned rt ear ringing and feeling of fullness. Discussed with ENT who felt pt had vestibular neuronitis and recommended continued prednisone and follow up in office on Tuesday. At discharge, dizziness has improved. She will be discharged home with plan for outpatient PT/vestibular rehab.           Day of Discharge     Sitting up in chair. Dizziness has improved. " No nausea. Agrees with discharge home.    Physical Exam:  Temp:  [97.7 °F (36.5 °C)-98 °F (36.7 °C)] 97.7 °F (36.5 °C)  Heart Rate:  [66-80] 80  Resp:  [16-17] 16  BP: (142-172)/(80-96) 172/96  Body mass index is 32.01 kg/m².  Physical Exam   Constitutional: She is oriented to person, place, and time. She appears well-developed. No distress.   HENT:   Head: Normocephalic.   Eyes: Conjunctivae are normal.   Neck: Neck supple. No tracheal deviation present.   Cardiovascular: Normal rate and regular rhythm.   Pulmonary/Chest: Effort normal and breath sounds normal. No respiratory distress.   Abdominal: Soft. Bowel sounds are normal.   Musculoskeletal: She exhibits no edema.   Neurological: She is alert and oriented to person, place, and time.   Skin: Skin is warm and dry.   Psychiatric: She has a normal mood and affect.   Vitals reviewed.      Consultants     Consult Orders (all) (From admission, onward)     Start     Ordered    07/16/20 1009  Inpatient ENT Consult  Once     Specialty:  Otolaryngology  Provider:  Jalen Yan MD    07/16/20 1008    07/14/20 0145  Inpatient Neurology Consult General  Once     Specialty:  Neurology  Provider:  Kevin Barrios MD    07/14/20 0147    07/14/20 0112  A (on-call MD unless specified) Details  Once     Specialty:  Hospitalist  Provider:  (Not yet assigned)    07/14/20 0111              Procedures     Imaging Results (All)     Procedure Component Value Units Date/Time    MRI Cervical Spine Without Contrast [[id number]] Collected:  07/14/20 2310     Updated:  07/14/20 2325    Narrative:       CERVICAL SPINE MRI WITHOUT GADOLINIUM     HISTORY: cervical spondylosis, left neck pain near base of skull;  R42-Dizziness and giddiness     COMPARISON:  11/24/2018.     FINDINGS:  Multiplanar images of the cervical spine obtained without  gadolinium.  Axial images obtained from C2-T1.     The patient is status post anterior cervical discectomy and fusion at  C6-C7. Vertebral body  alignment appears stable when compared to that  examination. No marrow signal abnormalities are seen. No acute fracture  or subluxation is identified.     C2-C3: There is no canal stenosis or neural foraminal narrowing.  C3-C4: Broad-based disc bulge results in flattening the thecal sac.  There is some neural foraminal narrowing noted on the left, secondary to  uncovertebral joint hypertrophy.  C4-C5: Broad-based disc osteophyte complex results in flattening of the  sac. There is no significant neural foraminal narrowing.  C5-C6: Disc osteophyte complex is noted. This does result in narrowing  of the canal. The appearance is stable when compared to prior exam.  There is also some neural foraminal narrowing noted on the right, again  not significantly changed.  C6-C7: There is a central disc protrusion, there does appear to be some  canal narrowing. There is no significant neural foraminal narrowing.  C7-T1: There is no significant canal stenosis or neural foraminal  narrowing.     Perineural cysts are suspected at T1 bilaterally, and T2 on the left.       Impression:       1. Stable degenerative changes, as noted above.     This report was finalized on 7/14/2020 11:22 PM by Dr. Samantha Alfredo M.D.       MRI Brain Without Contrast [[id number]] Collected:  07/14/20 2302     Updated:  07/14/20 2312    Narrative:       BRAIN MRI WITHOUT CONTRAST     HISTORY: Syncope/fainting     COMPARISON: 07/26/2017.     FINDINGS:  Multiplanar images of the head were obtained without  gadolinium. No areas of restricted diffusion are seen to suggest acute  infarct. There is diffuse atrophy. There is mild periventricular and  deep white matter microangiopathic change. There is no midline shift or  mass effect. The intracranial flow voids appear intact. There is no  evidence of hemorrhage on susceptibility weighted imaging. Prior MRI on  07/20/2017 suggested asymmetry in the anterior lobe of the pituitary  gland with volume loss  posterior laterally to the right with associated  CSF density. 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 The  appearance is stable on the current MRI.       Impression:       1. No acute intracranial abnormality.     This report was finalized on 7/14/2020 11:09 PM by Dr. Samantha Alfredo M.D.       CT Head Without Contrast [[id number]] Collected:  07/13/20 2346     Updated:  07/13/20 2356    Narrative:       CT HEAD WITHOUT CONTRAST     HISTORY: Dizziness     COMPARISON: None available.     TECHNIQUE: Axial CT imaging was obtained through the brain. No IV  contrast was administered.     FINDINGS:  No acute intracranial hemorrhage is identified. There is mild atrophy.  There is no midline shift or mass effect. There is mild periventricular  and deep white matter microangiopathic change. The paranasal sinuses and  mastoid air cells appear clear.       Impression:       No acute intracranial findings.     Radiation dose reduction techniques were utilized, including automated  exposure control and exposure modulation based on body size.     This report was finalized on 7/13/2020 11:53 PM by Dr. Samantha Alfredo M.D.       XR Chest 1 View [[id number]] Collected:  07/13/20 2305     Updated:  07/13/20 2309    Narrative:       PORTABLE CHEST RADIOGRAPH     HISTORY: Dizziness     COMPARISON: 05/21/2018     FINDINGS:  Heart size is within normal limits for portable technique. No  pneumothorax, pleural effusion, or acute infiltrate is seen.       Impression:       No acute findings.     This report was finalized on 7/13/2020 11:06 PM by Dr. Samantha Alfredo M.D.             Pertinent Labs     Results from last 7 days   Lab Units 07/14/20  0733 07/13/20  2248   WBC 10*3/mm3 7.01 8.00   HEMOGLOBIN g/dL 12.5 14.4   PLATELETS 10*3/mm3 257 365     Results from last 7 days   Lab Units 07/14/20  0732 07/13/20  2248   SODIUM mmol/L 141 142   POTASSIUM mmol/L 3.9 4.0   CHLORIDE mmol/L 108* 104   CO2 mmol/L 22.1 25.3    BUN mg/dL 13 17   CREATININE mg/dL 0.80 0.88   GLUCOSE mg/dL 89 100*   Estimated Creatinine Clearance: 66.6 mL/min (by C-G formula based on SCr of 0.8 mg/dL).  Results from last 7 days   Lab Units 07/13/20  2248   ALBUMIN g/dL 4.50   BILIRUBIN mg/dL 0.5   ALK PHOS U/L 103   AST (SGOT) U/L 15   ALT (SGPT) U/L 15     Results from last 7 days   Lab Units 07/14/20  0732 07/13/20  2248   CALCIUM mg/dL 8.9 9.8   ALBUMIN g/dL  --  4.50       Results from last 7 days   Lab Units 07/13/20  2248   TROPONIN T ng/mL <0.010           Invalid input(s): LDLCALC        Test Results Pending at Discharge       Discharge Details        Discharge Medications      New Medications      Instructions Start Date   clonazePAM 0.5 MG tablet  Commonly known as:  KlonoPIN   0.5 mg, Oral, 2 Times Daily      predniSONE 10 MG tablet  Commonly known as:  DELTASONE   60 mg, Oral, Daily With Breakfast   Start Date:  July 18, 2020        Changes to Medications      Instructions Start Date   hydrALAZINE 50 MG tablet  Commonly known as:  APRESOLINE  What changed:  when to take this   50 mg, Oral, 2 times daily      pravastatin 40 MG tablet  Commonly known as:  PRAVACHOL  What changed:  when to take this   40 mg, Oral, Daily         Continue These Medications      Instructions Start Date   amLODIPine 5 MG tablet  Commonly known as:  NORVASC   5 mg, Oral, Daily      aspirin 81 MG EC tablet   81 mg, Oral, Daily      carvedilol 25 MG tablet  Commonly known as:  COREG   25 mg, Oral, 2 Times Daily      DULoxetine 60 MG capsule  Commonly known as:  CYMBALTA   120 mg, Oral, Nightly      fluticasone 50 MCG/ACT nasal spray  Commonly known as:  FLONASE   2 sprays, Nasal, Daily      LaMICtal 100 MG tablet  Generic drug:  lamoTRIgine   100 mg, Oral, Nightly      Lexapro 10 MG tablet  Generic drug:  escitalopram   10 mg, Oral, Daily      losartan 100 MG tablet  Commonly known as:  COZAAR   TAKE 1 TABLET BY MOUTH EVERY DAY      pantoprazole 40 MG EC tablet  Commonly  known as:  PROTONIX   40 mg, Oral, 2 Times Daily      raNITIdine 75 MG tablet  Commonly known as:  ZANTAC   300 mg, Oral, Nightly      traZODone 50 MG tablet  Commonly known as:  DESYREL   25 mg, Oral, Nightly PRN         Stop These Medications    LORazepam 0.5 MG tablet  Commonly known as:  ATIVAN            Allergies   Allergen Reactions   • Erythromycin Rash         Discharge Disposition:  Home-Health Care Svc    Discharge Diet:  Diet Order   Procedures   • Diet Regular       Discharge Activity:   Activity Instructions     Activity as Tolerated      Activity as Tolerated            CODE STATUS:    Code Status and Medical Interventions:   Ordered at: 07/14/20 0147     Code Status:    CPR     Medical Interventions (Level of Support Prior to Arrest):    Full       No future appointments.  Additional Instructions for the Follow-ups that You Need to Schedule     Ambulatory Referral to Physical Therapy Evaluate and treat; Strengthening   As directed      Vestibular rehab    Order Comments:  Vestibular rehab     Specialty needed:  Evaluate and treat    Exercises:  Strengthening           Follow-up Information     Pierre Chaudhry Jr., MD. Schedule an appointment as soon as possible for a visit in 2 week(s).    Specialty:  Internal Medicine  Contact information:  [address]  [place] [id number]  [phone]             Tr Nix MD Follow up.    Specialty:  Neurosurgery  Why:  Follow up as needed  Contact information:  [address]  [place] [id number]  [phone]             Miley Rizvi MD. Schedule an appointment as soon as possible for a visit on 7/21/2020.    Specialty:  Otolaryngology  Contact information:  [address] [facility] [address]  [phone]             [facility] [place] [facility] [place] .    Specialty:  Home Health Services  Contact information:  [address]  [facility] [address]                  Additional Instructions for the Follow-ups that You Need to Schedule     Ambulatory Referral to Physical Therapy Evaluate and treat; Strengthening   As directed      Vestibular rehab    Order Comments:  Vestibular rehab     Specialty needed:  Evaluate and treat    Exercises:  Strengthening           Time Spent on Discharge:  Greater than 30 minutes      CARLIE May  [facility]  07/17/20  2:18 PM

## 2020-10-01 ENCOUNTER — TELEPHONE (OUTPATIENT)
Dept: CARDIOLOGY | Facility: CLINIC | Age: 70
End: 2020-10-01

## 2020-10-01 NOTE — TELEPHONE ENCOUNTER
Patient called to report she has had 2 episodes of feeling like her heart is pounding and states that her rate is up to 100. I tried to call and get more information. No answer, left message.    Thank you,  Kate Stevenson RN  [facility]  Triage

## 2020-10-02 NOTE — TELEPHONE ENCOUNTER
I called to check on Ms. Sr.  She states that she feels fine and hasn't had any problems since yesterday morning. She did mention that she may have missed a couple of doses of her carvedilol.    I instructed her to call if any further problems or complaint.    Thank you,  Kate Stevenson RN  [facility]  Triage

## 2020-11-12 DIAGNOSIS — E78.5 HYPERLIPIDEMIA LDL GOAL <70: Primary | ICD-10-CM

## 2020-11-12 RX ORDER — PRAVASTATIN SODIUM 40 MG
40 TABLET ORAL NIGHTLY
Qty: 90 TABLET | Refills: 3 | Status: SHIPPED | OUTPATIENT
Start: 2020-11-12 | End: 2022-02-21

## 2020-11-12 NOTE — TELEPHONE ENCOUNTER
Called left message for patient that she needed to have labs done and Dr. Velazquez has placed order for her to have done at the [facility].

## 2020-11-23 ENCOUNTER — OFFICE VISIT (OUTPATIENT)
Dept: CARDIOLOGY | Facility: CLINIC | Age: 70
End: 2020-11-23

## 2020-11-23 VITALS
DIASTOLIC BLOOD PRESSURE: 71 MMHG | SYSTOLIC BLOOD PRESSURE: 126 MMHG | BODY MASS INDEX: 32.2 KG/M2 | WEIGHT: 175 LBS | HEIGHT: 62 IN | HEART RATE: 83 BPM

## 2020-11-23 DIAGNOSIS — R00.2 PALPITATIONS: ICD-10-CM

## 2020-11-23 DIAGNOSIS — E78.5 HYPERLIPIDEMIA LDL GOAL <70: Primary | ICD-10-CM

## 2020-11-23 DIAGNOSIS — I10 ESSENTIAL HYPERTENSION: ICD-10-CM

## 2020-11-23 PROCEDURE — 99214 OFFICE O/P EST MOD 30 MIN: CPT | Performed by: INTERNAL MEDICINE

## 2020-11-23 NOTE — PROGRESS NOTES
Date of Office Visit: 20  Encounter Provider: Aleks Velazquez MD  Place of Service: [facility] CARDIOLOGY  Patient Name: Katelyn Sr  :1950   TELEHEALTH VISIT  VIDEO    Chief Complaint   Patient presents with   • Follow-up     6 month   • Hypertension   • S/P NSTEMI       HPI  This patient has consented to a telehealth visit via phone The visit was scheduled as a video visit to comply with patient safety concerns in accordance with CDC recommendations.  All vitals recorded within this visit are reported by the patient.  I spent 25 minutes in total including but not limited to the 15 minutes spent in direct conversation with this patient.     70 y.o. female  who presents today for follow-up.  Old records have been obtained and reviewed by me.  She is a patient of mine  with a past cardiac history significant for hypertension and hyperlipidemia.  In May 2018, she was hospitalized with chest pain.  Her troponin was 0.208 and she had nonspecific T wave changes in leads III and aVF.  She underwent coronary angiography which revealed normal coronary arteries, normal LV size and function, wall hypokinesis which was likely catheter induced.   She was felt to have suffered a type II myocardial infarction secondary to malignant hypertension.  She had an echocardiogram during that hospitalization which revealed a normal LV function with an EF of 59%, grade 1 diastolic dysfunction, and no significant valvular abnormalities.  Her blood pressure was controlled with amlodipine and chlorthalidone.  The chlorthalidone was subsequently discontinued due to acute kidney injury and volume depletion.  She was switched to carvedilol.     Since last visit she has been doing very well. She denies any chest pain or dyspnea on exertion. No orthopnea or PND. No edema.     Her blood pressure has been markedly better controlled on her current medical regimen.         Past Medical History:    Diagnosis Date   • Depression    • GERD (gastroesophageal reflux disease)    • Hyperlipidemia    • Hypertension    • Non-STEMI (non-ST elevated myocardial infarction) (CMS/[facility]ly [facility])    • FUENTES on CPAP        Past Surgical History:   Procedure Laterality Date   • BACK SURGERY     • CARDIAC CATHETERIZATION N/A 5/22/2018    Procedure: Left Heart Cath;  Surgeon: Aleks Velazquez MD;  Location:  [facility];  Service: Cardiovascular   • CARDIAC CATHETERIZATION N/A 5/22/2018    Procedure: Coronary angiography;  Surgeon: Aleks Velazquez MD;  Location:  [facility];  Service: Cardiovascular   • CARDIAC CATHETERIZATION N/A 5/22/2018    Procedure: Left ventriculography;  Surgeon: Aleks Velazquez MD;  Location:  [facility];  Service: Cardiovascular   • CARDIAC CATHETERIZATION N/A 5/22/2018    Procedure: Peripheral angiography;  Surgeon: Aleks Velazquez MD;  Location:  [facility];  Service: Cardiovascular   • EXPLORATORY LAPAROTOMY     • HEMORRHOIDECTOMY     • NECK SURGERY  12/03/2003    ACDF C6-7-Dr. Nix    • RECTAL SURGERY         Social History     Socioeconomic History   • Marital status: Single     Spouse name: Not on file   • Number of children: 0   • Years of education: BS    • Highest education level: Not on file   Occupational History   • Occupation: RETIRED RN    Tobacco Use   • Smoking status: Never Smoker   • Smokeless tobacco: Never Used   • Tobacco comment: caffeine use: TEA OCCASSIONALLY   Substance and Sexual Activity   • Alcohol use: No   • Drug use: No   • Sexual activity: Defer       Family History   Problem Relation Age of Onset   • Alzheimer's disease Mother    • Heart failure Father    • Diabetes Sister    • Atrial fibrillation Sister    • Stroke Paternal Grandmother        Review of Systems   Constitution: Positive for malaise/fatigue. Negative for chills and fever.   Cardiovascular: Positive for chest pain, leg  swelling and palpitations. Negative for dyspnea on exertion, near-syncope, orthopnea, paroxysmal nocturnal dyspnea and syncope.   Respiratory: Negative for cough and shortness of breath.    Musculoskeletal: Negative for joint pain, joint swelling and myalgias.   Gastrointestinal: Negative for abdominal pain, diarrhea, melena, nausea and vomiting.   Genitourinary: Negative for frequency and hematuria.   Neurological: Positive for light-headedness, numbness and paresthesias. Negative for seizures.   Allergic/Immunologic: Negative.    All other systems reviewed and are negative.      Allergies   Allergen Reactions   • Erythromycin Rash         Current Outpatient Medications:   •  amLODIPine (NORVASC) 5 MG tablet, Take 1 tablet by mouth Daily., Disp: 90 tablet, Rfl: 1  •  aspirin 81 MG EC tablet, Take 81 mg by mouth Daily., Disp: , Rfl:   •  carvedilol (COREG) 25 MG tablet, Take 1 tablet by mouth 2 (Two) Times a Day., Disp: 180 tablet, Rfl: 3  •  DULoxetine (CYMBALTA) 60 MG capsule, Take 120 mg by mouth Every Night., Disp: , Rfl:   •  escitalopram (LEXAPRO) 10 MG tablet, Take 10 mg by mouth Daily., Disp: , Rfl:   •  fluticasone (FLONASE) 50 MCG/ACT nasal spray, 2 sprays into the nostril(s) as directed by provider Daily., Disp: , Rfl:   •  hydrALAZINE (APRESOLINE) 50 MG tablet, Take 1 tablet by mouth 2 (two) times a day., Disp: 180 tablet, Rfl: 1  •  lamoTRIgine (LAMICTAL) 100 MG tablet, Take 100 mg by mouth Every Night., Disp: , Rfl:   •  losartan (COZAAR) 100 MG tablet, TAKE 1 TABLET BY MOUTH EVERY DAY, Disp: 90 tablet, Rfl: 0  •  pantoprazole (PROTONIX) 40 MG EC tablet, Take 40 mg by mouth 2 (Two) Times a Day., Disp: , Rfl:   •  pravastatin (PRAVACHOL) 40 MG tablet, Take 1 tablet by mouth Every Night., Disp: 90 tablet, Rfl: 3  •  predniSONE (DELTASONE) 10 MG tablet, Take 6 tabs daily x4 days, 5 tabs daily x1day, 4 tabs daily x1day, 3 tabs daily x1 day, 2 tabs daily x1 day, 1 tab daily x1 day, Disp: 40 tablet, Rfl:  "0  •  traZODone (DESYREL) 50 MG tablet, Take 25 mg by mouth At Night As Needed for Sleep., Disp: , Rfl:       Objective:     Vitals:    11/23/20 1209   BP: 126/71   Pulse: 83   Weight: 79.4 kg (175 lb)   Height: 157.5 cm (62\")     Body mass index is 32.01 kg/m².    PHYSICAL EXAM:  Appears well.   No distress.   Normal respiratory effort.   Normocephalic.   No additional examination.   Telehealth visit secondary to Covid-19 outbreak.       Procedures   5/23/18  · Left ventricular systolic function is normal. Calculated EF = 59%. Estimated EF was in agreement with the calculated EF. Estimated EF = 59%. Normal left ventricular cavity size and wall thickness noted. All left ventricular wall segments contract normally. Left ventricular diastolic dysfunction is noted (grade I) consistent with impaired relaxation.  · Trace mitral valve regurgitation is present.  · Physiologic tricuspid valve regurgitation is present. Estimated right ventricular systolic pressure from tricuspid regurgitation is normal (<35 mmHg). Calculated right ventricular systolic pressure from tricuspid regurgitation is 25 mmHg.      Assessment:      Plan:   69-year-old female with a medical history of type 2 myocardial infarction in the setting of malignant hypertension, normal left ventricular function, who presents back to me for follow up. She has been doing very well as of late. Her blood pressure is much better controlled. She has infrequent palpitations which are stable.         1. Essential hypertension:Looks better controlled       -Continue current regimen.   2. Hyperlipidemia: Continue Pravachol therapy at the current dose.       "

## 2020-12-08 RX ORDER — CARVEDILOL 25 MG/1
TABLET ORAL
Qty: 180 TABLET | Refills: 3 | Status: SHIPPED | OUTPATIENT
Start: 2020-12-08 | End: 2022-03-08

## 2021-02-01 RX ORDER — LOSARTAN POTASSIUM 100 MG/1
TABLET ORAL
Qty: 90 TABLET | Refills: 4 | Status: SHIPPED | OUTPATIENT
Start: 2021-02-01 | End: 2022-02-08

## 2021-03-04 RX ORDER — AMLODIPINE BESYLATE 5 MG/1
TABLET ORAL
Qty: 90 TABLET | Refills: 1 | Status: SHIPPED | OUTPATIENT
Start: 2021-03-04 | End: 2022-05-25

## 2021-03-16 ENCOUNTER — BULK ORDERING (OUTPATIENT)
Dept: CASE MANAGEMENT | Facility: OTHER | Age: 71
End: 2021-03-16

## 2021-03-16 DIAGNOSIS — Z23 IMMUNIZATION DUE: ICD-10-CM

## 2021-03-24 ENCOUNTER — HOSPITAL ENCOUNTER (OUTPATIENT)
Dept: GENERAL RADIOLOGY | Facility: HOSPITAL | Age: 71
Discharge: HOME | End: 2021-03-24
Admitting: INTERNAL MEDICINE

## 2021-03-24 DIAGNOSIS — R06.00 DYSPNEA, UNSPECIFIED TYPE: ICD-10-CM

## 2021-03-24 PROCEDURE — 71046 X-RAY EXAM CHEST 2 VIEWS: CPT

## 2021-04-19 RX ORDER — HYDRALAZINE HYDROCHLORIDE 50 MG/1
TABLET, FILM COATED ORAL
Qty: 180 TABLET | Refills: 1 | Status: SHIPPED | OUTPATIENT
Start: 2021-04-19 | End: 2022-01-10

## 2021-07-02 ENCOUNTER — TRANSCRIBE ORDERS (OUTPATIENT)
Dept: ADMINISTRATIVE | Facility: HOSPITAL | Age: 71
End: 2021-07-02

## 2021-07-02 DIAGNOSIS — R10.9 ABDOMINAL PAIN, UNSPECIFIED ABDOMINAL LOCATION: ICD-10-CM

## 2021-07-02 DIAGNOSIS — R05.9 COUGH: Primary | ICD-10-CM

## 2021-07-21 ENCOUNTER — HOSPITAL ENCOUNTER (OUTPATIENT)
Dept: CT IMAGING | Facility: HOSPITAL | Age: 71
Discharge: HOME | End: 2021-07-21
Admitting: INTERNAL MEDICINE

## 2021-07-21 DIAGNOSIS — R05.9 COUGH: ICD-10-CM

## 2021-07-21 DIAGNOSIS — R10.9 ABDOMINAL PAIN, UNSPECIFIED ABDOMINAL LOCATION: ICD-10-CM

## 2021-07-21 LAB — CREAT BLDA-MCNC: 1 MG/DL (ref 0.6–1.3)

## 2021-07-21 PROCEDURE — 71260 CT THORAX DX C+: CPT

## 2021-07-21 PROCEDURE — 74177 CT ABD & PELVIS W/CONTRAST: CPT

## 2021-07-21 PROCEDURE — 82565 ASSAY OF CREATININE: CPT

## 2021-07-21 PROCEDURE — 0 DIATRIZOATE MEGLUMINE & SODIUM PER 1 ML: Performed by: INTERNAL MEDICINE

## 2021-07-21 PROCEDURE — 25010000002 IOPAMIDOL 61 % SOLUTION: Performed by: INTERNAL MEDICINE

## 2021-07-21 RX ADMIN — DIATRIZOATE MEGLUMINE AND DIATRIZOATE SODIUM 30 ML: 660; 100 LIQUID ORAL; RECTAL at 14:03

## 2021-07-21 RX ADMIN — IOPAMIDOL 85 ML: 612 INJECTION, SOLUTION INTRAVENOUS at 14:03

## 2021-08-03 ENCOUNTER — TRANSCRIBE ORDERS (OUTPATIENT)
Dept: ADMINISTRATIVE | Facility: HOSPITAL | Age: 71
End: 2021-08-03

## 2021-08-03 DIAGNOSIS — J84.10 PULMONARY FIBROSIS (HCC): Primary | ICD-10-CM

## 2021-08-05 ENCOUNTER — APPOINTMENT (OUTPATIENT)
Dept: GENERAL RADIOLOGY | Facility: HOSPITAL | Age: 71
End: 2021-08-05

## 2021-08-05 LAB
ALBUMIN SERPL-MCNC: 3.8 G/DL (ref 3.5–5.2)
ALBUMIN/GLOB SERPL: 1.6 G/DL
ALP SERPL-CCNC: 97 U/L (ref 39–117)
ALT SERPL W P-5'-P-CCNC: 14 U/L (ref 1–33)
ANION GAP SERPL CALCULATED.3IONS-SCNC: 9.8 MMOL/L (ref 5–15)
AST SERPL-CCNC: 14 U/L (ref 1–32)
BASOPHILS # BLD AUTO: 0.04 10*3/MM3 (ref 0–0.2)
BASOPHILS NFR BLD AUTO: 0.5 % (ref 0–1.5)
BILIRUB SERPL-MCNC: 0.3 MG/DL (ref 0–1.2)
BUN SERPL-MCNC: 20 MG/DL (ref 8–23)
BUN/CREAT SERPL: 16.7 (ref 7–25)
CALCIUM SPEC-SCNC: 8.5 MG/DL (ref 8.6–10.5)
CHLORIDE SERPL-SCNC: 108 MMOL/L (ref 98–107)
CO2 SERPL-SCNC: 25.2 MMOL/L (ref 22–29)
CREAT SERPL-MCNC: 1.2 MG/DL (ref 0.57–1)
DEPRECATED RDW RBC AUTO: 42.4 FL (ref 37–54)
EOSINOPHIL # BLD AUTO: 0.32 10*3/MM3 (ref 0–0.4)
EOSINOPHIL NFR BLD AUTO: 4.2 % (ref 0.3–6.2)
ERYTHROCYTE [DISTWIDTH] IN BLOOD BY AUTOMATED COUNT: 13.9 % (ref 12.3–15.4)
GFR SERPL CREATININE-BSD FRML MDRD: 45 ML/MIN/1.73
GLOBULIN UR ELPH-MCNC: 2.4 GM/DL
GLUCOSE SERPL-MCNC: 75 MG/DL (ref 65–99)
HCT VFR BLD AUTO: 38 % (ref 34–46.6)
HGB BLD-MCNC: 11.9 G/DL (ref 12–15.9)
HOLD SPECIMEN: NORMAL
HOLD SPECIMEN: NORMAL
IMM GRANULOCYTES # BLD AUTO: 0.02 10*3/MM3 (ref 0–0.05)
IMM GRANULOCYTES NFR BLD AUTO: 0.3 % (ref 0–0.5)
LYMPHOCYTES # BLD AUTO: 1.51 10*3/MM3 (ref 0.7–3.1)
LYMPHOCYTES NFR BLD AUTO: 20 % (ref 19.6–45.3)
MCH RBC QN AUTO: 26.7 PG (ref 26.6–33)
MCHC RBC AUTO-ENTMCNC: 31.3 G/DL (ref 31.5–35.7)
MCV RBC AUTO: 85.4 FL (ref 79–97)
MONOCYTES # BLD AUTO: 0.86 10*3/MM3 (ref 0.1–0.9)
MONOCYTES NFR BLD AUTO: 11.4 % (ref 5–12)
NEUTROPHILS NFR BLD AUTO: 4.8 10*3/MM3 (ref 1.7–7)
NEUTROPHILS NFR BLD AUTO: 63.6 % (ref 42.7–76)
NRBC BLD AUTO-RTO: 0 /100 WBC (ref 0–0.2)
PLATELET # BLD AUTO: 310 10*3/MM3 (ref 140–450)
PMV BLD AUTO: 9 FL (ref 6–12)
POTASSIUM SERPL-SCNC: 4.2 MMOL/L (ref 3.5–5.2)
PROT SERPL-MCNC: 6.2 G/DL (ref 6–8.5)
RBC # BLD AUTO: 4.45 10*6/MM3 (ref 3.77–5.28)
SODIUM SERPL-SCNC: 143 MMOL/L (ref 136–145)
TROPONIN T SERPL-MCNC: <0.01 NG/ML (ref 0–0.03)
WBC # BLD AUTO: 7.55 10*3/MM3 (ref 3.4–10.8)
WHOLE BLOOD HOLD SPECIMEN: NORMAL

## 2021-08-05 PROCEDURE — 99284 EMERGENCY DEPT VISIT MOD MDM: CPT

## 2021-08-05 PROCEDURE — 84484 ASSAY OF TROPONIN QUANT: CPT

## 2021-08-05 PROCEDURE — 80053 COMPREHEN METABOLIC PANEL: CPT

## 2021-08-05 PROCEDURE — 93005 ELECTROCARDIOGRAM TRACING: CPT

## 2021-08-05 PROCEDURE — 93010 ELECTROCARDIOGRAM REPORT: CPT | Performed by: INTERNAL MEDICINE

## 2021-08-05 PROCEDURE — 71045 X-RAY EXAM CHEST 1 VIEW: CPT

## 2021-08-05 PROCEDURE — 85025 COMPLETE CBC W/AUTO DIFF WBC: CPT

## 2021-08-05 RX ORDER — SODIUM CHLORIDE 0.9 % (FLUSH) 0.9 %
10 SYRINGE (ML) INJECTION AS NEEDED
Status: DISCONTINUED | OUTPATIENT
Start: 2021-08-05 | End: 2021-08-06 | Stop reason: HOSPADM

## 2021-08-05 NOTE — ED TRIAGE NOTES
Pt arrived from her car on the side of [place] and [facility]. PT reports chest pain that started 1815. Pt reports pain 4/10. Pt received 324asa.  In route

## 2021-08-06 ENCOUNTER — HOSPITAL ENCOUNTER (EMERGENCY)
Facility: HOSPITAL | Age: 71
Discharge: HOME | End: 2021-08-06
Attending: EMERGENCY MEDICINE | Admitting: EMERGENCY MEDICINE

## 2021-08-06 VITALS
RESPIRATION RATE: 18 BRPM | SYSTOLIC BLOOD PRESSURE: 164 MMHG | TEMPERATURE: 98.2 F | OXYGEN SATURATION: 98 % | DIASTOLIC BLOOD PRESSURE: 72 MMHG | HEART RATE: 70 BPM

## 2021-08-06 DIAGNOSIS — R07.9 CHEST PAIN, UNSPECIFIED TYPE: Primary | ICD-10-CM

## 2021-08-06 LAB
LIPASE SERPL-CCNC: 38 U/L (ref 13–60)
QT INTERVAL: 431 MS
TROPONIN T SERPL-MCNC: <0.01 NG/ML (ref 0–0.03)

## 2021-08-06 PROCEDURE — 83690 ASSAY OF LIPASE: CPT | Performed by: EMERGENCY MEDICINE

## 2021-08-06 PROCEDURE — 84484 ASSAY OF TROPONIN QUANT: CPT | Performed by: EMERGENCY MEDICINE

## 2021-08-06 RX ADMIN — SODIUM CHLORIDE, PRESERVATIVE FREE 10 ML: 5 INJECTION INTRAVENOUS at 00:22

## 2021-08-06 NOTE — ED NOTES
.RN wearing all appropriate PPE during entire encounter with patient.        Behringer, Catherine, GREGORY  08/06/21 0010

## 2021-08-06 NOTE — ED PROVIDER NOTES
EMERGENCY DEPARTMENT ENCOUNTER    Room Number:  24/24  PCP: Pierre Chaudhry Jr., MD  Historian: Patient  History Limited By: Nothing      HPI  Chief Complaint: Chest pain  Context: Katelyn Sr is a 70 y.o. female who presents to the ED c/o chest pain.  Patient states she had cardiac catheterization in 2018.  Had normal coronary arteries at that point.  Patient has had difficulties with esophageal spasm since then.  States she has a couple episodes every month.  Patient follows with cardiologist and GI.  Patient states tonight was at a friend's house and drank some water.  States the water triggered discomfort in her chest.  States this is similar to her prior chest pain.  Patient states it lasted longer than normal.  Was driving and she flagged the ambulance down.  They brought her in for evaluation.  Patient states symptoms are significantly better.  Had no diaphoresis or vomiting.  Pain was not brought on by exertion.      Location: Substernal  Radiation: Right neck  Character: Aching  Duration: A few hours.  Started at 530  Severity: Moderate  Progression: Improved  Aggravating Factors: Drinking water  Alleviating Factors: Nothing        MEDICAL RECORD REVIEW    Patient did have cardiac catheterization in 2018 that showed normal coronary arteries          PAST MEDICAL HISTORY  Active Ambulatory Problems     Diagnosis Date Noted   • FUENTES on autoCPAP 10/19/2017   • Circadian rhythm sleep disorder, delayed sleep phase type 10/28/2017   • Hypersomnia due to another medical condition 10/28/2017   • NSTEMI (non-ST elevated myocardial infarction) (CMS/[facility]) 05/21/2018   • Essential hypertension 06/29/2018   • Degeneration of cervical intervertebral disc 11/09/2018   • DDD (degenerative disc disease), thoracic 11/09/2018   • Disc degeneration, lumbar 11/27/2018   • Mixed hyperlipidemia 01/10/2019   • Palpitations 07/23/2019   • Extremity pain 07/26/2019   • LBP (low back pain) 07/26/2019   • DDD (degenerative disc  Received fax from [facility] [id number] for refill of [email or web address] disease), lumbar 07/26/2019   • Atypical chest pain 10/30/2019   • Vertigo 07/14/2020   • Depression 07/14/2020   • Generalized anxiety disorder 07/14/2020   • Peripheral vestibulopathy of both ears 07/14/2020   • Cervical stenosis of spinal canal 07/14/2020   • Vestibular neuronitis 07/17/2020     Resolved Ambulatory Problems     Diagnosis Date Noted   • No Resolved Ambulatory Problems     Past Medical History:   Diagnosis Date   • GERD (gastroesophageal reflux disease)    • Hyperlipidemia    • Hypertension    • Non-STEMI (non-ST elevated myocardial infarction) (CMS/[facility])          PAST SURGICAL HISTORY  Past Surgical History:   Procedure Laterality Date   • BACK SURGERY     • CARDIAC CATHETERIZATION N/A 5/22/2018    Procedure: Left Heart Cath;  Surgeon: Aleks Velazquez MD;  Location:  [facility] CATH INVASIVE [facility];  Service: Cardiovascular   • CARDIAC CATHETERIZATION N/A 5/22/2018    Procedure: Coronary angiography;  Surgeon: Aleks Velazquez MD;  Location:  [facility] CATH [facility]VAS[facility];  Service: Cardiovascular   • CARDIAC CATHETERIZATION N/A 5/22/2018    Procedure: Left ventriculography;  Surgeon: Aleks Velazquez MD;  Location:  [facility] CATH [facility]VAS[facility];  Service: Cardiovascular   • CARDIAC CATHETERIZATION N/A 5/22/2018    Procedure: Peripheral angiography;  Surgeon: Aleks Velazquez MD;  Location:  [facility] CATH INVASIVE LOC[facility];  Service: Cardiovascular   • EXPLORATORY LAPAROTOMY     • HEMORRHOIDECTOMY     • NECK SURGERY  12/03/2003    ACDF C6-7-Dr. Nix    • RECTAL SURGERY           FAMILY HISTORY  Family History   Problem Relation Age of Onset   • Alzheimer's disease Mother    • Heart failure Father    • Diabetes Sister    • Atrial fibrillation Sister    • Stroke Paternal Grandmother          SOCIAL HISTORY  Social History     Socioeconomic History   • Marital status: Single     Spouse name: Not on file   • Number of children: 0   • Years of education: BS    • Highest education  level: Not on file   Tobacco Use   • Smoking status: Never Smoker   • Smokeless tobacco: Never Used   • Tobacco comment: caffeine use: TEA OCCASSIONALLY   Substance and Sexual Activity   • Alcohol use: No   • Drug use: No   • Sexual activity: Defer         ALLERGIES  Erythromycin        REVIEW OF SYSTEMS  Review of Systems   Constitutional: Negative for fever.   HENT: Negative for sore throat.    Eyes: Negative.    Respiratory: Negative for cough and shortness of breath.    Cardiovascular: Positive for chest pain.   Gastrointestinal: Positive for abdominal pain. Negative for diarrhea and vomiting.   Genitourinary: Negative for dysuria.   Musculoskeletal: Negative for neck pain.   Skin: Negative for rash.   Allergic/Immunologic: Negative.    Neurological: Negative for weakness, numbness and headaches.   Hematological: Negative.    Psychiatric/Behavioral: Negative.    All other systems reviewed and are negative.           PHYSICAL EXAM  ED Triage Vitals [08/05/21 1903]   Temp Heart Rate Resp BP SpO2   98.2 °F (36.8 °C) 72 18 110/68 97 %      Temp src Heart Rate Source Patient Position BP Location FiO2 (%)   Tympanic -- -- -- --       Physical Exam  Vitals and nursing note reviewed.   Constitutional:       General: She is not in acute distress.  HENT:      Head: Normocephalic and atraumatic.   Eyes:      Pupils: Pupils are equal, round, and reactive to light.   Cardiovascular:      Rate and Rhythm: Normal rate and regular rhythm.      Heart sounds: Normal heart sounds.   Pulmonary:      Effort: Pulmonary effort is normal. No respiratory distress.      Breath sounds: Normal breath sounds.   Abdominal:      Palpations: Abdomen is soft.      Tenderness: There is no abdominal tenderness. There is no guarding or rebound.   Musculoskeletal:         General: Normal range of motion.      Cervical back: Normal range of motion and neck supple.   Skin:     General: Skin is warm and dry.      Findings: No rash.   Neurological:       Mental Status: She is alert and oriented to person, place, and time.      Sensory: Sensation is intact.   Psychiatric:         Mood and Affect: Mood and affect normal.       Patient was wearing a face mask when I entered the room and they continued to wear a mask throughout their stay in the ED.  I wore PPE, including  gloves, face mask with shield or face mask with goggles whenever I was in the room with patient.       LAB RESULTS  Recent Results (from the past 24 hour(s))   ECG 12 Lead    Collection Time: 08/05/21  7:11 PM   Result Value Ref Range    QT Interval 431 ms   Comprehensive Metabolic Panel    Collection Time: 08/05/21  7:36 PM    Specimen: Blood   Result Value Ref Range    Glucose 75 65 - 99 mg/dL    BUN 20 8 - 23 mg/dL    Creatinine 1.20 (H) 0.57 - 1.00 mg/dL    Sodium 143 136 - 145 mmol/L    Potassium 4.2 3.5 - 5.2 mmol/L    Chloride 108 (H) 98 - 107 mmol/L    CO2 25.2 22.0 - 29.0 mmol/L    Calcium 8.5 (L) 8.6 - 10.5 mg/dL    Total Protein 6.2 6.0 - 8.5 g/dL    Albumin 3.80 3.50 - 5.20 g/dL    ALT (SGPT) 14 1 - 33 U/L    AST (SGOT) 14 1 - 32 U/L    Alkaline Phosphatase 97 39 - 117 U/L    Total Bilirubin 0.3 0.0 - 1.2 mg/dL    eGFR Non African Amer 45 (L) >60 mL/min/1.73    Globulin 2.4 gm/dL    A/G Ratio 1.6 g/dL    BUN/Creatinine Ratio 16.7 7.0 - 25.0    Anion Gap 9.8 5.0 - 15.0 mmol/L   Troponin    Collection Time: 08/05/21  7:36 PM    Specimen: Blood   Result Value Ref Range    Troponin T <0.010 0.000 - 0.030 ng/mL   Green Top (Gel)    Collection Time: 08/05/21  7:36 PM   Result Value Ref Range    Extra Tube Hold for add-ons.    Lavender Top    Collection Time: 08/05/21  7:36 PM   Result Value Ref Range    Extra Tube hold for add-on    Gold Top - SST    Collection Time: 08/05/21  7:36 PM   Result Value Ref Range    Extra Tube Hold for add-ons.    CBC Auto Differential    Collection Time: 08/05/21  7:36 PM    Specimen: Blood   Result Value Ref Range    WBC 7.55 3.40 - 10.80 10*3/mm3    RBC 4.45  3.77 - 5.28 10*6/mm3    Hemoglobin 11.9 (L) 12.0 - 15.9 g/dL    Hematocrit 38.0 34.0 - 46.6 %    MCV 85.4 79.0 - 97.0 fL    MCH 26.7 26.6 - 33.0 pg    MCHC 31.3 (L) 31.5 - 35.7 g/dL    RDW 13.9 12.3 - 15.4 %    RDW-SD 42.4 37.0 - 54.0 fl    MPV 9.0 6.0 - 12.0 fL    Platelets 310 140 - 450 10*3/mm3    Neutrophil % 63.6 42.7 - 76.0 %    Lymphocyte % 20.0 19.6 - 45.3 %    Monocyte % 11.4 5.0 - 12.0 %    Eosinophil % 4.2 0.3 - 6.2 %    Basophil % 0.5 0.0 - 1.5 %    Immature Grans % 0.3 0.0 - 0.5 %    Neutrophils, Absolute 4.80 1.70 - 7.00 10*3/mm3    Lymphocytes, Absolute 1.51 0.70 - 3.10 10*3/mm3    Monocytes, Absolute 0.86 0.10 - 0.90 10*3/mm3    Eosinophils, Absolute 0.32 0.00 - 0.40 10*3/mm3    Basophils, Absolute 0.04 0.00 - 0.20 10*3/mm3    Immature Grans, Absolute 0.02 0.00 - 0.05 10*3/mm3    nRBC 0.0 0.0 - 0.2 /100 WBC   Troponin    Collection Time: 08/06/21 12:21 AM    Specimen: Blood   Result Value Ref Range    Troponin T <0.010 0.000 - 0.030 ng/mL       Ordered the above labs and reviewed the results.        RADIOLOGY  XR Chest 1 View   Final Result   There is no evidence for an active or acute abnormality of   the chest.       This report was finalized on 8/5/2021 8:59 PM by Dr. Yves Middleton M.D.               Ordered the above noted radiological studies. Reviewed by me in PACS.        PROCEDURES  Procedures      EKG:          EKG time: 1911  Rhythm/Rate: Normal sinus rhythm 72  P waves and KS: Normal P waves  QRS, axis: Normal QRS  ST and T waves: Normal ST-T waves    Interpreted Contemporaneously by me, independently viewed  Unchanged compared to prior 7/13/2020        MEDICATIONS GIVEN IN ER  Medications   sodium chloride 0.9 % flush 10 mL (10 mL Intravenous Given 8/6/21 0022)             PROGRESS AND CONSULTS  ED Course as of Aug 06 0139   Fri Aug 06, 2021   0121 01:21 EDT  Patient presents for evaluation of chest pain of unclear etiology.  Doubt cardiac etiology as patient had normal cardiac  catheterization in 2018.  Patient has normal EKG here.  Patient has 2 troponins that are negative.  Patient states the symptoms are similar to her prior esophageal pain.  Discussed options with patient and she does not want to stay in the hospital.  States she has to take care of her cat.  Understands there is diagnostic uncertainty and she is to return for worsening symptoms.  Instructed to follow-up with Dr. Velazquez.  Heart score 3.    [SL]      ED Course User Index  [SL] Xiang Gaitan MD           MEDICAL DECISION MAKING      MDM  Number of Diagnoses or Management Options     Amount and/or Complexity of Data Reviewed  Clinical lab tests: reviewed and ordered (Serial troponins 0)  Tests in the radiology section of CPT®: reviewed and ordered (Chest x-ray negative)  Tests in the medicine section of CPT®: reviewed               DIAGNOSIS  Final diagnoses:   Chest pain, unspecified type           DISPOSITION  DISCHARGE    Patient discharged in stable condition.    Reviewed implications of results, diagnosis, meds, responsibility to follow up, warning signs and symptoms of possible worsening, potential complications and reasons to return to ER, including worsening pain.    Patient/Family voiced understanding of above instructions.    Discussed plan for discharge, as there is no emergent indication for admission. Patient referred to primary care provider for BP management due to today's BP. Pt/family is agreeable and understands need for follow up and repeat testing.  Pt is aware that discharge does not mean that nothing is wrong but it indicates no emergency is present that requires admission and they must continue care with follow-up as given below or physician of their choice.     FOLLOW-UP  Pierre Cahudhry Jr., MD  [address]  [place] [address] [phone]    Schedule an appointment as soon as possible for a visit       lAeks Velazquez MD  [address]  [place] [facility]  [address]  [phone]    Schedule an appointment as soon as possible for a visit           Medication List      No changes were made to your prescriptions during this visit.             Latest Documented Vital Signs:  As of 01:39 EDT  BP- 164/72 HR- 70 Temp- 98.2 °F (36.8 °C) (Tympanic) O2 sat- 98%                         Xiang Gaitan MD  08/06/21 0140

## 2021-08-09 ENCOUNTER — TELEPHONE (OUTPATIENT)
Dept: CARDIOLOGY | Facility: CLINIC | Age: 71
End: 2021-08-09

## 2021-08-11 ENCOUNTER — OFFICE VISIT (OUTPATIENT)
Dept: CARDIOLOGY | Facility: CLINIC | Age: 71
End: 2021-08-11

## 2021-08-11 ENCOUNTER — LAB (OUTPATIENT)
Dept: LAB | Facility: HOSPITAL | Age: 71
End: 2021-08-11

## 2021-08-11 VITALS
HEART RATE: 65 BPM | DIASTOLIC BLOOD PRESSURE: 80 MMHG | SYSTOLIC BLOOD PRESSURE: 122 MMHG | BODY MASS INDEX: 34.63 KG/M2 | WEIGHT: 188.2 LBS | HEIGHT: 62 IN

## 2021-08-11 DIAGNOSIS — E78.2 MIXED HYPERLIPIDEMIA: ICD-10-CM

## 2021-08-11 DIAGNOSIS — R07.89 ATYPICAL CHEST PAIN: ICD-10-CM

## 2021-08-11 DIAGNOSIS — I10 ESSENTIAL HYPERTENSION: Primary | ICD-10-CM

## 2021-08-11 LAB
CHOLEST SERPL-MCNC: 172 MG/DL (ref 0–200)
HDLC SERPL-MCNC: 46 MG/DL (ref 40–60)
LDLC SERPL CALC-MCNC: 100 MG/DL (ref 0–100)
LDLC/HDLC SERPL: 2.1 {RATIO}
TRIGL SERPL-MCNC: 148 MG/DL (ref 0–150)
VLDLC SERPL-MCNC: 26 MG/DL (ref 5–40)

## 2021-08-11 PROCEDURE — 99214 OFFICE O/P EST MOD 30 MIN: CPT | Performed by: NURSE PRACTITIONER

## 2021-08-11 PROCEDURE — 93000 ELECTROCARDIOGRAM COMPLETE: CPT | Performed by: NURSE PRACTITIONER

## 2021-08-11 PROCEDURE — 36415 COLL VENOUS BLD VENIPUNCTURE: CPT

## 2021-08-11 PROCEDURE — 80061 LIPID PANEL: CPT

## 2021-08-11 NOTE — PROGRESS NOTES
Date of Office Visit: 2021  Encounter Provider: CARLIE Ricardo  Place of Service: [facility] CARDIOLOGY  Patient Name: Katelyn Sr  :1950    Chief Complaint   Patient presents with   • Chest Pain   :     HPI: Katelyn Sr is a 70 y.o. female who presents today for follow-up.  Old records have been obtained and reviewed by me.  She is a patient of Dr. Velazquez's with a past cardiac history significant for hypertension and hyperlipidemia.  In May 2018, she had a type II MI secondary to malignant hypertension.  Coronary angiography at that time revealed normal coronaries.  Her blood pressure was controlled with amlodipine and chlorthalidone.  Chlorthalidone was subsequently discontinued due to acute kidney injury and volume depletion.  She was ultimately switched to carvedilol.              In 2020, she had a telehealth call with Dr. Velazquez at which time she was doing well with no complaints of angina or heart failure.  No changes were made to her medical regimen, and she was advised to follow-up in 6 months.   On 2021, she presented to the ED with chest pain.  Work-up was unrevealing.  She is here today for follow-up.   Evidently on Thursday, she was at her friend's house and drank some ice water.  She felt like the water just would not go down and took several minutes to pass.  Following that, she developed chest pain.  She thought it would pass but it seemed to just get worse and worse.  She has had this happen before.  She is actually undergone 2 esophageal dilatations with Dr. Francisco.  It always happens with liquids and never with foods.  She has not had any recurrence since.  She does have chronic dyspnea on exertion that is unchanged.  Evidently she had influenza A back in 2019.  She was really sick.  Since that time, she has struggled with a chronic cough.  She ended up undergoing a CT of the chest showing concern for early fibrosis.   Subsequently, she was referred to pulmonary who has recommended a high-resolution CT.  She is scheduled for that in a few weeks.  She has occasional swelling in her ankles which she attributes to the amlodipine.  She also has occasional dizziness.  She has previously seen an ENT for vertigo.  She denies any palpitations or syncope.  She has gained some weight which she attributes to not exercising and dietary indiscretion.    Past Medical History:   Diagnosis Date   • Depression    • GERD (gastroesophageal reflux disease)    • Hyperlipidemia    • Hypertension    • Non-STEMI (non-ST elevated myocardial infarction) (CMS/[facility])    • FUENTES on CPAP        Past Surgical History:   Procedure Laterality Date   • BACK SURGERY     • CARDIAC CATHETERIZATION N/A 5/22/2018    Procedure: Left Heart Cath;  Surgeon: Aleks Velazquez MD;  Location: [facility] CATH INVASIVE LOCATION;  Service: Cardiovascular   • CARDIAC CATHETERIZATION N/A 5/22/2018    Procedure: Coronary angiography;  Surgeon: Aleks Velazquez MD;  Location:  [facility];  Service: Cardiovascular   • CARDIAC CATHETERIZATION N/A 5/22/2018    Procedure: Left ventriculography;  Surgeon: Aleks Velazquez MD;  Location:  [facility];  Service: Cardiovascular   • CARDIAC CATHETERIZATION N/A 5/22/2018    Procedure: Peripheral angiography;  Surgeon: Aleks Velazquez MD;  Location: [facility] CATH [facility]VASIVE LOC[facility];  Service: Cardiovascular   • EXPLORATORY LAPAROTOMY     • HEMORRHOIDECTOMY     • NECK SURGERY  12/03/2003    ACDF C6-7-Dr. Nix    • RECTAL SURGERY         Social History     Socioeconomic History   • Marital status: Single     Spouse name: Not on file   • Number of children: 0   • Years of education: BS    • Highest education level: Not on file   Tobacco Use   • Smoking status: Never Smoker   • Smokeless tobacco: Never Used   • Tobacco comment: caffeine use: TEA OCCASSIONALLY   Vaping Use   • Vaping Use: Never  used   Substance and Sexual Activity   • Alcohol use: No   • Drug use: No   • Sexual activity: Defer       Family History   Problem Relation Age of Onset   • Alzheimer's disease Mother    • Heart failure Father    • Diabetes Sister    • Atrial fibrillation Sister    • Stroke Paternal Grandmother        Review of Systems   Constitutional: Negative.   Cardiovascular: Positive for chest pain and dyspnea on exertion. Negative for leg swelling, orthopnea, paroxysmal nocturnal dyspnea and syncope.   Respiratory: Positive for cough.    Hematologic/Lymphatic: Negative for bleeding problem.   Musculoskeletal: Negative for falls.   Gastrointestinal: Negative for melena.   Neurological: Positive for dizziness and vertigo. Negative for light-headedness.       Allergies   Allergen Reactions   • Erythromycin Rash         Current Outpatient Medications:   •  amLODIPine (NORVASC) 5 MG tablet, TAKE 1 TABLET BY MOUTH EVERY DAY, Disp: 90 tablet, Rfl: 1  •  aspirin 81 MG EC tablet, Take 81 mg by mouth Daily., Disp: , Rfl:   •  carvedilol (COREG) 25 MG tablet, TAKE 1 TABLET BY MOUTH TWICE A DAY, Disp: 180 tablet, Rfl: 3  •  DULoxetine (CYMBALTA) 60 MG capsule, Take 120 mg by mouth Every Night., Disp: , Rfl:   •  escitalopram (LEXAPRO) 10 MG tablet, Take 10 mg by mouth Daily., Disp: , Rfl:   •  fluticasone (FLONASE) 50 MCG/ACT nasal spray, 2 sprays into the nostril(s) as directed by provider Daily., Disp: , Rfl:   •  hydrALAZINE (APRESOLINE) 50 MG tablet, TAKE 1 TABLET BY MOUTH TWICE A DAY, Disp: 180 tablet, Rfl: 1  •  lamoTRIgine (LAMICTAL) 100 MG tablet, Take 100 mg by mouth Every Night., Disp: , Rfl:   •  losartan (COZAAR) 100 MG tablet, TAKE 1 TABLET BY MOUTH EVERY DAY, Disp: 90 tablet, Rfl: 4  •  pantoprazole (PROTONIX) 40 MG EC tablet, Take 40 mg by mouth 2 (Two) Times a Day., Disp: , Rfl:   •  pravastatin (PRAVACHOL) 40 MG tablet, Take 1 tablet by mouth Every Night., Disp: 90 tablet, Rfl: 3  •  predniSONE (DELTASONE) 10 MG tablet,  "Take 6 tabs daily x4 days, 5 tabs daily x1day, 4 tabs daily x1day, 3 tabs daily x1 day, 2 tabs daily x1 day, 1 tab daily x1 day, Disp: 40 tablet, Rfl: 0  •  traZODone (DESYREL) 50 MG tablet, Take 25 mg by mouth At Night As Needed for Sleep., Disp: , Rfl:       Objective:     Vitals:    08/11/21 1058   BP: 122/80   Pulse: 65   Weight: 85.4 kg (188 lb 3.2 oz)   Height: 157.5 cm (62\")     Body mass index is 34.42 kg/m².    PHYSICAL EXAM:    Neck:      Vascular: No JVD.   Pulmonary:      Effort: Pulmonary effort is normal.      Breath sounds: Normal breath sounds.   Cardiovascular:      Normal rate. Regular rhythm.      Murmurs: There is no murmur.      No gallop. No click. No rub.   Pulses:     Intact distal pulses.           ECG 12 Lead    Date/Time: 8/11/2021 11:12 AM  Performed by: Catalina Rojas APRN  Authorized by: Catalina Rojas APRN   Comparison: compared with previous ECG from 8/5/2021  Similar to previous ECG  Rhythm: sinus rhythm  Rate: normal  BPM: 65  Q waves: III      Clinical impression: normal ECG  Comments: Indication: Hypertension              Assessment:       Diagnosis Plan   1. Essential hypertension  ECG 12 Lead   2. Mixed hyperlipidemia  Lipid Panel   3. Atypical chest pain       Orders Placed This Encounter   Procedures   • Lipid Panel     Standing Status:   Future     Standing Expiration Date:   8/11/2022   • ECG 12 Lead     This order was created via procedure documentation     Order Specific Question:   Release to patient     Answer:   Immediate          Plan:       1.  Hypertension.  Her blood pressure looks great.  Continue amlodipine, carvedilol, hydralazine, and losartan.      2.  Hyperlipidemia.  I am going to repeat a lipid panel as I do not see one since 2019.      3.  Chest pain.  Her chest pain sounds atypical.  I do not think it is cardiac in etiology.  It is not exertional.  It really sounds like an esophageal spasm.  She could have a stricture.  I have asked her to " follow-up with her ENT.        Overall I think she is stable.  She denies any symptoms of angina or heart failure.  I am not going to make any changes, and she will follow-up with Dr. Velazquez in 1 year.      As always, it has been a pleasure to participate in your patient's care.      Sincerely,         CARLIE Canas

## 2021-08-16 NOTE — PROGRESS NOTES
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   • carvedilol (COREG) 25 MG tablet TAKE 1 TABLET BY MOUTH TWICE A DAY   • DULoxetine (CYMBALTA) 60 MG capsule Take 120 mg by mouth Every Night.   • escitalopram (LEXAPRO) 10 MG tablet Take 10 mg by mouth Daily.   • fluticasone (FLONASE) 50 MCG/ACT nasal spray 2 sprays into the nostril(s) as directed by provider Daily.   • hydrALAZINE (APRESOLINE) 50 MG tablet TAKE 1 TABLET BY MOUTH TWICE A DAY   • lamoTRIgine (LAMICTAL) 100 MG tablet Take 100 mg by mouth Every Night.   • losartan (COZAAR) 100 MG tablet TAKE 1 TABLET BY MOUTH EVERY DAY   • pantoprazole (PROTONIX) 40 MG EC tablet Take 40 mg by mouth 2 (Two) Times a Day.   • pravastatin (PRAVACHOL) 40 MG tablet Take 1 tablet by mouth Every Night.   • traZODone (DESYREL) 50 MG tablet Take 25 mg by mouth At Night As Needed for Sleep.     No current facility-administered medications on file prior to visit.     Current Medications:  Scheduled Meds:  Continuous Infusions:No current facility-administered medications for  this visit.    PRN Meds:.    Past Medical History:   Diagnosis Date   • Depression    • GERD (gastroesophageal reflux disease)    • Hyperlipidemia    • Hypertension    • Non-STEMI (non-ST elevated myocardial infarction) (CMS/[facility])    • FUENTES on CPAP         Past Surgical History:   Procedure Laterality Date   • BACK SURGERY     • CARDIAC CATHETERIZATION N/A 5/22/2018    Procedure: Left Heart Cath;  Surgeon: Aleks Velazquez MD;  Location: [facility] CATH INVASIVE LOCATION;  Service: Cardiovascular   • CARDIAC CATHETERIZATION N/A 5/22/2018    Procedure: Coronary angiography;  Surgeon: Aleks Velazquez MD;  Location:  [facility] CATH INVASIVE LOCATION;  Service: Cardiovascular   • CARDIAC CATHETERIZATION N/A 5/22/2018    Procedure: Left ventriculography;  Surgeon: Aleks Velazquez MD;  Location:  [facility] CATH INVASIVE LOCATION;  Service: Cardiovascular   • CARDIAC CATHETERIZATION N/A 5/22/2018    Procedure: Peripheral angiography;  Surgeon: Aleks Velazquez MD;  Location: [facility] CATH INVASIVE LOCATION;  Service: Cardiovascular   • EXPLORATORY LAPAROTOMY     • HEMORRHOIDECTOMY     • NECK SURGERY  12/03/2003    ACDF C6-7-Dr. Nix    • RECTAL SURGERY          Social History     Occupational History   • Occupation: RETIRED RN    Tobacco Use   • Smoking status: Never Smoker   • Smokeless tobacco: Never Used   • Tobacco comment: caffeine use: TEA OCCASSIONALLY   Vaping Use   • Vaping Use: Never used   Substance and Sexual Activity   • Alcohol use: No   • Drug use: No   • Sexual activity: Defer      Social History     Social History Narrative   • Not on file        Family History   Problem Relation Age of Onset   • Alzheimer's disease Mother    • Heart failure Father    • Diabetes Sister    • Atrial fibrillation Sister    • Stroke Paternal Grandmother              Review of Systems: 14 point review of systems are remarkable for the multiple pertinent positives listed in the chart by the patient the remainder  "negative    Review of Systems      Physical Exam: 70 y.o. female  General Appearance:    Alert, cooperative, in no acute distress                   Vitals:    08/17/21 1619   Temp: 98 °F (36.7 °C)   Weight: 85.3 kg (188 lb)   Height: 157.5 cm (62\")   PainSc:   6      Patient is alert and read ×3 no acute distress appears her above-listed at height weight and age.  Affect is normal respiratory rate is normal unlabored. Heart rate regular rate rhythm, sclera, dentition and hearing are normal for the purpose of this exam.        Ortho Exam  physical exam the right  knee she has mild effusion no overlying skin changes no lymphedema no lymphadenopathy.  She is sitting in a genu valgum position.  She has -5° of extension flexion is to 125 she has palpable tenderness laterally more than medially and crepitus with range of motion.  Her calf is soft and nontender  Large Joint Arthrocentesis: R knee  Date/Time: 8/17/2021 4:34 PM  Consent given by: patient  Site marked: site marked  Timeout: Immediately prior to procedure a time out was called to verify the correct patient, procedure, equipment, support staff and site/side marked as required   Supporting Documentation  Indications: pain   Procedure Details  Location: knee - R knee  Preparation: Patient was prepped and draped in the usual sterile fashion  Needle gauge: 21G.  Approach: anterolateral  Medications administered: 80 mg methylPREDNISolone acetate 80 MG/ML; 4 mL lidocaine PF 1% 1 %  Patient tolerance: patient tolerated the procedure well with no immediate complications                   Radiology:   AP, Lateral and merchant views of the right knee  were ordered/reviewed to evauateknee pain.  I have no comparative films she has lateral patellofemoral OA no acute pathology is appreciated  Imaging Results (Most Recent)     Procedure Component Value Units Date/Time    XR Knee 3 View Right [[id number]] Resulted: 08/17/21 1618     Updated: 08/17/21 1618    Impression:      " Ordering physician's impression is located in the Encounter Note dated 08/17/21. X-ray performed in the DR room.          Assessment/Plan:    Right knee pain I think this is more degenerative in origin we talked about options including steroid injection gel injection talked about the importance of quad and core strengthening.  She is also complaining of a foot issue states she had seen Dr. Beard in the past for an injection in the foot she would like to get back into see him which I am happy to do

## 2021-08-17 ENCOUNTER — OFFICE VISIT (OUTPATIENT)
Dept: ORTHOPEDIC SURGERY | Facility: CLINIC | Age: 71
End: 2021-08-17

## 2021-08-17 VITALS — WEIGHT: 188 LBS | BODY MASS INDEX: 34.6 KG/M2 | TEMPERATURE: 98 F | HEIGHT: 62 IN

## 2021-08-17 DIAGNOSIS — R52 PAIN: Primary | ICD-10-CM

## 2021-08-17 DIAGNOSIS — M17.11 PRIMARY LOCALIZED OSTEOARTHROSIS OF RIGHT LOWER LEG: ICD-10-CM

## 2021-08-17 PROCEDURE — 20610 DRAIN/INJ JOINT/BURSA W/O US: CPT | Performed by: ORTHOPAEDIC SURGERY

## 2021-08-17 PROCEDURE — 99203 OFFICE O/P NEW LOW 30 MIN: CPT | Performed by: ORTHOPAEDIC SURGERY

## 2021-08-17 PROCEDURE — 73562 X-RAY EXAM OF KNEE 3: CPT | Performed by: ORTHOPAEDIC SURGERY

## 2021-08-17 RX ADMIN — LIDOCAINE HYDROCHLORIDE 4 ML: 10 INJECTION, SOLUTION EPIDURAL; INFILTRATION; INTRACAUDAL; PERINEURAL at 16:34

## 2021-08-17 RX ADMIN — METHYLPREDNISOLONE ACETATE 80 MG: 80 INJECTION, SUSPENSION INTRA-ARTICULAR; INTRALESIONAL; INTRAMUSCULAR; SOFT TISSUE at 16:34

## 2021-08-18 RX ORDER — LIDOCAINE HYDROCHLORIDE 10 MG/ML
4 INJECTION, SOLUTION EPIDURAL; INFILTRATION; INTRACAUDAL; PERINEURAL
Status: COMPLETED | OUTPATIENT
Start: 2021-08-17 | End: 2021-08-17

## 2021-08-18 RX ORDER — METHYLPREDNISOLONE ACETATE 80 MG/ML
80 INJECTION, SUSPENSION INTRA-ARTICULAR; INTRALESIONAL; INTRAMUSCULAR; SOFT TISSUE
Status: COMPLETED | OUTPATIENT
Start: 2021-08-17 | End: 2021-08-17

## 2021-09-01 ENCOUNTER — OFFICE (OUTPATIENT)
Dept: URBAN - METROPOLITAN AREA CLINIC 66 | Facility: CLINIC | Age: 71
End: 2021-09-01

## 2021-09-01 VITALS
WEIGHT: 191 LBS | SYSTOLIC BLOOD PRESSURE: 128 MMHG | HEART RATE: 81 BPM | HEIGHT: 62 IN | DIASTOLIC BLOOD PRESSURE: 80 MMHG

## 2021-09-01 DIAGNOSIS — R10.13 EPIGASTRIC PAIN: ICD-10-CM

## 2021-09-01 DIAGNOSIS — R10.32 LEFT LOWER QUADRANT PAIN: ICD-10-CM

## 2021-09-01 DIAGNOSIS — R11.0 NAUSEA: ICD-10-CM

## 2021-09-01 DIAGNOSIS — K57.90 DIVERTICULOSIS OF INTESTINE, PART UNSPECIFIED, WITHOUT PERFO: ICD-10-CM

## 2021-09-01 DIAGNOSIS — K21.9 GASTRO-ESOPHAGEAL REFLUX DISEASE WITHOUT ESOPHAGITIS: ICD-10-CM

## 2021-09-01 DIAGNOSIS — R19.4 CHANGE IN BOWEL HABIT: ICD-10-CM

## 2021-09-01 PROCEDURE — 99215 OFFICE O/P EST HI 40 MIN: CPT | Performed by: NURSE PRACTITIONER

## 2021-09-01 RX ORDER — PANTOPRAZOLE 40 MG/1
40 TABLET, DELAYED RELEASE ORAL
Qty: 90 | Refills: 3 | Status: ACTIVE
Start: 2021-09-01

## 2021-09-01 RX ORDER — ONDANSETRON 4 MG/1
12 TABLET, ORALLY DISINTEGRATING ORAL
Qty: 30 | Refills: 4 | Status: ACTIVE
Start: 2021-09-01

## 2021-09-01 RX ORDER — CIPROFLOXACIN 500 MG/1
1000 TABLET, FILM COATED ORAL
Qty: 20 | Refills: 0 | Status: COMPLETED
Start: 2021-09-01 | End: 2021-10-12

## 2021-09-01 RX ORDER — METRONIDAZOLE 500 MG/1
1500 TABLET, FILM COATED ORAL
Qty: 30 | Refills: 0 | Status: COMPLETED
Start: 2021-09-01 | End: 2021-10-12

## 2021-09-09 ENCOUNTER — HOSPITAL ENCOUNTER (OUTPATIENT)
Dept: CT IMAGING | Facility: HOSPITAL | Age: 71
Discharge: HOME | End: 2021-09-09
Admitting: INTERNAL MEDICINE

## 2021-09-09 DIAGNOSIS — J84.10 PULMONARY FIBROSIS (HCC): ICD-10-CM

## 2021-09-09 PROCEDURE — 71250 CT THORAX DX C-: CPT

## 2021-10-06 NOTE — TELEPHONE ENCOUNTER
Patient went to [facility] for chest pain on 8/6/21  Needs follow up with one of the NP Catalina or Mindy  [phone]   Universal Safety Interventions

## 2021-10-12 ENCOUNTER — OFFICE (OUTPATIENT)
Dept: URBAN - METROPOLITAN AREA CLINIC 66 | Facility: CLINIC | Age: 71
End: 2021-10-12

## 2021-10-12 VITALS
HEART RATE: 83 BPM | SYSTOLIC BLOOD PRESSURE: 123 MMHG | WEIGHT: 190 LBS | HEIGHT: 62 IN | DIASTOLIC BLOOD PRESSURE: 82 MMHG

## 2021-10-12 DIAGNOSIS — R13.10 DYSPHAGIA, UNSPECIFIED: ICD-10-CM

## 2021-10-12 DIAGNOSIS — R10.9 UNSPECIFIED ABDOMINAL PAIN: ICD-10-CM

## 2021-10-12 DIAGNOSIS — R19.7 DIARRHEA, UNSPECIFIED: ICD-10-CM

## 2021-10-12 PROCEDURE — 99215 OFFICE O/P EST HI 40 MIN: CPT | Performed by: NURSE PRACTITIONER

## 2021-10-12 RX ORDER — COLESTIPOL HYDROCHLORIDE 1 G/1
1 TABLET, FILM COATED ORAL
Qty: 30 | Refills: 11 | Status: ACTIVE
Start: 2021-10-12

## 2021-10-12 RX ORDER — FAMOTIDINE 40 MG/1
40 TABLET, FILM COATED ORAL
Qty: 180 | Refills: 3 | Status: ACTIVE
Start: 2021-10-12

## 2022-01-10 RX ORDER — HYDRALAZINE HYDROCHLORIDE 50 MG/1
TABLET, FILM COATED ORAL
Qty: 180 TABLET | Refills: 1 | Status: SHIPPED | OUTPATIENT
Start: 2022-01-10 | End: 2022-07-18

## 2022-01-13 ENCOUNTER — OFFICE (OUTPATIENT)
Dept: URBAN - METROPOLITAN AREA CLINIC 66 | Facility: CLINIC | Age: 72
End: 2022-01-13

## 2022-01-13 VITALS
HEART RATE: 71 BPM | SYSTOLIC BLOOD PRESSURE: 145 MMHG | DIASTOLIC BLOOD PRESSURE: 80 MMHG | WEIGHT: 189 LBS | HEIGHT: 62 IN

## 2022-01-13 DIAGNOSIS — R13.10 DYSPHAGIA, UNSPECIFIED: ICD-10-CM

## 2022-01-13 DIAGNOSIS — K22.4 DYSKINESIA OF ESOPHAGUS: ICD-10-CM

## 2022-01-13 PROCEDURE — 99214 OFFICE O/P EST MOD 30 MIN: CPT | Performed by: NURSE PRACTITIONER

## 2022-01-13 RX ORDER — HYOSCYAMINE SULFATE 0.12 MG/1
TABLET ORAL; SUBLINGUAL
Qty: 60 | Refills: 3 | Status: COMPLETED
Start: 2022-01-13 | End: 2023-08-02

## 2022-02-08 RX ORDER — LOSARTAN POTASSIUM 100 MG/1
TABLET ORAL
Qty: 90 TABLET | Refills: 4 | Status: SHIPPED | OUTPATIENT
Start: 2022-02-08 | End: 2023-03-21

## 2022-02-21 RX ORDER — PRAVASTATIN SODIUM 40 MG
TABLET ORAL
Qty: 90 TABLET | Refills: 3 | Status: SHIPPED | OUTPATIENT
Start: 2022-02-21 | End: 2023-03-21

## 2022-03-08 RX ORDER — CARVEDILOL 25 MG/1
TABLET ORAL
Qty: 180 TABLET | Refills: 3 | Status: SHIPPED | OUTPATIENT
Start: 2022-03-08 | End: 2023-03-21

## 2022-05-25 RX ORDER — AMLODIPINE BESYLATE 5 MG/1
TABLET ORAL
Qty: 90 TABLET | Refills: 1 | Status: SHIPPED | OUTPATIENT
Start: 2022-05-25 | End: 2023-02-17

## 2022-07-11 ENCOUNTER — TRANSCRIBE ORDERS (OUTPATIENT)
Dept: PHYSICAL THERAPY | Facility: HOSPITAL | Age: 72
End: 2022-07-11

## 2022-07-11 DIAGNOSIS — M54.2 NECK PAIN: Primary | ICD-10-CM

## 2022-07-11 DIAGNOSIS — M54.50 LOW BACK PAIN, UNSPECIFIED BACK PAIN LATERALITY, UNSPECIFIED CHRONICITY, UNSPECIFIED WHETHER SCIATICA PRESENT: ICD-10-CM

## 2022-07-12 ENCOUNTER — TRANSCRIBE ORDERS (OUTPATIENT)
Dept: PHYSICAL THERAPY | Facility: HOSPITAL | Age: 72
End: 2022-07-12

## 2022-07-12 DIAGNOSIS — M54.2 NECK PAIN: Primary | ICD-10-CM

## 2022-07-18 RX ORDER — HYDRALAZINE HYDROCHLORIDE 50 MG/1
TABLET, FILM COATED ORAL
Qty: 180 TABLET | Refills: 1 | Status: SHIPPED | OUTPATIENT
Start: 2022-07-18 | End: 2023-03-09 | Stop reason: SDUPTHER

## 2022-07-29 ENCOUNTER — TRANSCRIBE ORDERS (OUTPATIENT)
Dept: ADMINISTRATIVE | Facility: HOSPITAL | Age: 72
End: 2022-07-29

## 2022-07-29 ENCOUNTER — HOSPITAL ENCOUNTER (OUTPATIENT)
Dept: CT IMAGING | Facility: HOSPITAL | Age: 72
Discharge: HOME | End: 2022-07-29
Admitting: INTERNAL MEDICINE

## 2022-07-29 DIAGNOSIS — R10.9 ABDOMINAL PAIN, UNSPECIFIED ABDOMINAL LOCATION: ICD-10-CM

## 2022-07-29 DIAGNOSIS — R10.9 ABDOMINAL PAIN, UNSPECIFIED ABDOMINAL LOCATION: Primary | ICD-10-CM

## 2022-07-29 PROCEDURE — 74177 CT ABD & PELVIS W/CONTRAST: CPT

## 2022-07-29 PROCEDURE — 25010000002 IOPAMIDOL 61 % SOLUTION: Performed by: INTERNAL MEDICINE

## 2022-07-29 PROCEDURE — 82565 ASSAY OF CREATININE: CPT

## 2022-07-29 RX ADMIN — IOPAMIDOL 85 ML: 612 INJECTION, SOLUTION INTRAVENOUS at 12:54

## 2022-08-11 ENCOUNTER — OFFICE VISIT (OUTPATIENT)
Dept: ORTHOPEDIC SURGERY | Facility: CLINIC | Age: 72
End: 2022-08-11

## 2022-08-11 VITALS — HEIGHT: 62 IN | TEMPERATURE: 96.9 F | BODY MASS INDEX: 34.23 KG/M2 | WEIGHT: 186 LBS

## 2022-08-11 DIAGNOSIS — M19.071 ARTHRITIS OF FIRST METATARSOPHALANGEAL (MTP) JOINT OF RIGHT FOOT: ICD-10-CM

## 2022-08-11 DIAGNOSIS — M20.11 HALLUX VALGUS OF RIGHT FOOT: ICD-10-CM

## 2022-08-11 DIAGNOSIS — M19.079 ARTHRITIS OF FOOT: Primary | ICD-10-CM

## 2022-08-11 DIAGNOSIS — M20.41 HAMMER TOE OF RIGHT FOOT: ICD-10-CM

## 2022-08-11 PROCEDURE — 73630 X-RAY EXAM OF FOOT: CPT | Performed by: ORTHOPAEDIC SURGERY

## 2022-08-11 PROCEDURE — 99214 OFFICE O/P EST MOD 30 MIN: CPT | Performed by: ORTHOPAEDIC SURGERY

## 2022-08-11 RX ORDER — FAMOTIDINE 40 MG/1
40 TABLET, FILM COATED ORAL
COMMUNITY
Start: 2022-07-17 | End: 2022-08-11 | Stop reason: SDUPTHER

## 2022-08-11 RX ORDER — UREA 10 %
1 LOTION (ML) TOPICAL DAILY PRN
COMMUNITY

## 2022-08-11 RX ORDER — NINTEDANIB 100 MG/1
100 CAPSULE ORAL 2 TIMES DAILY
COMMUNITY
Start: 2022-07-26

## 2022-08-11 RX ORDER — ARIPIPRAZOLE 2 MG/1
2 TABLET ORAL EVERY MORNING
COMMUNITY
Start: 2022-06-15 | End: 2023-03-07

## 2022-08-11 RX ORDER — CIPROFLOXACIN 500 MG/1
500 TABLET, FILM COATED ORAL 2 TIMES DAILY
COMMUNITY
Start: 2022-07-29 | End: 2023-03-07

## 2022-08-11 NOTE — PROGRESS NOTES
"Foot Follow Up      Patient: Katelyn Sr    YOB: 1950 71 y.o. female    Chief Complaints: Foot is sore    History of Present Illness: Patient has complaints of moderate and occasionally severe pain mainly in the dorsum of her right midfoot.  Symptoms are worse with \"walking a lot\" and are stabbing aching and burning with associated swelling worse with standing driving and climbing steps.    She is also used ibuprofen which has helped to some degree.    I had seen her back in 2014 for this similar problem and she had injection done in her first TMT joint which gave her significant relief for many many years.    She reports pain mainly around the first TMT joint more so than the first MTP joint and although has some mild hammertoes they have not been bothersome to her.  HPI    ROS: Foot pain  Past Medical History:   Diagnosis Date   • Depression    • GERD (gastroesophageal reflux disease)    • Hyperlipidemia    • Hypertension    • Non-STEMI (non-ST elevated myocardial infarction) ([facility])    • FUENTES on CPAP    • Pulmonary fibrosis ([facility])     Being treated at [facility]     Physical Exam:   Vitals:    08/11/22 1411   Temp: 96.9 °F (36.1 °C)   Weight: 84.4 kg (186 lb)   Height: 157.5 cm (62\")     Well developed with normal mood.  Right foot shows neutral dorsiflexion of the heel cord.  There was moderate discomfort around the first TMT joint and mild at the second TMT joint.  She had about 7 degrees dorsiflexion 4 degrees plantarflexion of the first MTP joint with only mild discomfort had some mild hammertoes.  She is nontender along the right fifth toe.      Radiology: 3 views of the right foot ordered evaluate pain and alignment reviewed and compared to prior x-rays from 2014.  There is persistent mild metatarsus adductus as well as hallux valgus and metatarsus primus varus with arthritic change of the first TMT joint with irregular gapping of the lateral aspect as well as arthritic change of the first " MTP joint with subchondral spoke.  There may been a previous fracture of the fifth proximal phalanx.  There is arthritic changes of the midfoot at the second TMT joint as well.      Assessment/Plan: 1.  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  Right hallux valgus with metatarsus primus varus with first MTP arthritis  3.  Right forefoot hammertoes    We discussed treatment options and she is uninterested in any surgical treatment which would likely require midfoot fusion of the first and second TMT joints and understanding that she could still have some resultant first MTP arthritis but would not recommend fusion of that joint simultaneously.    Regardless she is uninterested in surgical treatment at this time and will pursue further nonoperative treatment.    I recommend heel cord stretching exercises to do at least 4 times a day.  Instruction sheet was provided and demonstrated for the patient. We discussed etiology of heel cord tightness to midfoot and forefoot overload.    She was also instructed on obtaining power step orthotics to help give her some support to the midfoot.    Prescribed compounding cream consisting of ketoprofen 10%, lidocaine 5% and gabapentin 5% apply to area of the midfoot several times daily.    Also send her for radiographic guided injections of the first and second tarsometatarsal joints.    We discussed follow-up and decided that she will just schedule a follow-up appointment if she has any further problems or questions.

## 2022-08-15 ENCOUNTER — TRANSCRIBE ORDERS (OUTPATIENT)
Dept: ADMINISTRATIVE | Facility: HOSPITAL | Age: 72
End: 2022-08-15

## 2022-08-15 ENCOUNTER — OFFICE VISIT (OUTPATIENT)
Dept: CARDIOLOGY | Facility: CLINIC | Age: 72
End: 2022-08-15

## 2022-08-15 ENCOUNTER — LAB (OUTPATIENT)
Dept: LAB | Facility: HOSPITAL | Age: 72
End: 2022-08-15

## 2022-08-15 VITALS
WEIGHT: 188 LBS | HEIGHT: 62 IN | HEART RATE: 70 BPM | BODY MASS INDEX: 34.6 KG/M2 | SYSTOLIC BLOOD PRESSURE: 118 MMHG | DIASTOLIC BLOOD PRESSURE: 80 MMHG

## 2022-08-15 DIAGNOSIS — J84.10 PULMONARY FIBROSIS: ICD-10-CM

## 2022-08-15 DIAGNOSIS — I10 ESSENTIAL HYPERTENSION: ICD-10-CM

## 2022-08-15 DIAGNOSIS — J84.10 PULMONARY FIBROSIS: Primary | ICD-10-CM

## 2022-08-15 DIAGNOSIS — R06.09 DOE (DYSPNEA ON EXERTION): ICD-10-CM

## 2022-08-15 DIAGNOSIS — R07.89 ATYPICAL CHEST PAIN: ICD-10-CM

## 2022-08-15 DIAGNOSIS — E78.2 MIXED HYPERLIPIDEMIA: Primary | ICD-10-CM

## 2022-08-15 LAB
ALBUMIN SERPL-MCNC: 4 G/DL (ref 3.5–5.2)
ALBUMIN/GLOB SERPL: 1.5 G/DL
ALP SERPL-CCNC: 110 U/L (ref 39–117)
ALT SERPL W P-5'-P-CCNC: 21 U/L (ref 1–33)
ANION GAP SERPL CALCULATED.3IONS-SCNC: 12 MMOL/L (ref 5–15)
AST SERPL-CCNC: 17 U/L (ref 1–32)
BILIRUB SERPL-MCNC: 0.3 MG/DL (ref 0–1.2)
BUN SERPL-MCNC: 20 MG/DL (ref 8–23)
BUN/CREAT SERPL: 19.6 (ref 7–25)
CALCIUM SPEC-SCNC: 9.1 MG/DL (ref 8.6–10.5)
CHLORIDE SERPL-SCNC: 106 MMOL/L (ref 98–107)
CO2 SERPL-SCNC: 23 MMOL/L (ref 22–29)
CREAT SERPL-MCNC: 1.02 MG/DL (ref 0.57–1)
EGFRCR SERPLBLD CKD-EPI 2021: 59.7 ML/MIN/1.73
GLOBULIN UR ELPH-MCNC: 2.6 GM/DL
GLUCOSE SERPL-MCNC: 90 MG/DL (ref 65–99)
POTASSIUM SERPL-SCNC: 4.1 MMOL/L (ref 3.5–5.2)
PROT SERPL-MCNC: 6.6 G/DL (ref 6–8.5)
SODIUM SERPL-SCNC: 141 MMOL/L (ref 136–145)

## 2022-08-15 PROCEDURE — 93000 ELECTROCARDIOGRAM COMPLETE: CPT | Performed by: INTERNAL MEDICINE

## 2022-08-15 PROCEDURE — 36415 COLL VENOUS BLD VENIPUNCTURE: CPT

## 2022-08-15 PROCEDURE — 99214 OFFICE O/P EST MOD 30 MIN: CPT | Performed by: INTERNAL MEDICINE

## 2022-08-15 PROCEDURE — 80053 COMPREHEN METABOLIC PANEL: CPT

## 2022-08-15 NOTE — PROGRESS NOTES
Date of Office Visit: 08/15/22    Encounter Provider: Aleks Velazquez MD  [facility] of Service[facility]  Patient Name: Katelyn Sr  :1950    Chief complaint:  Essential hypertension  Interstitial lung disease/pulmonary fibrosis  Lower extremity edema    HPI:  71-year-old female with a medical history of essential hypertension, hyperlipidemia, acute kidney injury in the setting of chlorthalidone usage, along with atypical chest pain who presents back to me in follow-up.  She is currently following with the pulmonary department at [facility] secondary to her interstitial lung disease.  She has had prior issues with GI intolerance to Ofev but seems to be doing okay now.  She continues to complain of dyspnea on exertion but denies any orthopnea or PND.  She states that over the past couple of weeks she has noticed intermittently increased lower extremity edema.  This does still improve with elevation.  She is not wearing compression stockings.  Her blood pressure has been very well controlled.    Past Medical History:   Diagnosis Date   • Abnormal ECG 18    Done in PCP office.  Hx of Atrial fib-?   • Depression    • GERD (gastroesophageal reflux disease)    • Hyperlipidemia    • Hypertension    • Non-STEMI (non-ST elevated myocardial infarction) (HCC)    • FUENTES on CPAP    • Pulmonary fibrosis (HCC)     Being treated at [facility]       Past Surgical History:   Procedure Laterality Date   • BACK SURGERY     • CARDIAC CATHETERIZATION N/A 2018    Procedure: Left Heart Cath;  Surgeon: Aleks Velazquez MD;  Location: [facility] CATH INVASIVE LOCATION;  Service: Cardiovascular   • CARDIAC CATHETERIZATION N/A 2018    Procedure: Coronary angiography;  Surgeon: Aleks Velazquez MD;  Location: [facility] CATH INVASIVE LOCATION;  Service: Cardiovascular   • CARDIAC CATHETERIZATION N/A 2018    Procedure: Left ventriculography;  Surgeon:  Aleks Velazquez MD;  Location:  [facility] CATH INVASIVE LOCATION;  Service: Cardiovascular   • CARDIAC CATHETERIZATION N/A 2018    Procedure: Peripheral angiography;  Surgeon: Aleks Velazquez MD;  Location:  [facility] CATH INVASIVE LOCATION;  Service: Cardiovascular   • EXPLORATORY LAPAROTOMY     • HEMORRHOIDECTOMY     • NECK SURGERY  2003    ACDF C6-7-Dr. Nix    • RECTAL SURGERY         Social History     Socioeconomic History   • Marital status: Single   • Number of children: 0   • Years of education: BS    Tobacco Use   • Smoking status: Never Smoker   • Smokeless tobacco: Never Used   • Tobacco comment: caffeine use: TEA OCCASSIONALLY   Vaping Use   • Vaping Use: Never used   Substance and Sexual Activity   • Alcohol use: No   • Drug use: Never   • Sexual activity: Not Currently       Family History   Problem Relation Age of Onset   • Alzheimer's disease Mother    • Heart failure Father    • Arrhythmia Father    • Heart attack Father         MI at age 56,  at 63.   • Diabetes Sister    • Atrial fibrillation Sister    • Stroke Paternal Grandmother        Review of Systems   Constitutional: Negative. Negative for fever and malaise/fatigue.   HENT: Negative for nosebleeds and sore throat.    Eyes: Negative for blurred vision and double vision.   Cardiovascular: Positive for leg swelling. Negative for chest pain, claudication, dyspnea on exertion, orthopnea, palpitations, paroxysmal nocturnal dyspnea and syncope.   Respiratory: Negative for cough, shortness of breath and snoring.    Endocrine: Negative for cold intolerance, heat intolerance and polydipsia.   Hematologic/Lymphatic: Negative for bleeding problem.   Skin: Negative for itching, poor wound healing and rash.   Musculoskeletal: Negative for falls, joint pain, joint swelling, muscle weakness and myalgias.   Gastrointestinal: Negative for abdominal pain, melena, nausea and vomiting.   Neurological: Negative for dizziness,  light-headedness, loss of balance, seizures, vertigo and weakness.   Psychiatric/Behavioral: Negative for altered mental status and depression.       Allergies   Allergen Reactions   • Erythromycin Rash         Current Outpatient Medications:   •  amLODIPine (NORVASC) 5 MG tablet, TAKE 1 TABLET BY MOUTH EVERY DAY, Disp: 90 tablet, Rfl: 1  •  ARIPiprazole (ABILIFY) 2 MG tablet, Take 2 mg by mouth Every Morning., Disp: , Rfl:   •  aspirin 81 MG EC tablet, Take 81 mg by mouth Daily., Disp: , Rfl:   •  calcium carbonate (OS-SUZETTE) 1250 (500 Ca) MG chewable tablet, Chew., Disp: , Rfl:   •  carvedilol (COREG) 25 MG tablet, TAKE 1 TABLET BY MOUTH TWICE A DAY, Disp: 180 tablet, Rfl: 3  •  ciprofloxacin (CIPRO) 500 MG tablet, Take 500 mg by mouth 2 (Two) Times a Day., Disp: , Rfl:   •  DULoxetine (CYMBALTA) 60 MG capsule, Take 120 mg by mouth Every Night., Disp: , Rfl:   •  escitalopram (LEXAPRO) 10 MG tablet, Take 10 mg by mouth Daily., Disp: , Rfl:   •  famotidine (PEPCID) 10 MG tablet, Take 10 mg by mouth 2 (Two) Times a Day., Disp: , Rfl:   •  fluticasone (FLONASE) 50 MCG/ACT nasal spray, 2 sprays into the nostril(s) as directed by provider Daily., Disp: , Rfl:   •  hydrALAZINE (APRESOLINE) 50 MG tablet, TAKE 1 TABLET BY MOUTH TWICE A DAY, Disp: 180 tablet, Rfl: 1  •  lamoTRIgine (LaMICtal) 100 MG tablet, Take 100 mg by mouth Every Night., Disp: , Rfl:   •  losartan (COZAAR) 100 MG tablet, TAKE 1 TABLET BY MOUTH EVERY DAY, Disp: 90 tablet, Rfl: 4  •  Nintedanib Esylate (Ofev) 100 MG capsule, Take 100 mg by mouth., Disp: , Rfl:   •  pantoprazole (PROTONIX) 40 MG EC tablet, Take 40 mg by mouth 2 (Two) Times a Day., Disp: , Rfl:   •  pravastatin (PRAVACHOL) 40 MG tablet, TAKE 1 TABLET BY MOUTH EVERY DAY AT NIGHT, Disp: 90 tablet, Rfl: 3  •  traZODone (DESYREL) 50 MG tablet, Take 25 mg by mouth At Night As Needed for Sleep., Disp: , Rfl:       Objective:     There were no vitals filed for this visit.  There is no height or  weight on file to calculate BMI.    PHYSICAL EXAM:    Constitutional:       Appearance: Well-developed.   Eyes:      General: No scleral icterus.     Conjunctiva/sclera: Conjunctivae normal.   HENT:      Head: Normocephalic and atraumatic.   Neck:      Thyroid: No thyromegaly.      Vascular: Normal carotid pulses. No carotid bruit, hepatojugular reflux or JVD.      Trachea: No tracheal deviation.   Pulmonary:      Effort: Pulmonary effort is normal. No respiratory distress.      Breath sounds: Normal breath sounds. No decreased breath sounds. No wheezing. No rhonchi. No rales.   Chest:      Chest wall: Not tender to palpatation.   Cardiovascular:      Normal rate. Regular rhythm.      Murmurs: There is no murmur.      No gallop. No click. No rub.   Abdominal:      General: Bowel sounds are normal. There is no distension.      Palpations: Abdomen is soft.      Tenderness: There is no abdominal tenderness.   Musculoskeletal:         General: No deformity.      Cervical back: Normal range of motion and neck supple. Skin:     Findings: No erythema or rash.   Neurological:      Mental Status: Alert and oriented to person, place, and time.      Sensory: No sensory deficit.   Psychiatric:         Behavior: Behavior normal.           ECG 12 Lead    Date/Time: 8/15/2022 4:10 PM  Performed by: Aleks Velazquez MD  Authorized by: Aleks Velazquez MD   Comparison: compared with previous ECG from 8/11/2021  Similar to previous ECG  Rhythm: sinus rhythm  Rate: normal  QRS axis: normal    Clinical impression: normal ECG               8/11/21 Lipid panel    HDL 46    5/23/18 TTE  · Left ventricular systolic function is normal. Calculated EF = 59%. Estimated EF was in agreement with the calculated EF. Estimated EF = 59%. Normal left ventricular cavity size and wall thickness noted. All left ventricular wall segments contract normally. Left ventricular diastolic dysfunction is noted (grade I) consistent with  impaired relaxation.  · Trace mitral valve regurgitation is present.  · Physiologic tricuspid valve regurgitation is present. Estimated right ventricular systolic pressure from tricuspid regurgitation is normal (<35 mmHg). Calculated right ventricular systolic pressure from tricuspid regurgitation is 25 mmHg    Assessment:      No diagnosis found.  No orders of the defined types were placed in this encounter.    Plan:   71-year-old female with a medical history of dyspnea on exertion, interstitial lung disease, essential hypertension, hyperlipidemia, who presents to me in follow-up.  Her blood pressure has been very well controlled as of late.  She does complain of recent issues with bilateral lower extremity edema, but denies orthopnea or PND.  She continues to follow-up with [facility] pulmonology department.      1.  Hypertension: Blood pressure is very well controlled.  -Recommend continuing carvedilol and losartan therapy at current dose.  She is tolerating these well without any issues.  No significant complaints of fatigue on the carvedilol therapy.  No hyperkalemia documented previously on her losartan therapy.  - Continue hydralazine 50 mg p.o. twice daily  -I recommended that we continue the amlodipine therapy.  She will call me if her swelling becomes more of an issue and we could consider trying to discontinue that as this may be contributing.  If we go down that path we would have to increase her hydralazine to at least 75 mg twice daily if not 100 mg twice daily.  She would like to avoid 3 times daily dosing.    2.  Hyperlipidemia: Continue pravastatin therapy.  Prior myalgias with more potent statins    3.  Dyspnea on exertion: Likely secondary to her interstitial lung disease.  I have recommended a repeat transthoracic echocardiogram as it has been 4 years since her last echo.

## 2022-08-17 ENCOUNTER — TELEPHONE (OUTPATIENT)
Dept: CARDIOLOGY | Facility: CLINIC | Age: 72
End: 2022-08-17

## 2022-08-17 NOTE — TELEPHONE ENCOUNTER
Pt called. She wanted to let you know that she forgot to say that the doctor at [facility] thought the cause of her pulmonary fibrosis was likely chronic GERD.    Karyn

## 2022-08-18 ENCOUNTER — HOSPITAL ENCOUNTER (OUTPATIENT)
Dept: CT IMAGING | Facility: HOSPITAL | Age: 72
Discharge: HOME | End: 2022-08-18
Admitting: INTERNAL MEDICINE

## 2022-08-18 ENCOUNTER — TRANSCRIBE ORDERS (OUTPATIENT)
Dept: ADMINISTRATIVE | Facility: HOSPITAL | Age: 72
End: 2022-08-18

## 2022-08-18 DIAGNOSIS — R10.9 ABDOMINAL PAIN, UNSPECIFIED ABDOMINAL LOCATION: ICD-10-CM

## 2022-08-18 DIAGNOSIS — R10.9 ABDOMINAL PAIN, UNSPECIFIED ABDOMINAL LOCATION: Primary | ICD-10-CM

## 2022-08-18 PROCEDURE — 74176 CT ABD & PELVIS W/O CONTRAST: CPT

## 2022-08-18 NOTE — NURSING NOTE
Dr Morin called and spoke with the patient regarding the stat CT Abdomen & Pelvis without. Patient verbalized understanding and left Radiology triage ambulatory.

## 2022-08-25 ENCOUNTER — HOSPITAL ENCOUNTER (OUTPATIENT)
Dept: GENERAL RADIOLOGY | Facility: HOSPITAL | Age: 72
Discharge: HOME | End: 2022-08-25
Admitting: ORTHOPAEDIC SURGERY

## 2022-08-25 DIAGNOSIS — M19.079 ARTHRITIS OF FOOT: ICD-10-CM

## 2022-08-25 PROCEDURE — 77002 NEEDLE LOCALIZATION BY XRAY: CPT

## 2022-08-25 PROCEDURE — 25010000002 IOPAMIDOL 61 % SOLUTION: Performed by: ORTHOPAEDIC SURGERY

## 2022-08-25 PROCEDURE — 25010000002 METHYLPREDNISOLONE PER 125 MG: Performed by: ORTHOPAEDIC SURGERY

## 2022-08-25 PROCEDURE — 0 LIDOCAINE 1 % SOLUTION: Performed by: ORTHOPAEDIC SURGERY

## 2022-08-25 RX ORDER — BUPIVACAINE HYDROCHLORIDE 2.5 MG/ML
10 INJECTION, SOLUTION EPIDURAL; INFILTRATION; INTRACAUDAL ONCE
Status: COMPLETED | OUTPATIENT
Start: 2022-08-25 | End: 2022-08-25

## 2022-08-25 RX ORDER — LIDOCAINE HYDROCHLORIDE 10 MG/ML
20 INJECTION, SOLUTION INFILTRATION; PERINEURAL ONCE
Status: COMPLETED | OUTPATIENT
Start: 2022-08-25 | End: 2022-08-25

## 2022-08-25 RX ORDER — METHYLPREDNISOLONE SODIUM SUCCINATE 125 MG/2ML
80 INJECTION, POWDER, LYOPHILIZED, FOR SOLUTION INTRAMUSCULAR; INTRAVENOUS
Status: COMPLETED | OUTPATIENT
Start: 2022-08-25 | End: 2022-08-25

## 2022-08-25 RX ADMIN — METHYLPREDNISOLONE SODIUM SUCCINATE 80 MG: 125 INJECTION, POWDER, LYOPHILIZED, FOR SOLUTION INTRAMUSCULAR; INTRAVENOUS at 11:06

## 2022-08-25 RX ADMIN — BUPIVACAINE HYDROCHLORIDE 4 ML: 2.5 INJECTION, SOLUTION EPIDURAL; INFILTRATION; INTRACAUDAL; PERINEURAL at 11:06

## 2022-08-25 RX ADMIN — IOPAMIDOL 0.5 ML: 612 INJECTION, SOLUTION INTRAVENOUS at 11:06

## 2022-08-25 RX ADMIN — LIDOCAINE HYDROCHLORIDE 1 ML: 10 INJECTION, SOLUTION INFILTRATION; PERINEURAL at 11:06

## 2022-09-01 ENCOUNTER — HOSPITAL ENCOUNTER (OUTPATIENT)
Dept: CARDIOLOGY | Facility: HOSPITAL | Age: 72
Discharge: HOME | End: 2022-09-01
Admitting: INTERNAL MEDICINE

## 2022-09-01 DIAGNOSIS — R06.09 DOE (DYSPNEA ON EXERTION): ICD-10-CM

## 2022-09-01 DIAGNOSIS — I10 ESSENTIAL HYPERTENSION: ICD-10-CM

## 2022-09-01 DIAGNOSIS — E78.2 MIXED HYPERLIPIDEMIA: ICD-10-CM

## 2022-09-01 PROCEDURE — 93306 TTE W/DOPPLER COMPLETE: CPT

## 2022-09-01 PROCEDURE — 93306 TTE W/DOPPLER COMPLETE: CPT | Performed by: INTERNAL MEDICINE

## 2022-09-02 ENCOUNTER — TELEPHONE (OUTPATIENT)
Dept: CARDIOLOGY | Facility: CLINIC | Age: 72
End: 2022-09-02

## 2022-09-02 VITALS
HEART RATE: 81 BPM | HEIGHT: 62 IN | DIASTOLIC BLOOD PRESSURE: 80 MMHG | WEIGHT: 188.05 LBS | SYSTOLIC BLOOD PRESSURE: 118 MMHG | BODY MASS INDEX: 34.61 KG/M2

## 2022-09-02 LAB
ASCENDING AORTA: 2.2 CM
BH CV ECHO MEAS - ACS: 1.81 CM
BH CV ECHO MEAS - AO MAX PG: 7.5 MMHG
BH CV ECHO MEAS - AO MEAN PG: 3.9 MMHG
BH CV ECHO MEAS - AO ROOT DIAM: 2.3 CM
BH CV ECHO MEAS - AO V2 MAX: 137.1 CM/SEC
BH CV ECHO MEAS - AO V2 VTI: 27.5 CM
BH CV ECHO MEAS - AVA(I,D): 2.05 CM2
BH CV ECHO MEAS - EDV(CUBED): 47.5 ML
BH CV ECHO MEAS - EDV(MOD-SP2): 62 ML
BH CV ECHO MEAS - EDV(MOD-SP4): 64 ML
BH CV ECHO MEAS - EF(MOD-BP): 63.7 %
BH CV ECHO MEAS - EF(MOD-SP2): 64.5 %
BH CV ECHO MEAS - EF(MOD-SP4): 64.1 %
BH CV ECHO MEAS - ESV(CUBED): 10.2 ML
BH CV ECHO MEAS - ESV(MOD-SP2): 22 ML
BH CV ECHO MEAS - ESV(MOD-SP4): 23 ML
BH CV ECHO MEAS - FS: 40 %
BH CV ECHO MEAS - IVS/LVPW: 1.11 CM
BH CV ECHO MEAS - IVSD: 1.16 CM
BH CV ECHO MEAS - LAT PEAK E' VEL: 6.2 CM/SEC
BH CV ECHO MEAS - LV DIASTOLIC VOL/BSA (35-75): 34.4 CM2
BH CV ECHO MEAS - LV MASS(C)D: 124.7 GRAMS
BH CV ECHO MEAS - LV MAX PG: 5.4 MMHG
BH CV ECHO MEAS - LV MEAN PG: 2.19 MMHG
BH CV ECHO MEAS - LV SYSTOLIC VOL/BSA (12-30): 12.4 CM2
BH CV ECHO MEAS - LV V1 MAX: 115.7 CM/SEC
BH CV ECHO MEAS - LV V1 VTI: 21.3 CM
BH CV ECHO MEAS - LVIDD: 3.6 CM
BH CV ECHO MEAS - LVIDS: 2.17 CM
BH CV ECHO MEAS - LVOT AREA: 2.7 CM2
BH CV ECHO MEAS - LVOT DIAM: 1.84 CM
BH CV ECHO MEAS - LVPWD: 1.04 CM
BH CV ECHO MEAS - MED PEAK E' VEL: 4.7 CM/SEC
BH CV ECHO MEAS - MR MAX PG: 10.3 MMHG
BH CV ECHO MEAS - MR MAX VEL: 160.2 CM/SEC
BH CV ECHO MEAS - MV A DUR: 0.13 SEC
BH CV ECHO MEAS - MV A MAX VEL: 91.3 CM/SEC
BH CV ECHO MEAS - MV DEC SLOPE: 236 CM/SEC2
BH CV ECHO MEAS - MV DEC TIME: 0.28 MSEC
BH CV ECHO MEAS - MV E MAX VEL: 68.6 CM/SEC
BH CV ECHO MEAS - MV E/A: 0.75
BH CV ECHO MEAS - MV MAX PG: 4.1 MMHG
BH CV ECHO MEAS - MV MEAN PG: 1.68 MMHG
BH CV ECHO MEAS - MV P1/2T: 97.7 MSEC
BH CV ECHO MEAS - MV V2 VTI: 23.4 CM
BH CV ECHO MEAS - MVA(P1/2T): 2.25 CM2
BH CV ECHO MEAS - MVA(VTI): 2.41 CM2
BH CV ECHO MEAS - PA ACC TIME: 0.1 SEC
BH CV ECHO MEAS - PA PR(ACCEL): 35.4 MMHG
BH CV ECHO MEAS - PA V2 MAX: 104.5 CM/SEC
BH CV ECHO MEAS - PULM A REVS DUR: 0.17 SEC
BH CV ECHO MEAS - PULM A REVS VEL: 90.8 CM/SEC
BH CV ECHO MEAS - PULM DIAS VEL: 43.7 CM/SEC
BH CV ECHO MEAS - PULM S/D: 1.22
BH CV ECHO MEAS - PULM SYS VEL: 53.1 CM/SEC
BH CV ECHO MEAS - QP/QS: 0.94
BH CV ECHO MEAS - RAP SYSTOLE: 3 MMHG
BH CV ECHO MEAS - RV MAX PG: 2.46 MMHG
BH CV ECHO MEAS - RV V1 MAX: 78.4 CM/SEC
BH CV ECHO MEAS - RV V1 VTI: 16.2 CM
BH CV ECHO MEAS - RVOT DIAM: 2.05 CM
BH CV ECHO MEAS - RVSP: 27.1 MMHG
BH CV ECHO MEAS - SI(MOD-SP2): 21.5 ML/M2
BH CV ECHO MEAS - SI(MOD-SP4): 22 ML/M2
BH CV ECHO MEAS - SV(LVOT): 56.5 ML
BH CV ECHO MEAS - SV(MOD-SP2): 40 ML
BH CV ECHO MEAS - SV(MOD-SP4): 41 ML
BH CV ECHO MEAS - SV(RVOT): 53.3 ML
BH CV ECHO MEAS - TAPSE (>1.6): 1.65 CM
BH CV ECHO MEAS - TR MAX PG: 24.1 MMHG
BH CV ECHO MEAS - TR MAX VEL: 245.7 CM/SEC
BH CV ECHO MEASUREMENTS AVERAGE E/E' RATIO: 12.59
BH CV XLRA - RV BASE: 3 CM
BH CV XLRA - RV LENGTH: 6.9 CM
BH CV XLRA - RV MID: 2.8 CM
BH CV XLRA - TDI S': 13.6 CM/SEC
LEFT ATRIUM VOLUME INDEX: 32.2 ML/M2
LV EF 2D ECHO EST: 65 %
MAXIMAL PREDICTED HEART RATE: 151 BPM
SINUS: 2.5 CM
STJ: 2.14 CM
STRESS TARGET HR: 128 BPM

## 2022-09-02 NOTE — TELEPHONE ENCOUNTER
----- Message from Aleks Velazquez MD sent at 9/2/2022  2:33 PM EDT -----  Please let her know that her echocardiogram looks great.  No issues

## 2022-09-20 ENCOUNTER — TELEPHONE (OUTPATIENT)
Dept: CARDIOLOGY | Facility: CLINIC | Age: 72
End: 2022-09-20

## 2022-09-20 NOTE — TELEPHONE ENCOUNTER
Pt called Friday. She said she was having cramping in her muscles and was thinking her electrolytes were off or it was being caused by the pravastatin.     I went to call her this morning to get more details, as it turns out she has COVID.     The day after she called she went to [facility] and was tested for COVID. They gave her a prescription for Molnupiravir. She called her pulmonary doctor at [facility] as well. She is also seeing her local pulmonary doctor, Dr. Guerrero today at 2:30p as well.    I called the pt back to let her know that we are aware of what going on.    She would like to know how long after having covid and taking an antiviral pill, that she should wait to have her covid booster.    Please advise.      Thanks,  Karyn

## 2022-10-04 LAB — CREAT BLDA-MCNC: 0.9 MG/DL (ref 0.6–1.3)

## 2022-11-08 ENCOUNTER — OFFICE (OUTPATIENT)
Dept: URBAN - METROPOLITAN AREA CLINIC 66 | Facility: CLINIC | Age: 72
End: 2022-11-08

## 2022-11-08 VITALS
SYSTOLIC BLOOD PRESSURE: 132 MMHG | DIASTOLIC BLOOD PRESSURE: 88 MMHG | WEIGHT: 189 LBS | HEIGHT: 62 IN | HEART RATE: 76 BPM

## 2022-11-08 DIAGNOSIS — K21.00 GASTRO-ESOPHAGEAL REFLUX DISEASE WITH ESOPHAGITIS, WITHOUT B: ICD-10-CM

## 2022-11-08 DIAGNOSIS — K57.92 DIVERTICULITIS OF INTESTINE, PART UNSPECIFIED, WITHOUT PERFO: ICD-10-CM

## 2022-11-08 PROCEDURE — 99214 OFFICE O/P EST MOD 30 MIN: CPT | Performed by: NURSE PRACTITIONER

## 2022-11-08 RX ORDER — FAMOTIDINE 40 MG/1
40 TABLET, FILM COATED ORAL
Qty: 180 | Refills: 3 | Status: ACTIVE
Start: 2021-10-12

## 2022-11-08 RX ORDER — PANTOPRAZOLE 40 MG/1
40 TABLET, DELAYED RELEASE ORAL
Qty: 90 | Refills: 3 | Status: ACTIVE
Start: 2021-09-01

## 2022-12-14 ENCOUNTER — TRANSCRIBE ORDERS (OUTPATIENT)
Dept: ADMINISTRATIVE | Facility: HOSPITAL | Age: 72
End: 2022-12-14

## 2022-12-14 DIAGNOSIS — J84.9 INTERSTITIAL LUNG DISEASE: Primary | ICD-10-CM

## 2022-12-30 ENCOUNTER — TELEPHONE (OUTPATIENT)
Dept: CARDIOLOGY | Facility: CLINIC | Age: 72
End: 2022-12-30
Payer: MEDICARE

## 2022-12-30 NOTE — TELEPHONE ENCOUNTER
Pt called. She said that she has been having muscle weakness and cramps, and that she thinks it might be the pravastatin 40mg.    She said \"my legs are as weak as kittens'\"    She would like to know if she can cut her pravastatin dose in half or if there is another medication she can try. She has already tried Atorvastatin and Simvastatin.    Please advise.    Thanks,  Karyn

## 2023-01-04 ENCOUNTER — OFFICE VISIT (OUTPATIENT)
Dept: ORTHOPEDIC SURGERY | Facility: CLINIC | Age: 73
End: 2023-01-04
Payer: MEDICARE

## 2023-01-04 VITALS — BODY MASS INDEX: 35.04 KG/M2 | TEMPERATURE: 98.6 F | HEIGHT: 62 IN | WEIGHT: 190.4 LBS

## 2023-01-04 DIAGNOSIS — M20.41 HAMMER TOE OF RIGHT FOOT: ICD-10-CM

## 2023-01-04 DIAGNOSIS — M19.079 ARTHRITIS OF FOOT: Primary | ICD-10-CM

## 2023-01-04 DIAGNOSIS — M20.11 HALLUX VALGUS OF RIGHT FOOT: ICD-10-CM

## 2023-01-04 DIAGNOSIS — M19.071 ARTHRITIS OF FIRST METATARSOPHALANGEAL (MTP) JOINT OF RIGHT FOOT: ICD-10-CM

## 2023-01-04 PROCEDURE — 1160F RVW MEDS BY RX/DR IN RCRD: CPT | Performed by: ORTHOPAEDIC SURGERY

## 2023-01-04 PROCEDURE — 1159F MED LIST DOCD IN RCRD: CPT | Performed by: ORTHOPAEDIC SURGERY

## 2023-01-04 PROCEDURE — 99214 OFFICE O/P EST MOD 30 MIN: CPT | Performed by: ORTHOPAEDIC SURGERY

## 2023-01-04 RX ORDER — ARIPIPRAZOLE 2 MG/1
1 TABLET ORAL EVERY MORNING
COMMUNITY
Start: 2022-08-20

## 2023-01-04 NOTE — PROGRESS NOTES
Foot Follow Up      Patient: Katelyn Sr    YOB: 1950 72 y.o. female    Chief Complaints: Foot is sore    History of Present Illness:Patient was seen on 8/11/2022 with complaints of moderate and occasionally severe pain mainly in the dorsum of her right midfoot.  Symptoms were worse with \"walking a lot\" and were stabbing aching and burning with associated swelling worse with standing driving and climbing steps.     She had used ibuprofen which had helped to some degree.     I had seen her back in 2014 for this similar problem and she had injection done in her first TMT joint which gave her significant relief for many many years.     She reported pain mainly around the first TMT joint more so than the first MTP joint and although had some mild hammertoes they had  not been bothersome to her.    It was felt she had exacerbation of midfoot arthritis mostly at the first and second TMT joints and also had hallux valgus with metatarsus primus varus first MTP arthritis and hammertoes.  Reviewed her that she could ultimately require midfoot fusion of the first and second TMT joints but also with some arthritis of the first MTP joint could eventually require fusion but would not recommend simultaneous fusion of those.  She was uninterested in surgical treatment and instructed on heel cord stretching exercises, use of power step orthotics and prescribed compounding cream of ketoprofen 10%, lidocaine 5% and gabapentin 5% to apply to the midfoot.  She was sent for radiographic guided injections of the first and second TMT joints.    She underwent injections of the right midfoot and per review of those records it was of the second and third TMT joints on 8/25/2022.    She is seen back today reporting that she really did not have any relief and still has pain mainly around the first TMT joint more so than the second and third where injection seem to help some.  She is not leaving doing her heel cord stretching  exercises.  Compounding cream does seem to help some.  She initially got some over-the-counter orthotics which had helped but has had recurrent pain mainly in the midfoot more so than around the first MTP joint but has some there as well.  She does feel like the pain in her midfoot is affecting her gait.  HPI    ROS: Foot pain  Past Medical History:   Diagnosis Date   • Abnormal ECG 5/21/18    Done in PCP office.  Hx of Atrial fib-?2007   • Arthritis of neck    • Cervical disc disorder 8/03    ACDF C6-7, 12/03/2003;  Dr. Tr Nix   • CTS (carpal tunnel syndrome)    • Depression    • Fracture of wrist    • Fracture, finger    • GERD (gastroesophageal reflux disease)    • Hip arthrosis    • Hyperlipidemia    • Hypersensitivity pneumonitis (HCC)     vs pulmonary fibrosis   • Hypertension    • Knee swelling    • Low back strain    • Lumbosacral disc disease    • Neck strain    • Neuroma of foot    • Non-STEMI (non-ST elevated myocardial infarction) ([facility])    • FUENTES on CPAP    • Pulmonary fibrosis ([facility])     Being treated at [place]   • Scoliosis    • Thoracic disc disorder      Physical Exam:   Vitals:    01/04/23 1155   Temp: 98.6 °F (37 °C)   Weight: 86.4 kg (190 lb 6.4 oz)   Height: 157.5 cm (62\")   PainSc:   6     Well developed with normal mood.  She has a nonantalgic gait.  Right foot shows no warmth erythema there was moderate tenderness to palpation of the first TMT joint and much less so around the second and third.  There was some hallux valgus that was passively correctable with slight discomfort the range of motion but mostly pain of the medial eminence.  She had neutral dorsiflexion of the heel cord.  There was some some mild hammering of the lesser toes but these were not symptomatic.      Radiology: 3 views of the right foot ordered evaluate alignment and pain reviewed and compared to previous x-rays      Assessment/Plan:  1.  Exacerbation of right midfoot arthritis mostly at the first and second  TMT joint  2.  Right hallux valgus with metatarsus primus varus with first MTP arthritis  3.  Right forefoot hammertoes    We discussed treatment going forward and surgery would be fairly complex and involved with her and she lives alone on a second floor condo.  We discussed treatment options and I would recommend continued nonoperative treatment at this point with which she was in agreement.    Will send her for radiographic guided injections of the first second and third TMT joints (it appears the first was not done previously at the most recent injections but had been done back in 2014 that gave her relief).    She will increase her heel cord stretching exercises and do these at least 3 or 4 times a day.    She will continue with compounding cream    We will also get her into see Alessio at [facility] for physical therapy to work on gait training mechanics and possible orthotics.    We will see her back in about 8 weeks with x-rays of her right foot.

## 2023-01-12 ENCOUNTER — TELEHEALTH PROVIDED IN PATIENT'S HOME (OUTPATIENT)
Dept: URBAN - METROPOLITAN AREA TELEHEALTH 5 | Facility: TELEHEALTH | Age: 73
End: 2023-01-12

## 2023-01-12 VITALS — HEIGHT: 62 IN

## 2023-01-12 DIAGNOSIS — K57.92 DIVERTICULITIS OF INTESTINE, PART UNSPECIFIED, WITHOUT PERFO: ICD-10-CM

## 2023-01-12 PROCEDURE — 99441: CPT | Mod: 95 | Performed by: NURSE PRACTITIONER

## 2023-01-18 ENCOUNTER — TELEPHONE (OUTPATIENT)
Dept: CARDIOLOGY | Facility: CLINIC | Age: 73
End: 2023-01-18
Payer: MEDICARE

## 2023-01-18 NOTE — TELEPHONE ENCOUNTER
Pt called. She said that she found out today that she was supposed to be getting cardiac clearance to have her cataract surgery for tomorrow at 12p.    We saw her last on 8/15/22. And she says she feels fine and her BP/HR readings are doing good    Please advise.    Thanks,    Karyn

## 2023-01-19 NOTE — TELEPHONE ENCOUNTER
Called pt yesterday to advise and sent clearance over to [facility] per her request.    Karyn

## 2023-01-26 ENCOUNTER — APPOINTMENT (OUTPATIENT)
Dept: CT IMAGING | Facility: HOSPITAL | Age: 73
End: 2023-01-26

## 2023-02-09 ENCOUNTER — HOSPITAL ENCOUNTER (OUTPATIENT)
Dept: CT IMAGING | Facility: HOSPITAL | Age: 73
Discharge: HOME | End: 2023-02-09
Admitting: NURSE PRACTITIONER
Payer: MEDICARE

## 2023-02-09 DIAGNOSIS — J84.9 INTERSTITIAL LUNG DISEASE: ICD-10-CM

## 2023-02-09 PROCEDURE — 71250 CT THORAX DX C-: CPT

## 2023-02-15 ENCOUNTER — HOSPITAL ENCOUNTER (OUTPATIENT)
Dept: GENERAL RADIOLOGY | Facility: HOSPITAL | Age: 73
Discharge: HOME | End: 2023-02-15
Admitting: ORTHOPAEDIC SURGERY
Payer: MEDICARE

## 2023-02-15 DIAGNOSIS — M20.11 HALLUX VALGUS OF RIGHT FOOT: ICD-10-CM

## 2023-02-15 DIAGNOSIS — M19.071 ARTHRITIS OF FIRST METATARSOPHALANGEAL (MTP) JOINT OF RIGHT FOOT: ICD-10-CM

## 2023-02-15 DIAGNOSIS — M20.41 HAMMER TOE OF RIGHT FOOT: ICD-10-CM

## 2023-02-15 DIAGNOSIS — M19.079 ARTHRITIS OF FOOT: ICD-10-CM

## 2023-02-15 PROCEDURE — 0 LIDOCAINE 1 % SOLUTION: Performed by: ORTHOPAEDIC SURGERY

## 2023-02-15 PROCEDURE — 25010000002 IOPAMIDOL 61 % SOLUTION: Performed by: ORTHOPAEDIC SURGERY

## 2023-02-15 PROCEDURE — 77002 NEEDLE LOCALIZATION BY XRAY: CPT

## 2023-02-15 PROCEDURE — 25010000002 METHYLPREDNISOLONE PER 125 MG: Performed by: ORTHOPAEDIC SURGERY

## 2023-02-15 RX ORDER — LIDOCAINE HYDROCHLORIDE 10 MG/ML
10 INJECTION, SOLUTION INFILTRATION; PERINEURAL ONCE
Status: COMPLETED | OUTPATIENT
Start: 2023-02-15 | End: 2023-02-15

## 2023-02-15 RX ORDER — BUPIVACAINE HYDROCHLORIDE 2.5 MG/ML
10 INJECTION, SOLUTION EPIDURAL; INFILTRATION; INTRACAUDAL ONCE
Status: COMPLETED | OUTPATIENT
Start: 2023-02-15 | End: 2023-02-15

## 2023-02-15 RX ORDER — METHYLPREDNISOLONE SODIUM SUCCINATE 125 MG/2ML
80 INJECTION, POWDER, LYOPHILIZED, FOR SOLUTION INTRAMUSCULAR; INTRAVENOUS
Status: COMPLETED | OUTPATIENT
Start: 2023-02-15 | End: 2023-02-15

## 2023-02-15 RX ADMIN — BUPIVACAINE HYDROCHLORIDE 5 ML: 2.5 INJECTION, SOLUTION EPIDURAL; INFILTRATION; INTRACAUDAL; PERINEURAL at 12:09

## 2023-02-15 RX ADMIN — IOPAMIDOL 0.5 ML: 612 INJECTION, SOLUTION INTRAVENOUS at 12:09

## 2023-02-15 RX ADMIN — METHYLPREDNISOLONE SODIUM SUCCINATE 80 MG: 125 INJECTION, POWDER, LYOPHILIZED, FOR SOLUTION INTRAMUSCULAR; INTRAVENOUS at 12:09

## 2023-02-15 RX ADMIN — LIDOCAINE HYDROCHLORIDE 1 ML: 10 INJECTION, SOLUTION INFILTRATION; PERINEURAL at 12:09

## 2023-02-17 RX ORDER — AMLODIPINE BESYLATE 5 MG/1
TABLET ORAL
Qty: 90 TABLET | Refills: 1 | Status: SHIPPED | OUTPATIENT
Start: 2023-02-17 | End: 2023-03-09 | Stop reason: SDUPTHER

## 2023-02-25 ENCOUNTER — APPOINTMENT (OUTPATIENT)
Dept: GENERAL RADIOLOGY | Facility: HOSPITAL | Age: 73
End: 2023-02-25
Payer: MEDICARE

## 2023-02-25 ENCOUNTER — APPOINTMENT (OUTPATIENT)
Dept: CT IMAGING | Facility: HOSPITAL | Age: 73
End: 2023-02-25
Payer: MEDICARE

## 2023-02-25 ENCOUNTER — HOSPITAL ENCOUNTER (EMERGENCY)
Facility: HOSPITAL | Age: 73
Discharge: HOME | End: 2023-02-25
Attending: EMERGENCY MEDICINE | Admitting: EMERGENCY MEDICINE
Payer: MEDICARE

## 2023-02-25 VITALS
HEART RATE: 62 BPM | DIASTOLIC BLOOD PRESSURE: 75 MMHG | SYSTOLIC BLOOD PRESSURE: 137 MMHG | BODY MASS INDEX: 34.96 KG/M2 | TEMPERATURE: 98.1 F | HEIGHT: 62 IN | WEIGHT: 190 LBS | OXYGEN SATURATION: 98 % | RESPIRATION RATE: 16 BRPM

## 2023-02-25 DIAGNOSIS — S00.83XA CONTUSION OF FACE, INITIAL ENCOUNTER: ICD-10-CM

## 2023-02-25 DIAGNOSIS — S63.502A SPRAIN OF LEFT WRIST, INITIAL ENCOUNTER: ICD-10-CM

## 2023-02-25 DIAGNOSIS — S16.1XXA STRAIN OF NECK MUSCLE, INITIAL ENCOUNTER: ICD-10-CM

## 2023-02-25 DIAGNOSIS — S80.02XA CONTUSION OF LEFT KNEE, INITIAL ENCOUNTER: ICD-10-CM

## 2023-02-25 DIAGNOSIS — S09.90XA INJURY OF HEAD, INITIAL ENCOUNTER: Primary | ICD-10-CM

## 2023-02-25 PROCEDURE — 72125 CT NECK SPINE W/O DYE: CPT

## 2023-02-25 PROCEDURE — 70486 CT MAXILLOFACIAL W/O DYE: CPT

## 2023-02-25 PROCEDURE — 99283 EMERGENCY DEPT VISIT LOW MDM: CPT

## 2023-02-25 PROCEDURE — 73110 X-RAY EXAM OF WRIST: CPT

## 2023-02-25 PROCEDURE — 99284 EMERGENCY DEPT VISIT MOD MDM: CPT

## 2023-02-25 PROCEDURE — 70450 CT HEAD/BRAIN W/O DYE: CPT

## 2023-02-25 PROCEDURE — 73560 X-RAY EXAM OF KNEE 1 OR 2: CPT

## 2023-03-07 RX ORDER — ARIPIPRAZOLE 5 MG/1
TABLET ORAL
COMMUNITY
Start: 2023-02-21 | End: 2023-03-07

## 2023-03-08 ENCOUNTER — ON CAMPUS - OUTPATIENT (OUTPATIENT)
Dept: URBAN - METROPOLITAN AREA HOSPITAL 114 | Facility: HOSPITAL | Age: 73
End: 2023-03-08

## 2023-03-08 ENCOUNTER — ANESTHESIA EVENT (OUTPATIENT)
Dept: GASTROENTEROLOGY | Facility: HOSPITAL | Age: 73
End: 2023-03-08
Payer: MEDICARE

## 2023-03-08 ENCOUNTER — ANESTHESIA (OUTPATIENT)
Dept: GASTROENTEROLOGY | Facility: HOSPITAL | Age: 73
End: 2023-03-08
Payer: MEDICARE

## 2023-03-08 ENCOUNTER — HOSPITAL ENCOUNTER (OUTPATIENT)
Facility: HOSPITAL | Age: 73
Setting detail: HOSPITAL OUTPATIENT SURGERY
Discharge: HOME | End: 2023-03-08
Attending: INTERNAL MEDICINE | Admitting: INTERNAL MEDICINE
Payer: MEDICARE

## 2023-03-08 VITALS
HEART RATE: 82 BPM | DIASTOLIC BLOOD PRESSURE: 82 MMHG | RESPIRATION RATE: 18 BRPM | SYSTOLIC BLOOD PRESSURE: 133 MMHG | OXYGEN SATURATION: 95 % | TEMPERATURE: 97.4 F | WEIGHT: 183 LBS | BODY MASS INDEX: 33.68 KG/M2 | HEIGHT: 62 IN

## 2023-03-08 DIAGNOSIS — R10.32 LEFT LOWER QUADRANT PAIN: ICD-10-CM

## 2023-03-08 DIAGNOSIS — K57.30 DIVERTICULOSIS OF LARGE INTESTINE WITHOUT PERFORATION OR ABS: ICD-10-CM

## 2023-03-08 DIAGNOSIS — K57.92 DIVERTICULITIS: ICD-10-CM

## 2023-03-08 DIAGNOSIS — K31.7 POLYP OF STOMACH AND DUODENUM: ICD-10-CM

## 2023-03-08 DIAGNOSIS — R13.10 DYSPHAGIA, UNSPECIFIED: ICD-10-CM

## 2023-03-08 DIAGNOSIS — R13.10 DYSPHAGIA: ICD-10-CM

## 2023-03-08 DIAGNOSIS — K44.9 DIAPHRAGMATIC HERNIA WITHOUT OBSTRUCTION OR GANGRENE: ICD-10-CM

## 2023-03-08 DIAGNOSIS — K22.2 ESOPHAGEAL OBSTRUCTION: ICD-10-CM

## 2023-03-08 DIAGNOSIS — R10.12 LEFT UPPER QUADRANT PAIN: ICD-10-CM

## 2023-03-08 PROCEDURE — 43450 DILATE ESOPHAGUS 1/MULT PASS: CPT | Performed by: INTERNAL MEDICINE

## 2023-03-08 PROCEDURE — 88305 TISSUE EXAM BY PATHOLOGIST: CPT | Performed by: INTERNAL MEDICINE

## 2023-03-08 PROCEDURE — 43239 EGD BIOPSY SINGLE/MULTIPLE: CPT | Performed by: INTERNAL MEDICINE

## 2023-03-08 PROCEDURE — 45380 COLONOSCOPY AND BIOPSY: CPT | Performed by: INTERNAL MEDICINE

## 2023-03-08 PROCEDURE — 25010000002 PROPOFOL 10 MG/ML EMULSION: Performed by: NURSE ANESTHETIST, CERTIFIED REGISTERED

## 2023-03-08 RX ORDER — PROPOFOL 10 MG/ML
VIAL (ML) INTRAVENOUS CONTINUOUS PRN
Status: DISCONTINUED | OUTPATIENT
Start: 2023-03-08 | End: 2023-03-08 | Stop reason: SURG

## 2023-03-08 RX ORDER — SODIUM CHLORIDE, SODIUM LACTATE, POTASSIUM CHLORIDE, CALCIUM CHLORIDE 600; 310; 30; 20 MG/100ML; MG/100ML; MG/100ML; MG/100ML
1000 INJECTION, SOLUTION INTRAVENOUS CONTINUOUS
Status: DISCONTINUED | OUTPATIENT
Start: 2023-03-08 | End: 2023-03-08 | Stop reason: HOSPADM

## 2023-03-08 RX ORDER — LIDOCAINE HYDROCHLORIDE 20 MG/ML
INJECTION, SOLUTION INFILTRATION; PERINEURAL AS NEEDED
Status: DISCONTINUED | OUTPATIENT
Start: 2023-03-08 | End: 2023-03-08 | Stop reason: SURG

## 2023-03-08 RX ORDER — GLYCOPYRROLATE 0.2 MG/ML
INJECTION INTRAMUSCULAR; INTRAVENOUS AS NEEDED
Status: DISCONTINUED | OUTPATIENT
Start: 2023-03-08 | End: 2023-03-08 | Stop reason: SURG

## 2023-03-08 RX ORDER — SODIUM CHLORIDE 0.9 % (FLUSH) 0.9 %
10 SYRINGE (ML) INJECTION AS NEEDED
Status: DISCONTINUED | OUTPATIENT
Start: 2023-03-08 | End: 2023-03-08 | Stop reason: HOSPADM

## 2023-03-08 RX ORDER — PROPOFOL 10 MG/ML
VIAL (ML) INTRAVENOUS AS NEEDED
Status: DISCONTINUED | OUTPATIENT
Start: 2023-03-08 | End: 2023-03-08 | Stop reason: SURG

## 2023-03-08 RX ADMIN — GLYCOPYRROLATE 0.2 MG: 0.2 INJECTION INTRAMUSCULAR; INTRAVENOUS at 09:55

## 2023-03-08 RX ADMIN — LIDOCAINE HYDROCHLORIDE 60 MG: 20 INJECTION, SOLUTION INFILTRATION; PERINEURAL at 10:02

## 2023-03-08 RX ADMIN — SODIUM CHLORIDE, POTASSIUM CHLORIDE, SODIUM LACTATE AND CALCIUM CHLORIDE 1000 ML: 600; 310; 30; 20 INJECTION, SOLUTION INTRAVENOUS at 09:45

## 2023-03-08 RX ADMIN — PROPOFOL 180 MCG/KG/MIN: 10 INJECTION, EMULSION INTRAVENOUS at 10:02

## 2023-03-08 RX ADMIN — Medication 100 MG: at 10:02

## 2023-03-08 NOTE — ANESTHESIA POSTPROCEDURE EVALUATION
Patient: Katelyn Sr    Procedure Summary     Date: 03/08/23 Room / Location: [facility] ENDOSCOPY 5 / [facility] ENDOSCOPY    Anesthesia Start: 0955 Anesthesia Stop: 1033    Procedures:       ESOPHAGOGASTRODUODENOSCOPY with cold biopsies and 52 Fr Kam Dilatation (Esophagus)      COLONOSCOPY to cecum/TI with cold biopsies Diagnosis:     Surgeons: Hamilton Francisco MD Provider: Kevin Juan MD    Anesthesia Type: MAC ASA Status: 4          Anesthesia Type: MAC    Vitals  Vitals Value Taken Time   /73 03/08/23 1055   Temp 36.3 °C (97.4 °F) 03/08/23 1045   Pulse 88 03/08/23 1055   Resp 18 03/08/23 1055   SpO2 96 % 03/08/23 1055           Post Anesthesia Care and Evaluation    Patient location during evaluation: bedside  Patient participation: complete - patient participated  Level of consciousness: awake  Pain management: adequate    Airway patency: patent  Anesthetic complications: No anesthetic complications  PONV Status: controlled  Cardiovascular status: acceptable  Respiratory status: acceptable  Hydration status: acceptable    Comments: --------------------            03/08/23               1055     --------------------   BP:       114/73     Pulse:      88       Resp:       18       Temp:                SpO2:      96%      --------------------

## 2023-03-08 NOTE — DISCHARGE INSTRUCTIONS

## 2023-03-08 NOTE — H&P
[facility]   HISTORY AND PHYSICAL    Patient Name: Katelyn Sr  : 1950  MRN: [id number]  Primary Care Physician:  Pierre Chaudhry Jr., MD  Date of admission: 3/8/2023    Subjective   Subjective     Chief Complaint: dysphagia,  Diverticulitis, some diarrhea     History of Present Illness  Has had issues with cold liquids and swallowing.  Recurrent divertculitis   Review of Systems   All other systems reviewed and are negative.       Personal History     Past Medical History:   Diagnosis Date   • Abnormal ECG 18    Done in PCP office.  Hx of Atrial fib-?   • Arthritis of neck    • Cervical disc disorder     ACDF C6-7, 2003;  Dr. Tr Nix   • CTS (carpal tunnel syndrome)    • Depression    • Fracture of wrist    • Fracture, finger    • GERD (gastroesophageal reflux disease)    • Hip arthrosis    • Hyperlipidemia    • Hypersensitivity pneumonitis (HCC)     vs pulmonary fibrosis   • Hypertension    • Knee swelling    • Low back strain    • Lumbosacral disc disease    • Neck strain    • Neuroma of foot    • Non-STEMI (non-ST elevated myocardial infarction) ([facility])    • FUENTES on CPAP    • Presence of artificial intra-ocular lens    • Pulmonary fibrosis ([facility])     Being treated at [place]   • Scoliosis    • Thoracic disc disorder        Past Surgical History:   Procedure Laterality Date   • BACK SURGERY     • CARDIAC CATHETERIZATION N/A 2018    Procedure: Left Heart Cath;  Surgeon: Aleks Velazquez MD;  Location: [facility] CATH INVASIVE LOCATION;  Service: Cardiovascular   • CARDIAC CATHETERIZATION N/A 2018    Procedure: Coronary angiography;  Surgeon: Aleks Velazquez MD;  Location: [facility] CATH INVASIVE LOCATION;  Service: Cardiovascular   • CARDIAC CATHETERIZATION N/A 2018    Procedure: Left ventriculography;  Surgeon: Aleks Velazquez MD;  Location: [facility] CATH INVASIVE LOCATION;  Service: Cardiovascular   • CARDIAC CATHETERIZATION N/A 2018     Procedure: Peripheral angiography;  Surgeon: Aleks Velazquez MD;  Location: [facility] INVASIVE LOCATION;  Service: Cardiovascular   • EXPLORATORY LAPAROTOMY     • HEMORRHOIDECTOMY     • INTRAOCULAR LENS INSERTION Bilateral    • NECK SURGERY  12/03/2003    ACDF C6-7-Dr. Nix    • RECTAL SURGERY         Family History: family history includes Alzheimer's disease in her mother; Arrhythmia in her father; Atrial fibrillation in her sister; Diabetes in her sister; Heart attack in her father; Heart failure in her father; Stroke in her paternal grandmother. Otherwise pertinent FHx was reviewed and not pertinent to current issue.    Social History:  reports that she has never smoked. She has never used smokeless tobacco. She reports that she does not drink alcohol and does not use drugs.    Home Medications:  ARIPiprazole, DULoxetine, Nintedanib Esylate, amLODIPine, aspirin, calcium carbonate, carvedilol, escitalopram, famotidine, fluticasone, hydrALAZINE, lamoTRIgine, losartan, pantoprazole, pravastatin, and traZODone    Allergies:  Allergies   Allergen Reactions   • Erythromycin Rash   • Penicillins Rash       Objective    Objective     Vitals:   Heart Rate:  [82] 82  Resp:  [16] 16  BP: (142)/(92) 142/92    Physical Exam  HENT:      Right Ear: External ear normal.      Left Ear: External ear normal.      Mouth/Throat:      Pharynx: Oropharynx is clear.   Eyes:      Conjunctiva/sclera: Conjunctivae normal.   Cardiovascular:      Pulses: Normal pulses.   Pulmonary:      Effort: Pulmonary effort is normal.   Abdominal:      General: Abdomen is flat.   Skin:     General: Skin is warm and dry.   Neurological:      General: No focal deficit present.      Mental Status: She is alert.   Psychiatric:         Mood and Affect: Mood normal.         Result Review    Result Review:  I have personally reviewed the results from the time of this admission to 3/8/2023 09:59 EST and agree with these findings:  []   Laboratory list / accordion  []  Microbiology  []  Radiology  []  EKG/Telemetry   []  Cardiology/Vascular   []  Pathology  []  Old records  []  Other:  Most notable findings include:      Assessment & Plan   Assessment / Plan     Brief Patient Summary:  Katelyn Sr is a 72 y.o. female who   Dysphagia  Diverticulitis      Active Hospital Problems:  There are no active hospital problems to display for this patient.    Plan: Upper and lower tract endoscopy, risks, alternatives and benefits dicussed  with patient and patient is agreeable to proceed.      DVT prophylaxis:  No DVT prophylaxis order currently exists.    CODE STATUS:       Admission Status:  I believe this patient meets outpatient  status.    Hamilton Francisco MD

## 2023-03-08 NOTE — ANESTHESIA PREPROCEDURE EVALUATION
Anesthesia Evaluation     Patient summary reviewed and Nursing notes reviewed   NPO Solid Status: > 8 hours             Airway   Mallampati: II  TM distance: >3 FB  Neck ROM: full  no difficulty expected  Dental - normal exam     Pulmonary - normal exam   (+) sleep apnea,   Cardiovascular - normal exam    (+) hypertension, past MI ,       Neuro/Psych  (+) psychiatric history Depression,    GI/Hepatic/Renal/Endo    (+) obesity,       Musculoskeletal     (+) back pain, neck pain,   Abdominal  - normal exam   Substance History - negative use     OB/GYN negative ob/gyn ROS         Other        ROS/Med Hx Other: pulm fibrosis                Anesthesia Plan    ASA 4     MAC       Anesthetic plan, risks, benefits, and alternatives have been provided, discussed and informed consent has been obtained with: patient.        CODE STATUS:

## 2023-03-09 ENCOUNTER — OFFICE VISIT (OUTPATIENT)
Dept: SURGERY | Facility: CLINIC | Age: 73
End: 2023-03-09
Payer: MEDICARE

## 2023-03-09 ENCOUNTER — OFFICE VISIT (OUTPATIENT)
Dept: CARDIOLOGY | Facility: CLINIC | Age: 73
End: 2023-03-09
Payer: MEDICARE

## 2023-03-09 VITALS
WEIGHT: 186.8 LBS | SYSTOLIC BLOOD PRESSURE: 124 MMHG | DIASTOLIC BLOOD PRESSURE: 72 MMHG | HEIGHT: 62 IN | BODY MASS INDEX: 34.37 KG/M2 | HEART RATE: 96 BPM

## 2023-03-09 VITALS — BODY MASS INDEX: 34.04 KG/M2 | WEIGHT: 185 LBS | HEIGHT: 62 IN

## 2023-03-09 DIAGNOSIS — E78.2 MIXED HYPERLIPIDEMIA: ICD-10-CM

## 2023-03-09 DIAGNOSIS — K21.9 GASTROESOPHAGEAL REFLUX DISEASE, UNSPECIFIED WHETHER ESOPHAGITIS PRESENT: ICD-10-CM

## 2023-03-09 DIAGNOSIS — I10 ESSENTIAL HYPERTENSION: Primary | ICD-10-CM

## 2023-03-09 DIAGNOSIS — R13.10 DYSPHAGIA, UNSPECIFIED TYPE: Primary | ICD-10-CM

## 2023-03-09 DIAGNOSIS — K44.9 HIATAL HERNIA: ICD-10-CM

## 2023-03-09 DIAGNOSIS — K22.2 SCHATZKI'S RING: ICD-10-CM

## 2023-03-09 DIAGNOSIS — K57.92 DIVERTICULITIS: ICD-10-CM

## 2023-03-09 LAB
LAB AP CASE REPORT: NORMAL
PATH REPORT.FINAL DX SPEC: NORMAL
PATH REPORT.GROSS SPEC: NORMAL

## 2023-03-09 PROCEDURE — 1160F RVW MEDS BY RX/DR IN RCRD: CPT | Performed by: PHYSICIAN ASSISTANT

## 2023-03-09 PROCEDURE — 1159F MED LIST DOCD IN RCRD: CPT | Performed by: PHYSICIAN ASSISTANT

## 2023-03-09 PROCEDURE — 3074F SYST BP LT 130 MM HG: CPT | Performed by: NURSE PRACTITIONER

## 2023-03-09 PROCEDURE — 93000 ELECTROCARDIOGRAM COMPLETE: CPT | Performed by: NURSE PRACTITIONER

## 2023-03-09 PROCEDURE — 99204 OFFICE O/P NEW MOD 45 MIN: CPT | Performed by: PHYSICIAN ASSISTANT

## 2023-03-09 PROCEDURE — 99214 OFFICE O/P EST MOD 30 MIN: CPT | Performed by: NURSE PRACTITIONER

## 2023-03-09 PROCEDURE — 3078F DIAST BP <80 MM HG: CPT | Performed by: NURSE PRACTITIONER

## 2023-03-09 RX ORDER — AMLODIPINE BESYLATE 5 MG/1
5 TABLET ORAL DAILY
Qty: 90 TABLET | Refills: 3 | Status: SHIPPED | OUTPATIENT
Start: 2023-03-09

## 2023-03-09 RX ORDER — CIPROFLOXACIN 500 MG/1
500 TABLET, FILM COATED ORAL 2 TIMES DAILY
COMMUNITY

## 2023-03-09 RX ORDER — METRONIDAZOLE 500 MG/1
TABLET ORAL
COMMUNITY
Start: 2023-02-21

## 2023-03-09 RX ORDER — HYDRALAZINE HYDROCHLORIDE 50 MG/1
50 TABLET, FILM COATED ORAL 2 TIMES DAILY
Qty: 180 TABLET | Refills: 3 | Status: SHIPPED | OUTPATIENT
Start: 2023-03-09

## 2023-03-09 NOTE — PROGRESS NOTES
Date of Office Visit: 2023  Encounter Provider: CARLIE Gold  Place of Service: [facility] CARDIOLOGY  Patient Name: Katelyn Sr  :1950    No chief complaint on file.  : Follow-up    HPI: Katelyn Sr is a 72 y.o. female who is a patient of Dr. Velazquez.  She is new to me today and presents for a 6-month office follow-up.  He has a history of hypertension, hyperlipidemia, acute kidney injury in the setting of chlorthalidone usage.  She currently follows with pulmonary department at [facility] secondary to her interstitial lung disease.    She has some chronic dependent edema that improves with elevation.  She also has some chronic dyspnea on exertion.  On last office visit with Dr. Velazquez 8/15/2022, blood pressures been very well controlled.  2D echocardiogram 2022 shows normal LV function, no valvular disease, normal RV size and systolic function and no pulmonary hypertension.      Patient underwent colonoscopy and upper GI yesterday for dysphagia and diarrhea.  She was found to have small hiatal hernia.  She also was found to have mild Schatzki ring at the GE junction for which dilation was performed.    Ms. Sr presents today with no new complaints.  She continues to have some chronic dyspnea with exertion.  She also has some dependent edema that worsens throughout the day and improves in the morning which has not changed.  Her blood pressure is well controlled however she does not monitor her blood pressure at home.  She states that it is always very good when she has any office appointments.  She continues to follow with [facility] pulmonary every 3 months for her interstitial disease that has been diagnosed as hypersensitivity pneumonitis.  EKG is stable and shows no ischemia.  Every now and then she will feel palpitations which have no associated symptoms.      Previous testing and notes have been reviewed by me.   Past Medical History:    Diagnosis Date   • Abnormal ECG 5/21/18    Done in PCP office.  Hx of Atrial fib-?2007   • Arthritis of neck    • Atrial fibrillation (HCC)    • Cervical disc disorder 8/03    ACDF C6-7, 12/03/2003;  Dr. 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   • CTS (carpal tunnel syndrome)    • DDD (degenerative disc disease), lumbosacral 11/09/2018   • Depression    • Fibrosis of lung ([facility])    • Fracture of wrist    • Fracture, finger    • Generalized anxiety disorder 07/14/2020   • GERD (gastroesophageal reflux disease)    • Hip arthrosis    • Hyperlipidemia    • Hypersensitivity pneumonitis ([facility])     vs pulmonary fibrosis   • Hypersomnia due to another medical condition 10/28/2017   • Hypertension    • Knee swelling    • Low back strain    • Lower back pain 07/26/2019   • Myocardial infarction ([facility])    • Neck strain    • Neuroma of foot    • Non-STEMI (non-ST elevated myocardial infarction) ([facility]) 05/21/2018   • Nonsenile cataract    • FUENTES on CPAP 10/19/2017   • Palpitations 07/23/2019   • Peripheral vestibulopathy of both ears 07/14/2020   • Presence of artificial intra-ocular lens    • Pulmonary fibrosis ([facility])     Being treated at [facility]   • Scoliosis    • Sleep apnea    • Thoracic disc disorder    • Thyroid disease    • Vertigo 07/14/2020   • Vestibular neuronitis 07/17/2020       Past Surgical History:   Procedure Laterality Date   • CARDIAC CATHETERIZATION N/A 05/22/2018    Procedure: Left Heart Cath;  Surgeon: Aleks Velazquez MD;  Location: [facility] CATH INVASIVE LOCATION;  Service: Cardiovascular   • CARDIAC CATHETERIZATION N/A 05/22/2018    Procedure: Coronary angiography;  Surgeon: Aleks Velazquez MD;  Location:  [facility] CATH INVASIVE LOCATION;  Service: Cardiovascular   • CARDIAC CATHETERIZATION N/A 05/22/2018    Procedure: Left ventriculography;  Surgeon: Aleks Velazquez MD;  Location: [facility] CATH  INVASIVE LOCATION;  Service: Cardiovascular   • CARDIAC CATHETERIZATION N/A 2018    Procedure: Peripheral angiography;  Surgeon: Aleks Velazquez MD;  Location:  [facility] CATH INVASIVE LOCATION;  Service: Cardiovascular   • CATARACT EXTRACTION WITH INTRAOCULAR LENS IMPLANT Bilateral    • COLONOSCOPY N/A 2023    Procedure: COLONOSCOPY to cecum/TI with cold biopsies;  Surgeon: Hamilton Francisco MD;  Location:  [facility] ENDOSCOPY;  Service: Gastroenterology;  Laterality: N/A;  pre- diverticulitis  post- diverticulosis with stricture   • DIAGNOSTIC LAPAROSCOPY      r/o endometriosis   • ENDOSCOPY N/A 2023    Procedure: ESOPHAGOGASTRODUODENOSCOPY with cold biopsies and 52 Fr Kam Dilatation;  Surgeon: Hamilton Francisco MD;  [facility];  Service: Gastroenterology;  Laterality: N/A;  pre- dysphagia  post- hiatal hernia, gastric polyps, esophageal ring @ 30   • NECK SURGERY  2003    ACDF C6-7-Dr. Nix    • RECTAL SURGERY      fistula/abscess/hemorrhoid       Social History     Socioeconomic History   • Marital status: Single   • Number of children: 0   • Years of education: BS    Tobacco Use   • Smoking status: Never   • Smokeless tobacco: Never   • Tobacco comments:     caffeine use: TEA OCCASSIONALLY   Vaping Use   • Vaping Use: Never used   Substance and Sexual Activity   • Alcohol use: No   • Drug use: Never   • Sexual activity: Not Currently       Family History   Problem Relation Age of Onset   • Alzheimer's disease Mother    • Heart failure Father    • Arrhythmia Father    • Heart attack Father         MI at age 56,  at 63.   • Diabetes Sister    • Atrial fibrillation Sister    • Colon cancer Maternal Grandmother    • Stroke Paternal Grandmother    • Malig Hyperthermia Neg Hx        Review of Systems   Constitutional: Negative. Negative for malaise/fatigue.   HENT: Negative.    Eyes: Negative.    Cardiovascular: Negative.    Respiratory: Negative.     Endocrine: Negative.    Hematologic/Lymphatic: Negative.    Skin: Negative.    Musculoskeletal: Negative.    Gastrointestinal: Negative.    Genitourinary: Negative.    Neurological: Negative.    Psychiatric/Behavioral: Negative.    Allergic/Immunologic: Negative.        Allergies   Allergen Reactions   • Erythromycin Rash   • Penicillins Rash         Current Outpatient Medications:   •  amLODIPine (NORVASC) 5 MG tablet, TAKE 1 TABLET BY MOUTH EVERY DAY, Disp: 90 tablet, Rfl: 1  •  ARIPiprazole (ABILIFY) 2 MG tablet, Take 1 tablet by mouth Every Morning., Disp: , Rfl:   •  aspirin 81 MG EC tablet, Take 1 tablet by mouth Daily. PT STATES *NOT CURRENTLY TAKING (Patient not taking: Reported on 3/9/2023), Disp: , Rfl:   •  calcium carbonate (OS-SUZETTE) 1250 (500 Ca) MG chewable tablet, Chew 1 tablet Daily As Needed., Disp: , Rfl:   •  carvedilol (COREG) 25 MG tablet, TAKE 1 TABLET BY MOUTH TWICE A DAY, Disp: 180 tablet, Rfl: 3  •  ciprofloxacin (CIPRO) 500 MG tablet, Take 1 tablet by mouth 2 (Two) Times a Day., Disp: , Rfl:   •  DULoxetine (CYMBALTA) 60 MG capsule, Take 2 capsules by mouth Every Night., Disp: , Rfl:   •  escitalopram (LEXAPRO) 10 MG tablet, Take 1 tablet by mouth Daily., Disp: , Rfl:   •  famotidine (PEPCID) 10 MG tablet, Take 1 tablet by mouth Every Night., Disp: , Rfl:   •  fluticasone (FLONASE) 50 MCG/ACT nasal spray, 2 sprays into the nostril(s) as directed by provider Daily., Disp: , Rfl:   •  hydrALAZINE (APRESOLINE) 50 MG tablet, TAKE 1 TABLET BY MOUTH TWICE A DAY, Disp: 180 tablet, Rfl: 1  •  lamoTRIgine (LaMICtal) 100 MG tablet, Take 1 tablet by mouth Every Night., Disp: , Rfl:   •  losartan (COZAAR) 100 MG tablet, TAKE 1 TABLET BY MOUTH EVERY DAY, Disp: 90 tablet, Rfl: 4  •  metroNIDAZOLE (FLAGYL) 500 MG tablet, TAKE 1 TABLET BY MOUTH THREE TIMES A DAY AS DIRECTED FOR 14 DAYS, Disp: , Rfl:   •  Nintedanib Esylate (Ofev) 100 MG capsule, Take 1 capsule by mouth 2 (Two) Times a Day., Disp: , Rfl:    •  pantoprazole (PROTONIX) 40 MG EC tablet, Take 1 tablet by mouth Daily., Disp: , Rfl:   •  pravastatin (PRAVACHOL) 40 MG tablet, TAKE 1 TABLET BY MOUTH EVERY DAY AT NIGHT, Disp: 90 tablet, Rfl: 3  •  traZODone (DESYREL) 50 MG tablet, Take 25 mg by mouth At Night As Needed for Sleep., Disp: , Rfl:       Objective:     There were no vitals filed for this visit.  There is no height or weight on file to calculate BMI.     2D Echocardiogram 9/2/2022:  • Estimated left ventricular EF = 65% Left ventricular systolic function is normal.  • Left ventricular diastolic function was normal.  • Estimated right ventricular systolic pressure from tricuspid regurgitation is normal (<35 mmHg).  • No valvular disease  • Normal RV size and systolic function  • No pulmonary hypertension    48-hour Holter monitor 7/26/2019:  Relatively benign. Sinus with rare atrial and ventricular ectopic beats. 11 atrial runs with the longest lasting 8 beats.  No atrial fibrillation.      2D Echocardiogram 5/23/2018:  • Left ventricular systolic function is normal. Calculated EF = 59%. Estimated EF was in agreement with the calculated EF. Estimated EF = 59%. Normal left ventricular cavity size and wall thickness noted. All left ventricular wall segments contract normally. Left ventricular diastolic dysfunction is noted (grade I) consistent with impaired relaxation.  • Trace mitral valve regurgitation is present.  • Physiologic tricuspid valve regurgitation is present. Estimated right ventricular systolic pressure from tricuspid regurgitation is normal (<35 mmHg). Calculated right ventricular systolic pressure from tricuspid regurgitation is 25 mmHg.    Left heart cath 5/22/2018:  Left main:  Large-caliber vessel that bifurcates to an LAD and circumflex coronary artery.  Left main coronary artery is normal  LAD: Large-caliber vessel that gives rise to a small caliber diagonal branch in the proximal and mid segment.  The LAD is normal.  LCX: Large  caliber vessel that gives rise to a medium caliber OM1 and OM 2.  Normal  RCA: Large caliber vessel that gives rise to a small caliber PDA.  Normal     Left ventricular angiography: Normal left ventricular size and systolic function.  Ejection fraction 55%.  Distal inferior wall hypokinesis.  Likely catheter induced     PHYSICAL EXAM:    Constitutional:       Appearance: Healthy appearance. Not in distress.   Neck:      Vascular: No JVR. JVD normal.   Pulmonary:      Effort: Pulmonary effort is normal.      Breath sounds: Normal breath sounds. No wheezing. No rhonchi. No rales.   Chest:      Chest wall: Not tender to palpatation.   Cardiovascular:      PMI at left midclavicular line. Normal rate. Regular rhythm. Normal S1. Normal S2.      Murmurs: There is no murmur.      No gallop. No click. No rub.      Comments: Lysed lower extremity edema, nonpitting  Pulses:     Intact distal pulses.   Edema:     Peripheral edema absent.   Abdominal:      General: Bowel sounds are normal.      Palpations: Abdomen is soft.      Tenderness: There is no abdominal tenderness.   Musculoskeletal: Normal range of motion.         General: No tenderness. Skin:     General: Skin is warm and dry.   Neurological:      General: No focal deficit present.      Mental Status: Alert and oriented to person, place and time.           ECG 12 Lead    Date/Time: 3/9/2023 3:54 PM  Performed by: Mai Hernandez APRN  Authorized by: Mai Hernandez APRN   Comparison: compared with previous ECG from 8/15/2022  Similar to previous ECG  Rhythm: sinus rhythm  Rate: normal  BPM: 96  ST Segments: ST segments normal  T Waves: T waves normal  QRS axis: normal                Assessment:       Diagnosis Plan   1. Essential hypertension        2. Mixed hyperlipidemia          No orders of the defined types were placed in this encounter.         Plan:       1.  Hypertension: Well-controlled on current medication  2.  Hyperlipidemia: Statin therapy.  Prior  myalgias with more potent statins  3.  Dyspnea on exertion: Normal LV function, no valvular disease and no pulmonary hypertension.  Likely secondary to interstitial lung disease and/or deconditioning.  Follows with pulmonary at [facility]  4.  Obstructive sleep: Compliant with CPAP    Ms. Sr will follow-up with Dr. Velazquez in 6 months.  She will call sooner for any questions or concerns.         Your medication list          Accurate as of March 9, 2023  1:57 PM. If you have any questions, ask your nurse or doctor.            CONTINUE taking these medications      Instructions Last Dose Given Next Dose Due   amLODIPine 5 MG tablet  Commonly known as: NORVASC      TAKE 1 TABLET BY MOUTH EVERY DAY       ARIPiprazole 2 MG tablet  Commonly known as: ABILIFY      Take 1 tablet by mouth Every Morning.       aspirin 81 MG EC tablet      Take 1 tablet by mouth Daily. PT STATES *NOT CURRENTLY TAKING       calcium carbonate 1250 (500 Ca) MG chewable tablet  Commonly known as: OS-SUZETTE      Chew 1 tablet Daily As Needed.       carvedilol 25 MG tablet  Commonly known as: COREG      TAKE 1 TABLET BY MOUTH TWICE A DAY       ciprofloxacin 500 MG tablet  Commonly known as: CIPRO      Take 1 tablet by mouth 2 (Two) Times a Day.       DULoxetine 60 MG capsule  Commonly known as: CYMBALTA      Take 2 capsules by mouth Every Night.       escitalopram 10 MG tablet  Commonly known as: LEXAPRO      Take 1 tablet by mouth Daily.       famotidine 10 MG tablet  Commonly known as: PEPCID      Take 1 tablet by mouth Every Night.       fluticasone 50 MCG/ACT nasal spray  Commonly known as: FLONASE      2 sprays into the nostril(s) as directed by provider Daily.       hydrALAZINE 50 MG tablet  Commonly known as: APRESOLINE      TAKE 1 TABLET BY MOUTH TWICE A DAY       lamoTRIgine 100 MG tablet  Commonly known as: LaMICtal      Take 1 tablet by mouth Every Night.       losartan 100 MG tablet  Commonly known as: COZAAR      TAKE 1 TABLET BY MOUTH  EVERY DAY       metroNIDAZOLE 500 MG tablet  Commonly known as: FLAGYL      TAKE 1 TABLET BY MOUTH THREE TIMES A DAY AS DIRECTED FOR 14 DAYS       Ofev 100 MG capsule  Generic drug: Nintedanib Esylate      Take 1 capsule by mouth 2 (Two) Times a Day.       pantoprazole 40 MG EC tablet  Commonly known as: PROTONIX      Take 1 tablet by mouth Daily.       pravastatin 40 MG tablet  Commonly known as: PRAVACHOL      TAKE 1 TABLET BY MOUTH EVERY DAY AT NIGHT       traZODone 50 MG tablet  Commonly known as: DESYREL      Take 25 mg by mouth At Night As Needed for Sleep.                As always, it has been a pleasure to participate in your patient's care.      Sincerely,       CARLIE Rollins

## 2023-03-10 NOTE — PROGRESS NOTES
ASSESSMENT/PLAN:    This is a 72-year-old lady presenting with symptoms of dysphagia as well as longstanding GERD requiring both Protonix and Pepcid to manage.  To further evaluate her reflux and hiatal hernia, we will proceed with an upper GI.  Once we have the results of this we will discuss any further intervention at that time.  As for her diverticulitis Dr. Crane and I had a lengthy discussion of her options with her to include surgical intervention versus expectant watching and waiting.  As she states that she is able to manage her episodes fairly easily with antibiotics and they have a quick resolution, she prefers to watch and wait and avoid surgery if at all possible.  We gave her the signs and symptoms to look for as far as worsening diverticulitis or stricture.  Once we have the results of the upper GI we will contact her.  She may contact us if anything changes for her.  All questions were answered and she was willing to proceed with all recommendations.    CC:     Dysphagia, diverticulitis, GERD    HPI:    This is a 72-year-old lady presenting to the office today at the request of Dr. Pierre Chaudhry for consultation.  She presents today with multiple complaints the most significant being a sensation of dysphagia predominantly with liquids.  She states that this occurs almost exclusively in the morning with colder or tepid liquids.  She feels that this is likely representative of esophageal spasms, however she does have a history of a Schatzki's ring which she previously has had dilated, most recently yesterday.  She does take Protonix and Pepcid at maximum dosages in order to control her GERD, stating that if she does miss a dose her GERD is significant.  She previously was diagnosed with a small hiatal hernia.  She has discussed episodes of awakening at night coughing and choking.  She also is being worked up for interstitial lung disease with a concern for aspiration as the cause.  In addition to  this she also has a 2-year history of recurrent diverticulitis, stating that she has had approximately 8 episodes of mild diverticulitis in this 2-year stretch.  When she has her episodes her symptoms are left lower quadrant abdominal pain, bloating and diarrhea.  She did have a CT scan demonstrating sigmoid diverticulitis in July 2022 followed by a repeat CT scan in August 2022 demonstrating a new area within the sigmoid colon with diverticulitis.  Normally her symptoms resolve with Cipro and Flagyl.  She denies having fever, chills, abdominal distention, abdominal pain currently, dark tarry stools or bright red blood with her bowel movement.  A recent colonoscopy demonstrated diverticulosis with concern for possible diverticular stricture formation.    ENDOSCOPY:   • Colonoscopy 3/8/2023 hyperplastic polyp, diverticulosis  • EGD 3/8/23 hiatal hernia, gastric polyps, Schatzki's ring    RADIOLOGY:   · CT scan of the abdomen pelvis 8/18/2022: Previously demonstrated diverticulitis has completely resolved, there is now a new area of diverticulitis involving a different portion of the sigmoid colon  · CT scan of the abdomen pelvis 7/29/2022: Uncomplicated diverticulitis of the sigmoid/rectosigmoid colon with bowel wall thickening and pericolonic inflammatory changes    SOCIAL HISTORY:   • Denies tobacco use  • Denies alcohol use    FAMILY HISTORY:    • Colorectal cancer: Maternal grandmother    PREVIOUS ABDOMINAL SURGERY    • Diagnostic laparoscopy endometriosis 1990's    OTHER SURGERY  Past Surgical History:   Procedure Laterality Date   • CARDIAC CATHETERIZATION N/A 05/22/2018    Procedure: Left Heart Cath;  Surgeon: Aleks Velazquez MD;  Location:  [facility] CATH INVASIVE LOCATION;  Service: Cardiovascular   • CARDIAC CATHETERIZATION N/A 05/22/2018    Procedure: Coronary angiography;  Surgeon: Aleks Velazquez MD;  Location:  [facility] CATH INVASIVE LOCATION;  Service: Cardiovascular   • CARDIAC CATHETERIZATION  N/A 05/22/2018    Procedure: Left ventriculography;  Surgeon: Aleks Velazquez MD;  Location:  [facility] CATH INVASIVE LOCATION;  Service: Cardiovascular   • CARDIAC CATHETERIZATION N/A 05/22/2018    Procedure: Peripheral angiography;  Surgeon: Aleks Velazquez MD;  Location:  [facility] CATH INVASIVE LOCATION;  Service: Cardiovascular   • CATARACT EXTRACTION WITH INTRAOCULAR LENS IMPLANT Bilateral    • COLONOSCOPY N/A 03/08/2023    Procedure: COLONOSCOPY to cecum/TI with cold biopsies;  Surgeon: Hamilton Francisco MD;  Location:  [facility] ENDOSCOPY;  Service: Gastroenterology;  Laterality: N/A;  pre- diverticulitis  post- diverticulosis with stricture   • DIAGNOSTIC LAPAROSCOPY  1990    r/o endometriosis   • ENDOSCOPY N/A 03/08/2023    Procedure: ESOPHAGOGASTRODUODENOSCOPY with cold biopsies and 52 Fr Kam Dilatation;  Surgeon: Hamilton Francisco MD;  [facility];  Service: Gastroenterology;  Laterality: N/A;  pre- dysphagia  post- hiatal hernia, gastric polyps, esophageal ring @ 30   • NECK SURGERY  12/03/2003    ACDF C6-7-Dr. Nix    • RECTAL SURGERY  1992    fistula/abscess/hemorrhoid       PAST MEDICAL HISTORY:    Past Medical History:   Diagnosis Date   • Abnormal ECG 5/21/18    Done in PCP office.  Hx of Atrial fib-?2007   • Arthritis of neck    • Atrial fibrillation (HCC)    • Cervical disc disorder 8/03    ACDF C6-7, 12/03/2003;  Dr. 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   • CTS (carpal tunnel syndrome)    • DDD (degenerative disc disease), lumbosacral 11/09/2018   • Depression    • Fibrosis of lung (HCC)    • Fracture of wrist    • Fracture, finger    • Generalized anxiety disorder 07/14/2020   • GERD (gastroesophageal reflux disease)    • Hip arthrosis    • Hyperlipidemia    • Hypersensitivity pneumonitis (HCC)     vs pulmonary fibrosis   • Hypersomnia due to another medical condition  10/28/2017   • Hypertension    • Knee swelling    • Low back strain    • Lower back pain 07/26/2019   • Myocardial infarction (HCC)    • Neck strain    • Neuroma of foot    • Non-STEMI (non-ST elevated myocardial infarction) (HCC) 05/21/2018   • Nonsenile cataract    • FUENTES on CPAP 10/19/2017   • Palpitations 07/23/2019   • Peripheral vestibulopathy of both ears 07/14/2020   • Presence of artificial intra-ocular lens    • Pulmonary fibrosis (HCC)     Being treated at [facility]   • Scoliosis    • Sleep apnea    • Thoracic disc disorder    • Thyroid disease    • Vertigo 07/14/2020   • Vestibular neuronitis 07/17/2020       MEDICATIONS:     Current Outpatient Medications:   •  ARIPiprazole (ABILIFY) 2 MG tablet, Take 1 tablet by mouth Every Morning., Disp: , Rfl:   •  calcium carbonate (OS-SUZETTE) 1250 (500 Ca) MG chewable tablet, Chew 1 tablet Daily As Needed., Disp: , Rfl:   •  carvedilol (COREG) 25 MG tablet, TAKE 1 TABLET BY MOUTH TWICE A DAY, Disp: 180 tablet, Rfl: 3  •  ciprofloxacin (CIPRO) 500 MG tablet, Take 1 tablet by mouth 2 (Two) Times a Day., Disp: , Rfl:   •  DULoxetine (CYMBALTA) 60 MG capsule, Take 2 capsules by mouth Every Night., Disp: , Rfl:   •  escitalopram (LEXAPRO) 10 MG tablet, Take 1 tablet by mouth Daily., Disp: , Rfl:   •  famotidine (PEPCID) 10 MG tablet, Take 1 tablet by mouth Every Night., Disp: , Rfl:   •  fluticasone (FLONASE) 50 MCG/ACT nasal spray, 2 sprays into the nostril(s) as directed by provider Daily., Disp: , Rfl:   •  lamoTRIgine (LaMICtal) 100 MG tablet, Take 1 tablet by mouth Every Night., Disp: , Rfl:   •  losartan (COZAAR) 100 MG tablet, TAKE 1 TABLET BY MOUTH EVERY DAY, Disp: 90 tablet, Rfl: 4  •  metroNIDAZOLE (FLAGYL) 500 MG tablet, TAKE 1 TABLET BY MOUTH THREE TIMES A DAY AS DIRECTED FOR 14 DAYS, Disp: , Rfl:   •  Nintedanib Esylate (Ofev) 100 MG capsule, Take 1 capsule by mouth 2 (Two) Times a Day., Disp: , Rfl:   •  pantoprazole (PROTONIX) 40 MG EC tablet, Take 1 tablet  "by mouth Daily., Disp: , Rfl:   •  pravastatin (PRAVACHOL) 40 MG tablet, TAKE 1 TABLET BY MOUTH EVERY DAY AT NIGHT, Disp: 90 tablet, Rfl: 3  •  traZODone (DESYREL) 50 MG tablet, Take 25 mg by mouth At Night As Needed for Sleep., Disp: , Rfl:   •  amLODIPine (NORVASC) 5 MG tablet, Take 1 tablet by mouth Daily., Disp: 90 tablet, Rfl: 3  •  aspirin 81 MG EC tablet, Take 1 tablet by mouth Daily. PT STATES *NOT CURRENTLY TAKING, Disp: , Rfl:   •  hydrALAZINE (APRESOLINE) 50 MG tablet, Take 1 tablet by mouth 2 (Two) Times a Day., Disp: 180 tablet, Rfl: 3    ALLERGIES:   Allergies   Allergen Reactions   • Erythromycin Rash   • Penicillins Rash     Height 62\"  Weight 185  BMI 34.2  PHYSICAL EXAM:   • Constitutional: Well-developed well-nourished, no acute distress  • Vital signs:   o Height 62\"  o Weight 185  o BMI 34.2  • Neck: Supple, no palpable mass, trachea midline  • Respiratory: Clear to auscultation, normal inspiratory effort  • Cardiovascular: Regular rate, no murmur, no carotid bruit  • Gastrointestinal: Soft, nontender  • Psychiatric: Alert and oriented ×3, normal affect       Jose Mckinney PA-C    "

## 2023-03-17 ENCOUNTER — TREATMENT (OUTPATIENT)
Dept: PHYSICAL THERAPY | Facility: CLINIC | Age: 73
End: 2023-03-17
Payer: MEDICARE

## 2023-03-17 DIAGNOSIS — M21.6X2 ACQUIRED FOREFOOT PRONATION, LEFT: ICD-10-CM

## 2023-03-17 DIAGNOSIS — G89.29 CHRONIC FOOT PAIN, RIGHT: Primary | ICD-10-CM

## 2023-03-17 DIAGNOSIS — R26.2 DIFFICULTY WALKING: ICD-10-CM

## 2023-03-17 DIAGNOSIS — M21.6X1 ACQUIRED FOREFOOT PRONATION, RIGHT: ICD-10-CM

## 2023-03-17 DIAGNOSIS — R53.1 STRENGTH LOSS OF: ICD-10-CM

## 2023-03-17 DIAGNOSIS — M79.671 CHRONIC FOOT PAIN, RIGHT: Primary | ICD-10-CM

## 2023-03-17 PROCEDURE — 97162 PT EVAL MOD COMPLEX 30 MIN: CPT | Performed by: PHYSICAL THERAPIST

## 2023-03-17 PROCEDURE — 97110 THERAPEUTIC EXERCISES: CPT | Performed by: PHYSICAL THERAPIST

## 2023-03-17 NOTE — PROGRESS NOTES
Physical Therapy Initial Evaluation and Plan of Care      Patient: Katelyn Sr   : 1950  Diagnosis/ICD-10 Code:  Chronic foot pain, right [M79.671, G89.29]  Referring practitioner: Charles Beard*  Date of Initial Visit: 3/17/2023  Today's Date: 3/17/2023  Patient seen for 1 sessions           Subjective: Jina reports that for over two years she has experienced issues with her R foot.  These issues include pain, which recently has not been present due to a steroid injection, When she has pain she has difficulty walking for exercise, causes greater difficulty ambulating in the community for things like grocery shopping, navigating stairs, standing for greater than 15 minutes and have a sharp, searing pain when lying down.  She also has parasthesia in her foot and digits 3-5.    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  Jina enjoys cats and used to enjoy playing tennis prior to physical limitations    Objective     Observation  Feet, R foot bunion at the first MTP joint, hallux valgus deformity moderately, minor on the L foot.  During the gait cycle I observed excessive forefoot pronation B, R greater than the L.      DTRs; patellar B  Hypotonic/equal, Eleuterio's B trace/equal  Upper motor neuron: Babinski and Clonus were normal  Motor control: L2-S2 myotomes were 4+/5/equal B (isolated ankle MMT inversion L 4/5 and R 4-/5, eversion 4+ to 5/5, B)  Sensation: L1-S2 dermatomes were intact with perception to light touch, B    Functional Outcome Score: FAAM, 40/84=48% or a 52% perceived deficit    Treatment    Therapeutic  exercise  1. Seated towel scrunches, x 20, B  2. Seated arch raises x 20, B    NMR: verbal cues for exercise technique were given.    Patient education: I began her HEP with seated towel scrunches and seated arch raises.    Assessment & Plan     Assessment  Impairments: abnormal gait, activity intolerance, impaired physical strength, lacks appropriate home exercise program, pain with function and weight-bearing intolerance  Functional Limitations: walking, uncomfortable because of pain, standing, stooping and unable to perform repetitive tasks  Assessment details: Katelyn Sr is a 72 y.o. year-old female referred to physical therapy for chronic pain of the R foot. She presents with an evolving clinical presentation.  She has comorbidities to include soft/bony tissue adaptations to abnormal weight bearing mechanics in her feet.  She has no known personal factors  that may affect her progress in the plan of care.  Signs and symptoms are consistent with physical therapy diagnosis of chronic R foot pain, acquired forefeet pronation, strength loss of, pain/difficulty walking, difficulty navigating stairs and she will require education for self care.    Prognosis details: Further assessment of special tests and dural tests of the lumbar spine to rule out involvement in her R foot parasthesia will be done in upcoming visits.  Part of her treatment will consist of therapeutic exercises to improve function throughout the kinetic chain.     Goals  Plan Goals: STGs to be met in 4 weeks  1. Impressions for custom made orthotics are taken.   2. Therapeutic exercise is progressed with resisted ankle plantarflexion and inversion, long sitting, using theraband.   3. Orthotics arrive and break in period begins.     LTGs to be met in 12 weeks  1. Jina is wearing custom made orthotics comfortably for all weight bearing activities.   2. She is able to ambulate comfortably on level surface in the community and for exercise.  3.  She is independent with a HEP and education for self care.   4. FAAM deficit is improved to 30%.     Plan  Therapy options: will be seen for skilled therapy services  Planned modality interventions: ultrasound  Planned therapy interventions: manual therapy, neuromuscular re-education, orthotic fitting/training, soft tissue mobilization, strengthening, stretching, therapeutic activities, joint mobilization, home exercise program, functional ROM exercises, flexibility, body mechanics training, abdominal trunk stabilization, balance/weight-bearing training and ADL retraining  Frequency: 1x week  Duration in weeks: 12  Treatment plan discussed with: patient  Plan details: I will take impressions for custom made orthotics, progress exercises and assess ability with the exercises given today next visit.         Timed:  Manual Therapy:         mins  41133;  Therapeutic Exercise:    9     mins  85449;     Neuromuscular Nataliya:    1    mins  02267;    Therapeutic Activity:          mins  87863;     Gait Training:           mins  57348;     Ultrasound:          mins  49976;    Iontophoresis         mins 38681  Dry Needling        mins 56185/ 57861 (Self-pay)      Untimed:  Electrical Stimulation:         mins  56227 ( );  Traction:       mins  95088;   Low Eval          Mins  13053  Mod Eval     35     Mins  41464  High Eval                            Mins  42290    Timed Treatment:   10   mins   Total Treatment:     45   mins    PT SIGNATURE: Alessio Green PT     License Number: [place] [id number]    Electronically signed by Alessio Green PT, 03/17/23, 2:40 PM EDT    DATE TREATMENT INITIATED: 3/17/2023    Initial Certification  Certification Period: 6/15/2023  I certify that the therapy services are furnished while this patient is under my care.  The services outlined above are required by this patient, and will be reviewed every 90 days.     PHYSICIAN: Charles Beard MD   NPI: [id number]                                          DATE:     Please sign and return via fax to [phone] Thank you, [facility].

## 2023-03-21 RX ORDER — PRAVASTATIN SODIUM 40 MG
TABLET ORAL
Qty: 90 TABLET | Refills: 3 | Status: SHIPPED | OUTPATIENT
Start: 2023-03-21

## 2023-03-21 RX ORDER — CARVEDILOL 25 MG/1
TABLET ORAL
Qty: 180 TABLET | Refills: 3 | Status: SHIPPED | OUTPATIENT
Start: 2023-03-21

## 2023-03-21 RX ORDER — LOSARTAN POTASSIUM 100 MG/1
TABLET ORAL
Qty: 90 TABLET | Refills: 4 | Status: SHIPPED | OUTPATIENT
Start: 2023-03-21

## 2023-03-28 ENCOUNTER — TREATMENT (OUTPATIENT)
Dept: PHYSICAL THERAPY | Facility: CLINIC | Age: 73
End: 2023-03-28
Payer: MEDICARE

## 2023-03-28 DIAGNOSIS — M79.671 CHRONIC FOOT PAIN, RIGHT: Primary | ICD-10-CM

## 2023-03-28 DIAGNOSIS — M21.6X2 ACQUIRED FOREFOOT PRONATION, LEFT: ICD-10-CM

## 2023-03-28 DIAGNOSIS — M21.6X1 ACQUIRED FOREFOOT PRONATION, RIGHT: ICD-10-CM

## 2023-03-28 DIAGNOSIS — R53.1 STRENGTH LOSS OF: ICD-10-CM

## 2023-03-28 DIAGNOSIS — G89.29 CHRONIC FOOT PAIN, RIGHT: Primary | ICD-10-CM

## 2023-03-28 DIAGNOSIS — R26.2 DIFFICULTY WALKING: ICD-10-CM

## 2023-03-28 PROCEDURE — 97110 THERAPEUTIC EXERCISES: CPT | Performed by: PHYSICAL THERAPIST

## 2023-03-28 PROCEDURE — 97760 ORTHOTIC MGMT&TRAING 1ST ENC: CPT | Performed by: PHYSICAL THERAPIST

## 2023-03-28 PROCEDURE — 97112 NEUROMUSCULAR REEDUCATION: CPT | Performed by: PHYSICAL THERAPIST

## 2023-03-28 NOTE — PROGRESS NOTES
Physical Therapy Daily Treatment Note    [facility]  [address] [place] [address]  [phone] (phone)  [phone] (fax)    Patient: Katelyn Sr   : 1950  Diagnosis/ICD-10 Code:  Chronic foot pain, right [M79.671, G89.29]  Referring practitioner: Charles Beard*  Today's Date: 3/28/2023  Patient seen for 2 sessions           Subjective: Jina reports that she has observed cramping in her toes, both feet, and pull her foot into eversion.       Objective     Forefoot flexibility: B 70 degrees of passive supination    Treatment    Orthotic fit/train: impressions taken for custom made orthotics    Therapeutic exercise  1. Seated towel scrunches, x 20, B  2. Seated arch raises x 20, B  3. Ankle plantarflexion PRE, long sitting, blue theraband, x 20, B    NMR: verbal cues for exercises included to fully flex the hallux to increase the arch height.  With the arch raises cues to keep the calcaneus and first MTP joint planted while raising the arch up, holding and slowly releasing.  With the ankle plantarflexion to move the foot back to dorsiflexion of the ankle slowly and avoid deviating towards inversion or eversion.     Patient education: I issued blue theraband for her to do the ankle plantarflexion PRE at home    Assessment & Plan     Assessment    Assessment details: Jina has a hypermobile forefoot, B.  The orthotics will be adjusted to accommodate this aspect.  She progressed her HEP with plantarflexion PRE and needed verbal cues for exercise technique.      Plan  Plan details: Continue per treatment plan.            Timed:    Manual Therapy:         mins  39164;  Therapeutic Exercise:    22     mins  43433;     Neuromuscular Nataliya:    8    mins  26360;    Therapeutic Activity:          mins  63088;     Gait Training:           mins  76062;     Ultrasound:          mins  75817;    Electrical Stimulation:         mins  06574 (  );  Iontophoresis         mins 69989;  Aquatic Therapy         mins 03564;  Orthotic fit/train           15 mins 37776    Untimed:  Electrical Stimulation:         mins  05737 ( );  Traction:         mins  82618;   Dry Needling  (1-2 muscles)                 mins 20560 (Self-pay)  Dry Needling (3-4 muscles)      20561 (Self-pay)  Dry Needling Trial         DRYNDLTRIAL  (No Charge)    Timed Treatment:   45   mins   Total Treatment:     45   mins    Alessio Green PT  Physical Therapist    [place] License:[id number]

## 2023-04-07 ENCOUNTER — TELEPHONE (OUTPATIENT)
Dept: PHYSICAL THERAPY | Facility: CLINIC | Age: 73
End: 2023-04-07

## 2023-04-13 ENCOUNTER — HOSPITAL ENCOUNTER (OUTPATIENT)
Dept: GENERAL RADIOLOGY | Facility: HOSPITAL | Age: 73
Discharge: HOME | End: 2023-04-13
Admitting: PHYSICIAN ASSISTANT
Payer: MEDICARE

## 2023-04-13 PROCEDURE — A9270 NON-COVERED ITEM OR SERVICE: HCPCS | Performed by: PHYSICIAN ASSISTANT

## 2023-04-13 PROCEDURE — 63710000001 BARIUM SULFATE 96 % RECONSTITUTED SUSPENSION: Performed by: PHYSICIAN ASSISTANT

## 2023-04-13 PROCEDURE — 63710000001 BARIUM SULFATE 98 % RECONSTITUTED SUSPENSION: Performed by: PHYSICIAN ASSISTANT

## 2023-04-13 PROCEDURE — 63710000001 SOD BICARB-CITRIC ACID-SIMETHICONE 2.21-1.53-0.04 G PACK: Performed by: PHYSICIAN ASSISTANT

## 2023-04-13 PROCEDURE — 74220 X-RAY XM ESOPHAGUS 1CNTRST: CPT

## 2023-04-13 RX ADMIN — BARIUM SULFATE 135 ML: 980 POWDER, FOR SUSPENSION ORAL at 10:10

## 2023-04-13 RX ADMIN — BARIUM SULFATE 183 ML: 960 POWDER, FOR SUSPENSION ORAL at 10:10

## 2023-04-13 RX ADMIN — ANTACID/ANTIFLATULENT 1 PACKET: 380; 550; 10; 10 GRANULE, EFFERVESCENT ORAL at 10:10

## 2023-04-14 ENCOUNTER — TREATMENT (OUTPATIENT)
Dept: PHYSICAL THERAPY | Facility: CLINIC | Age: 73
End: 2023-04-14
Payer: MEDICARE

## 2023-04-14 DIAGNOSIS — M79.671 CHRONIC FOOT PAIN, RIGHT: Primary | ICD-10-CM

## 2023-04-14 DIAGNOSIS — M21.6X1 ACQUIRED FOREFOOT PRONATION, RIGHT: ICD-10-CM

## 2023-04-14 DIAGNOSIS — G89.29 CHRONIC FOOT PAIN, RIGHT: Primary | ICD-10-CM

## 2023-04-14 DIAGNOSIS — R53.1 STRENGTH LOSS OF: ICD-10-CM

## 2023-04-14 DIAGNOSIS — R26.2 DIFFICULTY WALKING: ICD-10-CM

## 2023-04-14 DIAGNOSIS — M21.6X2 ACQUIRED FOREFOOT PRONATION, LEFT: ICD-10-CM

## 2023-04-14 NOTE — PROGRESS NOTES
Physical Therapy Daily Treatment Note    [facility]  [address] [place] [address]  [phone] (phone)  [phone] (fax)    Patient: Katelyn Sr   : 1950  Diagnosis/ICD-10 Code:  Chronic foot pain, right [M79.671, G89.29]  Referring practitioner: Charles Beard*  Today's Date: 2023  Patient seen for 3 sessions           Subjective: Jina stated that     Objective     Orthotic check out:  In sitting the Love test for orthotic fit was performed.  Each orthotic maintained full contact with the arch into peak ankle plantarflexion.  I then trimmed the orthotics to fit her shoes, B    Therapeutic exercise  1. 160 feet of ambulation with the orthotics in the shoes  2. Runner's stretch, 30s x 2, B  3. Ankle plantarflexion, PRE, blue theraband, long sitting, x 15, B    NMR: verbal cues for the runner's stretch to improve technique.  With the ankle plantarflexion to keep the ankle from inverting/everting, and for eccentric control back into dorsiflexion.     Patient education:  I gave Jina instructions to add the runner's stretch to her HEP and gave instructions for general care of the orthotics, break in with the orthotics and suggested getting original yoga toes to stretch intrinsic soft tissue of her feet.     Assessment & Plan     Assessment    Assessment details: A total contact fit was observed with the orthotics.  It will take time for Jina's kinetic chain to adapt to the orthotics.  I progressed her ability for self care today by adding new education.     Plan  Plan details: Continue per treatment plan.                Timed:    Manual Therapy:         mins  81669;  Therapeutic Exercise:    15     mins  30246;     Neuromuscular Nataliya:    8    mins  13955;    Therapeutic Activity:          mins  67437;     Gait Training:           mins  29558;     Ultrasound:          mins  72722;    Electrical Stimulation:         mins  13649 (  );  Iontophoresis         mins 17533;  Aquatic Therapy         mins 27488;  Orthotic check out      20 mins 59361    Untimed:  Electrical Stimulation:         mins  54920 ( );  Traction:         mins  69962;   Dry Needling  (1-2 muscles)                 mins 20560 (Self-pay)  Dry Needling (3-4 muscles)      20561 (Self-pay)  Dry Needling Trial         DRYNDLTRIAL  (No Charge)    Timed Treatment:   43   mins   Total Treatment:     43   mins    Alessio Green PT  Physical Therapist    [place] License:[id number]

## 2023-05-12 ENCOUNTER — TREATMENT (OUTPATIENT)
Dept: PHYSICAL THERAPY | Facility: CLINIC | Age: 73
End: 2023-05-12
Payer: MEDICARE

## 2023-05-12 DIAGNOSIS — R26.2 DIFFICULTY WALKING: ICD-10-CM

## 2023-05-12 DIAGNOSIS — M21.6X2 ACQUIRED FOREFOOT PRONATION, LEFT: ICD-10-CM

## 2023-05-12 DIAGNOSIS — R53.1 STRENGTH LOSS OF: ICD-10-CM

## 2023-05-12 DIAGNOSIS — M21.6X1 ACQUIRED FOREFOOT PRONATION, RIGHT: ICD-10-CM

## 2023-05-12 DIAGNOSIS — M79.671 CHRONIC FOOT PAIN, RIGHT: Primary | ICD-10-CM

## 2023-05-12 DIAGNOSIS — G89.29 CHRONIC FOOT PAIN, RIGHT: Primary | ICD-10-CM

## 2023-05-12 NOTE — PROGRESS NOTES
Physical Therapy Daily Treatment/Reassessment Note    [facility]  [address] [place] [address]  [phone] (phone)  [phone] (fax)    Patient: Katelyn Sr   : 1950  Diagnosis/ICD-10 Code:  Chronic foot pain, right [M79.671, G89.29]  Referring practitioner: Charles Beard*  Today's Date: 2023  Patient seen for 4 sessions           Subjective: Jina reports that she is noticing improvement gradually.  She is now wearing the orthotics for the entire day.  She is able to walk in the community, with the orthotics in her shoes, without problem.  She received a pain injection a few months ago that seems to have help as well.     Objective      Treatment/Reassessment  Gait:  Forward head posture during the gait cycle, decreased trunk motion in the lower trunk and decreased reciprocal arm motion  MMT: ankle inversion, L 4+ to 5/5, R 4/5  FOS: FAAM, 42/=50%    Treatment    Therapeutic exercise  1. Ankle plantarflexion, PRE, long sitting, blue theraband, x 20  2. 1/12 of a mile ambulation  3. Runner's stretch, 30s x 2, B    NMR: verbal cues for exercise included using eccentric control of the ankle and avoiding medial and lateral motion, with the plantarflexion PRE.  Body alignment for the runner' stretch and with ambulation to use a heel to toe gait staring to the ground ahead, 15 feet, for improved balance.     Patient education:  I reminded her to do the ankle plantarflexion PRE, towel scrunches, arch raises and the runner's stretch at home.     Assessment & Plan     Assessment  Impairments: abnormal gait, activity intolerance, impaired physical strength, lacks appropriate home exercise program, pain with function and weight-bearing intolerance  Functional Limitations: walking, uncomfortable because of pain, standing, stooping and unable to perform repetitive tasks  Assessment details: Katelyn Sr has been seen for 4 physical therapy  sessions for chronic R foot pain.  Treatment has included therapeutic exercise, manual therapy, neuro-muscular retraining , patient education with home exercise program  and orthotic fabrication . Progress to physical therapy goals is fair to good.  She will benefit from continued skilled physical therapy to address remaining impairments and functional limitations.   Prognosis: good    Goals  Plan Goals: STGs to be met in 4 weeks  1. Impressions for custom made orthotics are taken. (met)  2. Therapeutic exercise is progressed with resisted ankle plantarflexion and inversion, long sitting, using theraband. (met)  3. Orthotics arrive and break in period begins. (met)    LTGs to be met in 12 weeks  1. Jina is wearing custom made orthotics comfortably for all weight bearing activities. (ongoing)  2. She is able to ambulate comfortably on level surface in the community and for exercise. (ongoing)  3. She is independent with a HEP and education for self care. (ongoing)  4. FAAM deficit is improved to 30%. (ongoing)    Plan  Therapy options: will be seen for skilled therapy services  Planned modality interventions: ultrasound  Planned therapy interventions: neuromuscular re-education, orthotic fitting/training, strengthening, stretching, therapeutic activities, soft tissue mobilization, postural training, ADL retraining, balance/weight-bearing training, body mechanics training, flexibility, functional ROM exercises, gait training, home exercise program and joint mobilization  Frequency: 1x week  Duration in weeks: 4  Treatment plan discussed with: patient  Plan details: Continue progressing exercises to closed chain variety to strengthen the entire kinetic chain and for her feet/ankles to provide better stability.               Timed:    Manual Therapy:         mins  95714;  Therapeutic Exercise:    35     mins  43746;     Neuromuscular Nataliya:    8    mins  56618;    Therapeutic Activity:          mins  46326;     Gait  Training:           mins  48303;     Ultrasound:          mins  23550;    Electrical Stimulation:         mins  14579 ( );  Iontophoresis         mins 75200;  Aquatic Therapy         mins 72069;      Untimed:  Electrical Stimulation:         mins  81623 ( );  Traction:         mins  47482;   Dry Needling  (1-2 muscles)                 mins 20560 (Self-pay)  Dry Needling (3-4 muscles)      20561 (Self-pay)  Dry Needling Trial         DRYNDLTRIAL  (No Charge)    Timed Treatment:   43   mins   Total Treatment:     43   mins    Alessio Green PT  Physical Therapist    [place] License:[id number]

## 2023-05-18 ENCOUNTER — TREATMENT (OUTPATIENT)
Dept: PHYSICAL THERAPY | Facility: CLINIC | Age: 73
End: 2023-05-18
Payer: MEDICARE

## 2023-05-18 DIAGNOSIS — G89.29 CHRONIC FOOT PAIN, RIGHT: Primary | ICD-10-CM

## 2023-05-18 DIAGNOSIS — M21.6X1 ACQUIRED FOREFOOT PRONATION, RIGHT: ICD-10-CM

## 2023-05-18 DIAGNOSIS — M21.6X2 ACQUIRED FOREFOOT PRONATION, LEFT: ICD-10-CM

## 2023-05-18 DIAGNOSIS — R26.2 DIFFICULTY WALKING: ICD-10-CM

## 2023-05-18 DIAGNOSIS — M79.671 CHRONIC FOOT PAIN, RIGHT: Primary | ICD-10-CM

## 2023-05-18 DIAGNOSIS — R53.1 STRENGTH LOSS OF: ICD-10-CM

## 2023-05-18 NOTE — PROGRESS NOTES
Physical Therapy Daily Treatment Note    [facility]  [address] [place] [address]  [phone] (phone)  [phone] (fax)    Patient: Katelyn Sr   : 1950  Diagnosis/ICD-10 Code:  Chronic foot pain, right [M79.671, G89.29]  Referring practitioner: Charles Beard*  Today's Date: 2023  Patient seen for 5 sessions           Subjective: Jina reports that the last 3 days she has experienced R ankle pain for no known reason.       Objective     Treatment    Observation: The R ankle/foot appeared more swollen than the L.    Girth measurement using figure 8 technique: L 46.8 cm., and R 48.5 cm.     Therapeutic exercise  1. Seated towel scrunches, x 20, B  2. Seated arch raises, 20, B  3. Ankle inversion PRE, seated, red theraband, x 20, B    NMR: verbal cues for exercise included flexing the hallux more to increase the arch build with the towel scrunches, to keep the calcaneus and digits of her feet static with the arch raise and to use eccentric control with the ankle inversion PRE.    Manual therapy:  STM to the R peroneal area from the lateral lower leg, into the ankle and foot.    Patient education:  I issued red theraband for her to begin doing ankle inversion PRE at home.      Assessment & Plan     Assessment    Assessment details: Tenderness noted mildly in the muscle belly area of the peroneal group, but very sensitive as the muscle becomes tendinous around the lateral malleolus.  Her ankle invertors are weak and that PRE not only should help that but begin mobilizing the peroneal area to elongate it and hopefully reduce pain.     Plan  Plan details: Continue per treatment plan.            Timed:    Manual Therapy:    15     mins  81308;  Therapeutic Exercise:    23     mins  28827;     Neuromuscular Nataliya:    5    mins  34209;    Therapeutic Activity:          mins  52499;     Gait Training:           mins  30017;     Ultrasound:           mins  94453;    Electrical Stimulation:         mins  14646 ( );  Iontophoresis         mins 73553;  Aquatic Therapy         mins 62074;      Untimed:  Electrical Stimulation:         mins  58049 ( );  Traction:         mins  60873;   Dry Needling  (1-2 muscles)                 mins 20560 (Self-pay)  Dry Needling (3-4 muscles)      20561 (Self-pay)  Dry Needling Trial         DRYNDLTRIAL  (No Charge)    Timed Treatment:   43   mins   Total Treatment:     43   mins    Alessio Green PT  Physical Therapist    [place] License:[id number]

## 2023-05-25 ENCOUNTER — TELEPHONE (OUTPATIENT)
Dept: PHYSICAL THERAPY | Facility: CLINIC | Age: 73
End: 2023-05-25

## 2023-06-01 ENCOUNTER — TREATMENT (OUTPATIENT)
Dept: PHYSICAL THERAPY | Facility: CLINIC | Age: 73
End: 2023-06-01

## 2023-06-01 DIAGNOSIS — M79.671 CHRONIC FOOT PAIN, RIGHT: Primary | ICD-10-CM

## 2023-06-01 DIAGNOSIS — G89.29 CHRONIC FOOT PAIN, RIGHT: Primary | ICD-10-CM

## 2023-06-01 DIAGNOSIS — M21.6X2 ACQUIRED FOREFOOT PRONATION, LEFT: ICD-10-CM

## 2023-06-01 DIAGNOSIS — R53.1 STRENGTH LOSS OF: ICD-10-CM

## 2023-06-01 DIAGNOSIS — M21.6X1 ACQUIRED FOREFOOT PRONATION, RIGHT: ICD-10-CM

## 2023-06-01 DIAGNOSIS — R26.2 DIFFICULTY WALKING: ICD-10-CM

## 2023-06-01 NOTE — PROGRESS NOTES
"Physical Therapy Daily Treatment Note    [facility] Therapy [facility]  [address] [place] [address]  [phone] (phone)  [phone] (fax)    Patient: Katelyn Sr   : 1950  Diagnosis/ICD-10 Code:  Chronic foot pain, right [M79.671, G89.29]  Referring practitioner: Charles Beard*  Today's Date: 2023  Patient seen for 6 sessions           Subjective: Jina noticed that after slipping her orthotics into a pair of New Balance athletic shoes she noticed pain increase in R medial ankle.  That does not occur when she wears her Clark.      Objective     Palpation: tenderness noted along the medial portion of the R lower leg in the belly of the tibialis anterior and along the tibia where the tibialis posterior lies.    Dry needling, using 4 no 8, 0.30 x 60 mm., needles, 2 in the tibialis anterior and 2 tibialis posterior.      Manual therapy:  STM to the R medial lower leg, mobilizations to the joints from the calcaneus to the hallux, grade 4 x 15 at each joint.    NMR: KT taping to support the arch in supination,  Piece from the metatarsal heads, proximally up the plantar fascia to the posterior leg anchoring on the distal Eleuterio's tendon at 100% tension Then 3 strips at 80% tension from the lateral foot, across the foot to the medial side and anchoring just above the medial malleolus.  The first strip was just distal to the calcaneus and each was then 1/4\" more distally.     Patient education: instructions to continue with her HEP and general care of the KT tape were given.     Assessment & Plan     Assessment    Assessment details: Jina needed cues for the arch raise to keep the knee directly over the ankle joint and the movement small/subtle.  She responded well to today's treatment but may be aggravating the foot due to increasing her closed chain activities generally.     Plan  Plan details: Continue per treatment plan.                Timed:    Manual " Therapy:    10     mins  91376;  Therapeutic Exercise:    15     mins  61237;     Neuromuscular Nataliya:    13    mins  39460;    Therapeutic Activity:          mins  63660;     Gait Training:           mins  30738;     Ultrasound:          mins  50982;    Electrical Stimulation:         mins  03391 ( );  Iontophoresis         mins 74969;  Aquatic Therapy         mins 54084;      Untimed:  Electrical Stimulation:         mins  86815 ( );  Traction:         mins  76126;   Dry Needling  (1-2 muscles)                 mins 20560 (Self-pay)  Dry Needling (3-4 muscles)      20561 (Self-pay)  Dry Needling Trial    7     DRYNDLTRIAL  (No Charge)    Timed Treatment: 38     mins   Total Treatment:     45   mins    Alessio Green PT  Physical Therapist    [place] License:[id number]

## 2023-06-08 ENCOUNTER — TREATMENT (OUTPATIENT)
Dept: PHYSICAL THERAPY | Facility: CLINIC | Age: 73
End: 2023-06-08
Payer: MEDICARE

## 2023-06-08 DIAGNOSIS — G89.29 CHRONIC FOOT PAIN, RIGHT: Primary | ICD-10-CM

## 2023-06-08 DIAGNOSIS — M79.671 CHRONIC FOOT PAIN, RIGHT: Primary | ICD-10-CM

## 2023-06-08 DIAGNOSIS — M21.6X2 ACQUIRED FOREFOOT PRONATION, LEFT: ICD-10-CM

## 2023-06-08 DIAGNOSIS — R26.2 DIFFICULTY WALKING: ICD-10-CM

## 2023-06-08 DIAGNOSIS — R53.1 STRENGTH LOSS OF: ICD-10-CM

## 2023-06-08 DIAGNOSIS — M21.6X1 ACQUIRED FOREFOOT PRONATION, RIGHT: ICD-10-CM

## 2023-06-08 NOTE — PROGRESS NOTES
"Physical Therapy Daily Treatment Note    [facility] Therapy [facility]  [address] [place] [address]  [phone] (phone)  [phone] (fax)    Patient: Katelyn Sr   : 1950  Diagnosis/ICD-10 Code:  Chronic foot pain, right [M79.671, G89.29]  Referring practitioner: Charles Beard*  Today's Date: 2023  Patient seen for 7 sessions           Subjective: Jina stated that she is having pain in the R medial ankle area at about 3-4/10.  She has not figured a way to set up the ankle inversion PRE yet.    Objective     Manual therapy:  STM to the R medial lower leg, especially the posterior tibialis area along the border of the tibia in the lower tibial area.     NMR: KT taping to support the arch in supination,  Piece from the metatarsal heads, proximally up the plantar fascia to the posterior leg anchoring on the distal [place]'s tendon at 100% tension Then 3 strips at 80% tension from the lateral foot, across the foot to the medial side and anchoring just above the medial malleolus.  The first strip was just distal to the calcaneus and each was then 1/4\" more distally.     Therapeutic exercise  R ankle inversion P'RE, supine, red theraband x 20  Ankle plantarflexion PRE, long sitting, black theraband, x 20, B    Patient education: I suggested that she use a dining room table to anchor the theraband with and sit in a chair for the exercise.  I progressed the plantarflexion exercise by issuing black theraband for home.     Assessment & Plan     Assessment    Assessment details: The medial R ankle pain may decrease if she gets the tibialis group stronger, so I urged her to do the ankle inversion exercise.  She did well with the black theraband for the plantarflexion PRE    Plan  Plan details: Reassess and treat next visit.              Timed:    Manual Therapy:    15     mins  44977;  Therapeutic Exercise:    10     mins  46401;     Neuromuscular Nataliya:    15    " mins  11077;    Therapeutic Activity:          mins  31600;     Gait Training:           mins  88531;     Ultrasound:          mins  82204;    Electrical Stimulation:         mins  76239 ( );  Iontophoresis         mins 94340;  Aquatic Therapy         mins 92506;      Untimed:  Electrical Stimulation:         mins  70417 ( );  Traction:         mins  34458;   Dry Needling  (1-2 muscles)                 mins 20560 (Self-pay)  Dry Needling (3-4 muscles)      20561 (Self-pay)  Dry Needling Trial         DRYNDLTRIAL  (No Charge)    Timed Treatment:   40   mins   Total Treatment:     40   mins    Alessio Green PT  Physical Therapist    [place] License:[id number]

## 2023-06-15 ENCOUNTER — TREATMENT (OUTPATIENT)
Dept: PHYSICAL THERAPY | Facility: CLINIC | Age: 73
End: 2023-06-15
Payer: MEDICARE

## 2023-06-15 DIAGNOSIS — M21.6X1 ACQUIRED FOREFOOT PRONATION, RIGHT: ICD-10-CM

## 2023-06-15 DIAGNOSIS — M21.6X2 ACQUIRED FOREFOOT PRONATION, LEFT: ICD-10-CM

## 2023-06-15 DIAGNOSIS — M79.671 CHRONIC FOOT PAIN, RIGHT: Primary | ICD-10-CM

## 2023-06-15 DIAGNOSIS — R53.1 STRENGTH LOSS OF: ICD-10-CM

## 2023-06-15 DIAGNOSIS — G89.29 CHRONIC FOOT PAIN, RIGHT: Primary | ICD-10-CM

## 2023-06-15 NOTE — PROGRESS NOTES
Physical Therapy Daily Treatment Note    [facility] [place] [address]  [phone] (phone)  [phone] (fax)    Patient: Katelyn Sr   : 1950  Diagnosis/ICD-10 Code:  Chronic foot pain, right [M79.671, G89.29]  Referring practitioner: Charles Beard*  Today's Date: 6/15/2023  Patient seen for 8 sessions           Subjective: Jina reported that her medial ankle and arch are still painful.  She does say that when at home she goes around bare footed a significant amount of time.  Jina notices that after being barefooted it makes the orthotic wear feel like she is starting over with them again.     Objective     Treatment    Manual therapy:  Mobilizations for each joint along the medial foot from the calcaneus to the hallux to improve mobility for supination, grade 4, x 15 at each joint.  Metatarsal gliding digits 1-5 of the L foot.    US to the medial portion of the dorsum of the R foot and the medial portion of the R foot/arch and the inferior side of the first MTP joint, 1.5 W/cm2, 3 mHz, 100% duty cycle, x 8 minutes    Therapeutic exercise  1. Seated towel scrunches x 20, B  2. Seated arch raises x 20, B  3. Stretching of the extensors of the digits of each foot, 30s x 3    NMR: verbal cues for correct form for the toe extensor stretching, emphasizing flexing the MTP joints to offset hammer toe tendencies.      Patient education: I added the toe extensor stretching and emphasized the need for Jina to not allow the forefoot to collapse at home being barefooted.  I asked her to either wear her orthotics in slippers or get a comfortable sandel with an arch support, like [employer], in it and avoid walking barefoot so much.     Assessment & Plan     Assessment    Assessment details: Going barefooted for a significant amount of time at home is not therapeutic for this chronic R foot issue.  I progressed her exercise program to improve  her self care ability.  Jina required some cues and instruction for the new exercise.     Plan  Plan details: Continue per treatment plan.                Timed:    Manual Therapy:    15     mins  62905;  Therapeutic Exercise:    20     mins  02310;     Neuromuscular Nataliya:    2    mins  64571;    Therapeutic Activity:          mins  34789;     Gait Training:           mins  52641;     Ultrasound:     8     mins  26341;    Electrical Stimulation:         mins  21776 ( );  Iontophoresis         mins 73546;  Aquatic Therapy         mins 03582;      Untimed:  Electrical Stimulation:         mins  44377 ( );  Traction:         mins  93149;   Dry Needling  (1-2 muscles)                 mins 15322 (Self-pay)  Dry Needling (3-4 muscles)      20561 (Self-pay)  Dry Needling Trial         DRYNDLTRIAL  (No Charge)    Timed Treatment:   45   mins   Total Treatment:     45   mins    Alessio Green PT  Physical Therapist    [place] License:[id number]

## 2023-08-02 ENCOUNTER — OFFICE (OUTPATIENT)
Dept: URBAN - METROPOLITAN AREA CLINIC 66 | Facility: CLINIC | Age: 73
End: 2023-08-02
Payer: MEDICARE

## 2023-08-02 VITALS
HEIGHT: 62 IN | SYSTOLIC BLOOD PRESSURE: 109 MMHG | HEART RATE: 76 BPM | DIASTOLIC BLOOD PRESSURE: 73 MMHG | WEIGHT: 191 LBS

## 2023-08-02 DIAGNOSIS — K21.9 GASTRO-ESOPHAGEAL REFLUX DISEASE WITHOUT ESOPHAGITIS: ICD-10-CM

## 2023-08-02 DIAGNOSIS — R19.7 DIARRHEA, UNSPECIFIED: ICD-10-CM

## 2023-08-02 DIAGNOSIS — R05.9 COUGH, UNSPECIFIED: ICD-10-CM

## 2023-08-02 DIAGNOSIS — K57.92 DIVERTICULITIS OF INTESTINE, PART UNSPECIFIED, WITHOUT PERFO: ICD-10-CM

## 2023-08-02 DIAGNOSIS — R10.32 LEFT LOWER QUADRANT PAIN: ICD-10-CM

## 2023-08-02 PROBLEM — K57.90 DIVERTICULOSIS: Status: ACTIVE | Noted: 2017-12-14

## 2023-08-02 PROCEDURE — 99213 OFFICE O/P EST LOW 20 MIN: CPT | Performed by: INTERNAL MEDICINE

## 2023-08-02 RX ORDER — METRONIDAZOLE 500 MG/1
1500 TABLET, FILM COATED ORAL
Qty: 30 | Refills: 0 | Status: ACTIVE
Start: 2023-08-02

## 2023-08-02 RX ORDER — CIPROFLOXACIN 500 MG/1
1000 TABLET, FILM COATED ORAL
Qty: 20 | Refills: 0 | Status: ACTIVE
Start: 2023-08-02

## 2023-08-07 ENCOUNTER — OFFICE VISIT (OUTPATIENT)
Dept: ORTHOPEDIC SURGERY | Facility: CLINIC | Age: 73
End: 2023-08-07
Payer: MEDICARE

## 2023-08-07 VITALS — WEIGHT: 192.6 LBS | HEIGHT: 62 IN | TEMPERATURE: 96.8 F | BODY MASS INDEX: 35.44 KG/M2

## 2023-08-07 DIAGNOSIS — M19.079 ARTHRITIS OF FOOT: ICD-10-CM

## 2023-08-07 DIAGNOSIS — M19.071 ARTHRITIS OF FIRST METATARSOPHALANGEAL (MTP) JOINT OF RIGHT FOOT: ICD-10-CM

## 2023-08-07 DIAGNOSIS — M20.11 HALLUX VALGUS OF RIGHT FOOT: ICD-10-CM

## 2023-08-07 DIAGNOSIS — R52 PAIN: Primary | ICD-10-CM

## 2023-08-07 DIAGNOSIS — M20.41 HAMMER TOE OF RIGHT FOOT: ICD-10-CM

## 2023-08-07 NOTE — PROGRESS NOTES
"Foot Follow Up      Patient: Katelyn Sr    YOB: 1950 72 y.o. female    Chief Complaints: Foot sore again    History of Present Illness:Patient was seen on 8/11/2022 with complaints of moderate and occasionally severe pain mainly in the dorsum of her right midfoot.  Symptoms were worse with \"walking a lot\" and were stabbing aching and burning with associated swelling worse with standing driving and climbing steps.     She had used ibuprofen which had helped to some degree.     I had seen her back in 2014 for this similar problem and she had injection done in her first TMT joint which gave her significant relief for many many years.     She reported pain mainly around the first TMT joint more so than the first MTP joint and although had some mild hammertoes they had  not been bothersome to her.     It was felt she had exacerbation of midfoot arthritis mostly at the first and second TMT joints and also had hallux valgus with metatarsus primus varus first MTP arthritis and hammertoes.  Reviewed her that she could ultimately require midfoot fusion of the first and second TMT joints but also with some arthritis of the first MTP joint could eventually require fusion but would not recommend simultaneous fusion of those.  She was uninterested in surgical treatment and instructed on heel cord stretching exercises, use of power step orthotics and prescribed compounding cream of ketoprofen 10%, lidocaine 5% and gabapentin 5% to apply to the midfoot.  She was sent for radiographic guided injections of the first and second TMT joints.     She underwent injections of the right midfoot and per review of those records it was of the second and third TMT joints on 8/25/2022.     Patient was seen on 1/4/2023 reporting that she really did not have any relief and still had pain mainly around the first TMT joint more so than the second and third where injection did seem to help some.  She was not really doing her heel " cord stretching exercises.  Compounding cream did seem to help some.  She had initially gotten some over-the-counter orthotics which had helped but had had recurrent pain mainly in the midfoot more so than around the first MTP joint but has some there as well.  She did feel like the pain in her midfoot is affecting her gait.    We discussed treatment going forward fairly be fairly complex and especially with her living alone in second-floor [facility].  She was to continue with nonoperative treatment and was sent for radiographic guided injections of the right first second and third TMT joints.  She was also instructed to increase her heel cord stretching exercises and use of compounding cream and was referred to see Alessio at [facility] for work on gait training mechanics and possible orthotic    She was to been seen back 2 months but did not return until today.  She had her injections done on 2/15/2023 with it.  This was mainly the first TMT joint but did have significant relief for several months.    She did start some physical therapy with Alessio in March or April and did this for about 10 weeks and I believe got some arch supports    She has recurrent throbbing pain mainly in the dorsal medial aspect of the right midfoot.  She really has not been doing the heel cord stretching exercises as previously described and has not been using compounding cream.  Pain is rated 4 out of 10  HPI    ROS: Foot pain  Past Medical History:   Diagnosis Date    Abnormal ECG 5/21/18    Done in PCP office.  Hx of Atrial fib-?2007    Arthritis of neck     Atrial fibrillation     Cervical disc disorder 8/03    ACDF C6-7, 12/03/2003;  Dr. 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    CTS (carpal tunnel syndrome)     DDD (degenerative disc disease), lumbosacral 11/09/2018    Depression     Fibrosis of lung     Fracture of wrist     Fracture, finger      "Generalized anxiety disorder 07/14/2020    GERD (gastroesophageal reflux disease)     Hip arthrosis     Hyperlipidemia     Hypersensitivity pneumonitis     vs pulmonary fibrosis    Hypersomnia due to another medical condition 10/28/2017    Hypertension     Knee swelling     Low back strain     Lower back pain 07/26/2019    Myocardial infarction     Neck strain     Neuroma of foot     Non-STEMI (non-ST elevated myocardial infarction) 05/21/2018    Nonsenile cataract     FUENTES on CPAP 10/19/2017    Palpitations 07/23/2019    Peripheral vestibulopathy of both ears 07/14/2020    Presence of artificial intra-ocular lens     Pulmonary fibrosis     Being treated at [facility]     Sleep apnea     Thoracic disc disorder     Thyroid disease     Vertigo 07/14/2020    Vestibular neuronitis 07/17/2020     Physical Exam:   Vitals:    08/07/23 1427   Temp: 96.8 øF (36 øC)   Weight: 87.4 kg (192 lb 9.6 oz)   Height: 157.5 cm (62\")   PainSc:   4     Well developed with normal mood.  On exam she has hallux valgus deformity but minimal discomfort around the first MTP joint.  There is moderate discomfort of the dorsum of the midfoot mostly around the first TMT joint but some to the second more so than third.  Some mild hammering of the lesser toes especially the second and third      Radiology: 3 views of the right foot ordered evaluate pain and alignment reviewed and compared to previous x-rays.  There remains hallux valgus with first MTP arthritis as well as gapping arthritis at the first tarsometatarsal joint as well as arthritis of the second more so than third tarsometatarsal joint.      Assessment/Plan:  1.  Exacerbation of right midfoot arthritis mostly at the first and second more so than third TMT joint  2.  Right hallux valgus with metatarsus primus varus with first MTP arthritis  3.  Right forefoot hammertoes    We discussed treatment going forward and she does not wish to pursue surgical treatment this would " require first MTP realignment fusion possible bunion correction but does have arthritis of the first MTP joint but would not want to fuse the TMT and first MTP joint    Will send her for repeat radiographic guided injections of the first second and third tarsometatarsal joints    She was instructed again on heel cord stretching exercises and discussed etiology of tight heel cord with fluid overload    She will continue with the orthotics and recommended use of compounding cream which was again prescribed through [facility]    Will see her back in 3 months but if having persistent symptoms in 8 weeks to let us know we will get an MRI of her right foot prior to follow-up.

## 2023-08-22 ENCOUNTER — TRANSCRIBE ORDERS (OUTPATIENT)
Dept: ADMINISTRATIVE | Facility: HOSPITAL | Age: 73
End: 2023-08-22
Payer: MEDICARE

## 2023-08-22 DIAGNOSIS — J84.10 PULMONARY FIBROSIS: Primary | ICD-10-CM

## 2023-08-25 ENCOUNTER — TRANSCRIBE ORDERS (OUTPATIENT)
Dept: ADMINISTRATIVE | Facility: HOSPITAL | Age: 73
End: 2023-08-25
Payer: MEDICARE

## 2023-08-25 DIAGNOSIS — I27.20 PULMONARY HTN: Primary | ICD-10-CM

## 2023-08-31 ENCOUNTER — HOSPITAL ENCOUNTER (OUTPATIENT)
Dept: CT IMAGING | Facility: HOSPITAL | Age: 73
Discharge: HOME | End: 2023-08-31
Admitting: INTERNAL MEDICINE
Payer: MEDICARE

## 2023-08-31 ENCOUNTER — TRANSCRIBE ORDERS (OUTPATIENT)
Dept: ADMINISTRATIVE | Facility: HOSPITAL | Age: 73
End: 2023-08-31
Payer: MEDICARE

## 2023-08-31 DIAGNOSIS — R10.32 LEFT LOWER QUADRANT ABDOMINAL PAIN: ICD-10-CM

## 2023-08-31 DIAGNOSIS — R10.32 LEFT LOWER QUADRANT ABDOMINAL PAIN: Primary | ICD-10-CM

## 2023-08-31 PROCEDURE — 82565 ASSAY OF CREATININE: CPT

## 2023-08-31 PROCEDURE — 74177 CT ABD & PELVIS W/CONTRAST: CPT

## 2023-08-31 PROCEDURE — 25510000001 IOPAMIDOL 61 % SOLUTION: Performed by: INTERNAL MEDICINE

## 2023-08-31 RX ADMIN — IOPAMIDOL 95 ML: 612 INJECTION, SOLUTION INTRAVENOUS at 17:18

## 2023-09-01 LAB — CREAT BLDA-MCNC: 1.1 MG/DL (ref 0.6–1.3)

## 2023-09-06 ENCOUNTER — HOSPITAL ENCOUNTER (OUTPATIENT)
Dept: GENERAL RADIOLOGY | Facility: HOSPITAL | Age: 73
Discharge: HOME | End: 2023-09-06
Payer: MEDICARE

## 2023-09-06 DIAGNOSIS — M19.079 ARTHRITIS OF FOOT: ICD-10-CM

## 2023-09-06 PROCEDURE — 77002 NEEDLE LOCALIZATION BY XRAY: CPT

## 2023-09-06 PROCEDURE — 25010000002 METHYLPREDNISOLONE PER 125 MG: Performed by: ORTHOPAEDIC SURGERY

## 2023-09-06 PROCEDURE — 25010000002 BUPIVACAINE (PF) 0.25 % SOLUTION: Performed by: ORTHOPAEDIC SURGERY

## 2023-09-06 PROCEDURE — 25510000001 IOPAMIDOL 61 % SOLUTION: Performed by: ORTHOPAEDIC SURGERY

## 2023-09-06 PROCEDURE — 0 LIDOCAINE 1 % SOLUTION: Performed by: ORTHOPAEDIC SURGERY

## 2023-09-06 RX ORDER — LIDOCAINE HYDROCHLORIDE 10 MG/ML
20 INJECTION, SOLUTION INFILTRATION; PERINEURAL ONCE
Status: COMPLETED | OUTPATIENT
Start: 2023-09-06 | End: 2023-09-06

## 2023-09-06 RX ORDER — METHYLPREDNISOLONE SODIUM SUCCINATE 125 MG/2ML
80 INJECTION, POWDER, LYOPHILIZED, FOR SOLUTION INTRAMUSCULAR; INTRAVENOUS
Status: COMPLETED | OUTPATIENT
Start: 2023-09-06 | End: 2023-09-06

## 2023-09-06 RX ORDER — BUPIVACAINE HYDROCHLORIDE 2.5 MG/ML
10 INJECTION, SOLUTION EPIDURAL; INFILTRATION; INTRACAUDAL ONCE
Status: COMPLETED | OUTPATIENT
Start: 2023-09-06 | End: 2023-09-06

## 2023-09-06 RX ADMIN — METHYLPREDNISOLONE SODIUM SUCCINATE 80 MG: 125 INJECTION, POWDER, LYOPHILIZED, FOR SOLUTION INTRAMUSCULAR; INTRAVENOUS at 11:45

## 2023-09-06 RX ADMIN — BUPIVACAINE HYDROCHLORIDE 5 ML: 2.5 INJECTION, SOLUTION EPIDURAL; INFILTRATION; INTRACAUDAL; PERINEURAL at 11:45

## 2023-09-06 RX ADMIN — IOPAMIDOL 1 ML: 612 INJECTION, SOLUTION INTRAVENOUS at 11:45

## 2023-09-06 RX ADMIN — LIDOCAINE HYDROCHLORIDE 1 ML: 10 INJECTION, SOLUTION INFILTRATION; PERINEURAL at 11:45

## 2023-09-21 ENCOUNTER — LAB (OUTPATIENT)
Dept: LAB | Facility: HOSPITAL | Age: 73
End: 2023-09-21

## 2023-09-21 ENCOUNTER — HOSPITAL ENCOUNTER (OUTPATIENT)
Dept: CARDIOLOGY | Facility: HOSPITAL | Age: 73
Discharge: HOME | End: 2023-09-21
Payer: MEDICARE

## 2023-09-21 ENCOUNTER — OFFICE VISIT (OUTPATIENT)
Dept: CARDIOLOGY | Facility: CLINIC | Age: 73
End: 2023-09-21
Payer: MEDICARE

## 2023-09-21 VITALS
HEART RATE: 78 BPM | SYSTOLIC BLOOD PRESSURE: 120 MMHG | WEIGHT: 192 LBS | DIASTOLIC BLOOD PRESSURE: 71 MMHG | OXYGEN SATURATION: 92 % | HEIGHT: 62 IN | BODY MASS INDEX: 35.33 KG/M2

## 2023-09-21 VITALS
SYSTOLIC BLOOD PRESSURE: 128 MMHG | HEIGHT: 62 IN | BODY MASS INDEX: 34.78 KG/M2 | DIASTOLIC BLOOD PRESSURE: 78 MMHG | WEIGHT: 189 LBS | HEART RATE: 66 BPM

## 2023-09-21 DIAGNOSIS — E78.2 MIXED HYPERLIPIDEMIA: ICD-10-CM

## 2023-09-21 DIAGNOSIS — G47.33 OSA ON CPAP: ICD-10-CM

## 2023-09-21 DIAGNOSIS — I10 ESSENTIAL HYPERTENSION: ICD-10-CM

## 2023-09-21 DIAGNOSIS — E78.00 HYPERCHOLESTEROLEMIA: Primary | ICD-10-CM

## 2023-09-21 DIAGNOSIS — E78.00 HYPERCHOLESTEROLEMIA: ICD-10-CM

## 2023-09-21 DIAGNOSIS — Z99.89 OSA ON CPAP: ICD-10-CM

## 2023-09-21 DIAGNOSIS — R06.09 CHRONIC DYSPNEA: ICD-10-CM

## 2023-09-21 DIAGNOSIS — I27.20 PULMONARY HTN: ICD-10-CM

## 2023-09-21 LAB
AORTIC ARCH: 1.9 CM
AORTIC DIMENSIONLESS INDEX: 0.9 (DI)
ASCENDING AORTA: 2.2 CM
BH CV ECHO MEAS - ACS: 1.57 CM
BH CV ECHO MEAS - AO MAX PG: 6.3 MMHG
BH CV ECHO MEAS - AO MEAN PG: 3 MMHG
BH CV ECHO MEAS - AO ROOT DIAM: 2.48 CM
BH CV ECHO MEAS - AO V2 MAX: 125 CM/SEC
BH CV ECHO MEAS - AO V2 VTI: 28.2 CM
BH CV ECHO MEAS - AVA(I,D): 2.22 CM2
BH CV ECHO MEAS - EDV(CUBED): 79.5 ML
BH CV ECHO MEAS - EDV(MOD-SP2): 95 ML
BH CV ECHO MEAS - EDV(MOD-SP4): 81 ML
BH CV ECHO MEAS - EF(MOD-BP): 63.2 %
BH CV ECHO MEAS - EF(MOD-SP2): 64.2 %
BH CV ECHO MEAS - EF(MOD-SP4): 63 %
BH CV ECHO MEAS - ESV(CUBED): 15.1 ML
BH CV ECHO MEAS - ESV(MOD-SP2): 34 ML
BH CV ECHO MEAS - ESV(MOD-SP4): 30 ML
BH CV ECHO MEAS - FS: 42.5 %
BH CV ECHO MEAS - IVS/LVPW: 1 CM
BH CV ECHO MEAS - IVSD: 0.9 CM
BH CV ECHO MEAS - LAT PEAK E' VEL: 5.4 CM/SEC
BH CV ECHO MEAS - LV DIASTOLIC VOL/BSA (35-75): 43.1 CM2
BH CV ECHO MEAS - LV MASS(C)D: 123.3 GRAMS
BH CV ECHO MEAS - LV MAX PG: 4.4 MMHG
BH CV ECHO MEAS - LV MEAN PG: 2 MMHG
BH CV ECHO MEAS - LV SYSTOLIC VOL/BSA (12-30): 16 CM2
BH CV ECHO MEAS - LV V1 MAX: 105 CM/SEC
BH CV ECHO MEAS - LV V1 VTI: 24 CM
BH CV ECHO MEAS - LVIDD: 4.3 CM
BH CV ECHO MEAS - LVIDS: 2.47 CM
BH CV ECHO MEAS - LVOT AREA: 2.6 CM2
BH CV ECHO MEAS - LVOT DIAM: 1.82 CM
BH CV ECHO MEAS - LVPWD: 0.9 CM
BH CV ECHO MEAS - MED PEAK E' VEL: 7.1 CM/SEC
BH CV ECHO MEAS - MV A DUR: 0.17 SEC
BH CV ECHO MEAS - MV A MAX VEL: 98.4 CM/SEC
BH CV ECHO MEAS - MV DEC SLOPE: 169.5 CM/SEC2
BH CV ECHO MEAS - MV DEC TIME: 333 SEC
BH CV ECHO MEAS - MV E MAX VEL: 74.2 CM/SEC
BH CV ECHO MEAS - MV E/A: 0.75
BH CV ECHO MEAS - MV MAX PG: 5.2 MMHG
BH CV ECHO MEAS - MV MEAN PG: 1.15 MMHG
BH CV ECHO MEAS - MV P1/2T: 143.1 MSEC
BH CV ECHO MEAS - MV V2 VTI: 40.9 CM
BH CV ECHO MEAS - MVA(P1/2T): 1.54 CM2
BH CV ECHO MEAS - MVA(VTI): 1.53 CM2
BH CV ECHO MEAS - PA ACC TIME: 0.04 SEC
BH CV ECHO MEAS - PA V2 MAX: 100.4 CM/SEC
BH CV ECHO MEAS - PULM A REVS DUR: 0.15 SEC
BH CV ECHO MEAS - PULM A REVS VEL: 32.8 CM/SEC
BH CV ECHO MEAS - PULM DIAS VEL: 39.5 CM/SEC
BH CV ECHO MEAS - PULM S/D: 0.86
BH CV ECHO MEAS - PULM SYS VEL: 33.8 CM/SEC
BH CV ECHO MEAS - QP/QS: 1.27
BH CV ECHO MEAS - RAP SYSTOLE: 8 MMHG
BH CV ECHO MEAS - RV MAX PG: 3.3 MMHG
BH CV ECHO MEAS - RV V1 MAX: 90.8 CM/SEC
BH CV ECHO MEAS - RV V1 VTI: 22.1 CM
BH CV ECHO MEAS - RVOT DIAM: 2.15 CM
BH CV ECHO MEAS - RVSP: 20.5 MMHG
BH CV ECHO MEAS - SI(MOD-SP2): 32.5 ML/M2
BH CV ECHO MEAS - SI(MOD-SP4): 27.1 ML/M2
BH CV ECHO MEAS - SV(LVOT): 62.7 ML
BH CV ECHO MEAS - SV(MOD-SP2): 61 ML
BH CV ECHO MEAS - SV(MOD-SP4): 51 ML
BH CV ECHO MEAS - SV(RVOT): 80 ML
BH CV ECHO MEAS - TAPSE (>1.6): 1.26 CM
BH CV ECHO MEAS - TR MAX PG: 12.5 MMHG
BH CV ECHO MEAS - TR MAX VEL: 176.5 CM/SEC
BH CV ECHO MEASUREMENTS AVERAGE E/E' RATIO: 11.87
BH CV XLRA - RV BASE: 2.8 CM
BH CV XLRA - RV LENGTH: 7.2 CM
BH CV XLRA - RV MID: 2.47 CM
BH CV XLRA - TDI S': 8.4 CM/SEC
CHOLEST SERPL-MCNC: 196 MG/DL (ref 0–200)
HDLC SERPL-MCNC: 54 MG/DL (ref 40–60)
LDLC SERPL CALC-MCNC: 119 MG/DL (ref 0–100)
LDLC/HDLC SERPL: 2.15 {RATIO}
LEFT ATRIUM VOLUME INDEX: 18.8 ML/M2
SINUS: 2.6 CM
STJ: 2.15 CM
TRIGL SERPL-MCNC: 129 MG/DL (ref 0–150)
VLDLC SERPL-MCNC: 23 MG/DL (ref 5–40)

## 2023-09-21 PROCEDURE — 80061 LIPID PANEL: CPT

## 2023-09-21 PROCEDURE — 3074F SYST BP LT 130 MM HG: CPT | Performed by: NURSE PRACTITIONER

## 2023-09-21 PROCEDURE — 93306 TTE W/DOPPLER COMPLETE: CPT

## 2023-09-21 PROCEDURE — 99214 OFFICE O/P EST MOD 30 MIN: CPT | Performed by: NURSE PRACTITIONER

## 2023-09-21 PROCEDURE — 25510000001 PERFLUTREN (DEFINITY) 8.476 MG IN SODIUM CHLORIDE (PF) 0.9 % 10 ML INJECTION: Performed by: INTERNAL MEDICINE

## 2023-09-21 PROCEDURE — 3078F DIAST BP <80 MM HG: CPT | Performed by: NURSE PRACTITIONER

## 2023-09-21 PROCEDURE — 36415 COLL VENOUS BLD VENIPUNCTURE: CPT

## 2023-09-21 PROCEDURE — 93000 ELECTROCARDIOGRAM COMPLETE: CPT | Performed by: NURSE PRACTITIONER

## 2023-09-21 RX ADMIN — PERFLUTREN 2 ML: 6.52 INJECTION, SUSPENSION INTRAVENOUS at 13:09

## 2023-09-21 NOTE — PROGRESS NOTES
Date of Office Visit: 2023  Encounter Provider: CARLIE Gold  Place of Service: [facility] CARDIOLOGY  Patient Name: Katelyn Sr  :1950    No chief complaint on file.  : Follow-up    HPI: Katelyn Sr is a 72 y.o. female who is a patient of Dr. Velazquez.  She is new to me today and presents for a 6-month office follow-up.  He has a history of hypertension, hyperlipidemia, acute kidney injury in the setting of chlorthalidone usage 3 she has interstitial lung disease and follows with Dr. Gutiérrez with [facility] and [place] pulmonary department.     2D echocardiogram 2022 shows normal LV function, no valvular disease, normal RV size and systolic function and no pulmonary hypertension.      She presents today with no new complaints.  She has fallen 3-4 times since the beginning of the year due to misstep.  She has chronic dyspnea on exertion and dependent edema.  She has some vertigo-like symptoms that she is experienced for years.  Blood pressure is well controlled.  EKG is stable and shows no new ischemia.  She is complaining of some pain on the lateral portion of her lower right leg and concerned that it is secondary to her statin use.  She has been on pravastatin since 2019.    Previous testing and notes have been reviewed by me.   Past Medical History:   Diagnosis Date    Abnormal ECG 18    Done in PCP office.  Hx of Atrial fib-?    Arthritis of neck     Atrial fibrillation     Cervical disc disorder     ACDF C6-7, 2003;  Dr. Tr Nix    Cervical stenosis of spinal canal 2020    Circadian rhythm sleep disorder, delayed sleep phase type 2017    CTS (carpal tunnel syndrome)     DDD (degenerative disc disease), lumbosacral 2018    Depression     Fibrosis of lung     Fracture of wrist     Fracture, finger     Generalized anxiety disorder 2020    GERD (gastroesophageal reflux disease)      Hip arthrosis     Hyperlipidemia     Hypersensitivity pneumonitis     vs pulmonary fibrosis    Hypersomnia due to another medical condition 10/28/2017    Hypertension     Knee swelling     Low back strain     Lower back pain 07/26/2019    Myocardial infarction     Neck strain     Neuroma of foot     Non-STEMI (non-ST elevated myocardial infarction) 05/21/2018    Nonsenile cataract     FUENTES on CPAP 10/19/2017    Palpitations 07/23/2019    Peripheral vestibulopathy of both ears 07/14/2020    Presence of artificial intra-ocular lens     Pulmonary fibrosis     Being treated at [facility]     Sleep apnea     Thoracic disc disorder     Thyroid disease     Vertigo 07/14/2020    Vestibular neuronitis 07/17/2020       Past Surgical History:   Procedure Laterality Date    CARDIAC CATHETERIZATION N/A 05/22/2018    Procedure: Left Heart Cath;  Surgeon: Aleks Velazquez MD;  [facility];  Service: Cardiovascular    CARDIAC CATHETERIZATION N/A 05/22/2018    Procedure: Coronary angiography;  Surgeon: Aleks Velazquez MD;  Location:  [facility] CATH INVASIVE LOCATION;  Service: Cardiovascular    CARDIAC CATHETERIZATION N/A 05/22/2018    Procedure: Left ventriculography;  Surgeon: Aleks Velazquez MD;  [facility];  Service: Cardiovascular    CARDIAC CATHETERIZATION N/A 05/22/2018    Procedure: Peripheral angiography;  Surgeon: Aleks Velazquez MD;  [facility];  Service: Cardiovascular    CATARACT EXTRACTION WITH INTRAOCULAR LENS IMPLANT Bilateral     COLONOSCOPY N/A 03/08/2023    Procedure: COLONOSCOPY to cecum/TI with cold biopsies;  Surgeon: Hamilton Francisco MD;  Location: [facility] ENDOSCOPY;  Service: Gastroenterology;  Laterality: N/A;  pre- diverticulitis  post- diverticulosis with stricture    DIAGNOSTIC LAPAROSCOPY  1990    r/o endometriosis    ENDOSCOPY N/A 03/08/2023    Procedure: ESOPHAGOGASTRODUODENOSCOPY with  cold biopsies and 52 Fr Kam Dilatation;  Surgeon: Hamilton Francisco MD;  Location: [facility] ENDOSCOPY;  Service: Gastroenterology;  Laterality: N/A;  pre- dysphagia  post- hiatal hernia, gastric polyps, esophageal ring @ 32    NECK SURGERY  2003    ACDF C6-7-Dr. Nix     RECTAL SURGERY      fistula/abscess/hemorrhoid       Social History     Socioeconomic History    Marital status: Single    Number of children: 0    Years of education: BS    Tobacco Use    Smoking status: Never     Passive exposure: Never    Smokeless tobacco: Never    Tobacco comments:     caffeine use: TEA OCCASSIONALLY   Vaping Use    Vaping Use: Never used   Substance and Sexual Activity    Alcohol use: No    Drug use: Never    Sexual activity: Not Currently       Family History   Problem Relation Age of Onset    Alzheimer's disease Mother     Heart failure Father     Arrhythmia Father     Heart attack Father         MI at age 56,  at 63.    Diabetes Sister     Atrial fibrillation Sister     Colon cancer Maternal Grandmother     Stroke Paternal Grandmother     Malig Hyperthermia Neg Hx        Review of Systems   Constitutional: Negative. Negative for malaise/fatigue.   HENT: Negative.     Eyes: Negative.    Cardiovascular: Negative.    Respiratory:  Positive for wheezing.    Endocrine: Negative.    Hematologic/Lymphatic: Negative.    Skin: Negative.    Musculoskeletal: Negative.    Gastrointestinal: Negative.    Genitourinary: Negative.    Neurological: Negative.    Psychiatric/Behavioral:  The patient is nervous/anxious.    Allergic/Immunologic: Negative.      Allergies   Allergen Reactions    Simvastatin Myalgia     Cramps in thighs      Erythromycin Rash    Penicillins Rash         Current Outpatient Medications:     amLODIPine (NORVASC) 5 MG tablet, Take 1 tablet by mouth Daily., Disp: 90 tablet, Rfl: 3    ARIPiprazole (ABILIFY) 2 MG tablet, Take 1 tablet by mouth Every Morning., Disp: , Rfl:     carvedilol (COREG) 25 MG  tablet, TAKE 1 TABLET BY MOUTH TWICE A DAY, Disp: 180 tablet, Rfl: 3    ciprofloxacin (CIPRO) 500 MG tablet, Take 1 tablet by mouth 2 (Two) Times a Day., Disp: , Rfl:     DULoxetine (CYMBALTA) 60 MG capsule, Take 2 capsules by mouth Every Night., Disp: , Rfl:     escitalopram (LEXAPRO) 10 MG tablet, Take 1 tablet by mouth Daily., Disp: , Rfl:     famotidine (PEPCID) 10 MG tablet, Take 4 tablets by mouth Every Night., Disp: , Rfl:     fluticasone (FLONASE) 50 MCG/ACT nasal spray, 2 sprays into the nostril(s) as directed by provider Daily., Disp: , Rfl:     hydrALAZINE (APRESOLINE) 50 MG tablet, Take 1 tablet by mouth 2 (Two) Times a Day., Disp: 180 tablet, Rfl: 3    Ibuprofen 3 %, Gabapentin 10 %, Baclofen 2 %, lidocaine 4 %, Apply 1-2 g topically to the appropriate area as directed 3 (Three) to 4 (Four) times daily., Disp: 90 g, Rfl: 1    lamoTRIgine (LaMICtal) 100 MG tablet, Take 1 tablet by mouth Every Night., Disp: , Rfl:     losartan (COZAAR) 100 MG tablet, TAKE 1 TABLET BY MOUTH EVERY DAY, Disp: 90 tablet, Rfl: 4    metroNIDAZOLE (FLAGYL) 500 MG tablet, , Disp: , Rfl:     Nintedanib Esylate (Ofev) 100 MG capsule, Take 1 capsule by mouth 2 (Two) Times a Day., Disp: , Rfl:     pantoprazole (PROTONIX) 40 MG EC tablet, Take 1 tablet by mouth Daily., Disp: , Rfl:     pravastatin (PRAVACHOL) 40 MG tablet, TAKE 1 TABLET BY MOUTH EVERY DAY AT NIGHT, Disp: 90 tablet, Rfl: 3    traZODone (DESYREL) 50 MG tablet, Take 0.5 tablets by mouth At Night As Needed for Sleep., Disp: , Rfl:     aspirin 81 MG EC tablet, Take 1 tablet by mouth Daily. PT STATES *NOT CURRENTLY TAKING (Patient not taking: Reported on 9/21/2023), Disp: , Rfl:     calcium carbonate (OS-SUZETTE) 1250 (500 Ca) MG chewable tablet, Chew 1 tablet Daily As Needed. (Patient not taking: Reported on 9/21/2023), Disp: , Rfl:   No current facility-administered medications for this visit.      Objective:     Vitals:    09/21/23 1319   BP: 128/78   Pulse: 66   Weight:  "85.7 kg (189 lb)   Height: 157.5 cm (62.01\")     Body mass index is 34.56 kg/m².     2D Echocardiogram 9/2/2022:  Estimated left ventricular EF = 65% Left ventricular systolic function is normal.  Left ventricular diastolic function was normal.  Estimated right ventricular systolic pressure from tricuspid regurgitation is normal (<35 mmHg).  No valvular disease  Normal RV size and systolic function  No pulmonary hypertension    48-hour Holter monitor 7/26/2019:  Relatively benign. Sinus with rare atrial and ventricular ectopic beats. 11 atrial runs with the longest lasting 8 beats.  No atrial fibrillation.      2D Echocardiogram 5/23/2018:  Left ventricular systolic function is normal. Calculated EF = 59%. Estimated EF was in agreement with the calculated EF. Estimated EF = 59%. Normal left ventricular cavity size and wall thickness noted. All left ventricular wall segments contract normally. Left ventricular diastolic dysfunction is noted (grade I) consistent with impaired relaxation.  Trace mitral valve regurgitation is present.  Physiologic tricuspid valve regurgitation is present. Estimated right ventricular systolic pressure from tricuspid regurgitation is normal (<35 mmHg). Calculated right ventricular systolic pressure from tricuspid regurgitation is 25 mmHg.    Left heart cath 5/22/2018:  Left main:  Large-caliber vessel that bifurcates to an LAD and circumflex coronary artery.  Left main coronary artery is normal  LAD: Large-caliber vessel that gives rise to a small caliber diagonal branch in the proximal and mid segment.  The LAD is normal.  LCX: Large caliber vessel that gives rise to a medium caliber OM1 and OM 2.  Normal  RCA: Large caliber vessel that gives rise to a small caliber PDA.  Normal     Left ventricular angiography: Normal left ventricular size and systolic function.  Ejection fraction 55%.  Distal inferior wall hypokinesis.  Likely catheter induced     PHYSICAL EXAM:    Constitutional:      "  Appearance: Healthy appearance. Not in distress.   Neck:      Vascular: No JVR. JVD normal.   Pulmonary:      Effort: Pulmonary effort is normal.      Breath sounds: Wheezing present. No rhonchi. No rales.      Comments: Expiratory wheezes left lobe  Chest:      Chest wall: Not tender to palpatation.   Cardiovascular:      PMI at left midclavicular line. Normal rate. Regular rhythm. Normal S1. Normal S2.       Murmurs: There is no murmur.      No gallop.  No click. No rub.      Comments: Generalized lower extremity edema, nonpitting  Pulses:     Intact distal pulses.   Edema:     Peripheral edema absent.   Abdominal:      General: Bowel sounds are normal.      Palpations: Abdomen is soft.      Tenderness: There is no abdominal tenderness.   Musculoskeletal: Normal range of motion.         General: No tenderness. Skin:     General: Skin is warm and dry.   Neurological:      General: No focal deficit present.      Mental Status: Alert and oriented to person, place and time.         ECG 12 Lead    Date/Time: 9/21/2023 2:18 PM  Performed by: Mai Hernandez APRN  Authorized by: Mai Hernandez APRN   Comparison: compared with previous ECG from 3/9/2023  Similar to previous ECG  Rhythm: sinus rhythm  BPM: 66  Conduction: conduction normal          Assessment:       Diagnosis Plan   1. Hypercholesterolemia  Lipid Panel      2. Essential hypertension        3. Mixed hyperlipidemia        4. FUENTES on autoCPAP        5. Chronic dyspnea          Orders Placed This Encounter   Procedures    Lipid Panel     Standing Status:   Future     Standing Expiration Date:   9/21/2024     Order Specific Question:   Release to patient     Answer:   Routine Release [[id number]]          Plan:       1.  Hypertension: Well-controlled on current medication  2.  Hyperlipidemia: Statin therapy.  Prior myalgias with more potent statins.  She has been on pravastatin since 2019 and is concerned that her new leg pain is secondary to statin  use.  Will obtain fasting lipid panel as most recent was in 2021.  If lipid panel in target range, we may decrease pravastatin to 20 mg.  She will continue current dose for the time being.  3.  Dyspnea on exertion: Normal LV function, no valvular disease and no pulmonary hypertension.  Likely secondary to interstitial lung disease and/or deconditioning.  2D echocardiogram performed today prior to this office appointment.  4.  Obstructive sleep: Compliant with CPAP    Ms. Sr will follow-up with Dr. Velazquez in 6 months.  She will call sooner for any questions or concerns.         Your medication list            Accurate as of September 21, 2023  2:17 PM. If you have any questions, ask your nurse or doctor.                CONTINUE taking these medications        Instructions Last Dose Given Next Dose Due   amLODIPine 5 MG tablet  Commonly known as: NORVASC      Take 1 tablet by mouth Daily.       ARIPiprazole 2 MG tablet  Commonly known as: ABILIFY      Take 1 tablet by mouth Every Morning.       aspirin 81 MG EC tablet      Take 1 tablet by mouth Daily. PT STATES *NOT CURRENTLY TAKING       calcium carbonate 1250 (500 Ca) MG chewable tablet  Commonly known as: OS-SUZETTE      Chew 1 tablet Daily As Needed.       carvedilol 25 MG tablet  Commonly known as: COREG      TAKE 1 TABLET BY MOUTH TWICE A DAY       ciprofloxacin 500 MG tablet  Commonly known as: CIPRO      Take 1 tablet by mouth 2 (Two) Times a Day.       DULoxetine 60 MG capsule  Commonly known as: CYMBALTA      Take 2 capsules by mouth Every Night.       escitalopram 10 MG tablet  Commonly known as: LEXAPRO      Take 1 tablet by mouth Daily.       famotidine 10 MG tablet  Commonly known as: PEPCID      Take 4 tablets by mouth Every Night.       fluticasone 50 MCG/ACT nasal spray  Commonly known as: FLONASE      2 sprays into the nostril(s) as directed by provider Daily.       hydrALAZINE 50 MG tablet  Commonly known as: APRESOLINE      Take 1 tablet by mouth  2 (Two) Times a Day.       Ibuprofen 3 %, Gabapentin 10 %, Baclofen 2 %, lidocaine 4 %      Apply 1-2 g topically to the appropriate area as directed 3 (Three) to 4 (Four) times daily.       lamoTRIgine 100 MG tablet  Commonly known as: LaMICtal      Take 1 tablet by mouth Every Night.       losartan 100 MG tablet  Commonly known as: COZAAR      TAKE 1 TABLET BY MOUTH EVERY DAY       metroNIDAZOLE 500 MG tablet  Commonly known as: FLAGYL           Ofev 100 MG capsule  Generic drug: Nintedanib Esylate      Take 1 capsule by mouth 2 (Two) Times a Day.       pantoprazole 40 MG EC tablet  Commonly known as: PROTONIX      Take 1 tablet by mouth Daily.       pravastatin 40 MG tablet  Commonly known as: PRAVACHOL      TAKE 1 TABLET BY MOUTH EVERY DAY AT NIGHT       traZODone 50 MG tablet  Commonly known as: DESYREL      Take 0.5 tablets by mouth At Night As Needed for Sleep.                  As always, it has been a pleasure to participate in your patient's care.      Sincerely,       CARLIE Rollins

## 2023-09-22 ENCOUNTER — TELEPHONE (OUTPATIENT)
Dept: CARDIOLOGY | Facility: CLINIC | Age: 73
End: 2023-09-22
Payer: MEDICARE

## 2023-09-22 NOTE — TELEPHONE ENCOUNTER
Katelyn Sr returned call.    Reviewed results and recommendations with patient and she verbalized understanding of results and recommendations, with repeat back of medication instructions.    Thank you,  Lilibeth DUPONT RN  Triage Nurse [facility]   12:55 EDT

## 2023-09-22 NOTE — TELEPHONE ENCOUNTER
----- Message from CARLIE Leigh sent at 9/22/2023 10:47 AM EDT -----  Let patient know her LDL is a little above goal and would like to keep her on the same statin dose. I know she is having some leg cramps but as her to start taking 100mg of coezymes cq10 which is over the counter. Let's see if that helps.  ----- Message -----  From: Lab, Background User  Sent: 9/21/2023   4:15 PM EDT  To: CARLIE Gold

## 2023-09-22 NOTE — TELEPHONE ENCOUNTER
I tried to call Katelyn Sr but there was no answer.  Left a voicemail asking patient to call back.  Will continue to try to reach pt.    Thank you,    Gilma Diamond RN  [place] [facility]  09/22/23 12:10 EDT

## 2023-09-25 ENCOUNTER — HOSPITAL ENCOUNTER (OUTPATIENT)
Facility: HOSPITAL | Age: 73
Discharge: HOME | End: 2023-09-25
Admitting: INTERNAL MEDICINE
Payer: MEDICARE

## 2023-09-25 DIAGNOSIS — J84.10 PULMONARY FIBROSIS: ICD-10-CM

## 2023-09-25 PROCEDURE — 71250 CT THORAX DX C-: CPT

## 2023-09-28 ENCOUNTER — TELEPHONE (OUTPATIENT)
Dept: CARDIOLOGY | Facility: CLINIC | Age: 73
End: 2023-09-28
Payer: MEDICARE

## 2023-09-28 NOTE — TELEPHONE ENCOUNTER
Pt called into triage line asking for Echo results from last week.  Is there anything that I can tell her?

## 2023-10-02 NOTE — TELEPHONE ENCOUNTER
I am confused.  It looks like she had the echo done before his appointment with Kirti.   I called and got her answering machine.

## 2023-10-02 NOTE — TELEPHONE ENCOUNTER
She came right over to her apptmt after echo, so no results available at the time.  I will try her tomorrow.

## 2023-10-02 NOTE — TELEPHONE ENCOUNTER
Pt called again stating she missed a call from our office. Kirti, she knows to expect a call from you today or tomorrow regarding her echo.    Thank you,    Kirti Westfall RN  [place] [facility] [place]  10/02/23 12:55 EDT

## 2023-11-08 ENCOUNTER — LAB (OUTPATIENT)
Dept: LAB | Facility: HOSPITAL | Age: 73
End: 2023-11-08
Payer: MEDICARE

## 2023-11-08 ENCOUNTER — TRANSCRIBE ORDERS (OUTPATIENT)
Dept: LAB | Facility: HOSPITAL | Age: 73
End: 2023-11-08
Payer: MEDICARE

## 2023-11-08 DIAGNOSIS — E05.90 HYPERTHYROIDISM: Primary | ICD-10-CM

## 2023-11-08 DIAGNOSIS — E05.90 HYPERTHYROIDISM: ICD-10-CM

## 2023-11-08 LAB
T4 FREE SERPL-MCNC: 2.39 NG/DL (ref 0.93–1.7)
TSH SERPL DL<=0.05 MIU/L-ACNC: 4.13 UIU/ML (ref 0.27–4.2)

## 2023-11-08 PROCEDURE — 36415 COLL VENOUS BLD VENIPUNCTURE: CPT

## 2023-11-08 PROCEDURE — 84443 ASSAY THYROID STIM HORMONE: CPT

## 2023-11-08 PROCEDURE — 84439 ASSAY OF FREE THYROXINE: CPT

## 2023-11-29 ENCOUNTER — OFFICE VISIT (OUTPATIENT)
Dept: ORTHOPEDIC SURGERY | Facility: CLINIC | Age: 73
End: 2023-11-29
Payer: MEDICARE

## 2023-11-29 VITALS — TEMPERATURE: 96 F | WEIGHT: 191 LBS | HEIGHT: 62 IN | BODY MASS INDEX: 35.15 KG/M2

## 2023-11-29 DIAGNOSIS — G57.91 NEURITIS OF RIGHT FOOT: ICD-10-CM

## 2023-11-29 DIAGNOSIS — M19.079 ARTHRITIS OF ANKLE: ICD-10-CM

## 2023-11-29 DIAGNOSIS — M87.071 AVASCULAR NECROSIS OF RIGHT TALUS: Primary | ICD-10-CM

## 2023-11-29 DIAGNOSIS — M19.071 ARTHRITIS OF FIRST METATARSOPHALANGEAL (MTP) JOINT OF RIGHT FOOT: ICD-10-CM

## 2023-11-29 DIAGNOSIS — M20.41 HAMMER TOE OF RIGHT FOOT: ICD-10-CM

## 2023-11-29 DIAGNOSIS — M20.11 HALLUX VALGUS OF RIGHT FOOT: ICD-10-CM

## 2023-11-29 DIAGNOSIS — M19.079 ARTHRITIS OF FOOT: ICD-10-CM

## 2023-11-29 NOTE — PROGRESS NOTES
"Foot Follow Up      Patient: Katelyn Sr    YOB: 1950 73 y.o. female    Chief Complaints: Foot is sore but ankle hurts more    History of Present Illness:Patient was seen on 8/11/2022 with complaints of moderate and occasionally severe pain mainly in the dorsum of her right midfoot.  Symptoms were worse with \"walking a lot\" and were stabbing aching and burning with associated swelling worse with standing driving and climbing steps.     She had used ibuprofen which had helped to some degree.     I had seen her back in 2014 for this similar problem and she had injection done in her first TMT joint which gave her significant relief for many many years.     She reported pain mainly around the first TMT joint more so than the first MTP joint and although had some mild hammertoes they had  not been bothersome to her.     It was felt she had exacerbation of midfoot arthritis mostly at the first and second TMT joints and also had hallux valgus with metatarsus primus varus first MTP arthritis and hammertoes.  Reviewed her that she could ultimately require midfoot fusion of the first and second TMT joints but also with some arthritis of the first MTP joint could eventually require fusion but would not recommend simultaneous fusion of those.  She was uninterested in surgical treatment and instructed on heel cord stretching exercises, use of power step orthotics and prescribed compounding cream of ketoprofen 10%, lidocaine 5% and gabapentin 5% to apply to the midfoot.  She was sent for radiographic guided injections of the first and second TMT joints.     She underwent injections of the right midfoot and per review of those records it was of the second and third TMT joints on 8/25/2022.     Patient was seen on 1/4/2023 reporting that she really did not have any relief and still had pain mainly around the first TMT joint more so than the second and third where injection did seem to help some.  She was not " really doing her heel cord stretching exercises.  Compounding cream did seem to help some.  She had initially gotten some over-the-counter orthotics which had helped but had had recurrent pain mainly in the midfoot more so than around the first MTP joint but has some there as well.  She did feel like the pain in her midfoot is affecting her gait.     We discussed treatment going forward fairly be fairly complex and especially with her living alone in second-[facility].  She was to continue with nonoperative treatment and was sent for radiographic guided injections of the right first second and third TMT joints.  She was also instructed to increase her heel cord stretching exercises and use of compounding cream and was referred to see Alessio at [facility] for work on gait training mechanics and possible orthotic     She was to been seen back 2 months but did not return until 8/7/2023.  She had her injections done on 2/15/2023.  This was mainly the first TMT joint but did have significant relief for several months.     She did start some physical therapy with Alessio in March or April and did this for about 10 weeks and got some arch supports     When seen on 8/7/2023 she had recurrent throbbing pain mainly in the dorsal medial aspect of the right midfoot.  She really had not been doing the heel cord stretching exercises as previously described and had not been using compounding cream.  Pain was rated 4 out of 10.    We discussed treatment options including extensive midfoot fusion which would be at the first TMT joint with realignment and bunion correction but also arthritis at the first MTP joint but would not want to fuse both of those joints.  She was sent for repeat radiographic guided injections of the first second and third TMT joints.  She was also instructed on heel cord stretching exercises.    Patient had injections done first second and third tarsometatarsal joints on 9/6/2023.    Patient reports that she and  initially had 100% relief which lasted for several weeks.  She had recurrence of pain mainly over the dorsal medial aspect of the right midfoot more so than any around the first MTP joint.  She has been heel cord stretching only several times per week and has not really noted any appreciable relief with the compounding cream.   However she now reports ankle pain that is worse than her midfoot pain that she said began several months ago.  Its mainly over the anteromedial aspect of the right ankle.  She reports swelling laterally but not really pain but does have pain over the inferomedial aspect of the right ankle when she inverts her foot and states that she tends to walk on the lateral side of her foot to minimize the pain which is somewhat contradictory however she feels that the symptoms have been somewhat worse when she started wearing the orthotics.  HPI    ROS: Ankle and foot pain  Past Medical History:   Diagnosis Date    Abnormal ECG 5/21/18    Done in PCP office.  Hx of Atrial fib-?2007    Arthritis of neck     Atrial fibrillation     Cervical disc disorder 8/03    ACDF C6-7, 12/03/2003;  Dr. 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    CTS (carpal tunnel syndrome)     DDD (degenerative disc disease), lumbosacral 11/09/2018    Depression     Fibrosis of lung     Fracture of wrist     Fracture, finger     Generalized anxiety disorder 07/14/2020    GERD (gastroesophageal reflux disease)     Hip arthrosis     Hyperlipidemia     Hypersensitivity pneumonitis     vs pulmonary fibrosis    Hypersomnia due to another medical condition 10/28/2017    Hypertension     Knee swelling     Low back strain     Lower back pain 07/26/2019    Myocardial infarction     Neck strain     Neuroma of foot     Non-STEMI (non-ST elevated myocardial infarction) 05/21/2018    Nonsenile cataract     FUENTES on CPAP 10/19/2017    Palpitations  "07/23/2019    Peripheral vestibulopathy of both ears 07/14/2020    Presence of artificial intra-ocular lens     Pulmonary fibrosis     Being treated at [place]    Scoliosis     Sleep apnea     Thoracic disc disorder     Thyroid disease     Vertigo 07/14/2020    Vestibular neuronitis 07/17/2020     Physical Exam:   Vitals:    11/29/23 1311   Temp: 96 °F (35.6 °C)   Weight: 86.6 kg (191 lb)   Height: 157.5 cm (62\")   PainSc:   7     Well developed with normal mood.  On exam she has mild swelling over the lateral aspect of the ankle but without focal tenderness over the lateral aspect of the ankle joint anteriorly.  She has mild discomfort over the anteromedial aspect of the ankle but no palpable defect of the anterior tib tendon minimal discomfort inframedially.  Mild discomfort over the second and third more so than first TMT joint and did have a slight Tinel's over the dorsum of the midfoot but no mottling or hyperesthesia.  She had very mild hallux valgus with some palpable arthritic change but without focal pain today with range of motion.  Some mild hammering of the second and third toes      Radiology: 3 views of the right ankle ordered to evaluate pain reviewed and compared to old x-rays from 11/4/2013.  There is some lucency through the lateral third of the talus on the oblique may be consistent with avascular necrosis but I do not see any gross collapse.  There is a very small spur over the anterior ankle.    Report of right foot injection of the first second and third tarsometatarsal joint from 9/6/2023 which describes 100% relief      Assessment/Plan: 1.  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  Right forefoot mild hammertoes  4.  Right dorsal midfoot deep peroneal " neuritis    We discussed treatment going forward and the ankle is more bothersome to her than the midfoot.  She would like to avoid surgical treatment if at all possible.    We will go ahead and get an MRI of her right ankle evaluate extent of arthrosis again at the midfoot but also more importantly to evaluate for avascular necrosis of the talus.    We fitted with an ASO brace.  And she may continue with her orthotic and may try taking that out and see if that helps as she said it seemed to have been bothersome    She did continue with compounding cream as previously prescribed    We will hold off on any more injections to her right midfoot although has had some recurrence of symptoms her ankle is more bothersome at this point.    Offered her a cane to help offload the ankle which she declined at this time.  She did however say she has had several falls with some issues of her entire right side feeling somewhat weak in her thigh and thinks it may be coming from her low back is been going on for several years.  We discussed spine evaluation she has been seen by Dr. Nix in the past and was already contemplating make an appointment to see his office for this which I encouraged her to go ahead and do at home think there is anything intrinsic to her foot or ankle    We will plan to see her back in 3 weeks to review MRI of the right ankle and determine treatment course going forward.

## 2023-12-04 ENCOUNTER — TRANSCRIBE ORDERS (OUTPATIENT)
Dept: ADMINISTRATIVE | Facility: HOSPITAL | Age: 73
End: 2023-12-04
Payer: MEDICARE

## 2023-12-04 DIAGNOSIS — M54.16 LUMBAR RADICULOPATHY: Primary | ICD-10-CM

## 2023-12-08 ENCOUNTER — HOSPITAL ENCOUNTER (OUTPATIENT)
Dept: MRI IMAGING | Facility: HOSPITAL | Age: 73
Discharge: HOME | End: 2023-12-08
Admitting: ORTHOPAEDIC SURGERY
Payer: MEDICARE

## 2023-12-08 DIAGNOSIS — M87.071 AVASCULAR NECROSIS OF RIGHT TALUS: ICD-10-CM

## 2023-12-08 DIAGNOSIS — M19.079 ARTHRITIS OF ANKLE: ICD-10-CM

## 2023-12-08 PROCEDURE — 73721 MRI JNT OF LWR EXTRE W/O DYE: CPT

## 2023-12-21 ENCOUNTER — OFFICE VISIT (OUTPATIENT)
Dept: ORTHOPEDIC SURGERY | Facility: CLINIC | Age: 73
End: 2023-12-21
Payer: MEDICARE

## 2023-12-21 VITALS — BODY MASS INDEX: 35.3 KG/M2 | TEMPERATURE: 97.8 F | WEIGHT: 191.8 LBS | HEIGHT: 62 IN

## 2023-12-21 DIAGNOSIS — M19.079 ARTHRITIS OF ANKLE: ICD-10-CM

## 2023-12-21 DIAGNOSIS — M19.071 ARTHRITIS OF FIRST METATARSOPHALANGEAL (MTP) JOINT OF RIGHT FOOT: ICD-10-CM

## 2023-12-21 DIAGNOSIS — M20.11 HALLUX VALGUS OF RIGHT FOOT: ICD-10-CM

## 2023-12-21 DIAGNOSIS — M20.41 HAMMER TOE OF RIGHT FOOT: ICD-10-CM

## 2023-12-21 DIAGNOSIS — M19.079 ARTHRITIS OF FOOT: Primary | ICD-10-CM

## 2023-12-21 DIAGNOSIS — G57.91 NEURITIS OF RIGHT FOOT: ICD-10-CM

## 2023-12-21 NOTE — PROGRESS NOTES
"Ankle Follow Up      Patient: Katelyn Sr    YOB: 1950 73 y.o. female    Chief Complaints: Ankle feeling some better but foot is sore    History of Present Illness:Patient was seen on 8/11/2022 with complaints of moderate and occasionally severe pain mainly in the dorsum of her right midfoot.  Symptoms were worse with \"walking a lot\" and were stabbing aching and burning with associated swelling worse with standing driving and climbing steps.     She had used ibuprofen which had helped to some degree.     I had seen her back in 2014 for this similar problem and she had injection done in her first TMT joint which gave her significant relief for many many years.     She reported pain mainly around the first TMT joint more so than the first MTP joint and although had some mild hammertoes they had  not been bothersome to her.     It was felt she had exacerbation of midfoot arthritis mostly at the first and second TMT joints and also had hallux valgus with metatarsus primus varus first MTP arthritis and hammertoes.  Reviewed her that she could ultimately require midfoot fusion of the first and second TMT joints but also with some arthritis of the first MTP joint could eventually require fusion but would not recommend simultaneous fusion of those.  She was uninterested in surgical treatment and instructed on heel cord stretching exercises, use of power step orthotics and prescribed compounding cream of ketoprofen 10%, lidocaine 5% and gabapentin 5% to apply to the midfoot.  She was sent for radiographic guided injections of the first and second TMT joints.     She underwent injections of the right midfoot and per review of those records it was of the second and third TMT joints on 8/25/2022.     Patient was seen on 1/4/2023 reporting that she really did not have any relief and still had pain mainly around the first TMT joint more so than the second and third where injection did seem to help some.  She " was not really doing her heel cord stretching exercises.  Compounding cream did seem to help some.  She had initially gotten some over-the-counter orthotics which had helped but had had recurrent pain mainly in the midfoot more so than around the first MTP joint but has some there as well.  She did feel like the pain in her midfoot is affecting her gait.     We discussed treatment going forward fairly be fairly complex and especially with her living alone in [facility].  She was to continue with nonoperative treatment and was sent for radiographic guided injections of the right first second and third TMT joints.  She was also instructed to increase her heel cord stretching exercises and use of compounding cream and was referred to see Alessio at [facility] for work on gait training mechanics and possible orthotic     She was to been seen back 2 months but did not return until 8/7/2023.  She had her injections done on 2/15/2023.  This was mainly the first TMT joint but did have significant relief for several months.     She did start some physical therapy with Alessio in March or April and did this for about 10 weeks and got some arch supports     When seen on 8/7/2023 she had recurrent throbbing pain mainly in the dorsal medial aspect of the right midfoot.  She really had not been doing the heel cord stretching exercises as previously described and had not been using compounding cream.  Pain was rated 4 out of 10.     We discussed treatment options including extensive midfoot fusion which would be at the first TMT joint with realignment and bunion correction but also arthritis at the first MTP joint but would not want to fuse both of those joints.  She was sent for repeat radiographic guided injections of the first second and third TMT joints.  She was also instructed on heel cord stretching exercises.     Patient had injections done first second and third tarsometatarsal joints on 9/6/2023.     Patient was seen on  "11/29/2023 reporting that she and initially had 100% relief which lasted for several weeks.  She had had recurrence of pain mainly over the dorsal medial aspect of the right midfoot more so than any around the first MTP joint.  She had only been heel cord stretching several times per week and had not really noted any appreciable relief with the compounding cream.   However she did report that her right ankle pain was worse than her midfoot pain.  She reported right ankle pain had begun several months prior.  It was mainly over the anteromedial aspect of the right ankle.  She reported swelling laterally but not really pain but did have pain over the inferomedial aspect of the right ankle especially when she inverted her foot.  She reported that she had to to walk on the lateral side of her foot to minimize the pain which was somewhat contradictory however she felt that the symptoms had been somewhat worse when she started wearing the orthotics.    We discussed treatment and the ankle is more bothersome than the midfoot and she wished to avoid surgical treatment.  She was sent for MRI of the right ankle evaluate extent of arthrosis again at the midfoot but also evaluate for avascular necrosis of the talus.  She was fitted with an ASO brace and instructed she could try orthotic or could try taking it out and see if that helped.  She instructed continue with compounding cream and we held off any further injections of her right foot.  Offered her a cane which she declined.  She did report she had several falls with issues of her \"entire right side feeling weak\" and thought it may be something from her back and has been going on for several years.  She had been seen by Dr. Nix in the past and was contemplating an appointment to see him which I encouraged her to do.    Patient is seen back today and has not yet made appointment with Dr. Nix.  She reports mainly now pain over the dorsal medial aspect of the " "right foot around the first TMT joint and naviculocuneiform joint more so than the second and third TMT joints and only mild discomfort about the ankle joint itself.  Previous compounding cream is not really been beneficial.  Symptoms rated 6 out of 10 down from 7 out of 10 at previous visit  HPI    ROS: Foot and ankle pain  Past Medical History:   Diagnosis Date    Abnormal ECG 5/21/18    Done in PCP office.  Hx of Atrial fib-?2007    Arthritis of neck     Atrial fibrillation     Cervical disc disorder 8/03    ACDF C6-7, 12/03/2003;  Dr. 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    CTS (carpal tunnel syndrome)     DDD (degenerative disc disease), lumbosacral 11/09/2018    Depression     Fibrosis of lung     Fracture of wrist     Fracture, finger     Generalized anxiety disorder 07/14/2020    GERD (gastroesophageal reflux disease)     Hip arthrosis     Hyperlipidemia     Hypersensitivity pneumonitis     vs pulmonary fibrosis    Hypersomnia due to another medical condition 10/28/2017    Hypertension     Knee swelling     Low back strain     Lower back pain 07/26/2019    Myocardial infarction     Neck strain     Neuroma of foot     Non-STEMI (non-ST elevated myocardial infarction) 05/21/2018    Nonsenile cataract     FUENTES on CPAP 10/19/2017    Palpitations 07/23/2019    Peripheral vestibulopathy of both ears 07/14/2020    Presence of artificial intra-ocular lens     Pulmonary fibrosis     Being treated at [facility]    Scoliosis     Sleep apnea     Thoracic disc disorder     Thyroid disease     Vertigo 07/14/2020    Vestibular neuronitis 07/17/2020       Physical Exam:   Vitals:    12/21/23 1407   Temp: 97.8 °F (36.6 °C)   Weight: 87 kg (191 lb 12.8 oz)   Height: 157.5 cm (62\")   PainSc:   6     Well developed with normal mood.  On exam she has mild to moderate tenderness of the dorsal medial aspect of the right midfoot and " along the first TMT joint and naviculocuneiform joint.  Less tender over the second and third tarsometatarsal joints.  She had limited discomfort to the ankle with range of motion      Radiology: MRI films and report of the right ankle dated 12/8/2023 reviewed.  There is a marker denoting the area of pain  Along the medial side of the foot superficial to the navicular and first cuneiform.  There are degenerative changes tibiotalar joint observed lateral to the midline where there is full-thickness chondral loss on the talar dome with no joint effusion or significant marrow edema.  There is degenerative changes of the navicular and first cuneiform and also at the midfoot along the tarsometatarsal joints relatively sparing the fourth and fifth.  Posterior tib tendon and other flexor and extensor tendons appear normal    There is ossification along the course of the ATFL consistent with old sprain.  Distal lower leg and proximal foot musculature appeared normal      Assessment/Plan: 1.  Right ankle pain with degenerative change with full-thickness chondral loss along the talar dome lateral to the midline  2.  Right hindfoot degenerative change talonavicular and first cuneiform,  3.  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  Right hallux valgus with metatarsus primus varus and first MTP arthritis  5.  Right forefoot mild hammertoes  6.  Right dorsal midfoot deep peroneal neuritis    We discussed treatment going forward and she has had good response to the midfoot pain in the past from injections and could not give her guarantees that surgery would relieve her symptoms and could have potential complications.  The ankle itself does not show osteonecrosis but does have some arthritis but is not all that symptomatic right now so we will hold off injecting that    As long as she is getting relief from the midfoot injections continue with  that    She can continue with sturdy accommodative shoes with good arch support will try different compounding cream and sent for radiographic guided injections of the right first second and third tarsometatarsal joint and medial naviculocuneiform joint    Will see her back in about 8 weeks with x-rays of her right foot and ankle.

## 2023-12-28 ENCOUNTER — HOSPITAL ENCOUNTER (OUTPATIENT)
Dept: MRI IMAGING | Facility: HOSPITAL | Age: 73
Discharge: HOME | End: 2023-12-28
Admitting: INTERNAL MEDICINE
Payer: MEDICARE

## 2023-12-28 DIAGNOSIS — M54.16 LUMBAR RADICULOPATHY: ICD-10-CM

## 2023-12-28 PROCEDURE — 72148 MRI LUMBAR SPINE W/O DYE: CPT

## 2024-01-09 NOTE — PROGRESS NOTES
"Subjective   Patient ID: Katelyn Sr is a 73 y.o. female is being seen for consultation today at the request of Pierre Chaudhry Jr.,* for lumbar radiculopathy.   History of Present Illness  Today, patient reports chronic right lower back pain for at least 20 years but worse in last year and particularly worse in last few months. Aggravated by prolonged standing. Difficulty finding position of comfort. It is a \"toothache\" pain that is dull and with some tightness. She had PT x 3 months in Spring which helped but recurred. She reports that she is really not compliant with HEP. She sees chiropractor intermittently. Advil 400 mg 1 time daily PRN. She does have some difficulty going up and down steps. She feels right leg is weak, but no radiating pain. She did have LESI x 2 in past with no benefit. She does have R knee issues and arthritis in R foot/ankle. Chronic pain in foot/ankle. She has ROBERT- not new or worse.    Non-smoker.  No prior lumbar surgery.  She has a history of C6/7 ACDF in 2003 with Dr. Nix.  No cancer history. She has pneumonitis.    The following portions of the patient's history were reviewed and updated as appropriate: allergies, current medications, past family history, past medical history, past social history, past surgical history, and problem list.    Review of Systems   Constitutional:  Negative for chills and fever.   Respiratory:  Positive for shortness of breath. Negative for cough.    Cardiovascular:  Positive for palpitations and leg swelling. Negative for chest pain.   Gastrointestinal:  Negative for abdominal pain and constipation.   Genitourinary:  Negative for difficulty urinating and enuresis.   Musculoskeletal:  Positive for back pain. Negative for gait problem.   Skin:  Negative for rash.   Neurological:  Positive for weakness. Negative for numbness (or tingling).   Hematological:  Does not bruise/bleed easily.   Psychiatric/Behavioral:  Positive for sleep disturbance. "        Objective     Vitals:    01/12/24 1304   BP: 110/68   Pulse: 75   SpO2: 94%   Weight: 86.6 kg (191 lb)     Body mass index is 34.93 kg/m².    Tobacco Use: Low Risk  (1/12/2024)    Patient History     Smoking Tobacco Use: Never     Smokeless Tobacco Use: Never     Passive Exposure: Never          Physical Exam  Vitals reviewed.   Constitutional:       Appearance: Normal appearance.   Pulmonary:      Effort: Pulmonary effort is normal.   Musculoskeletal:      Lumbar back: Tenderness (Mildly right SI joint) and bony tenderness (Right lower lumbar facet area) present. Negative right straight leg raise test and negative left straight leg raise test.   Skin:     General: Skin is warm and dry.   Neurological:      General: No focal deficit present.      Mental Status: She is alert.      Gait: Gait is intact.      Deep Tendon Reflexes:      Reflex Scores:       Patellar reflexes are 2+ on the right side and 2+ on the left side.       Achilles reflexes are 1+ on the right side and 2+ on the left side.  Psychiatric:         Mood and Affect: Mood normal.         Speech: Speech normal.         Thought Content: Thought content normal.       Neurologic Exam     Mental Status   Speech: speech is normal   Level of consciousness: alert  Knowledge: good.   Normal comprehension.     Motor Exam   Muscle bulk: normal  Overall muscle tone: normal  5/5 bilateral lower extremity     Sensory Exam   Right leg light touch: normal  Left leg light touch: normal    Gait, Coordination, and Reflexes     Gait  Gait: normal    Reflexes   Right patellar: 2+  Left patellar: 2+  Right achilles: 1+  Left achilles: 2+  Right ankle clonus: absent  Left ankle clonus: absent  Able to heel and toe walk bilaterally       Assessment & Plan   Independent Review of Radiographic Studies:      I personally reviewed the images from the following studies.    MRI lumbar spine December 28, 2023 reveals mild dextro scoliosis at L3/4. There are minor degenerative  changes at L1/2, L2/3.  There is a minor right disc bulge that is broad-based at L2/3 which is new when compared to the prior study.  There is at least moderate DDD with disc height loss at L3/4 and disc osteophyte complex resulting in moderate left neural foraminal narrowing with degenerative endplate changes.  There is moderate bilateral  facet hypertrophy, R>L resulting in minimal left neuroforaminal narrowing at L4/5.  At L5/S1 there is advanced degenerative disc disease and facet hypertrophy R>L.  Moderate right foraminal narrowing. Several Tarlov cyst present in the sacral canal behind the lower body of S2.  Unless otherwise noted above, study findings are stable when compared to prior imaging in 2018.    Medical Decision Making:      Patient presents for evaluation of chronic back pain that has been slowly worsening over the last year.  Is mainly right-sided and into her buttock.  Does not radiate down her leg.  She has had epidurals in the past with no benefit.  She is followed with chiropractor and had PT as well.  She seemed to do better during PT but once complete symptoms returned and she does report that she was not not so compliant with her home exercise program.  She has no red flags on exam.  She has some tenderness in her right low back that appears along the facets as well as mildly in the SI joint.  MRI as noted above with multilevel degenerative changes.  She does have some facet hypertrophy which I feel is more so on the right at several levels.  She does not have any significant nerve root compression but is moderate at right L5/S1.  She does report some chronic foot and ankle pain with negative imaging outside of some arthritis.  Recommend lumbar x-rays with flexion-extension evaluate for any instability when she is standing and moving and referral to pain management for facet blocks, possible radiofrequency ablation, and possible SI joint injection.  Will see her back in 3 months with the  x-rays to see how she is feeling and whether she has made any improvement with the pain management treatments.  I did advise that 400 mg of ibuprofen intermittently is likely not enough to give her any benefit.  She could try taking Aleve once daily for period of time if that is helpful, recommend that she talk to her family doctor about prescription anti-inflammatory such as Mobic on a routine basis to help with her chronic arthritic issues.    Diagnoses and all orders for this visit:    1. DDD (degenerative disc disease), lumbar (Primary)  -     XR Spine Lumbar Complete With Flex & Ext; Future  -     Ambulatory Referral to Pain Management Clinic    2. Facet arthropathy, lumbosacral  -     XR Spine Lumbar Complete With Flex & Ext; Future  -     Ambulatory Referral to Pain Management Clinic      Return in about 3 months (around 4/12/2024) for after pain management visit, with imaging.

## 2024-01-12 ENCOUNTER — OFFICE VISIT (OUTPATIENT)
Dept: NEUROSURGERY | Facility: CLINIC | Age: 74
End: 2024-01-12
Payer: MEDICARE

## 2024-01-12 VITALS
HEART RATE: 75 BPM | BODY MASS INDEX: 34.93 KG/M2 | SYSTOLIC BLOOD PRESSURE: 110 MMHG | OXYGEN SATURATION: 94 % | DIASTOLIC BLOOD PRESSURE: 68 MMHG | WEIGHT: 191 LBS

## 2024-01-12 DIAGNOSIS — M47.817 FACET ARTHROPATHY, LUMBOSACRAL: ICD-10-CM

## 2024-01-12 DIAGNOSIS — M51.36 DDD (DEGENERATIVE DISC DISEASE), LUMBAR: Primary | ICD-10-CM

## 2024-01-12 RX ORDER — CETIRIZINE HYDROCHLORIDE 5 MG/1
5 TABLET ORAL DAILY
COMMUNITY

## 2024-01-17 ENCOUNTER — HOSPITAL ENCOUNTER (OUTPATIENT)
Dept: GENERAL RADIOLOGY | Facility: HOSPITAL | Age: 74
Discharge: HOME | End: 2024-01-17
Admitting: ORTHOPAEDIC SURGERY
Payer: MEDICARE

## 2024-01-17 DIAGNOSIS — M19.079 ARTHRITIS OF FOOT: ICD-10-CM

## 2024-01-17 PROCEDURE — 25010000002 BUPIVACAINE (PF) 0.25 % SOLUTION: Performed by: ORTHOPAEDIC SURGERY

## 2024-01-17 PROCEDURE — 25010000002 METHYLPREDNISOLONE PER 125 MG: Performed by: ORTHOPAEDIC SURGERY

## 2024-01-17 PROCEDURE — 77002 NEEDLE LOCALIZATION BY XRAY: CPT

## 2024-01-17 PROCEDURE — 25010000002 LIDOCAINE 1 % SOLUTION: Performed by: ORTHOPAEDIC SURGERY

## 2024-01-17 PROCEDURE — 25510000001 IOPAMIDOL 61 % SOLUTION: Performed by: ORTHOPAEDIC SURGERY

## 2024-01-17 RX ORDER — LIDOCAINE HYDROCHLORIDE 10 MG/ML
20 INJECTION, SOLUTION INFILTRATION; PERINEURAL ONCE
Status: COMPLETED | OUTPATIENT
Start: 2024-01-17 | End: 2024-01-17

## 2024-01-17 RX ORDER — METHYLPREDNISOLONE SODIUM SUCCINATE 125 MG/2ML
80 INJECTION, POWDER, LYOPHILIZED, FOR SOLUTION INTRAMUSCULAR; INTRAVENOUS
Status: COMPLETED | OUTPATIENT
Start: 2024-01-17 | End: 2024-01-17

## 2024-01-17 RX ORDER — BUPIVACAINE HYDROCHLORIDE 2.5 MG/ML
10 INJECTION, SOLUTION EPIDURAL; INFILTRATION; INTRACAUDAL ONCE
Status: COMPLETED | OUTPATIENT
Start: 2024-01-17 | End: 2024-01-17

## 2024-01-17 RX ADMIN — LIDOCAINE HYDROCHLORIDE 2 ML: 10 INJECTION, SOLUTION INFILTRATION; PERINEURAL at 11:43

## 2024-01-17 RX ADMIN — METHYLPREDNISOLONE SODIUM SUCCINATE 80 MG: 125 INJECTION, POWDER, LYOPHILIZED, FOR SOLUTION INTRAMUSCULAR; INTRAVENOUS at 11:43

## 2024-01-17 RX ADMIN — IOPAMIDOL 1 ML: 612 INJECTION, SOLUTION INTRAVENOUS at 11:43

## 2024-01-17 RX ADMIN — BUPIVACAINE HYDROCHLORIDE 5 ML: 2.5 INJECTION, SOLUTION EPIDURAL; INFILTRATION; INTRACAUDAL; PERINEURAL at 11:43

## 2024-01-23 ENCOUNTER — HOSPITAL ENCOUNTER (INPATIENT)
Facility: HOSPITAL | Age: 74
LOS: 1 days | Discharge: HOME | DRG: 310 | End: 2024-01-27
Attending: EMERGENCY MEDICINE | Admitting: STUDENT IN AN ORGANIZED HEALTH CARE EDUCATION/TRAINING PROGRAM
Payer: MEDICARE

## 2024-01-23 ENCOUNTER — APPOINTMENT (OUTPATIENT)
Dept: GENERAL RADIOLOGY | Facility: HOSPITAL | Age: 74
DRG: 310 | End: 2024-01-23
Payer: MEDICARE

## 2024-01-23 DIAGNOSIS — R79.89 ELEVATED TROPONIN: ICD-10-CM

## 2024-01-23 DIAGNOSIS — I48.91 ATRIAL FIBRILLATION WITH RVR: Primary | ICD-10-CM

## 2024-01-23 LAB
ALBUMIN SERPL-MCNC: 4.4 G/DL (ref 3.5–5.2)
ALBUMIN/GLOB SERPL: 1.5 G/DL
ALP SERPL-CCNC: 121 U/L (ref 39–117)
ALT SERPL W P-5'-P-CCNC: 11 U/L (ref 1–33)
ANION GAP SERPL CALCULATED.3IONS-SCNC: 16.7 MMOL/L (ref 5–15)
AST SERPL-CCNC: 13 U/L (ref 1–32)
BASOPHILS # BLD AUTO: 0.02 10*3/MM3 (ref 0–0.2)
BASOPHILS NFR BLD AUTO: 0.2 % (ref 0–1.5)
BILIRUB SERPL-MCNC: 0.4 MG/DL (ref 0–1.2)
BUN SERPL-MCNC: 18 MG/DL (ref 8–23)
BUN/CREAT SERPL: 18.2 (ref 7–25)
CALCIUM SPEC-SCNC: 9.5 MG/DL (ref 8.6–10.5)
CHLORIDE SERPL-SCNC: 104 MMOL/L (ref 98–107)
CO2 SERPL-SCNC: 23.3 MMOL/L (ref 22–29)
CREAT SERPL-MCNC: 0.99 MG/DL (ref 0.57–1)
DEPRECATED RDW RBC AUTO: 41.8 FL (ref 37–54)
EGFRCR SERPLBLD CKD-EPI 2021: 61.1 ML/MIN/1.73
EOSINOPHIL # BLD AUTO: 0.78 10*3/MM3 (ref 0–0.4)
EOSINOPHIL NFR BLD AUTO: 6.7 % (ref 0.3–6.2)
ERYTHROCYTE [DISTWIDTH] IN BLOOD BY AUTOMATED COUNT: 13.9 % (ref 12.3–15.4)
GLOBULIN UR ELPH-MCNC: 3 GM/DL
GLUCOSE SERPL-MCNC: 118 MG/DL (ref 65–99)
HCT VFR BLD AUTO: 48.8 % (ref 34–46.6)
HGB BLD-MCNC: 15.4 G/DL (ref 12–15.9)
HOLD SPECIMEN: NORMAL
HOLD SPECIMEN: NORMAL
IMM GRANULOCYTES # BLD AUTO: 0.04 10*3/MM3 (ref 0–0.05)
IMM GRANULOCYTES NFR BLD AUTO: 0.3 % (ref 0–0.5)
LYMPHOCYTES # BLD AUTO: 1.6 10*3/MM3 (ref 0.7–3.1)
LYMPHOCYTES NFR BLD AUTO: 13.7 % (ref 19.6–45.3)
MAGNESIUM SERPL-MCNC: 1.8 MG/DL (ref 1.6–2.4)
MCH RBC QN AUTO: 26.6 PG (ref 26.6–33)
MCHC RBC AUTO-ENTMCNC: 31.6 G/DL (ref 31.5–35.7)
MCV RBC AUTO: 84.1 FL (ref 79–97)
MONOCYTES # BLD AUTO: 1.08 10*3/MM3 (ref 0.1–0.9)
MONOCYTES NFR BLD AUTO: 9.3 % (ref 5–12)
NEUTROPHILS NFR BLD AUTO: 69.8 % (ref 42.7–76)
NEUTROPHILS NFR BLD AUTO: 8.12 10*3/MM3 (ref 1.7–7)
NRBC BLD AUTO-RTO: 0 /100 WBC (ref 0–0.2)
PLATELET # BLD AUTO: 367 10*3/MM3 (ref 140–450)
PMV BLD AUTO: 9.1 FL (ref 6–12)
POTASSIUM SERPL-SCNC: 3.6 MMOL/L (ref 3.5–5.2)
PROT SERPL-MCNC: 7.4 G/DL (ref 6–8.5)
RBC # BLD AUTO: 5.8 10*6/MM3 (ref 3.77–5.28)
SODIUM SERPL-SCNC: 144 MMOL/L (ref 136–145)
TROPONIN T SERPL HS-MCNC: 11 NG/L
WBC NRBC COR # BLD AUTO: 11.64 10*3/MM3 (ref 3.4–10.8)
WHOLE BLOOD HOLD COAG: NORMAL
WHOLE BLOOD HOLD SPECIMEN: NORMAL

## 2024-01-23 PROCEDURE — 71045 X-RAY EXAM CHEST 1 VIEW: CPT

## 2024-01-23 PROCEDURE — 83735 ASSAY OF MAGNESIUM: CPT | Performed by: PHYSICIAN ASSISTANT

## 2024-01-23 PROCEDURE — 99285 EMERGENCY DEPT VISIT HI MDM: CPT

## 2024-01-23 PROCEDURE — 93010 ELECTROCARDIOGRAM REPORT: CPT | Performed by: INTERNAL MEDICINE

## 2024-01-23 PROCEDURE — 36415 COLL VENOUS BLD VENIPUNCTURE: CPT

## 2024-01-23 PROCEDURE — 80053 COMPREHEN METABOLIC PANEL: CPT

## 2024-01-23 PROCEDURE — 93005 ELECTROCARDIOGRAM TRACING: CPT

## 2024-01-23 PROCEDURE — 84484 ASSAY OF TROPONIN QUANT: CPT | Performed by: EMERGENCY MEDICINE

## 2024-01-23 PROCEDURE — 25010000002 ONDANSETRON PER 1 MG: Performed by: PHYSICIAN ASSISTANT

## 2024-01-23 PROCEDURE — 25810000003 LACTATED RINGERS SOLUTION: Performed by: PHYSICIAN ASSISTANT

## 2024-01-23 PROCEDURE — 93005 ELECTROCARDIOGRAM TRACING: CPT | Performed by: PHYSICIAN ASSISTANT

## 2024-01-23 PROCEDURE — 85025 COMPLETE CBC W/AUTO DIFF WBC: CPT

## 2024-01-23 PROCEDURE — 93005 ELECTROCARDIOGRAM TRACING: CPT | Performed by: EMERGENCY MEDICINE

## 2024-01-23 PROCEDURE — 84484 ASSAY OF TROPONIN QUANT: CPT

## 2024-01-23 RX ORDER — ONDANSETRON 2 MG/ML
4 INJECTION INTRAMUSCULAR; INTRAVENOUS ONCE
Status: COMPLETED | OUTPATIENT
Start: 2024-01-23 | End: 2024-01-23

## 2024-01-23 RX ORDER — ASPIRIN 325 MG
325 TABLET ORAL ONCE
Status: DISCONTINUED | OUTPATIENT
Start: 2024-01-23 | End: 2024-01-24

## 2024-01-23 RX ORDER — SODIUM CHLORIDE 0.9 % (FLUSH) 0.9 %
10 SYRINGE (ML) INJECTION AS NEEDED
Status: DISCONTINUED | OUTPATIENT
Start: 2024-01-23 | End: 2024-01-27 | Stop reason: HOSPADM

## 2024-01-23 RX ADMIN — ONDANSETRON 4 MG: 2 INJECTION INTRAMUSCULAR; INTRAVENOUS at 22:27

## 2024-01-23 RX ADMIN — SODIUM CHLORIDE, POTASSIUM CHLORIDE, SODIUM LACTATE AND CALCIUM CHLORIDE 1000 ML: 600; 310; 30; 20 INJECTION, SOLUTION INTRAVENOUS at 22:27

## 2024-01-24 PROBLEM — R79.89 ELEVATED TROPONIN: Status: ACTIVE | Noted: 2024-01-24

## 2024-01-24 PROBLEM — I48.91 A-FIB: Status: ACTIVE | Noted: 2024-01-24

## 2024-01-24 LAB
ALBUMIN SERPL-MCNC: 3.3 G/DL (ref 3.5–5.2)
ALBUMIN/GLOB SERPL: 1.2 G/DL
ALP SERPL-CCNC: 93 U/L (ref 39–117)
ALT SERPL W P-5'-P-CCNC: 7 U/L (ref 1–33)
ANION GAP SERPL CALCULATED.3IONS-SCNC: 11.7 MMOL/L (ref 5–15)
AST SERPL-CCNC: 11 U/L (ref 1–32)
BASOPHILS # BLD AUTO: 0.02 10*3/MM3 (ref 0–0.2)
BASOPHILS NFR BLD AUTO: 0.2 % (ref 0–1.5)
BILIRUB SERPL-MCNC: 0.4 MG/DL (ref 0–1.2)
BUN SERPL-MCNC: 13 MG/DL (ref 8–23)
BUN/CREAT SERPL: 16.9 (ref 7–25)
CALCIUM SPEC-SCNC: 8.7 MG/DL (ref 8.6–10.5)
CHLORIDE SERPL-SCNC: 108 MMOL/L (ref 98–107)
CO2 SERPL-SCNC: 21.3 MMOL/L (ref 22–29)
CREAT SERPL-MCNC: 0.77 MG/DL (ref 0.57–1)
DEPRECATED RDW RBC AUTO: 41 FL (ref 37–54)
EGFRCR SERPLBLD CKD-EPI 2021: 82.6 ML/MIN/1.73
EOSINOPHIL # BLD AUTO: 0.6 10*3/MM3 (ref 0–0.4)
EOSINOPHIL NFR BLD AUTO: 7.3 % (ref 0.3–6.2)
ERYTHROCYTE [DISTWIDTH] IN BLOOD BY AUTOMATED COUNT: 13.6 % (ref 12.3–15.4)
GEN 5 2HR TROPONIN T REFLEX: 16 NG/L
GLOBULIN UR ELPH-MCNC: 2.7 GM/DL
GLUCOSE SERPL-MCNC: 109 MG/DL (ref 65–99)
HCT VFR BLD AUTO: 39.6 % (ref 34–46.6)
HGB BLD-MCNC: 12.7 G/DL (ref 12–15.9)
IMM GRANULOCYTES # BLD AUTO: 0.04 10*3/MM3 (ref 0–0.05)
IMM GRANULOCYTES NFR BLD AUTO: 0.5 % (ref 0–0.5)
LYMPHOCYTES # BLD AUTO: 1.13 10*3/MM3 (ref 0.7–3.1)
LYMPHOCYTES NFR BLD AUTO: 13.7 % (ref 19.6–45.3)
MAGNESIUM SERPL-MCNC: 1.9 MG/DL (ref 1.6–2.4)
MCH RBC QN AUTO: 26.8 PG (ref 26.6–33)
MCHC RBC AUTO-ENTMCNC: 32.1 G/DL (ref 31.5–35.7)
MCV RBC AUTO: 83.5 FL (ref 79–97)
MONOCYTES # BLD AUTO: 0.74 10*3/MM3 (ref 0.1–0.9)
MONOCYTES NFR BLD AUTO: 8.9 % (ref 5–12)
NEUTROPHILS NFR BLD AUTO: 5.74 10*3/MM3 (ref 1.7–7)
NEUTROPHILS NFR BLD AUTO: 69.4 % (ref 42.7–76)
NRBC BLD AUTO-RTO: 0 /100 WBC (ref 0–0.2)
PHOSPHATE SERPL-MCNC: 2.9 MG/DL (ref 2.5–4.5)
PLATELET # BLD AUTO: 255 10*3/MM3 (ref 140–450)
PMV BLD AUTO: 9.1 FL (ref 6–12)
POTASSIUM SERPL-SCNC: 3.5 MMOL/L (ref 3.5–5.2)
PROT SERPL-MCNC: 6 G/DL (ref 6–8.5)
QT INTERVAL: 300 MS
QT INTERVAL: 446 MS
QTC INTERVAL: 437 MS
QTC INTERVAL: 468 MS
RBC # BLD AUTO: 4.74 10*6/MM3 (ref 3.77–5.28)
SODIUM SERPL-SCNC: 141 MMOL/L (ref 136–145)
TROPONIN T DELTA: 5 NG/L
TROPONIN T SERPL HS-MCNC: 10 NG/L
TSH SERPL DL<=0.05 MIU/L-ACNC: 3.76 UIU/ML (ref 0.27–4.2)
WBC NRBC COR # BLD AUTO: 8.27 10*3/MM3 (ref 3.4–10.8)

## 2024-01-24 PROCEDURE — 84484 ASSAY OF TROPONIN QUANT: CPT | Performed by: NURSE PRACTITIONER

## 2024-01-24 PROCEDURE — G0378 HOSPITAL OBSERVATION PER HR: HCPCS

## 2024-01-24 PROCEDURE — 25010000002 ONDANSETRON PER 1 MG: Performed by: NURSE PRACTITIONER

## 2024-01-24 PROCEDURE — 25010000002 ONDANSETRON PER 1 MG: Performed by: EMERGENCY MEDICINE

## 2024-01-24 PROCEDURE — 99214 OFFICE O/P EST MOD 30 MIN: CPT | Performed by: INTERNAL MEDICINE

## 2024-01-24 PROCEDURE — 85025 COMPLETE CBC W/AUTO DIFF WBC: CPT | Performed by: NURSE PRACTITIONER

## 2024-01-24 PROCEDURE — 80053 COMPREHEN METABOLIC PANEL: CPT | Performed by: NURSE PRACTITIONER

## 2024-01-24 PROCEDURE — 84443 ASSAY THYROID STIM HORMONE: CPT | Performed by: STUDENT IN AN ORGANIZED HEALTH CARE EDUCATION/TRAINING PROGRAM

## 2024-01-24 PROCEDURE — 84100 ASSAY OF PHOSPHORUS: CPT | Performed by: STUDENT IN AN ORGANIZED HEALTH CARE EDUCATION/TRAINING PROGRAM

## 2024-01-24 PROCEDURE — 36415 COLL VENOUS BLD VENIPUNCTURE: CPT | Performed by: NURSE PRACTITIONER

## 2024-01-24 PROCEDURE — 25810000003 LACTATED RINGERS PER 1000 ML: Performed by: STUDENT IN AN ORGANIZED HEALTH CARE EDUCATION/TRAINING PROGRAM

## 2024-01-24 PROCEDURE — 83735 ASSAY OF MAGNESIUM: CPT | Performed by: STUDENT IN AN ORGANIZED HEALTH CARE EDUCATION/TRAINING PROGRAM

## 2024-01-24 RX ORDER — FAMOTIDINE 20 MG/1
40 TABLET, FILM COATED ORAL NIGHTLY
Status: DISCONTINUED | OUTPATIENT
Start: 2024-01-24 | End: 2024-01-27 | Stop reason: HOSPADM

## 2024-01-24 RX ORDER — POTASSIUM CHLORIDE 750 MG/1
40 TABLET, FILM COATED, EXTENDED RELEASE ORAL ONCE
Status: COMPLETED | OUTPATIENT
Start: 2024-01-24 | End: 2024-01-24

## 2024-01-24 RX ORDER — PANTOPRAZOLE SODIUM 40 MG/1
40 TABLET, DELAYED RELEASE ORAL DAILY
Status: DISCONTINUED | OUTPATIENT
Start: 2024-01-24 | End: 2024-01-27 | Stop reason: HOSPADM

## 2024-01-24 RX ORDER — AMOXICILLIN 250 MG
2 CAPSULE ORAL 2 TIMES DAILY
Status: DISCONTINUED | OUTPATIENT
Start: 2024-01-24 | End: 2024-01-27 | Stop reason: HOSPADM

## 2024-01-24 RX ORDER — LOSARTAN POTASSIUM 100 MG/1
100 TABLET ORAL DAILY
Status: DISCONTINUED | OUTPATIENT
Start: 2024-01-24 | End: 2024-01-27 | Stop reason: HOSPADM

## 2024-01-24 RX ORDER — ONDANSETRON 2 MG/ML
4 INJECTION INTRAMUSCULAR; INTRAVENOUS EVERY 6 HOURS PRN
Status: DISCONTINUED | OUTPATIENT
Start: 2024-01-24 | End: 2024-01-27 | Stop reason: HOSPADM

## 2024-01-24 RX ORDER — SODIUM CHLORIDE 9 MG/ML
40 INJECTION, SOLUTION INTRAVENOUS AS NEEDED
Status: DISCONTINUED | OUTPATIENT
Start: 2024-01-24 | End: 2024-01-27 | Stop reason: HOSPADM

## 2024-01-24 RX ORDER — ACETAMINOPHEN 160 MG/5ML
650 SOLUTION ORAL EVERY 4 HOURS PRN
Status: DISCONTINUED | OUTPATIENT
Start: 2024-01-24 | End: 2024-01-27 | Stop reason: HOSPADM

## 2024-01-24 RX ORDER — AMLODIPINE BESYLATE 5 MG/1
5 TABLET ORAL DAILY
Status: DISCONTINUED | OUTPATIENT
Start: 2024-01-24 | End: 2024-01-24

## 2024-01-24 RX ORDER — CARVEDILOL 25 MG/1
25 TABLET ORAL 2 TIMES DAILY
Status: DISCONTINUED | OUTPATIENT
Start: 2024-01-24 | End: 2024-01-24

## 2024-01-24 RX ORDER — ONDANSETRON 2 MG/ML
4 INJECTION INTRAMUSCULAR; INTRAVENOUS ONCE
Status: COMPLETED | OUTPATIENT
Start: 2024-01-24 | End: 2024-01-24

## 2024-01-24 RX ORDER — PRAVASTATIN SODIUM 40 MG
40 TABLET ORAL NIGHTLY
Status: DISCONTINUED | OUTPATIENT
Start: 2024-01-24 | End: 2024-01-27 | Stop reason: HOSPADM

## 2024-01-24 RX ORDER — AMLODIPINE BESYLATE 5 MG/1
5 TABLET ORAL
Status: DISCONTINUED | OUTPATIENT
Start: 2024-01-24 | End: 2024-01-27 | Stop reason: HOSPADM

## 2024-01-24 RX ORDER — SODIUM CHLORIDE 0.9 % (FLUSH) 0.9 %
10 SYRINGE (ML) INJECTION EVERY 12 HOURS SCHEDULED
Status: DISCONTINUED | OUTPATIENT
Start: 2024-01-24 | End: 2024-01-27 | Stop reason: HOSPADM

## 2024-01-24 RX ORDER — NITROGLYCERIN 0.4 MG/1
0.4 TABLET SUBLINGUAL
Status: DISCONTINUED | OUTPATIENT
Start: 2024-01-24 | End: 2024-01-27 | Stop reason: HOSPADM

## 2024-01-24 RX ORDER — CARVEDILOL 12.5 MG/1
12.5 TABLET ORAL 2 TIMES DAILY
Status: DISCONTINUED | OUTPATIENT
Start: 2024-01-24 | End: 2024-01-25

## 2024-01-24 RX ORDER — SODIUM CHLORIDE, SODIUM LACTATE, POTASSIUM CHLORIDE, CALCIUM CHLORIDE 600; 310; 30; 20 MG/100ML; MG/100ML; MG/100ML; MG/100ML
75 INJECTION, SOLUTION INTRAVENOUS CONTINUOUS
Status: DISCONTINUED | OUTPATIENT
Start: 2024-01-24 | End: 2024-01-27 | Stop reason: HOSPADM

## 2024-01-24 RX ORDER — SODIUM CHLORIDE 0.9 % (FLUSH) 0.9 %
10 SYRINGE (ML) INJECTION AS NEEDED
Status: DISCONTINUED | OUTPATIENT
Start: 2024-01-24 | End: 2024-01-27 | Stop reason: HOSPADM

## 2024-01-24 RX ORDER — LAMOTRIGINE 100 MG/1
100 TABLET ORAL NIGHTLY
Status: DISCONTINUED | OUTPATIENT
Start: 2024-01-24 | End: 2024-01-27 | Stop reason: HOSPADM

## 2024-01-24 RX ORDER — TRAZODONE HYDROCHLORIDE 50 MG/1
25 TABLET ORAL NIGHTLY PRN
Status: DISCONTINUED | OUTPATIENT
Start: 2024-01-24 | End: 2024-01-27 | Stop reason: HOSPADM

## 2024-01-24 RX ORDER — BISACODYL 10 MG
10 SUPPOSITORY, RECTAL RECTAL DAILY PRN
Status: DISCONTINUED | OUTPATIENT
Start: 2024-01-24 | End: 2024-01-27 | Stop reason: HOSPADM

## 2024-01-24 RX ORDER — DULOXETIN HYDROCHLORIDE 60 MG/1
120 CAPSULE, DELAYED RELEASE ORAL NIGHTLY
Status: DISCONTINUED | OUTPATIENT
Start: 2024-01-24 | End: 2024-01-27 | Stop reason: HOSPADM

## 2024-01-24 RX ORDER — ACETAMINOPHEN 325 MG/1
650 TABLET ORAL EVERY 4 HOURS PRN
Status: DISCONTINUED | OUTPATIENT
Start: 2024-01-24 | End: 2024-01-27 | Stop reason: HOSPADM

## 2024-01-24 RX ORDER — ESCITALOPRAM OXALATE 10 MG/1
10 TABLET ORAL DAILY
Status: DISCONTINUED | OUTPATIENT
Start: 2024-01-24 | End: 2024-01-27 | Stop reason: HOSPADM

## 2024-01-24 RX ORDER — ACETAMINOPHEN 650 MG/1
650 SUPPOSITORY RECTAL EVERY 4 HOURS PRN
Status: DISCONTINUED | OUTPATIENT
Start: 2024-01-24 | End: 2024-01-27 | Stop reason: HOSPADM

## 2024-01-24 RX ORDER — POLYETHYLENE GLYCOL 3350 17 G/17G
17 POWDER, FOR SOLUTION ORAL DAILY PRN
Status: DISCONTINUED | OUTPATIENT
Start: 2024-01-24 | End: 2024-01-27 | Stop reason: HOSPADM

## 2024-01-24 RX ORDER — BISACODYL 5 MG/1
5 TABLET, DELAYED RELEASE ORAL DAILY PRN
Status: DISCONTINUED | OUTPATIENT
Start: 2024-01-24 | End: 2024-01-27 | Stop reason: HOSPADM

## 2024-01-24 RX ADMIN — PRAVASTATIN SODIUM 40 MG: 40 TABLET ORAL at 20:58

## 2024-01-24 RX ADMIN — ESCITALOPRAM OXALATE 10 MG: 10 TABLET, FILM COATED ORAL at 11:09

## 2024-01-24 RX ADMIN — POTASSIUM CHLORIDE 40 MEQ: 750 TABLET, EXTENDED RELEASE ORAL at 18:24

## 2024-01-24 RX ADMIN — LAMOTRIGINE 100 MG: 100 TABLET ORAL at 20:58

## 2024-01-24 RX ADMIN — FAMOTIDINE 40 MG: 20 TABLET, FILM COATED ORAL at 20:58

## 2024-01-24 RX ADMIN — ONDANSETRON HYDROCHLORIDE 4 MG: 2 INJECTION, SOLUTION INTRAMUSCULAR; INTRAVENOUS at 00:55

## 2024-01-24 RX ADMIN — LOSARTAN POTASSIUM 100 MG: 100 TABLET, FILM COATED ORAL at 11:09

## 2024-01-24 RX ADMIN — PANTOPRAZOLE SODIUM 40 MG: 40 TABLET, DELAYED RELEASE ORAL at 11:08

## 2024-01-24 RX ADMIN — DULOXETINE HYDROCHLORIDE 120 MG: 60 CAPSULE, DELAYED RELEASE ORAL at 20:58

## 2024-01-24 RX ADMIN — ONDANSETRON HYDROCHLORIDE 4 MG: 2 INJECTION, SOLUTION INTRAMUSCULAR; INTRAVENOUS at 09:00

## 2024-01-24 RX ADMIN — AMLODIPINE BESYLATE 5 MG: 5 TABLET ORAL at 11:09

## 2024-01-24 RX ADMIN — Medication 10 ML: at 09:00

## 2024-01-24 RX ADMIN — SODIUM CHLORIDE, POTASSIUM CHLORIDE, SODIUM LACTATE AND CALCIUM CHLORIDE 75 ML/HR: 600; 310; 30; 20 INJECTION, SOLUTION INTRAVENOUS at 18:24

## 2024-01-24 RX ADMIN — CARVEDILOL 12.5 MG: 12.5 TABLET, FILM COATED ORAL at 21:02

## 2024-01-24 NOTE — ED PROVIDER NOTES
MD ATTESTATION NOTE    The PATTI and I have discussed this patient's history, physical exam, and treatment plan.  I have reviewed the documentation and personally had a face to face interaction with the patient. I affirm the documentation and agree with the treatment and plan.  The attached note describes my personal findings.      I provided a substantive portion of the care of the patient.  I personally performed the physical exam in its entirety, and below are my findings.      Brief HPI: This patient is a 73-year-old female with a previous history of atrial fibrillation presenting to the emergency room with shortness of breath as well as an irregular heartbeat.  She does report that she has had several episodes of nausea and vomiting over the last several days leading up to today's event.  She denies chest pain, fever/chills, or known sick contacts.      PHYSICAL EXAM  ED Triage Vitals   Temp Heart Rate Resp BP SpO2   01/23/24 2120 01/23/24 2120 01/23/24 2120 01/23/24 2133 01/23/24 2120   97 °F (36.1 °C) 61 20 147/99 92 %      Temp src Heart Rate Source Patient Position BP Location FiO2 (%)   01/23/24 2120 01/23/24 2120 01/23/24 2133 01/23/24 2133 --   Tympanic Monitor Lying Right arm          GENERAL: Resting comfortably and in no acute distress, nontoxic in appearance  HENT: nares patent  EYES: no scleral icterus  CV: regular rhythm, normal rate, no M/R/G  RESPIRATORY: normal effort, lungs clear bilaterally  ABDOMEN: soft, nontender, no rebound or guarding  MUSCULOSKELETAL: no deformity, no edema  NEURO: alert, moves all extremities, follows commands  PSYCH:  calm, cooperative  SKIN: warm, dry    Vital signs and nursing notes reviewed.      Differential diagnosis includes but is not limited to atrial fibrillation with RVR, acute coronary syndrome, sepsis, gastroenteritis, dehydration, or severe electrolyte disturbance.      Plan: The patient is currently in a normal sinus rhythm.  We will monitor in the  emergency room as we obtain labs including serial cardiac enzymes.  We will treat with antiemetics as well as fluid hydration.  We will reassess following.      Chest x-ray was independently interpreted by myself with my interpretation showing no cardiomegaly nor area of edema, infiltrate, or pneumothorax.           Parvez Estrada MD  01/23/24 8065

## 2024-01-24 NOTE — PLAN OF CARE
Goal Outcome Evaluation:      Pt is A/Ox4; no c/o pain; up ad leah;     1645 pt has resting all day; no nausea since 1000; zofran prn; pt able to keep small liquid (jello/sprite) down; No movit/diarrhea either; NSR on monitor with no sign of Afib; A/Ox4; VSS;

## 2024-01-24 NOTE — CONSULTS
Referring Provider:       Patient Care Team:  Pierre Chaudhry Jr., MD as PCP - General (Internal Medicine)  Pierre Chaudhry Jr., MD as PCP - Internal Medicine  Manoj Gutiérrez Jr., MD as Consulting Physician (Pulmonary Disease)      Reason for Consultation:   Paroxysmal atrial fibrillation  Dyspnea    History of present illness:    73-year-old female with a medical history of essential hypertension, hyperlipidemia, interstitial lung disease, and chronic dyspnea who presented yesterday with report of shortness of breath that was worsened along with palpitations and an irregular heartbeat.  She reported several episodes of nausea and vomiting over the past several days prior to presenting to the ER.  Per her report the symptoms started around 7 PM.  She did report that she has had 4 days of loose stools.  Unfortunately secondary to her nausea she has not been taking her p.o. carvedilol therapy.  High-sensitivity troponin was 11 and then 16.  X-ray of the chest look normal.  Initial EKG showed atrial fibrillation with a rapid ventricular rate.  Repeat EKG showed conversion to sinus rhythm.    Of note she did have a transthoracic echocardiogram done in September 2023 that was completely normal.    Review of Systems  All other systems reviewed and negative.     Past Medical History:   Past Medical History:   Diagnosis Date    Abnormal ECG 5/21/18    Done in PCP office.  Hx of Atrial fib-?2007    Arthritis of neck     Atrial fibrillation     Cervical disc disorder 8/03    ACDF C6-7, 12/03/2003;  Dr. 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    CTS (carpal tunnel syndrome)     DDD (degenerative disc disease), lumbosacral 11/09/2018    Depression     Fibrosis of lung     Fracture of wrist     Fracture, finger     Generalized anxiety disorder 07/14/2020    GERD (gastroesophageal reflux disease)     Hip arthrosis      Hyperlipidemia     Hypersensitivity pneumonitis     vs pulmonary fibrosis    Hypersensitivity pneumonitis     Hypersomnia due to another medical condition 10/28/2017    Hypertension     Knee swelling     Low back strain     Lower back pain 07/26/2019    Myocardial infarction     Neck strain     Neuroma of foot     Non-STEMI (non-ST elevated myocardial infarction) 05/21/2018    Nonsenile cataract     FUENTES on CPAP 10/19/2017    Palpitations 07/23/2019    Peripheral vestibulopathy of both ears 07/14/2020    Presence of artificial intra-ocular lens     Scoliosis     Sleep apnea     Thoracic disc disorder     Thyroid disease     Vertigo 07/14/2020    Vestibular neuronitis 07/17/2020       Past Surgical History:   Past Surgical History:   Procedure Laterality Date    CARDIAC CATHETERIZATION N/A 05/22/2018    Procedure: Left Heart Cath;  Surgeon: Aleks Velazquez MD;  Location: [facility] CATH INVASIVE LOCATION;  Service: Cardiovascular    CARDIAC CATHETERIZATION N/A 05/22/2018    Procedure: Coronary angiography;  Surgeon: Aleks Velazquez MD;  Location: [facility] CATH INVASIVE LOCATION;  Service: Cardiovascular    CARDIAC CATHETERIZATION N/A 05/22/2018    Procedure: Left ventriculography;  Surgeon: Aleks Velazquez MD;  Location: [facility] CATH INVASIVE LOCATION;  Service: Cardiovascular    CARDIAC CATHETERIZATION N/A 05/22/2018    Procedure: Peripheral angiography;  Surgeon: Aleks Velazquez MD;  Location: [facility] CATH INVASIVE LOCATION;  Service: Cardiovascular    CATARACT EXTRACTION WITH INTRAOCULAR LENS IMPLANT Bilateral     COLONOSCOPY N/A 03/08/2023    Procedure: COLONOSCOPY to cecum/TI with cold biopsies;  Surgeon: Hamilton Francisco MD;  Location: [facility] ENDOSCOPY;  Service: Gastroenterology;  Laterality: N/A;  pre- diverticulitis  post- diverticulosis with stricture    DIAGNOSTIC LAPAROSCOPY  1990    r/o endometriosis    ENDOSCOPY N/A 03/08/2023    Procedure: ESOPHAGOGASTRODUODENOSCOPY with cold biopsies  and 52 Fr Kam Dilatation;  Surgeon: Hamilton Francisco MD;  Location: [facility] ENDOSCOPY;  Service: Gastroenterology;  Laterality: N/A;  pre- dysphagia  post- hiatal hernia, gastric polyps, esophageal ring @ 32    NECK SURGERY  2003    ACDF C6-7-Dr. Nix     RECTAL SURGERY      fistula/abscess/hemorrhoid       Family History:   Family History   Problem Relation Age of Onset    Alzheimer's disease Mother     Heart failure Father     Arrhythmia Father     Heart attack Father         MI at age 56,  at 63.    Diabetes Sister     Atrial fibrillation Sister     Colon cancer Maternal Grandmother     Stroke Paternal Grandmother     Malig Hyperthermia Neg Hx        Social History:   Social History     Tobacco Use    Smoking status: Never     Passive exposure: Never    Smokeless tobacco: Never    Tobacco comments:     caffeine use: TEA OCCASSIONALLY   Vaping Use    Vaping Use: Never used   Substance Use Topics    Alcohol use: No    Drug use: Never       Home Medications:   Medications Prior to Admission   Medication Sig Dispense Refill Last Dose    amLODIPine (NORVASC) 5 MG tablet Take 1 tablet by mouth Daily. 90 tablet 3     ARIPiprazole (ABILIFY) 2 MG tablet Take 1 tablet by mouth Every Morning.       aspirin 81 MG EC tablet Take 1 tablet by mouth Daily. PT STATES *NOT CURRENTLY TAKING       carvedilol (COREG) 25 MG tablet TAKE 1 TABLET BY MOUTH TWICE A  tablet 3     cetirizine (zyrTEC) 5 MG tablet Take 1 tablet by mouth Daily.       Diclofenac Sodium 4 %, Topiramate 2 %, cloNIDine HCl 0.2 %, Lidocaine HCl 5 % Apply 1-2 g topically to the appropriate area as directed 3 (Three) to 4 (Four) times daily. 90 g 1     DULoxetine (CYMBALTA) 60 MG capsule Take 2 capsules by mouth Every Night.       escitalopram (LEXAPRO) 10 MG tablet Take 1 tablet by mouth Daily.       famotidine (PEPCID) 10 MG tablet Take 4 tablets by mouth Every Night.       fluticasone (FLONASE) 50 MCG/ACT nasal spray 2 sprays into the  nostril(s) as directed by provider Daily. (Patient not taking: Reported on 1/12/2024)       hydrALAZINE (APRESOLINE) 50 MG tablet Take 1 tablet by mouth 2 (Two) Times a Day. 180 tablet 3     lamoTRIgine (LaMICtal) 100 MG tablet Take 1 tablet by mouth Every Night.       losartan (COZAAR) 100 MG tablet TAKE 1 TABLET BY MOUTH EVERY DAY 90 tablet 4     Nintedanib Esylate (Ofev) 100 MG capsule Take 1 capsule by mouth 2 (Two) Times a Day.       pantoprazole (PROTONIX) 40 MG EC tablet Take 1 tablet by mouth Daily.       pravastatin (PRAVACHOL) 40 MG tablet TAKE 1 TABLET BY MOUTH EVERY DAY AT NIGHT 90 tablet 3     traZODone (DESYREL) 50 MG tablet Take 0.5 tablets by mouth At Night As Needed for Sleep.          Current Medications:   Current Facility-Administered Medications:     acetaminophen (TYLENOL) tablet 650 mg, 650 mg, Oral, Q4H PRN **OR** acetaminophen (TYLENOL) 160 MG/5ML oral solution 650 mg, 650 mg, Oral, Q4H PRN **OR** acetaminophen (TYLENOL) suppository 650 mg, 650 mg, Rectal, Q4H PRN, Amelia Jones APRN    amLODIPine (NORVASC) tablet 5 mg, 5 mg, Oral, Daily, Shaggy Ludwig MD    aspirin tablet 325 mg, 325 mg, Oral, Once, Parvez Estrada MD    sennosides-docusate (PERICOLACE) 8.6-50 MG per tablet 2 tablet, 2 tablet, Oral, BID **AND** polyethylene glycol (MIRALAX) packet 17 g, 17 g, Oral, Daily PRN **AND** bisacodyl (DULCOLAX) EC tablet 5 mg, 5 mg, Oral, Daily PRN **AND** bisacodyl (DULCOLAX) suppository 10 mg, 10 mg, Rectal, Daily PRN, Amelia Jones APRN    carvedilol (COREG) tablet 25 mg, 25 mg, Oral, BID, Shaggy Ludwig MD    DULoxetine (CYMBALTA) DR capsule 120 mg, 120 mg, Oral, Nightly, Shaggy Ludwig MD    escitalopram (LEXAPRO) tablet 10 mg, 10 mg, Oral, Daily, Shaggy Ludwig MD    famotidine (PEPCID) tablet 40 mg, 40 mg, Oral, Nightly, Shaggy Ludwig MD    lamoTRIgine (LaMICtal) tablet 100 mg, 100 mg, Oral, Nightly, Shaggy Ludwig MD    losartan (COZAAR) tablet 100 mg, 100 mg, Oral,  "Daily, Shaggy Ludwig MD    nitroglycerin (NITROSTAT) SL tablet 0.4 mg, 0.4 mg, Sublingual, Q5 Min PRN, Amelia Jones APRN    ondansetron (ZOFRAN) injection 4 mg, 4 mg, Intravenous, Q6H PRN, Amelia Jones APRN    pantoprazole (PROTONIX) EC tablet 40 mg, 40 mg, Oral, Daily, Shaggy Ludwig MD    pravastatin (PRAVACHOL) tablet 40 mg, 40 mg, Oral, Nightly, Shaggy Ludwig MD    sodium chloride 0.9 % flush 10 mL, 10 mL, Intravenous, PRN, Parvez Estrada MD    sodium chloride 0.9 % flush 10 mL, 10 mL, Intravenous, Q12H, Amelia Jones APRN    sodium chloride 0.9 % flush 10 mL, 10 mL, Intravenous, PRN, Amelia Jones APRN    sodium chloride 0.9 % infusion 40 mL, 40 mL, Intravenous, PRN, Amelia Jones APRN    traZODone (DESYREL) tablet 25 mg, 25 mg, Oral, Nightly PRN, Shaggy Ludwig MD     Allergies: Simvastatin, Erythromycin, and Penicillins      Vital Signs   Temp:  [97 °F (36.1 °C)-98.6 °F (37 °C)] 98.6 °F (37 °C)  Heart Rate:  [61-97] 61  Resp:  [18-20] 18  BP: (131-172)/() 154/79  Flowsheet Rows      Flowsheet Row First Filed Value   Admission Height 157.5 cm (62\") Documented at 01/23/2024 2120   Admission Weight 86.2 kg (190 lb) Documented at 01/23/2024 2120            General Appearance:    Alert, cooperative, in no acute distress   Head:    Normocephalic, without obvious abnormality, atraumatic       Neck:   No adenopathy, supple, no thyromegaly, no carotid bruit, no    JVD   Lungs:     Clear to auscultation bilaterally, no wheezes, rales, or     rhonchi    Heart:    Normal rate, regular rhythm, no murmur, no rub, no gallop   Chest Wall:    No abnormalities observed   Abdomen:     Normal bowel sounds, soft, nontender, nondistended,            no rebound tenderness   Extremities:   No cyanosis, clubbing, or edema   Pulses:   Pulses palpable and equal bilaterally   Skin:   No bleeding or rash   Lymph nodes:   No cervical adenopathy   Neurologic:   Cranial nerves 2 - 12 " grossly intact, sensation intact               Results Review: I personally viewed and interpreted the patient's EKG/Telemetry data    Results from last 7 days   Lab Units 01/24/24  0811   WBC 10*3/mm3 8.27   HEMOGLOBIN g/dL 12.7   HEMATOCRIT % 39.6   PLATELETS 10*3/mm3 255     Results from last 7 days   Lab Units 01/24/24  0811   SODIUM mmol/L 141   POTASSIUM mmol/L 3.5   CHLORIDE mmol/L 108*   CO2 mmol/L 21.3*   BUN mg/dL 13   CREATININE mg/dL 0.77   GLUCOSE mg/dL 109*   CALCIUM mg/dL 8.7     Lab Results   Lab Value Date/Time    TROPONINT 10 01/24/2024 0811    TROPONINT 16 (H) 01/23/2024 2348    TROPONINT 11 01/23/2024 2139    TROPONINT <0.010 08/06/2021 0021    TROPONINT <0.010 08/05/2021 1936    TROPONINT <0.010 07/13/2020 2248    TROPONINT <0.010 12/20/2019 0815    TROPONINT 0.134 (C) 05/22/2018 1426    TROPONINT 0.174 (C) 05/22/2018 0525    TROPONINT 0.185 (C) 05/21/2018 2050    TROPONINT 0.208 (C) 05/21/2018 1659           Assessment & Plan   1.  Nausea/vomiting/diarrhea: Workup per the primary team.  2.  Paroxysmal atrial fibrillation: Currently sinus  3.  Interstitial lung disease  4.  Chronic dyspnea  5.  Obstructive sleep apnea    -Patient previously was on 25 mg of p.o. carvedilol at home.  Resting heart rate off carvedilol is only in the 60s.  I would recommend decreasing this to 12.5 mg p.o. every 12 hours and monitoring closely.  -I would recommend starting Eliquis 5 mg p.o. every 12 hours secondary to her paroxysmal atrial fibrillation and elevated chads 2 VASc score.  If there is no GI workup upcoming this could be started today.  -No repeat echo indicated.  I will continue to follow.    I discussed the patient's findings and my recommendations with the patient

## 2024-01-24 NOTE — H&P
Patient Name:  Katelyn Sr  YOB: 1950  MRN:  [id number]  Admit Date:  1/23/2024  Patient Care Team:  Pierre Chaudhry Jr., MD as PCP - General (Internal Medicine)  Pierre Chaudhry Jr., MD as PCP - Internal Medicine  Manoj Gutiérrez Jr., MD as Consulting Physician (Pulmonary Disease)      Subjective   History Present Illness     Chief Complaint   Patient presents with    Shortness of Breath    Irregular Heart Beat         History of Present Illness  Ms. Sr is a 73 y.o. with past medical history of remote atrial fibrillation, hypertension, hyperlipidemia, interstitial lung disease presenting with acute onset of palpitations yesterday evening around 7 PM.  States it felt like when she had her atrial fibrillation from the past.  Associated with chest pain.  Patient has had some nausea and diarrhea since last Thursday.  Felt like he got better on Sunday and then got worse again on Monday.  No further diarrhea but having some nausea without vomiting or abdominal pain.  No known sick contacts.  Retired RN.  No recent antibiotics.  She has not been able to take her Coreg because of the nausea prior to admission for the last few days.  EKG in ER showing A-fib, but she converted to normal sinus rhythm.    Review of Systems   Constitutional:  Positive for fatigue. Negative for chills and fever.   HENT:  Negative for congestion, rhinorrhea and sinus pressure.    Respiratory:  Negative for cough and shortness of breath.    Cardiovascular:  Negative for chest pain and leg swelling.   Gastrointestinal:  Positive for diarrhea, nausea and vomiting. Negative for abdominal pain.   Genitourinary:  Negative for dysuria and frequency.        Personal History     Past Medical History:   Diagnosis Date    Abnormal ECG 5/21/18    Done in PCP office.  Hx of Atrial fib-?2007    Arthritis of neck     Atrial fibrillation     Cervical disc disorder 8/03    ACDF C6-7, 12/03/2003;  Dr. 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    CTS (carpal tunnel syndrome)     DDD (degenerative disc disease), lumbosacral 11/09/2018    Depression     Fibrosis of lung     Fracture of wrist     Fracture, finger     Generalized anxiety disorder 07/14/2020    GERD (gastroesophageal reflux disease)     Hip arthrosis     Hyperlipidemia     Hypersensitivity pneumonitis     vs pulmonary fibrosis    Hypersensitivity pneumonitis     Hypersomnia due to another medical condition 10/28/2017    Hypertension     Knee swelling     Low back strain     Lower back pain 07/26/2019    Myocardial infarction     Neck strain     Neuroma of foot     Non-STEMI (non-ST elevated myocardial infarction) 05/21/2018    Nonsenile cataract     FUENTES on CPAP 10/19/2017    Palpitations 07/23/2019    Peripheral vestibulopathy of both ears 07/14/2020    Presence of artificial intra-ocular lens     Scoliosis     Sleep apnea     Thoracic disc disorder     Thyroid disease     Vertigo 07/14/2020    Vestibular neuronitis 07/17/2020     Past Surgical History:   Procedure Laterality Date    CARDIAC CATHETERIZATION N/A 05/22/2018    Procedure: Left Heart Cath;  Surgeon: Aleks Velazquez MD;  Location: [facility] CATH INVASIVE LOCATION;  Service: Cardiovascular    CARDIAC CATHETERIZATION N/A 05/22/2018    Procedure: Coronary angiography;  Surgeon: Aleks Velazquez MD;  Location: [facility] CATH INVASIVE LOCATION;  Service: Cardiovascular    CARDIAC CATHETERIZATION N/A 05/22/2018    Procedure: Left ventriculography;  Surgeon: Aleks Velazquez MD;  Location: [facility] CATH INVASIVE LOC[facility];  Service: Cardiovascular    CARDIAC CATHETERIZATION N/A 05/22/2018    Procedure: Peripheral angiography;  Surgeon: Aleks Velazquez MD;  Location: [facility] CATH INVASIVE LOCATION;  Service: Cardiovascular    CATARACT EXTRACTION WITH INTRAOCULAR LENS IMPLANT Bilateral     COLONOSCOPY N/A  2023    Procedure: COLONOSCOPY to cecum/TI with cold biopsies;  Surgeon: Hamilton Francisco MD;  Location:  [facility];  Service: Gastroenterology;  Laterality: N/A;  pre- diverticulitis  post- diverticulosis with stricture    DIAGNOSTIC LAPAROSCOPY      r/o endometriosis    ENDOSCOPY N/A 2023    Procedure: ESOPHAGOGASTRODUODENOSCOPY with cold biopsies and 52 Fr Kam Dilatation;  Surgeon: Hamilton Francisco MD;  Location:  [facility];  Service: Gastroenterology;  Laterality: N/A;  pre- dysphagia  post- hiatal hernia, gastric polyps, esophageal ring @ 32    NECK SURGERY  2003    ACDF C6-7-Dr. Nix     RECTAL SURGERY      fistula/abscess/hemorrhoid     Family History   Problem Relation Age of Onset    Alzheimer's disease Mother     Heart failure Father     Arrhythmia Father     Heart attack Father         MI at age 56,  at 63.    Diabetes Sister     Atrial fibrillation Sister     Colon cancer Maternal Grandmother     Stroke Paternal Grandmother     Malig Hyperthermia Neg Hx      Social History     Tobacco Use    Smoking status: Never     Passive exposure: Never    Smokeless tobacco: Never    Tobacco comments:     caffeine use: TEA OCCASSIONALLY   Vaping Use    Vaping Use: Never used   Substance Use Topics    Alcohol use: No    Drug use: Never     No current facility-administered medications on file prior to encounter.     Current Outpatient Medications on File Prior to Encounter   Medication Sig Dispense Refill    amLODIPine (NORVASC) 5 MG tablet Take 1 tablet by mouth Daily. 90 tablet 3    ARIPiprazole (ABILIFY) 2 MG tablet Take 1 tablet by mouth Every Morning.      aspirin 81 MG EC tablet Take 1 tablet by mouth Daily. PT STATES *NOT CURRENTLY TAKING      carvedilol (COREG) 25 MG tablet TAKE 1 TABLET BY MOUTH TWICE A  tablet 3    cetirizine (zyrTEC) 5 MG tablet Take 1 tablet by mouth Daily.      Diclofenac Sodium 4 %, Topiramate 2 %, cloNIDine HCl 0.2 %, Lidocaine  HCl 5 % Apply 1-2 g topically to the appropriate area as directed 3 (Three) to 4 (Four) times daily. 90 g 1    DULoxetine (CYMBALTA) 60 MG capsule Take 2 capsules by mouth Every Night.      escitalopram (LEXAPRO) 10 MG tablet Take 1 tablet by mouth Daily.      famotidine (PEPCID) 10 MG tablet Take 4 tablets by mouth Every Night.      fluticasone (FLONASE) 50 MCG/ACT nasal spray 2 sprays into the nostril(s) as directed by provider Daily. (Patient not taking: Reported on 1/12/2024)      hydrALAZINE (APRESOLINE) 50 MG tablet Take 1 tablet by mouth 2 (Two) Times a Day. 180 tablet 3    lamoTRIgine (LaMICtal) 100 MG tablet Take 1 tablet by mouth Every Night.      losartan (COZAAR) 100 MG tablet TAKE 1 TABLET BY MOUTH EVERY DAY 90 tablet 4    Nintedanib Esylate (Ofev) 100 MG capsule Take 1 capsule by mouth 2 (Two) Times a Day.      pantoprazole (PROTONIX) 40 MG EC tablet Take 1 tablet by mouth Daily.      pravastatin (PRAVACHOL) 40 MG tablet TAKE 1 TABLET BY MOUTH EVERY DAY AT NIGHT 90 tablet 3    traZODone (DESYREL) 50 MG tablet Take 0.5 tablets by mouth At Night As Needed for Sleep.       Allergies   Allergen Reactions    Simvastatin Myalgia     Cramps in thighs      Erythromycin Rash    Penicillins Rash       Objective    Objective     Vital Signs  Temp:  [97 °F (36.1 °C)-98.6 °F (37 °C)] 98.6 °F (37 °C)  Heart Rate:  [61-97] 61  Resp:  [18-20] 18  BP: (131-172)/() 154/79  SpO2:  [91 %-99 %] 93 %  on   ;   Device (Oxygen Therapy): room air  Body mass index is 33.2 kg/m².    Physical Exam  Constitutional:       General: She is not in acute distress.     Appearance: She is not ill-appearing.   Cardiovascular:      Rate and Rhythm: Normal rate and regular rhythm.   Pulmonary:      Effort: Pulmonary effort is normal. No respiratory distress.   Abdominal:      General: Abdomen is flat. There is no distension.      Tenderness: There is no abdominal tenderness.   Musculoskeletal:         General: No swelling or  deformity. Normal range of motion.   Skin:     General: Skin is warm and dry.   Neurological:      General: No focal deficit present.      Mental Status: She is alert. Mental status is at baseline.         Results Review:  I reviewed the patient's new clinical results.  I reviewed the patient's new imaging results and agree with the interpretation.  I reviewed the patient's other test results and agree with the interpretation  I personally viewed and interpreted the patient's EKG/Telemetry data  Discussed with ED provider.    Lab Results (last 24 hours)       Procedure Component Value Units Date/Time    CBC & Differential [[id number]]  (Abnormal) Collected: 01/23/24 2139    Specimen: Blood Updated: 01/23/24 2151    Narrative:      The following orders were created for panel order CBC & Differential.  Procedure                               Abnormality         Status                     ---------                               -----------         ------                     CBC Auto Differential[[id number]]        Abnormal            Final result                 Please view results for these tests on the individual orders.    Comprehensive Metabolic Panel [[id number]]  (Abnormal) Collected: 01/23/24 2139    Specimen: Blood Updated: 01/23/24 2211     Glucose 118 mg/dL      BUN 18 mg/dL      Creatinine 0.99 mg/dL      Sodium 144 mmol/L      Potassium 3.6 mmol/L      Chloride 104 mmol/L      CO2 23.3 mmol/L      Calcium 9.5 mg/dL      Total Protein 7.4 g/dL      Albumin 4.4 g/dL      ALT (SGPT) 11 U/L      AST (SGOT) 13 U/L      Alkaline Phosphatase 121 U/L      Total Bilirubin 0.4 mg/dL      Globulin 3.0 gm/dL      A/G Ratio 1.5 g/dL      BUN/Creatinine Ratio 18.2     Anion Gap 16.7 mmol/L      eGFR 61.1 mL/min/1.73     Narrative:      GFR Normal >60  Chronic Kidney Disease <60  Kidney Failure <15    The GFR formula is only valid for adults with stable renal function between ages 18 and 70.    High Sensitivity Troponin T  [[id number]]  (Normal) Collected: 01/23/24 2139    Specimen: Blood Updated: 01/23/24 2211     HS Troponin T 11 ng/L     Narrative:      High Sensitive Troponin T Reference Range:  <14.0 ng/L- Negative Female for AMI  <22.0 ng/L- Negative Male for AMI  >=14 - Abnormal Female indicating possible myocardial injury.  >=22 - Abnormal Male indicating possible myocardial injury.   Clinicians would have to utilize clinical acumen, EKG, Troponin, and serial changes to determine if it is an Acute Myocardial Infarction or myocardial injury due to an underlying chronic condition.         CBC Auto Differential [[id number]]  (Abnormal) Collected: 01/23/24 2139    Specimen: Blood Updated: 01/23/24 2151     WBC 11.64 10*3/mm3      RBC 5.80 10*6/mm3      Hemoglobin 15.4 g/dL      Hematocrit 48.8 %      MCV 84.1 fL      MCH 26.6 pg      MCHC 31.6 g/dL      RDW 13.9 %      RDW-SD 41.8 fl      MPV 9.1 fL      Platelets 367 10*3/mm3      Neutrophil % 69.8 %      Lymphocyte % 13.7 %      Monocyte % 9.3 %      Eosinophil % 6.7 %      Basophil % 0.2 %      Immature Grans % 0.3 %      Neutrophils, Absolute 8.12 10*3/mm3      Lymphocytes, Absolute 1.60 10*3/mm3      Monocytes, Absolute 1.08 10*3/mm3      Eosinophils, Absolute 0.78 10*3/mm3      Basophils, Absolute 0.02 10*3/mm3      Immature Grans, Absolute 0.04 10*3/mm3      nRBC 0.0 /100 WBC     Magnesium [[id number]]  (Normal) Collected: 01/23/24 2139    Specimen: Blood Updated: 01/23/24 2237     Magnesium 1.8 mg/dL     High Sensitivity Troponin T 2Hr [[id number]]  (Abnormal) Collected: 01/23/24 2348    Specimen: Blood Updated: 01/24/24 0020     HS Troponin T 16 ng/L      Troponin T Delta 5 ng/L     Narrative:      High Sensitive Troponin T Reference Range:  <14.0 ng/L- Negative Female for AMI  <22.0 ng/L- Negative Male for AMI  >=14 - Abnormal Female indicating possible myocardial injury.  >=22 - Abnormal Male indicating possible myocardial injury.   Clinicians would have to utilize clinical  acumen, EKG, Troponin, and serial changes to determine if it is an Acute Myocardial Infarction or myocardial injury due to an underlying chronic condition.         CBC Auto Differential [[id number]]  (Abnormal) Collected: 01/24/24 0811    Specimen: Blood Updated: 01/24/24 0843     WBC 8.27 10*3/mm3      RBC 4.74 10*6/mm3      Hemoglobin 12.7 g/dL      Hematocrit 39.6 %      MCV 83.5 fL      MCH 26.8 pg      MCHC 32.1 g/dL      RDW 13.6 %      RDW-SD 41.0 fl      MPV 9.1 fL      Platelets 255 10*3/mm3      Neutrophil % 69.4 %      Lymphocyte % 13.7 %      Monocyte % 8.9 %      Eosinophil % 7.3 %      Basophil % 0.2 %      Immature Grans % 0.5 %      Neutrophils, Absolute 5.74 10*3/mm3      Lymphocytes, Absolute 1.13 10*3/mm3      Monocytes, Absolute 0.74 10*3/mm3      Eosinophils, Absolute 0.60 10*3/mm3      Basophils, Absolute 0.02 10*3/mm3      Immature Grans, Absolute 0.04 10*3/mm3      nRBC 0.0 /100 WBC     Comprehensive Metabolic Panel [[id number]]  (Abnormal) Collected: 01/24/24 0811    Specimen: Blood Updated: 01/24/24 0847     Glucose 109 mg/dL      BUN 13 mg/dL      Creatinine 0.77 mg/dL      Sodium 141 mmol/L      Potassium 3.5 mmol/L      Chloride 108 mmol/L      CO2 21.3 mmol/L      Calcium 8.7 mg/dL      Total Protein 6.0 g/dL      Albumin 3.3 g/dL      ALT (SGPT) 7 U/L      AST (SGOT) 11 U/L      Alkaline Phosphatase 93 U/L      Total Bilirubin 0.4 mg/dL      Globulin 2.7 gm/dL      A/G Ratio 1.2 g/dL      BUN/Creatinine Ratio 16.9     Anion Gap 11.7 mmol/L      eGFR 82.6 mL/min/1.73     Narrative:      GFR Normal >60  Chronic Kidney Disease <60  Kidney Failure <15    The GFR formula is only valid for adults with stable renal function between ages 18 and 70.    High Sensitivity Troponin T [[id number]]  (Normal) Collected: 01/24/24 0811    Specimen: Blood Updated: 01/24/24 0853     HS Troponin T 10 ng/L     Narrative:      High Sensitive Troponin T Reference Range:  <14.0 ng/L- Negative Female for  AMI  <22.0 ng/L- Negative Male for AMI  >=14 - Abnormal Female indicating possible myocardial injury.  >=22 - Abnormal Male indicating possible myocardial injury.   Clinicians would have to utilize clinical acumen, EKG, Troponin, and serial changes to determine if it is an Acute Myocardial Infarction or myocardial injury due to an underlying chronic condition.         Phosphorus [[id number]]  (Normal) Collected: 01/24/24 0811    Specimen: Blood Updated: 01/24/24 0847     Phosphorus 2.9 mg/dL     Magnesium [[id number]]  (Normal) Collected: 01/24/24 0811    Specimen: Blood Updated: 01/24/24 0847     Magnesium 1.9 mg/dL     TSH Rfx On Abnormal To Free T4 [[id number]]  (Normal) Collected: 01/24/24 0811    Specimen: Blood Updated: 01/24/24 0853     TSH 3.760 uIU/mL             Imaging Results (Last 24 Hours)       Procedure Component Value Units Date/Time    XR Chest 1 View [[id number]] Collected: 01/23/24 2212     Updated: 01/23/24 2216    Narrative:      SINGLE VIEW OF THE CHEST     HISTORY: Chest pain     COMPARISON: August 5, 2021     FINDINGS:  There is cardiomegaly. There is no vascular congestion. No pneumothorax,  pleural effusion, or acute infiltrate is seen. There are changes of  prior cervical spinal fusion.       Impression:      No acute findings.     This report was finalized on 1/23/2024 10:13 PM by Dr. Samantha Alfredo M.D on Workstation: [id number]               Results for orders placed during the hospital encounter of 09/21/23    Adult Transthoracic Echo Complete W/ Cont if Necessary Per Protocol    Interpretation Summary    Left ventricular systolic function is normal. Calculated left ventricular EF = 63.2%    Left ventricular diastolic function was normal.      ECG 12 Lead Rhythm Change   Final Result   HEART RATE= 66  bpm   RR Interval= 909  ms   OR Interval= 154  ms   P Horizontal Axis= 31  deg   P Front Axis= 50  deg   QRSD Interval= 92  ms   QT Interval= 446  ms   QTcB= 468  ms   QRS Axis= -26   deg   T Wave Axis= 6  deg   - BORDERLINE ECG -   Sinus rhythm   Borderline left axis deviation   Low voltage, precordial leads   Consider anterior infarct   Since prior tracing  no longer in a fib   Electronically Signed By: Allie Quevedo ([facility]) 24-Jan-2024 10:33:17   Date and Time of Study: 2024-01-23 23:45:09      ECG 12 Lead ED Triage Standing Order; Chest Pain   Final Result   HEART RATE= 127  bpm   RR Interval= 472  ms   AK Interval=   ms   P Horizontal Axis=   deg   P Front Axis=   deg   QRSD Interval= 76  ms   QT Interval= 300  ms   QTcB= 437  ms   QRS Axis= -33  deg   T Wave Axis= 107  deg   - ABNORMAL ECG -   Atrial fibrillation   Inferior infarct, old   Consider anterior infarct   Lateral leads are also involved   Since prior tracing  a fib is new   Electronically Signed By: Allie Quevedo ([facility]) 24-Jan-2024 10:32:44   Date and Time of Study: 2024-01-23 21:24:48      SCANNED - TELEMETRY     Final Result           Assessment/Plan     Active Hospital Problems    Diagnosis  POA    **A-fib [I48.91]  Yes    Elevated troponin [R79.89]  Yes    Chronic dyspnea [R06.09]  Yes    Essential hypertension [I10]  Yes    FUENTES on autoCPAP [G47.33]  Yes      Resolved Hospital Problems   No resolved problems to display.     Paroxysmal atrial fibrillation with RVR  Hypertension  Hyperlipidemia  -EKG on admission with heart rate of 127, now in NSR  -TSH WNL  -Troponin mildly elevated likely related to his RVR  -Echo from August 2023 reviewed, unremarkable  -Cardiology consulted-decrease preload to 12.5 mg twice a day.  Recommend Eliquis if no procedures planned, which will depend on how her nausea responds today  -Continue amlodipine, Cozaar, statin    Nausea/vomiting  Diarrhea  -Never had any abdominal pain  -Sounds like she had a viral gastroenteritis that is been slow to resolve  -Plan for liquid diet for lunch and to advance as tolerated  -CT abdomen if patient can't tolerate her diet    Depression-continue home Lexapro and  duloxetine    GERD-continue home pantoprazole    I discussed the patient's findings and my recommendations with patient and nursing staff.    VTE Prophylaxis - SCDs.  Code Status - Full code.       Shaggy Ludwig MD  [facility]  01/24/24  12:49 EST

## 2024-01-24 NOTE — ED NOTES
Nursing report ED to floor  Katelyn Sr  73 y.o.  female    HPI :   Chief Complaint   Patient presents with    Shortness of Breath    Irregular Heart Beat       Admitting doctor:   Matt Can DO    Admitting diagnosis:   The primary encounter diagnosis was Atrial fibrillation with RVR. A diagnosis of Elevated troponin was also pertinent to this visit.    Code status:   Current Code Status       Date Active Code Status Order ID Comments User Context       1/24/2024 0258 CPR (Attempt to Resuscitate) [id number]  Amelia Jones, APRN ED        Question Answer    Code Status (Patient has no pulse and is not breathing) CPR (Attempt to Resuscitate)    Medical Interventions (Patient has pulse or is breathing) Full Support                    Allergies:   Simvastatin, Erythromycin, and Penicillins    Isolation:   No active isolations    Intake and Output    Intake/Output Summary (Last 24 hours) at 1/24/2024 0552  Last data filed at 1/24/2024 0015  Gross per 24 hour   Intake 1000 ml   Output --   Net 1000 ml       Weight:       01/23/24 2120   Weight: 86.2 kg (190 lb)       Most recent vitals:   Vitals:    01/24/24 0331 01/24/24 0349 01/24/24 0501 01/24/24 0531   BP: 134/66  131/61 135/68   Pulse: 68 71 71 68   Resp:       Temp:       TempSrc:       SpO2: 94% 92% 93% 91%   Weight:       Height:           Active LDAs/IV Access:   Lines, Drains & Airways       Active LDAs       Name Placement date Placement time Site Days    Peripheral IV 01/23/24 2227 Anterior;Proximal;Right Forearm 01/23/24 2227  Forearm  less than 1                    Labs (abnormal labs have a star):   Labs Reviewed   COMPREHENSIVE METABOLIC PANEL - Abnormal; Notable for the following components:       Result Value    Glucose 118 (*)     Alkaline Phosphatase 121 (*)     Anion Gap 16.7 (*)     All other components within normal limits    Narrative:     GFR Normal >60  Chronic Kidney Disease <60  Kidney Failure <15    The GFR formula is only valid  for adults with stable renal function between ages 18 and 70.   CBC WITH AUTO DIFFERENTIAL - Abnormal; Notable for the following components:    WBC 11.64 (*)     RBC 5.80 (*)     Hematocrit 48.8 (*)     Lymphocyte % 13.7 (*)     Eosinophil % 6.7 (*)     Neutrophils, Absolute 8.12 (*)     Monocytes, Absolute 1.08 (*)     Eosinophils, Absolute 0.78 (*)     All other components within normal limits   HIGH SENSITIVITIY TROPONIN T 2HR - Abnormal; Notable for the following components:    HS Troponin T 16 (*)     Troponin T Delta 5 (*)     All other components within normal limits    Narrative:     High Sensitive Troponin T Reference Range:  <14.0 ng/L- Negative Female for AMI  <22.0 ng/L- Negative Male for AMI  >=14 - Abnormal Female indicating possible myocardial injury.  >=22 - Abnormal Male indicating possible myocardial injury.   Clinicians would have to utilize clinical acumen, EKG, Troponin, and serial changes to determine if it is an Acute Myocardial Infarction or myocardial injury due to an underlying chronic condition.        TROPONIN - Normal    Narrative:     High Sensitive Troponin T Reference Range:  <14.0 ng/L- Negative Female for AMI  <22.0 ng/L- Negative Male for AMI  >=14 - Abnormal Female indicating possible myocardial injury.  >=22 - Abnormal Male indicating possible myocardial injury.   Clinicians would have to utilize clinical acumen, EKG, Troponin, and serial changes to determine if it is an Acute Myocardial Infarction or myocardial injury due to an underlying chronic condition.        MAGNESIUM - Normal   RAINBOW DRAW    Narrative:     The following orders were created for panel order [place] Draw.  Procedure                               Abnormality         Status                     ---------                               -----------         ------                     Green Top (Gel)[[id number]]                                  Final result               Lavender Top[[id number]]                                      Final result               Gold Top - SST[[id number]]                                   Final result               Light Blue Top[[id number]]                                   Final result                 Please view results for these tests on the individual orders.   CBC WITH AUTO DIFFERENTIAL   COMPREHENSIVE METABOLIC PANEL   TROPONIN   CBC AND DIFFERENTIAL    Narrative:     The following orders were created for panel order CBC & Differential.  Procedure                               Abnormality         Status                     ---------                               -----------         ------                     CBC Auto Differential[[id number]]        Abnormal            Final result                 Please view results for these tests on the individual orders.   GREEN TOP   LAVENDER TOP   GOLD TOP - SST   LIGHT BLUE TOP       EKG:   ECG 12 Lead Rhythm Change   Preliminary Result   HEART RATE= 66  bpm   RR Interval= 909  ms   OH Interval= 154  ms   P Horizontal Axis= 31  deg   P Front Axis= 50  deg   QRSD Interval= 92  ms   QT Interval= 446  ms   QTcB= 468  ms   QRS Axis= -26  deg   T Wave Axis= 6  deg   - BORDERLINE ECG -   Sinus rhythm   Borderline left axis deviation   Low voltage, precordial leads   Consider anterior infarct   Electronically Signed By:    Date and Time of Study: 2024-01-23 23:45:09      ECG 12 Lead ED Triage Standing Order; Chest Pain   Preliminary Result   HEART RATE= 127  bpm   RR Interval= 472  ms   OH Interval=   ms   P Horizontal Axis=   deg   P Front Axis=   deg   QRSD Interval= 76  ms   QT Interval= 300  ms   QTcB= 437  ms   QRS Axis= -33  deg   T Wave Axis= 107  deg   - ABNORMAL ECG -   Atrial fibrillation   Inferior infarct, old   Consider anterior infarct   Lateral leads are also involved   Electronically Signed By:    Date and Time of Study: 2024-01-23 21:24:48          Meds given in ED:   Medications   sodium chloride 0.9 % flush 10 mL (has no administration in time  range)   aspirin tablet 325 mg (325 mg Oral Not Given 1/23/24 2147)   sodium chloride 0.9 % flush 10 mL (has no administration in time range)   sodium chloride 0.9 % flush 10 mL (has no administration in time range)   sodium chloride 0.9 % infusion 40 mL (has no administration in time range)   sennosides-docusate (PERICOLACE) 8.6-50 MG per tablet 2 tablet (has no administration in time range)     And   polyethylene glycol (MIRALAX) packet 17 g (has no administration in time range)     And   bisacodyl (DULCOLAX) EC tablet 5 mg (has no administration in time range)     And   bisacodyl (DULCOLAX) suppository 10 mg (has no administration in time range)   nitroglycerin (NITROSTAT) SL tablet 0.4 mg (has no administration in time range)   acetaminophen (TYLENOL) tablet 650 mg (has no administration in time range)     Or   acetaminophen (TYLENOL) 160 MG/5ML oral solution 650 mg (has no administration in time range)     Or   acetaminophen (TYLENOL) suppository 650 mg (has no administration in time range)   ondansetron (ZOFRAN) injection 4 mg (has no administration in time range)   lactated ringers bolus 1,000 mL (0 mL Intravenous Stopped 1/24/24 0015)   ondansetron (ZOFRAN) injection 4 mg (4 mg Intravenous Given 1/23/24 2227)   ondansetron (ZOFRAN) injection 4 mg (4 mg Intravenous Given 1/24/24 0055)       Imaging results:  XR Chest 1 View    Result Date: 1/23/2024  No acute findings.  This report was finalized on 1/23/2024 10:13 PM by Dr. Samantha Alfredo M.D on Workstation: [id number]       Ambulatory status:   - up with assist     Social issues:   Social History     Socioeconomic History    Marital status: Single    Number of children: 0    Years of education: BS    Tobacco Use    Smoking status: Never     Passive exposure: Never    Smokeless tobacco: Never    Tobacco comments:     caffeine use: TEA OCCASSIONALLY   Vaping Use    Vaping Use: Never used   Substance and Sexual Activity    Alcohol use: No    Drug use:  Never    Sexual activity: Not Currently       NIH Stroke Scale:       Deedee Minor RN  01/24/24 05:52 EST

## 2024-01-24 NOTE — ED NOTES
Pt to er via PV. C/o irregular heart beat and SOA. Pt states symptoms started approx 7pm. Pt also reporting diarrhea x4 days.    States hx of NSTEMI.

## 2024-01-24 NOTE — ED NOTES
Nursing report ED to floor  Katelyn Sr  73 y.o.  female    HPI :   Chief Complaint   Patient presents with    Shortness of Breath    Irregular Heart Beat       Admitting doctor:   Matt Can DO    Admitting diagnosis:   The primary encounter diagnosis was Atrial fibrillation with RVR. A diagnosis of Elevated troponin was also pertinent to this visit.    Code status:   Current Code Status       Date Active Code Status Order ID Comments User Context       Prior            Allergies:   Simvastatin, Erythromycin, and Penicillins    Isolation:   No active isolations    Intake and Output    Intake/Output Summary (Last 24 hours) at 1/24/2024 0140  Last data filed at 1/24/2024 0015  Gross per 24 hour   Intake 1000 ml   Output --   Net 1000 ml       Weight:       01/23/24 2120   Weight: 86.2 kg (190 lb)       Most recent vitals:   Vitals:    01/24/24 0126 01/24/24 0131 01/24/24 0139 01/24/24 0140   BP:  163/90     Pulse: 68 70 71 71   Resp:       Temp:       TempSrc:       SpO2: 96% 94% 95% 95%   Weight:       Height:           Active LDAs/IV Access:   Lines, Drains & Airways       Active LDAs       Name Placement date Placement time Site Days    Peripheral IV 01/23/24 2227 Anterior;Proximal;Right Forearm 01/23/24 2227  Forearm  less than 1                    Labs (abnormal labs have a star):   Labs Reviewed   COMPREHENSIVE METABOLIC PANEL - Abnormal; Notable for the following components:       Result Value    Glucose 118 (*)     Alkaline Phosphatase 121 (*)     Anion Gap 16.7 (*)     All other components within normal limits    Narrative:     GFR Normal >60  Chronic Kidney Disease <60  Kidney Failure <15    The GFR formula is only valid for adults with stable renal function between ages 18 and 70.   CBC WITH AUTO DIFFERENTIAL - Abnormal; Notable for the following components:    WBC 11.64 (*)     RBC 5.80 (*)     Hematocrit 48.8 (*)     Lymphocyte % 13.7 (*)     Eosinophil % 6.7 (*)     Neutrophils, Absolute 8.12  (*)     Monocytes, Absolute 1.08 (*)     Eosinophils, Absolute 0.78 (*)     All other components within normal limits   HIGH SENSITIVITIY TROPONIN T 2HR - Abnormal; Notable for the following components:    HS Troponin T 16 (*)     Troponin T Delta 5 (*)     All other components within normal limits    Narrative:     High Sensitive Troponin T Reference Range:  <14.0 ng/L- Negative Female for AMI  <22.0 ng/L- Negative Male for AMI  >=14 - Abnormal Female indicating possible myocardial injury.  >=22 - Abnormal Male indicating possible myocardial injury.   Clinicians would have to utilize clinical acumen, EKG, Troponin, and serial changes to determine if it is an Acute Myocardial Infarction or myocardial injury due to an underlying chronic condition.        TROPONIN - Normal    Narrative:     High Sensitive Troponin T Reference Range:  <14.0 ng/L- Negative Female for AMI  <22.0 ng/L- Negative Male for AMI  >=14 - Abnormal Female indicating possible myocardial injury.  >=22 - Abnormal Male indicating possible myocardial injury.   Clinicians would have to utilize clinical acumen, EKG, Troponin, and serial changes to determine if it is an Acute Myocardial Infarction or myocardial injury due to an underlying chronic condition.        MAGNESIUM - Normal   RAINBOW DRAW    Narrative:     The following orders were created for panel order [place] Draw.  Procedure                               Abnormality         Status                     ---------                               -----------         ------                     Green Top (Gel)[[id number]]                                  Final result               Lavender Top[[id number]]                                     Final result               Gold Top - SST[[id number]]                                   Final result               Light Blue Top[[id number]]                                   Final result                 Please view results for these tests on the individual orders.    CBC AND DIFFERENTIAL    Narrative:     The following orders were created for panel order CBC & Differential.  Procedure                               Abnormality         Status                     ---------                               -----------         ------                     CBC Auto Differential[[id number]]        Abnormal            Final result                 Please view results for these tests on the individual orders.   GREEN TOP   LAVENDER TOP   GOLD TOP - SST   LIGHT BLUE TOP       EKG:   ECG 12 Lead Rhythm Change   Preliminary Result   HEART RATE= 66  bpm   RR Interval= 909  ms   SD Interval= 154  ms   P Horizontal Axis= 31  deg   P Front Axis= 50  deg   QRSD Interval= 92  ms   QT Interval= 446  ms   QTcB= 468  ms   QRS Axis= -26  deg   T Wave Axis= 6  deg   - BORDERLINE ECG -   Sinus rhythm   Borderline left axis deviation   Low voltage, precordial leads   Consider anterior infarct   Electronically Signed By:    Date and Time of Study: 2024-01-23 23:45:09      ECG 12 Lead ED Triage Standing Order; Chest Pain   Preliminary Result   HEART RATE= 127  bpm   RR Interval= 472  ms   SD Interval=   ms   P Horizontal Axis=   deg   P Front Axis=   deg   QRSD Interval= 76  ms   QT Interval= 300  ms   QTcB= 437  ms   QRS Axis= -33  deg   T Wave Axis= 107  deg   - ABNORMAL ECG -   Atrial fibrillation   Inferior infarct, old   Consider anterior infarct   Lateral leads are also involved   Electronically Signed By:    Date and Time of Study: 2024-01-23 21:24:48          Meds given in ED:   Medications   sodium chloride 0.9 % flush 10 mL (has no administration in time range)   aspirin tablet 325 mg (325 mg Oral Not Given 1/23/24 2147)   lactated ringers bolus 1,000 mL (0 mL Intravenous Stopped 1/24/24 0015)   ondansetron (ZOFRAN) injection 4 mg (4 mg Intravenous Given 1/23/24 2227)   ondansetron (ZOFRAN) injection 4 mg (4 mg Intravenous Given 1/24/24 0055)       Imaging results:  XR Chest 1 View    Result Date:  1/23/2024  No acute findings.  This report was finalized on 1/23/2024 10:13 PM by Dr. Samantha Alfredo M.D on Workstation: [id number]       Ambulatory status:   ax1    Social issues:   Social History     Socioeconomic History    Marital status: Single    Number of children: 0    Years of education: BS    Tobacco Use    Smoking status: Never     Passive exposure: Never    Smokeless tobacco: Never    Tobacco comments:     caffeine use: TEA OCCASSIONALLY   Vaping Use    Vaping Use: Never used   Substance and Sexual Activity    Alcohol use: No    Drug use: Never    Sexual activity: Not Currently       NIH Stroke Scale:       Registered Nurse, RN  01/24/24 01:40 EST

## 2024-01-25 LAB
ANION GAP SERPL CALCULATED.3IONS-SCNC: 12.6 MMOL/L (ref 5–15)
BUN SERPL-MCNC: 6 MG/DL (ref 8–23)
BUN/CREAT SERPL: 7.7 (ref 7–25)
CALCIUM SPEC-SCNC: 8.9 MG/DL (ref 8.6–10.5)
CHLORIDE SERPL-SCNC: 106 MMOL/L (ref 98–107)
CO2 SERPL-SCNC: 21.4 MMOL/L (ref 22–29)
CREAT SERPL-MCNC: 0.78 MG/DL (ref 0.57–1)
EGFRCR SERPLBLD CKD-EPI 2021: 81.3 ML/MIN/1.73
GLUCOSE SERPL-MCNC: 111 MG/DL (ref 65–99)
MAGNESIUM SERPL-MCNC: 2.2 MG/DL (ref 1.6–2.4)
POTASSIUM SERPL-SCNC: 3.8 MMOL/L (ref 3.5–5.2)
QT INTERVAL: 337 MS
QTC INTERVAL: 458 MS
SODIUM SERPL-SCNC: 140 MMOL/L (ref 136–145)

## 2024-01-25 PROCEDURE — 83735 ASSAY OF MAGNESIUM: CPT | Performed by: INTERNAL MEDICINE

## 2024-01-25 PROCEDURE — G0378 HOSPITAL OBSERVATION PER HR: HCPCS

## 2024-01-25 PROCEDURE — 93010 ELECTROCARDIOGRAM REPORT: CPT | Performed by: INTERNAL MEDICINE

## 2024-01-25 PROCEDURE — 93005 ELECTROCARDIOGRAM TRACING: CPT | Performed by: STUDENT IN AN ORGANIZED HEALTH CARE EDUCATION/TRAINING PROGRAM

## 2024-01-25 PROCEDURE — 99232 SBSQ HOSP IP/OBS MODERATE 35: CPT | Performed by: INTERNAL MEDICINE

## 2024-01-25 PROCEDURE — 25010000002 DIGOXIN PER 500 MCG: Performed by: INTERNAL MEDICINE

## 2024-01-25 PROCEDURE — 80048 BASIC METABOLIC PNL TOTAL CA: CPT | Performed by: INTERNAL MEDICINE

## 2024-01-25 RX ORDER — POTASSIUM CHLORIDE 750 MG/1
40 TABLET, FILM COATED, EXTENDED RELEASE ORAL ONCE
Status: DISCONTINUED | OUTPATIENT
Start: 2024-01-25 | End: 2024-01-25

## 2024-01-25 RX ORDER — CARVEDILOL 25 MG/1
25 TABLET ORAL 2 TIMES DAILY
Status: DISCONTINUED | OUTPATIENT
Start: 2024-01-25 | End: 2024-01-25

## 2024-01-25 RX ORDER — POTASSIUM CHLORIDE 1.5 G/1.58G
40 POWDER, FOR SOLUTION ORAL ONCE
Status: COMPLETED | OUTPATIENT
Start: 2024-01-25 | End: 2024-01-25

## 2024-01-25 RX ORDER — DIGOXIN 0.25 MG/ML
250 INJECTION INTRAMUSCULAR; INTRAVENOUS ONCE
Status: COMPLETED | OUTPATIENT
Start: 2024-01-25 | End: 2024-01-25

## 2024-01-25 RX ORDER — TRAMADOL HYDROCHLORIDE 50 MG/1
50 TABLET ORAL EVERY 6 HOURS PRN
Status: DISCONTINUED | OUTPATIENT
Start: 2024-01-25 | End: 2024-01-27 | Stop reason: HOSPADM

## 2024-01-25 RX ORDER — CARVEDILOL 25 MG/1
25 TABLET ORAL 2 TIMES DAILY
Status: DISCONTINUED | OUTPATIENT
Start: 2024-01-25 | End: 2024-01-26

## 2024-01-25 RX ADMIN — TRAMADOL HYDROCHLORIDE 50 MG: 50 TABLET ORAL at 17:51

## 2024-01-25 RX ADMIN — LAMOTRIGINE 100 MG: 100 TABLET ORAL at 21:06

## 2024-01-25 RX ADMIN — ESCITALOPRAM OXALATE 10 MG: 10 TABLET, FILM COATED ORAL at 09:43

## 2024-01-25 RX ADMIN — Medication 10 ML: at 21:06

## 2024-01-25 RX ADMIN — DULOXETINE HYDROCHLORIDE 120 MG: 60 CAPSULE, DELAYED RELEASE ORAL at 21:06

## 2024-01-25 RX ADMIN — APIXABAN 5 MG: 5 TABLET, FILM COATED ORAL at 21:04

## 2024-01-25 RX ADMIN — PANTOPRAZOLE SODIUM 40 MG: 40 TABLET, DELAYED RELEASE ORAL at 09:42

## 2024-01-25 RX ADMIN — PRAVASTATIN SODIUM 40 MG: 40 TABLET ORAL at 21:06

## 2024-01-25 RX ADMIN — POTASSIUM CHLORIDE 40 MEQ: 1.5 FOR SOLUTION ORAL at 13:56

## 2024-01-25 RX ADMIN — FAMOTIDINE 40 MG: 20 TABLET, FILM COATED ORAL at 21:03

## 2024-01-25 RX ADMIN — CARVEDILOL 25 MG: 25 TABLET, FILM COATED ORAL at 21:04

## 2024-01-25 RX ADMIN — LOSARTAN POTASSIUM 100 MG: 100 TABLET, FILM COATED ORAL at 09:43

## 2024-01-25 RX ADMIN — AMLODIPINE BESYLATE 5 MG: 5 TABLET ORAL at 09:43

## 2024-01-25 RX ADMIN — DIGOXIN 250 MCG: 0.25 INJECTION INTRAMUSCULAR; INTRAVENOUS at 04:45

## 2024-01-25 RX ADMIN — CARVEDILOL 25 MG: 25 TABLET, FILM COATED ORAL at 09:49

## 2024-01-25 NOTE — PROGRESS NOTES
"Patient Care Team:  Pierre Chaudhry Jr., MD as PCP - General (Internal Medicine)  Pierre Chaudhry Jr., MD as PCP - Internal Medicine  Manoj Gutiérrez Jr., MD as Consulting Physician (Pulmonary Disease)    Chief Complaint: Paroxysmal atrial fibrillation.    Interval History:   Short duration of A-fib with RVR noted at around 4 AM.  Currently sinus    Objective   Vital Signs  Temp:  [97.7 °F (36.5 °C)-98.6 °F (37 °C)] 97.9 °F (36.6 °C)  Heart Rate:  [] 76  Resp:  [16-18] 17  BP: (130-155)/(64-90) 145/76    Intake/Output Summary (Last 24 hours) at 1/25/2024 0934  Last data filed at 1/25/2024 0900  Gross per 24 hour   Intake 720 ml   Output 1500 ml   Net -780 ml     Flowsheet Rows      Flowsheet Row First Filed Value   Admission Height 157.5 cm (62\") Documented at 01/23/2024 2120   Admission Weight 86.2 kg (190 lb) Documented at 01/23/2024 2120            Temp:  [97.7 °F (36.5 °C)-98.6 °F (37 °C)] 97.9 °F (36.6 °C)  Heart Rate:  [] 76  Resp:  [16-18] 17  BP: (130-155)/(64-90) 145/76    Intake/Output Summary (Last 24 hours) at 1/25/2024 0934  Last data filed at 1/25/2024 0900  Gross per 24 hour   Intake 720 ml   Output 1500 ml   Net -780 ml     Flowsheet Rows      Flowsheet Row First Filed Value   Admission Height 157.5 cm (62\") Documented at 01/23/2024 2120   Admission Weight 86.2 kg (190 lb) Documented at 01/23/2024 2120            General Appearance:    Alert, cooperative, in no acute distress   Head:    Normocephalic, without obvious abnormality, atraumatic       Neck/Lymph   No adenopathy, supple, no thyromegaly, no carotid bruit, no    JVD   Lungs:     Clear to auscultation bilaterally, no wheezes, rales, or     rhonchi    Cardiac:    Normal rate, regular rhythm, no murmur, no rub, no gallop   Chest Wall:    No abnormalities observed   GI:     Normal bowel sounds, soft, nontender, nondistended,            no rebound tenderness   Extremities:   No cyanosis, clubbing, or edema "   Circulatory/Peripheral Vascular :   Pulses palpable and equal bilaterally   Integumentary:   No bleeding or rash. Normal temperature       Neurologic:   Cranial nerves 2 - 12 grossly intact, sensation intact              amLODIPine, 5 mg, Oral, Q24H  carvedilol, 25 mg, Oral, BID  DULoxetine, 120 mg, Oral, Nightly  escitalopram, 10 mg, Oral, Daily  famotidine, 40 mg, Oral, Nightly  lamoTRIgine, 100 mg, Oral, Nightly  losartan, 100 mg, Oral, Daily  pantoprazole, 40 mg, Oral, Daily  potassium chloride, 40 mEq, Oral, Once  pravastatin, 40 mg, Oral, Nightly  senna-docusate sodium, 2 tablet, Oral, BID  sodium chloride, 10 mL, Intravenous, Q12H        lactated ringers, 75 mL/hr, Last Rate: 75 mL/hr (01/25/24 0148)        Results Review:    Results from last 7 days   Lab Units 01/25/24  0439   SODIUM mmol/L 140   POTASSIUM mmol/L 3.8   CHLORIDE mmol/L 106   CO2 mmol/L 21.4*   BUN mg/dL 6*   CREATININE mg/dL 0.78   GLUCOSE mg/dL 111*   CALCIUM mg/dL 8.9     Results from last 7 days   Lab Units 01/24/24  0811 01/23/24  2348 01/23/24  2139   HSTROP T ng/L 10 16* 11     Results from last 7 days   Lab Units 01/24/24  0811   WBC 10*3/mm3 8.27   HEMOGLOBIN g/dL 12.7   HEMATOCRIT % 39.6   PLATELETS 10*3/mm3 255             Results from last 7 days   Lab Units 01/25/24  0439   MAGNESIUM mg/dL 2.2         @LABRCNT(bnp)@  I reviewed the patient's new clinical results.  I personally viewed and interpreted the patient's EKG/Telemetry data        Assessment & Plan   1.  Nausea/vomiting/diarrhea: Workup per the primary team.  2.  Paroxysmal atrial fibrillation: Currently sinus  3.  Interstitial lung disease  4.  Chronic dyspnea  5.  Obstructive sleep apnea    -Recommend starting Eliquis today unless patient has upcoming procedure.  If any procedures planned Lovenox or heparin drip would be recommended.  - Increase carvedilol to 25 mg p.o. every 12 hours.  - Recommend monitoring 1 additional day secondary to paroxysm of atrial  fibrillation earlier this a.m.

## 2024-01-25 NOTE — PLAN OF CARE
Goal Outcome Evaluation:  Plan of Care Reviewed With: patient        Progress: no change  Outcome Evaluation: A&O, went into afib at 0350, ekg and labs completed, cardiology notified, order received for 1X dose of digoxin, patient converted back to SR at 0455, VSS.

## 2024-01-25 NOTE — NURSING NOTE
Patient went back into afib at 0350.  EKG completed, labs ordered, cardiology notified, order received for digoxin x1 dose.

## 2024-01-25 NOTE — PLAN OF CARE
Goal Outcome Evaluation:                 Patient VSS, SR 70s-80s, BP 140s systolic this am and carvedilol was increased this am. BP 100s-110s this afternoon. Patient on room air when awake and 2L when sleeping. Called Dr. Ludwig for patient c/o arthritic hip and back pain, gave patient tramadol (See MAR). Possible discharge tomorrow. Safety precautions in place, call light within reach. Care ongoing.

## 2024-01-25 NOTE — PROGRESS NOTES
Name: Katelyn Sr ADMIT: 2024   : 1950  PCP: Pierre Chaudhry Jr., MD    MRN: [id number] LOS: 0 days   AGE/SEX: 73 y.o. female  ROOM: [facility]     Subjective   Subjective   Patient went to [facility] with RVR overnight and received IV digoxin and then returned to sinus rhythm.  Nausea has greatly improved and she tolerated some grits for breakfast.  Feels like she can eat regular food so a normal diet was ordered for lunch.  No abdominal pain or diarrhea.  Patient can feels when she goes in and out of atrial fibrillation.    Review of Systems   Constitutional:  Negative for chills and fever.   Respiratory:  Negative for cough and shortness of breath.    Cardiovascular:  Negative for chest pain and leg swelling.   Gastrointestinal:  Negative for abdominal pain, diarrhea and nausea.     As above     Objective   Objective   Vital Signs  Temp:  [97.7 °F (36.5 °C)-98.6 °F (37 °C)] 97.9 °F (36.6 °C)  Heart Rate:  [] 76  Resp:  [16-18] 17  BP: (130-155)/(64-90) 145/76  SpO2:  [95 %-99 %] 99 %  on   ;   Device (Oxygen Therapy): room air  Body mass index is 33.2 kg/m².  Physical Exam  Constitutional:       General: She is not in acute distress.     Appearance: She is not ill-appearing.   Cardiovascular:      Rate and Rhythm: Normal rate and regular rhythm.   Pulmonary:      Effort: Pulmonary effort is normal. No respiratory distress.   Abdominal:      General: Abdomen is flat. There is no distension.      Tenderness: There is no abdominal tenderness.   Musculoskeletal:         General: No swelling or deformity. Normal range of motion.   Skin:     General: Skin is warm and dry.   Neurological:      General: No focal deficit present.      Mental Status: She is alert. Mental status is at baseline.   Results Review     I reviewed the patient's new clinical results.  Results from last 7 days   Lab Units 24  0811 24  2139   WBC 10*3/mm3 8.27 11.64*   HEMOGLOBIN g/dL 12.7 15.4   PLATELETS  "10*3/mm3 255 367     Results from last 7 days   Lab Units 01/25/24  0439 01/24/24  0811 01/23/24  2139   SODIUM mmol/L 140 141 144   POTASSIUM mmol/L 3.8 3.5 3.6   CHLORIDE mmol/L 106 108* 104   CO2 mmol/L 21.4* 21.3* 23.3   BUN mg/dL 6* 13 18   CREATININE mg/dL 0.78 0.77 0.99   GLUCOSE mg/dL 111* 109* 118*   Estimated Creatinine Clearance: 65.8 mL/min (by C-G formula based on SCr of 0.78 mg/dL).  Results from last 7 days   Lab Units 01/24/24  0811 01/23/24  2139   ALBUMIN g/dL 3.3* 4.4   BILIRUBIN mg/dL 0.4 0.4   ALK PHOS U/L 93 121*   AST (SGOT) U/L 11 13   ALT (SGPT) U/L 7 11     Results from last 7 days   Lab Units 01/25/24  0439 01/24/24  0811 01/23/24  2139   CALCIUM mg/dL 8.9 8.7 9.5   ALBUMIN g/dL  --  3.3* 4.4   MAGNESIUM mg/dL 2.2 1.9 1.8   PHOSPHORUS mg/dL  --  2.9  --      No results found for: \"COVID19\"  No results found for: \"HGBA1C\", \"POCGLU\"    XR Chest 1 View  Narrative: SINGLE VIEW OF THE CHEST     HISTORY: Chest pain     COMPARISON: August 5, 2021     FINDINGS:  There is cardiomegaly. There is no vascular congestion. No pneumothorax,  pleural effusion, or acute infiltrate is seen. There are changes of  prior cervical spinal fusion.     Impression: No acute findings.     This report was finalized on 1/23/2024 10:13 PM by Dr. Samantha Alfredo M.D on Workstation: [id number]       I reviewed the patient's daily medications.  Scheduled Medications  amLODIPine, 5 mg, Oral, Q24H  apixaban, 5 mg, Oral, Q12H  carvedilol, 25 mg, Oral, BID  DULoxetine, 120 mg, Oral, Nightly  escitalopram, 10 mg, Oral, Daily  famotidine, 40 mg, Oral, Nightly  lamoTRIgine, 100 mg, Oral, Nightly  losartan, 100 mg, Oral, Daily  pantoprazole, 40 mg, Oral, Daily  potassium chloride, 40 mEq, Oral, Once  pravastatin, 40 mg, Oral, Nightly  senna-docusate sodium, 2 tablet, Oral, BID  sodium chloride, 10 mL, Intravenous, Q12H    Infusions  lactated ringers, 75 mL/hr, Last Rate: 75 mL/hr (01/25/24 0148)    Diet  Diet: Regular/House " Diet, Gastrointestinal Diets; Fiber-Restricted, Fat-Restricted, Low Irritant; Texture: Regular Texture (IDDSI 7); Fluid Consistency: Thin (IDDSI 0)         I have personally reviewed:  []  Laboratory   []  Microbiology   []  Radiology   []  EKG/Telemetry   []  Cardiology/Vascular   []  Pathology   []  Records     Assessment/Plan     Active Hospital Problems    Diagnosis  POA    **A-fib [I48.91]  Yes    Elevated troponin [R79.89]  Yes    Chronic dyspnea [R06.09]  Yes    Essential hypertension [I10]  Yes    FUENTES on autoCPAP [G47.33]  Yes      Resolved Hospital Problems   No resolved problems to display.       73 y.o. female admitted with A-fib.    Paroxysmal atrial fibrillation with RVR  Hypertension  Hyperlipidemia  -EKG on admission with heart rate of 127, now in NSR  -TSH WNL  -Troponin mildly elevated likely related to his RVR  -Echo from August 2023 reviewed, unremarkable  -Cardiology consulted-Coreg increased back to 25 mg.  Received IV digoxin overnight for an episode of A-fib RVR that converted back to sinus rhythm.  Eliquis dose this evening.  -Continue amlodipine, Cozaar, statin     Nausea/vomiting, resolved  Diarrhea, resolved  -Never had any abdominal pain  -Sounds like she had a viral gastroenteritis that is been slow to resolve  -Tolerated liquid diet, requesting to advance to regular diet.  [place] diet ordered for lunch     Depression-continue home Lexapro and duloxetine     GERD-continue home pantoprazole    Eliquis (home med) for DVT prophylaxis.  Full code.  Discussed with patient and nursing staff.  Anticipate discharge home tomorrow.  If okay with cardiology    Expected Discharge Date: 1/25/2024; Expected Discharge Time:       Shaggy Ludwig MD  [facility]  01/25/24  12:07 EST

## 2024-01-26 ENCOUNTER — APPOINTMENT (OUTPATIENT)
Dept: CARDIOLOGY | Facility: HOSPITAL | Age: 74
DRG: 310 | End: 2024-01-26
Payer: MEDICARE

## 2024-01-26 PROCEDURE — 99232 SBSQ HOSP IP/OBS MODERATE 35: CPT | Performed by: INTERNAL MEDICINE

## 2024-01-26 PROCEDURE — 93246 EXT ECG>7D<15D RECORDING: CPT

## 2024-01-26 RX ORDER — CARVEDILOL 12.5 MG/1
12.5 TABLET ORAL 2 TIMES DAILY
Status: DISCONTINUED | OUTPATIENT
Start: 2024-01-26 | End: 2024-01-27

## 2024-01-26 RX ADMIN — DULOXETINE HYDROCHLORIDE 120 MG: 60 CAPSULE, DELAYED RELEASE ORAL at 20:43

## 2024-01-26 RX ADMIN — APIXABAN 5 MG: 5 TABLET, FILM COATED ORAL at 20:43

## 2024-01-26 RX ADMIN — FAMOTIDINE 40 MG: 20 TABLET, FILM COATED ORAL at 20:43

## 2024-01-26 RX ADMIN — AMLODIPINE BESYLATE 5 MG: 5 TABLET ORAL at 08:49

## 2024-01-26 RX ADMIN — Medication 10 ML: at 20:46

## 2024-01-26 RX ADMIN — PRAVASTATIN SODIUM 40 MG: 40 TABLET ORAL at 20:43

## 2024-01-26 RX ADMIN — CARVEDILOL 25 MG: 25 TABLET, FILM COATED ORAL at 08:50

## 2024-01-26 RX ADMIN — ESCITALOPRAM OXALATE 10 MG: 10 TABLET, FILM COATED ORAL at 08:50

## 2024-01-26 RX ADMIN — APIXABAN 5 MG: 5 TABLET, FILM COATED ORAL at 08:50

## 2024-01-26 RX ADMIN — Medication 10 ML: at 08:50

## 2024-01-26 RX ADMIN — LAMOTRIGINE 100 MG: 100 TABLET ORAL at 20:43

## 2024-01-26 RX ADMIN — CARVEDILOL 12.5 MG: 25 TABLET, FILM COATED ORAL at 20:43

## 2024-01-26 RX ADMIN — PANTOPRAZOLE SODIUM 40 MG: 40 TABLET, DELAYED RELEASE ORAL at 08:50

## 2024-01-26 RX ADMIN — DOCUSATE SODIUM 50MG AND SENNOSIDES 8.6MG 2 TABLET: 8.6; 5 TABLET, FILM COATED ORAL at 20:43

## 2024-01-26 RX ADMIN — LOSARTAN POTASSIUM 100 MG: 100 TABLET, FILM COATED ORAL at 08:50

## 2024-01-26 NOTE — PROGRESS NOTES
"Patient Care Team:  Pierre Chaudhry Jr., MD as PCP - General (Internal Medicine)  Pierre Chaudhry Jr., MD as PCP - Internal Medicine  Manoj Gutiérrez Jr., MD as Consulting Physician (Pulmonary Disease)    Chief Complaint: Paroxysmal atrial fibrillation.    Interval History:   Patient noted to have intermittent bradycardia and pauses up to 3 seconds.  Carvedilol decreased.    Objective   Vital Signs  Temp:  [97.6 °F (36.4 °C)-98.3 °F (36.8 °C)] 98.1 °F (36.7 °C)  Heart Rate:  [58-76] 65  Resp:  [18-20] 20  BP: (107-147)/(54-73) 113/73    Intake/Output Summary (Last 24 hours) at 1/26/2024 1529  Last data filed at 1/26/2024 1259  Gross per 24 hour   Intake 480 ml   Output 850 ml   Net -370 ml     Flowsheet Rows      Flowsheet Row First Filed Value   Admission Height 157.5 cm (62\") Documented at 01/23/2024 2120   Admission Weight 86.2 kg (190 lb) Documented at 01/23/2024 2120            Temp:  [97.6 °F (36.4 °C)-98.3 °F (36.8 °C)] 98.1 °F (36.7 °C)  Heart Rate:  [58-76] 65  Resp:  [18-20] 20  BP: (107-147)/(54-73) 113/73    Intake/Output Summary (Last 24 hours) at 1/26/2024 1529  Last data filed at 1/26/2024 1259  Gross per 24 hour   Intake 480 ml   Output 850 ml   Net -370 ml     Flowsheet Rows      Flowsheet Row First Filed Value   Admission Height 157.5 cm (62\") Documented at 01/23/2024 2120   Admission Weight 86.2 kg (190 lb) Documented at 01/23/2024 2120            General Appearance:    Alert, cooperative, in no acute distress   Head:    Normocephalic, without obvious abnormality, atraumatic       Neck/Lymph   No adenopathy, supple, no thyromegaly, no carotid bruit, no    JVD   Lungs:     Clear to auscultation bilaterally, no wheezes, rales, or     rhonchi    Cardiac:    Normal rate, regular rhythm, no murmur, no rub, no gallop   Chest Wall:    No abnormalities observed   GI:     Normal bowel sounds, soft, nontender, nondistended,            no rebound tenderness   Extremities:   No cyanosis, clubbing, " or edema   Circulatory/Peripheral Vascular :   Pulses palpable and equal bilaterally   Integumentary:   No bleeding or rash. Normal temperature       Neurologic:   Cranial nerves 2 - 12 grossly intact, sensation intact              amLODIPine, 5 mg, Oral, Q24H  apixaban, 5 mg, Oral, Q12H  carvedilol, 12.5 mg, Oral, BID  DULoxetine, 120 mg, Oral, Nightly  escitalopram, 10 mg, Oral, Daily  famotidine, 40 mg, Oral, Nightly  lamoTRIgine, 100 mg, Oral, Nightly  losartan, 100 mg, Oral, Daily  pantoprazole, 40 mg, Oral, Daily  pravastatin, 40 mg, Oral, Nightly  senna-docusate sodium, 2 tablet, Oral, BID  sodium chloride, 10 mL, Intravenous, Q12H        lactated ringers, 75 mL/hr, Last Rate: 75 mL/hr (01/25/24 0148)        Results Review:    Results from last 7 days   Lab Units 01/25/24  0439   SODIUM mmol/L 140   POTASSIUM mmol/L 3.8   CHLORIDE mmol/L 106   CO2 mmol/L 21.4*   BUN mg/dL 6*   CREATININE mg/dL 0.78   GLUCOSE mg/dL 111*   CALCIUM mg/dL 8.9     Results from last 7 days   Lab Units 01/24/24  0811 01/23/24  2348 01/23/24  2139   HSTROP T ng/L 10 16* 11     Results from last 7 days   Lab Units 01/24/24  0811   WBC 10*3/mm3 8.27   HEMOGLOBIN g/dL 12.7   HEMATOCRIT % 39.6   PLATELETS 10*3/mm3 255             Results from last 7 days   Lab Units 01/25/24  0439   MAGNESIUM mg/dL 2.2         @LABRCNT(bnp)@  I reviewed the patient's new clinical results.  I personally viewed and interpreted the patient's EKG/Telemetry data        Assessment & Plan   1.  Nausea/vomiting/diarrhea: Workup per the primary team.  2.  Paroxysmal atrial fibrillation: Currently sinus  3.  Interstitial lung disease  4.  Chronic dyspnea  5.  Obstructive sleep apnea    -Continue Eliquis.  - Decrease carvedilol to 12.5 mg p.o. twice daily.  Monitor overnight.  Hopefully home tomorrow.  Zio patch upon discharge.

## 2024-01-26 NOTE — PLAN OF CARE
Goal Outcome Evaluation:  Plan of Care Reviewed With: patient        Progress: improving  Outcome Evaluation: Patient with no c/o pain this pm. Patient refused senna s due to previous diarrhea. Patient b/p 120-130's/50-60's HR 60-70's. Patient remains SR/SB and has not converted back into afib. Patient started Eliquis and education given regarding medication. Will continue to monitor and await discharge plans.

## 2024-01-26 NOTE — CASE MANAGEMENT/SOCIAL WORK
Discharge Planning Assessment  [facility]     Patient Name: Katelyn Sr  MRN: [id number]  Today's Date: 1/26/2024    Admit Date: 1/23/2024    Plan: Home; Denies needs.   Discharge Needs Assessment       Row Name 01/26/24 1817       Living Environment    People in Home alone    Current Living Arrangements condominium    Duration at Residence >25 years    Potentially Unsafe Housing Conditions none    In the past 12 months has the electric, gas, oil, or water company threatened to shut off services in your home? No    Primary Care Provided by self    Provides Primary Care For no one    Family Caregiver if Needed other relative(s)    Family Caregiver Names Nephew/Bhavesh Nascimento is [facility] and lives in [place]; 2nd emergency contact is friend, Tiffaniejoao Cortes who lives in [place].    Quality of Family Relationships helpful;involved;supportive    Able to Return to Prior Arrangements yes       Resource/Environmental Concerns    Resource/Environmental Concerns home accessibility    Home Accessibility Concerns stairs to enter home  17 ENTRANCE STAIR STEPS TO 2ND FLOOR CONDO.    Transportation Concerns none       Transportation Needs    In the past 12 months, has lack of transportation kept you from medical appointments or from getting medications? no    In the past 12 months, has lack of transportation kept you from meetings, work, or from getting things needed for daily living? No       Transition Planning    Patient/Family Anticipates Transition to home    Patient/Family Anticipated Services at Transition none    Transportation Anticipated family or friend will provide       Discharge Needs Assessment    Equipment Currently Used at Home cpap;bp cuff    Concerns to be Addressed no discharge needs identified;denies needs/concerns at this time    Anticipated Changes Related to Illness none    Equipment Needed After Discharge none    Provided Post Acute Provider List? N/A    N/A Provider List Comment No need for list  identified at this time.    Current Discharge Risk lives alone                   Discharge Plan       Row Name 01/26/24 1826       Plan    Plan Home; Denies needs.    Plan Comments CCP spoke with pleasant Pt at bedside.  CCP role explained and discharge planning discussed.  Face sheet verified.  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  Pt lives alone in a second-floor condominium with seventeen entrance stair steps.  Pt nephew/Bhavesh Nascimento ([phone]), is [employer] and lives in [place].  Pt has supportive friend, Tiffanie Cortes that lives in [place].  Pt reports PCP is, Pierre Chaudhry.  [facility].  Pt denies use of past home health or going to a sub-acute rehab.  Pt has the following DME- CPAP ([facility]) and BP cuff monitor.  Pt plans to return home at discharge.  Pt denies current discharge needs.  Pt reports one of her friends will transport her home at discharge.  CCP will continue to follow…….Taylor SOSA /.                  Continued Care and Services - Admitted Since 1/23/2024    Coordination has not been started for this encounter.       Expected Discharge Date and Time       Expected Discharge Date Expected Discharge Time    Jan 27, 2024            Demographic Summary       Row Name 01/26/24 1815       General Information    Admission Type inpatient    Arrived From emergency department    Required Notices Provided Important Message from Medicare    Referral Source admission list    Reason for Consult discharge planning    Preferred Language English       Contact Information    Permission Granted to Share Info With family/designee                   Functional Status       Row Name 01/26/24 1817       Functional Status    Usual Activity Tolerance good    Current Activity Tolerance good       Assessment of Health Literacy    How often do you have someone help you read hospital materials? Never    How confident are you filling out medical forms by  yourself? Extremely    Health Literacy Excellent       Functional Status, IADL    Medications independent    Meal Preparation independent    Housekeeping independent    Laundry independent    Shopping independent       Mental Status    General Appearance WDL WDL       Mental Status Summary    Recent Changes in Mental Status/Cognitive Functioning no changes       Employment/    Employment Status retired    Current or Previous Occupation healthcare    Employment/ Comments Retired RN from [employer]    No documentation.                  Abuse/Neglect    No documentation.                  Legal    No documentation.                  Substance Abuse    No documentation.                  Patient Forms    No documentation.                     Taylor Donohue RN

## 2024-01-26 NOTE — PROGRESS NOTES
Name: Katelyn Sr ADMIT: 2024   : 1950  PCP: Pierre Chaudhry Jr., MD    MRN: [id number] LOS: 0 days   AGE/SEX: 73 y.o. female  ROOM: [facility]     Subjective   Subjective   Patient with no complaints.  Tolerating regular diet.  Minimal intermittent nausea but much improved than she has been over the last weeks.    Review of Systems   Constitutional:  Negative for chills and fever.   Respiratory:  Negative for cough and shortness of breath.    Cardiovascular:  Negative for chest pain and leg swelling.   Gastrointestinal:  Negative for abdominal pain, diarrhea and nausea.     As above     Objective   Objective   Vital Signs  Temp:  [97.6 °F (36.4 °C)-98.3 °F (36.8 °C)] 98.1 °F (36.7 °C)  Heart Rate:  [58-76] 65  Resp:  [18-20] 20  BP: (107-147)/(54-73) 113/73  SpO2:  [93 %-98 %] 93 %  on   ;   Device (Oxygen Therapy): room air  Body mass index is 33.2 kg/m².  Physical Exam  Constitutional:       General: She is not in acute distress.     Appearance: She is not ill-appearing.   Cardiovascular:      Rate and Rhythm: Normal rate and regular rhythm.   Pulmonary:      Effort: Pulmonary effort is normal. No respiratory distress.   Abdominal:      General: Abdomen is flat. There is no distension.      Tenderness: There is no abdominal tenderness.   Musculoskeletal:         General: No swelling or deformity. Normal range of motion.   Skin:     General: Skin is warm and dry.   Neurological:      General: No focal deficit present.      Mental Status: She is alert. Mental status is at baseline.   Results Review     I reviewed the patient's new clinical results.  Results from last 7 days   Lab Units 24  0811 24  2139   WBC 10*3/mm3 8.27 11.64*   HEMOGLOBIN g/dL 12.7 15.4   PLATELETS 10*3/mm3 255 367     Results from last 7 days   Lab Units 24  0439 24  0811 24  2139   SODIUM mmol/L 140 141 144   POTASSIUM mmol/L 3.8 3.5 3.6   CHLORIDE mmol/L 106 108* 104   CO2 mmol/L 21.4*  "21.3* 23.3   BUN mg/dL 6* 13 18   CREATININE mg/dL 0.78 0.77 0.99   GLUCOSE mg/dL 111* 109* 118*   Estimated Creatinine Clearance: 65.8 mL/min (by C-G formula based on SCr of 0.78 mg/dL).  Results from last 7 days   Lab Units 01/24/24  0811 01/23/24  2139   ALBUMIN g/dL 3.3* 4.4   BILIRUBIN mg/dL 0.4 0.4   ALK PHOS U/L 93 121*   AST (SGOT) U/L 11 13   ALT (SGPT) U/L 7 11     Results from last 7 days   Lab Units 01/25/24  0439 01/24/24  0811 01/23/24  2139   CALCIUM mg/dL 8.9 8.7 9.5   ALBUMIN g/dL  --  3.3* 4.4   MAGNESIUM mg/dL 2.2 1.9 1.8   PHOSPHORUS mg/dL  --  2.9  --      No results found for: \"COVID19\"  No results found for: \"HGBA1C\", \"POCGLU\"    XR Chest 1 View  Narrative: SINGLE VIEW OF THE CHEST     HISTORY: Chest pain     COMPARISON: August 5, 2021     FINDINGS:  There is cardiomegaly. There is no vascular congestion. No pneumothorax,  pleural effusion, or acute infiltrate is seen. There are changes of  prior cervical spinal fusion.     Impression: No acute findings.     This report was finalized on 1/23/2024 10:13 PM by Dr. Samantha Alfredo M.D on Workstation: [id number]       I reviewed the patient's daily medications.  Scheduled Medications  amLODIPine, 5 mg, Oral, Q24H  apixaban, 5 mg, Oral, Q12H  carvedilol, 12.5 mg, Oral, BID  DULoxetine, 120 mg, Oral, Nightly  escitalopram, 10 mg, Oral, Daily  famotidine, 40 mg, Oral, Nightly  lamoTRIgine, 100 mg, Oral, Nightly  losartan, 100 mg, Oral, Daily  pantoprazole, 40 mg, Oral, Daily  pravastatin, 40 mg, Oral, Nightly  senna-docusate sodium, 2 tablet, Oral, BID  sodium chloride, 10 mL, Intravenous, Q12H    Infusions  lactated ringers, 75 mL/hr, Last Rate: 75 mL/hr (01/25/24 0146)    Diet  Diet: Regular/House Diet, Gastrointestinal Diets; Fiber-Restricted, Fat-Restricted, Low Irritant; Texture: Regular Texture (IDDSI 7); Fluid Consistency: Thin (IDDSI 0)         I have personally reviewed:  [x]  Laboratory   []  Microbiology   []  Radiology   []  " EKG/Telemetry   []  Cardiology/Vascular   []  Pathology   []  Records     Assessment/Plan     Active Hospital Problems    Diagnosis  POA    **A-fib [I48.91]  Yes    Elevated troponin [R79.89]  Yes    Chronic dyspnea [R06.09]  Yes    Essential hypertension [I10]  Yes    FUENTES on autoCPAP [G47.33]  Yes      Resolved Hospital Problems   No resolved problems to display.       73 y.o. female admitted with A-fib.    Paroxysmal atrial fibrillation with RVR  Hypertension  Hyperlipidemia  Sinus pauses  -EKG on admission with heart rate of 127, now in NSR  -TSH WNL  -Troponin mildly elevated likely related to his RVR  -Echo from August 2023 reviewed, unremarkable  -Cardiology consulted-with sinus pauses plan to decrease Coreg to 12.5 mg and observe overnight  -Continue amlodipine, Cozaar, statin  -Holding home hydralazine      Nausea/vomiting, resolved  Diarrhea, resolved  -Never had any abdominal pain  -Sounds like she had a viral gastroenteritis that is been slow to resolve  -Tolerating a diet     Depression-continue home Lexapro and duloxetine     GERD-continue home pantoprazole    Eliquis (home med) for DVT prophylaxis.  Full code.  Discussed with patient and nursing staff.  Anticipate discharge home tomorrow.  If okay with cardiology    Expected Discharge Date: 1/26/2024; Expected Discharge Time:       Shaggy Ludwig MD  [facility]  01/26/24  13:22 EST

## 2024-01-27 ENCOUNTER — APPOINTMENT (OUTPATIENT)
Dept: CARDIOLOGY | Facility: HOSPITAL | Age: 74
DRG: 310 | End: 2024-01-27
Payer: MEDICARE

## 2024-01-27 ENCOUNTER — READMISSION MANAGEMENT (OUTPATIENT)
Dept: CALL CENTER | Facility: HOSPITAL | Age: 74
End: 2024-01-27
Payer: MEDICARE

## 2024-01-27 VITALS
WEIGHT: 181.5 LBS | HEART RATE: 64 BPM | RESPIRATION RATE: 18 BRPM | BODY MASS INDEX: 33.4 KG/M2 | HEIGHT: 62 IN | DIASTOLIC BLOOD PRESSURE: 73 MMHG | TEMPERATURE: 98.1 F | SYSTOLIC BLOOD PRESSURE: 120 MMHG | OXYGEN SATURATION: 94 %

## 2024-01-27 LAB
ANION GAP SERPL CALCULATED.3IONS-SCNC: 9.4 MMOL/L (ref 5–15)
BUN SERPL-MCNC: 15 MG/DL (ref 8–23)
BUN/CREAT SERPL: 15 (ref 7–25)
CALCIUM SPEC-SCNC: 9 MG/DL (ref 8.6–10.5)
CHLORIDE SERPL-SCNC: 108 MMOL/L (ref 98–107)
CO2 SERPL-SCNC: 25.6 MMOL/L (ref 22–29)
CREAT SERPL-MCNC: 1 MG/DL (ref 0.57–1)
DEPRECATED RDW RBC AUTO: 41.6 FL (ref 37–54)
EGFRCR SERPLBLD CKD-EPI 2021: 60.4 ML/MIN/1.73
ERYTHROCYTE [DISTWIDTH] IN BLOOD BY AUTOMATED COUNT: 13.6 % (ref 12.3–15.4)
GLUCOSE SERPL-MCNC: 98 MG/DL (ref 65–99)
HCT VFR BLD AUTO: 39.3 % (ref 34–46.6)
HGB BLD-MCNC: 12.1 G/DL (ref 12–15.9)
MAGNESIUM SERPL-MCNC: 2.2 MG/DL (ref 1.6–2.4)
MCH RBC QN AUTO: 26.2 PG (ref 26.6–33)
MCHC RBC AUTO-ENTMCNC: 30.8 G/DL (ref 31.5–35.7)
MCV RBC AUTO: 85.2 FL (ref 79–97)
PHOSPHATE SERPL-MCNC: 3.9 MG/DL (ref 2.5–4.5)
PLATELET # BLD AUTO: 255 10*3/MM3 (ref 140–450)
PMV BLD AUTO: 9.7 FL (ref 6–12)
POTASSIUM SERPL-SCNC: 4.3 MMOL/L (ref 3.5–5.2)
RBC # BLD AUTO: 4.61 10*6/MM3 (ref 3.77–5.28)
SODIUM SERPL-SCNC: 143 MMOL/L (ref 136–145)
WBC NRBC COR # BLD AUTO: 8.34 10*3/MM3 (ref 3.4–10.8)

## 2024-01-27 PROCEDURE — 99232 SBSQ HOSP IP/OBS MODERATE 35: CPT

## 2024-01-27 PROCEDURE — 83735 ASSAY OF MAGNESIUM: CPT | Performed by: STUDENT IN AN ORGANIZED HEALTH CARE EDUCATION/TRAINING PROGRAM

## 2024-01-27 PROCEDURE — 84100 ASSAY OF PHOSPHORUS: CPT | Performed by: STUDENT IN AN ORGANIZED HEALTH CARE EDUCATION/TRAINING PROGRAM

## 2024-01-27 PROCEDURE — 80048 BASIC METABOLIC PNL TOTAL CA: CPT | Performed by: STUDENT IN AN ORGANIZED HEALTH CARE EDUCATION/TRAINING PROGRAM

## 2024-01-27 PROCEDURE — 93246 EXT ECG>7D<15D RECORDING: CPT

## 2024-01-27 PROCEDURE — 85027 COMPLETE CBC AUTOMATED: CPT | Performed by: STUDENT IN AN ORGANIZED HEALTH CARE EDUCATION/TRAINING PROGRAM

## 2024-01-27 RX ORDER — CARVEDILOL 12.5 MG/1
12.5 TABLET ORAL 2 TIMES DAILY
Qty: 60 TABLET | Refills: 0 | Status: SHIPPED | OUTPATIENT
Start: 2024-01-27 | End: 2024-01-27 | Stop reason: HOSPADM

## 2024-01-27 RX ORDER — CARVEDILOL 6.25 MG/1
6.25 TABLET ORAL 2 TIMES DAILY
Status: DISCONTINUED | OUTPATIENT
Start: 2024-01-27 | End: 2024-01-27

## 2024-01-27 RX ADMIN — ESCITALOPRAM OXALATE 10 MG: 10 TABLET, FILM COATED ORAL at 08:35

## 2024-01-27 RX ADMIN — APIXABAN 5 MG: 5 TABLET, FILM COATED ORAL at 08:35

## 2024-01-27 RX ADMIN — PANTOPRAZOLE SODIUM 40 MG: 40 TABLET, DELAYED RELEASE ORAL at 08:36

## 2024-01-27 RX ADMIN — LOSARTAN POTASSIUM 100 MG: 100 TABLET, FILM COATED ORAL at 08:35

## 2024-01-27 RX ADMIN — CARVEDILOL 6.25 MG: 25 TABLET, FILM COATED ORAL at 08:36

## 2024-01-27 RX ADMIN — AMLODIPINE BESYLATE 5 MG: 5 TABLET ORAL at 08:35

## 2024-01-27 NOTE — DISCHARGE SUMMARY
Patient Name: Katelyn Sr  : 1950  MRN: [id number]    Date of Admission: 2024  Date of Discharge:  2024  Primary Care Physician: Pierre Chaudhry Jr., MD      Chief Complaint:   Shortness of Breath and Irregular Heart Beat      Discharge Diagnoses     Active Hospital Problems    Diagnosis  POA    **A-fib [I48.91]  Yes    Elevated troponin [R79.89]  Yes    Chronic dyspnea [R06.09]  Yes    Essential hypertension [I10]  Yes    FUENTES on autoCPAP [G47.33]  Yes      Resolved Hospital Problems   No resolved problems to display.        Admitting HPI     Ms. Sr is a 73 y.o. with past medical history of remote atrial fibrillation, hypertension, hyperlipidemia, interstitial lung disease presenting with acute onset of palpitations yesterday evening around 7 PM.  States it felt like when she had her atrial fibrillation from the past.  Associated with chest pain.  Patient has had some nausea and diarrhea since last Thursday.  Felt like he got better on  and then got worse again on Monday.  No further diarrhea but having some nausea without vomiting or abdominal pain.  No known sick contacts.  Retired RN.  No recent antibiotics.  She has not been able to take her Coreg because of the nausea prior to admission for the last few days.  EKG in ER showing A-fib, but she converted to normal sinus rhythm.     Hospital Course     Pt admitted for nausea and vomiting and diarrhea, consistent with gastroenteritis which improved during hospitalization.  She was noted to be in A-fib with RVR and emergency department cardiology was consulted.  Her beta-blocker dose was adjusted multiple times, however she had issues with sinus pauses prompting discontinuation of her beta-blocker.  Given that this was patient's first episode of A-fib in several years, they decided to hold beta-blocker at this time and discharged with a ZIO monitor.  Based off of the results of this, further treatment options will be  discussed with patient.  Indications discussed for returning to ED, which she expressed understanding of.  Started on Eliquis.  Stable for discharge home at this time.    Discharge Plan     Paroxysmal atrial fibrillation with RVR  Hypertension  Hyperlipidemia  Sinus pauses  -EKG on admission with heart rate of 127  -TSH WNL  -Echo from August 2023 reviewed, unremarkable  -Cardiology consulted-with sinus pauses plan to DC Coreg, Zio patch at discharge, and follow-up in outpatient setting for continued management  -Started on Eliquis  -Continue amlodipine, Cozaar, statin     Nausea/vomiting, resolved  Diarrhea, resolved  -Consistent with gastroenteritis, resolved     Depression-continue home Lexapro and duloxetine     GERD-continue home pantoprazole    Day of Discharge     Physical Exam:  Temp:  [97.7 °F (36.5 °C)-98.2 °F (36.8 °C)] 98.1 °F (36.7 °C)  Heart Rate:  [34-74] 64  Resp:  [18-20] 18  BP: (116-134)/(52-97) 120/73  Body mass index is 33.2 kg/m².  Physical Exam  Constitutional:       General: She is not in acute distress.     Appearance: Normal appearance. She is not toxic-appearing.   Cardiovascular:      Rate and Rhythm: Normal rate and regular rhythm.   Pulmonary:      Effort: Pulmonary effort is normal.   Abdominal:      General: Abdomen is flat. Bowel sounds are normal. There is no distension.      Palpations: Abdomen is soft.      Tenderness: There is no abdominal tenderness.   Skin:     General: Skin is warm.   Neurological:      General: No focal deficit present.      Mental Status: She is alert and oriented to person, place, and time.   Psychiatric:         Mood and Affect: Mood normal.         Behavior: Behavior normal.         Consultants     Consult Orders (all) (From admission, onward)       Start     Ordered    01/24/24 0702  Inpatient Cardiology Consult  IN AM        Specialty:  Cardiology  Provider:  Aleks Vealzquez MD    01/24/24 0258    01/24/24 0041  [facility] (on-call MD unless specified)  Details  Once        Specialty:  Hospitalist  Provider:  (Not yet assigned)    01/24/24 0040                  Procedures     * Surgery not found *      Imaging Results (All)       Procedure Component Value Units Date/Time    XR Chest 1 View [[id number]] Collected: 01/23/24 2212     Updated: 01/23/24 2216    Narrative:      SINGLE VIEW OF THE CHEST     HISTORY: Chest pain     COMPARISON: August 5, 2021     FINDINGS:  There is cardiomegaly. There is no vascular congestion. No pneumothorax,  pleural effusion, or acute infiltrate is seen. There are changes of  prior cervical spinal fusion.       Impression:      No acute findings.     This report was finalized on 1/23/2024 10:13 PM by Dr. Samantha Alfredo M.D on Workstation: [id number]               Results for orders placed during the hospital encounter of 09/21/23    Adult Transthoracic Echo Complete W/ Cont if Necessary Per Protocol    Interpretation Summary    Left ventricular systolic function is normal. Calculated left ventricular EF = 63.2%    Left ventricular diastolic function was normal.    Pertinent Labs     Results from last 7 days   Lab Units 01/27/24  0359 01/24/24  0811 01/23/24  2139   WBC 10*3/mm3 8.34 8.27 11.64*   HEMOGLOBIN g/dL 12.1 12.7 15.4   PLATELETS 10*3/mm3 255 255 367     Results from last 7 days   Lab Units 01/27/24  0359 01/25/24  0439 01/24/24  0811 01/23/24  2139   SODIUM mmol/L 143 140 141 144   POTASSIUM mmol/L 4.3 3.8 3.5 3.6   CHLORIDE mmol/L 108* 106 108* 104   CO2 mmol/L 25.6 21.4* 21.3* 23.3   BUN mg/dL 15 6* 13 18   CREATININE mg/dL 1.00 0.78 0.77 0.99   GLUCOSE mg/dL 98 111* 109* 118*   EGFR mL/min/1.73 60.4 81.3 82.6 61.1     Results from last 7 days   Lab Units 01/24/24  0811 01/23/24  2139   ALBUMIN g/dL 3.3* 4.4   BILIRUBIN mg/dL 0.4 0.4   ALK PHOS U/L 93 121*   AST (SGOT) U/L 11 13   ALT (SGPT) U/L 7 11     Results from last 7 days   Lab Units 01/27/24  0359 01/25/24  0439 01/24/24  0811 01/23/24  2139   CALCIUM mg/dL 9.0  "8.9 8.7 9.5   ALBUMIN g/dL  --   --  3.3* 4.4   MAGNESIUM mg/dL 2.2 2.2 1.9 1.8   PHOSPHORUS mg/dL 3.9  --  2.9  --        Results from last 7 days   Lab Units 01/24/24  0811 01/23/24  2348 01/23/24  2139   HSTROP T ng/L 10 16* 11           Invalid input(s): \"LDLCALC\"          Test Results Pending at Discharge       Discharge Details        Discharge Medications        New Medications        Instructions Start Date   Eliquis 5 MG tablet tablet  Generic drug: apixaban   5 mg, Oral, Every 12 Hours Scheduled             Continue These Medications        Instructions Start Date   amLODIPine 5 MG tablet  Commonly known as: NORVASC   5 mg, Oral, Daily      ARIPiprazole 2 MG tablet  Commonly known as: ABILIFY   1 tablet, Oral, Every Morning      cetirizine 5 MG tablet  Commonly known as: zyrTEC   5 mg, Oral, Daily      Diclofenac Sodium 4 %, Topiramate 2 %, cloNIDine HCl 0.2 %, Lidocaine HCl 5 %   1-2 g, Topical, 3 to 4 Times Daily      DULoxetine 60 MG capsule  Commonly known as: CYMBALTA   120 mg, Oral, Nightly      escitalopram 10 MG tablet  Commonly known as: LEXAPRO   10 mg, Oral, Daily      famotidine 10 MG tablet  Commonly known as: PEPCID   40 mg, Oral, Nightly      fluticasone 50 MCG/ACT nasal spray  Commonly known as: FLONASE   2 sprays, Nasal, Daily      lamoTRIgine 100 MG tablet  Commonly known as: LaMICtal   100 mg, Oral, Nightly      losartan 100 MG tablet  Commonly known as: COZAAR   TAKE 1 TABLET BY MOUTH EVERY DAY      Ofev 100 MG capsule  Generic drug: Nintedanib Esylate   100 mg, Oral, 2 Times Daily      pantoprazole 40 MG EC tablet  Commonly known as: PROTONIX   40 mg, Oral, Daily      pravastatin 40 MG tablet  Commonly known as: PRAVACHOL   TAKE 1 TABLET BY MOUTH EVERY DAY AT NIGHT      traZODone 50 MG tablet  Commonly known as: DESYREL   25 mg, Oral, Nightly PRN             Stop These Medications      aspirin 81 MG EC tablet     carvedilol 25 MG tablet  Commonly known as: COREG     hydrALAZINE 50 MG " tablet  Commonly known as: APRESOLINE              Allergies   Allergen Reactions    Simvastatin Myalgia     Cramps in thighs      Erythromycin Rash    Penicillins Rash       Discharge Disposition:  Home or Self Care      Discharge Diet:  Diet Order   Procedures    Diet: Regular/House Diet, Gastrointestinal Diets; Fiber-Restricted, Fat-Restricted, Low Irritant; Texture: Regular Texture (IDDSI 7); Fluid Consistency: Thin (IDDSI 0)       Discharge Activity:   Activity Instructions       Activity as Tolerated     Additional Activity Instructions:    Activity as tolerated           CODE STATUS:    Code Status and Medical Interventions:   Ordered at: 01/24/24 0258     Code Status (Patient has no pulse and is not breathing):    CPR (Attempt to Resuscitate)     Medical Interventions (Patient has pulse or is breathing):    Full Support       Future Appointments   Date Time Provider Department Center   2/1/2024  2:00 PM Deven Ward PA [facility]   2/22/2024  1:00 PM Charles Beard MD [facility] [facility]   4/16/2024  1:00 PM Sera García APRN [facility]   6/10/2024 12:40 PM Aleks Velazquez MD [facility]      Follow-up Information       Pierre Chaudhry Jr., MD .    Specialty: Internal Medicine  Why: febuary 2nd 1:30 pm  Contact information:  [address]  [place] [address]  [phone]               Aleks Velazquez MD Follow up.    Specialty: Cardiology  Contact information:  [address]  [place] [address]  [phone]                             Time Spent on Discharge:  Greater than 30 minutes spent on discharge management including final examination, discussion of hospital stay and patient education, preparation of records, medication reconciliation, follow up planning      Diomedes Lemos MD  [facility]  01/27/24  07:40 EST

## 2024-01-27 NOTE — PROGRESS NOTES
Saw patient this morning. Had a couple of pauses overnight ~3 seconds. She is asymptomatic.     I am going to decrease coreg to 6.25mg and monitor until this afternoon.     Reassess ability to discharge later today. We may even have to stop her beta blocker and if she has issues with AF, consider alternative treatment options.     Continue apixaban.

## 2024-01-27 NOTE — PROGRESS NOTES
Cardiology Follow-Up Note      Patient Name: Katelyn Sr  Age/Sex: 73 y.o. female  : 1950  MRN: [id number]      Day of Service: 24       Chief Complaint/Follow-up: PAF, sinus bradycardia     Interval History: doing well this afternoon. She had some bradycardia around 11AM but denies any symptoms.       Temp:  [97.7 °F (36.5 °C)-98.2 °F (36.8 °C)] 98.1 °F (36.7 °C)  Heart Rate:  [34-74] 64  Resp:  [18-20] 18  BP: (116-134)/(52-97) 120/73     PHYSICAL EXAM:    General Appearance: No acute distress, well developed and well nourished.   Eyes: Conjunctiva and lids: No erythema, swelling, or discharge. Sclera non-icteric.   HENT: Atraumatic, normocephalic. External eyes, ears, and nose normal.   Respiratory: No signs of respiratory distress. Respiration rhythm and depth normal.   Clear to auscultation. No rales, crackles, rhonchi, or wheezing auscultated.   Cardiovascular:  Heart Rate and Rhythm: Normal, Heart Sounds: Normal S1 and S2. No S3 or S4 noted.  Gastrointestinal:  Abdomen soft, non-distended, non-tender.  Musculoskeletal: Normal movement of extremities  Skin: Warm and dry.   Psychiatric: Patient alert and oriented to person, place, and time. Speech and behavior appropriate. Normal mood and affect.       ECG/TELE:         Results from last 7 days   Lab Units 24  0359 24  0439 24  0811   SODIUM mmol/L 143 140 141   POTASSIUM mmol/L 4.3 3.8 3.5   CHLORIDE mmol/L 108* 106 108*   CO2 mmol/L 25.6 21.4* 21.3*   BUN mg/dL 15 6* 13   CREATININE mg/dL 1.00 0.78 0.77   GLUCOSE mg/dL 98 111* 109*   CALCIUM mg/dL 9.0 8.9 8.7     Results from last 7 days   Lab Units 24  0359 24  0811 24  2139   WBC 10*3/mm3 8.34 8.27 11.64*   HEMOGLOBIN g/dL 12.1 12.7 15.4   HEMATOCRIT % 39.3 39.6 48.8*   PLATELETS 10*3/mm3 255 255 367         Results from last 7 days   Lab Units 24  0811 24  2348 24  2139   HSTROP T ng/L 10 16* 11     Results from last 7 days    Lab Units 01/24/24  0811   TSH uIU/mL 3.760           Current Medications:   Scheduled Meds:amLODIPine, 5 mg, Oral, Q24H  apixaban, 5 mg, Oral, Q12H  DULoxetine, 120 mg, Oral, Nightly  escitalopram, 10 mg, Oral, Daily  famotidine, 40 mg, Oral, Nightly  lamoTRIgine, 100 mg, Oral, Nightly  losartan, 100 mg, Oral, Daily  pantoprazole, 40 mg, Oral, Daily  pravastatin, 40 mg, Oral, Nightly  senna-docusate sodium, 2 tablet, Oral, BID  sodium chloride, 10 mL, Intravenous, Q12H            A-fib    FUENTES on autoCPAP    Essential hypertension    Chronic dyspnea    Elevated troponin       Plan:   PAF--- came in with AF in the setting of GI upset and dehydration. Says this was really her first episode in the past couple of years. On apixaban for AC.     2. SB, pause---- she has had some pauses and bradycardia this admission. Pause around 2.5 seconds. Better after reducing coreg. She denies any dizziness, lightheadedness, or syncope.       --- she denies any symptoms associated with bradycardia. This is really her first episode of AF in years. We had a long discussion, she really wants to go home.     ---I suspect this not a new problem. I am going to stop her coreg daily, she can take as needed for episodes of AF.       I am going to send her home today with a Zio AT with live monitoring to better understand her AF and bradycardia.     Advised to call or present back to ED if she develops dizziness, lightheadedness, or syncope. She is agreeable.           CARLIE Mahan  01/27/24  13:21 EST

## 2024-01-27 NOTE — OUTREACH NOTE
Prep Survey      Flowsheet Row Responses   [facility] facility patient discharged from? [place]   Is LACE score < 7 ? No   Eligibility Readm Mgmt   Discharge diagnosis A-fib   Does the patient have one of the following disease processes/diagnoses(primary or secondary)? Other   Does the patient have Home health ordered? No   Is there a DME ordered? No   Prep survey completed? Yes            Jeane SALEH - Registered Nurse

## 2024-01-27 NOTE — PLAN OF CARE
Goal Outcome Evaluation:  Plan of Care Reviewed With: patient        Progress: no change  Outcome Evaluation: Discharge cancelled d/t pauses and pause with dizziness. Coreg dose decreased starting tonight. Pauses 2.66 to 3.17.  Monitor HR. Zio patch at discharge.

## 2024-01-27 NOTE — PLAN OF CARE
Goal Outcome Evaluation:  Plan of Care Reviewed With: patient        Progress: improving  Outcome Evaluation: Patient withno c/o pain this pm. No further pauses noted but HR did drop while patient was sleeping into the 30-40's. Patient b/p 120-130's/50-60's HR 50-60's patient remains in SR/SB. Patient IV was leaking and discontinued. IV nurse came to replace IV and patient declined to have IV replaced due to discharge this am. Discussed if patient is not discharged then new IV will need to be replaced. Will continue to monitor and await discharge plans.

## 2024-01-28 NOTE — PAYOR COMM NOTE
"Francisca Sr \"Jina\" (73 y.o. Female)                   ATTENTION; CLINICALS PENDING CASE REF [id number]                  REPLY TO UR DEPT  [phone] OR CALL   DERRICK GIVENS LPYOANNA           Date of Birth   1950    Social Security Number       Address   [address] [place] [address]    Home Phone   [phone]    MRN   [id number]       Congregational   [facility]    Marital Status   Single                            Admission Date   1/23/24    Admission Type   Emergency    Admitting Provider   Shaggy Ludwig MD    Attending Provider       Department, Room/Bed   [facility], 3108/1       Discharge Date   1/27/2024    Discharge Disposition   Home or Self Care    Discharge Destination                                 Attending Provider: (none)   Allergies: Simvastatin, Erythromycin, Penicillins    Isolation: None   Infection: None   Code Status: Prior    Ht: 157.5 cm (62\")   Wt: 82.3 kg (181 lb 8 oz)    Admission Cmt: None   Principal Problem: A-fib [I48.91]                   Active Insurance as of 1/23/2024       Primary Coverage       Payor Plan Insurance Group Employer/Plan Group    AETNA MEDICARE REPLACEMENT AETNA MEDICARE REPLACEMENT [id number]       Payor Plan Address Payor Plan Phone Number Payor Plan Fax Number Effective Dates    [address] [phone]  1/1/2024 - None Entered    [facility] [id number]         Subscriber Name Subscriber Birth Date Member ID       FRANCISCA SR 1950 [id number]                     Emergency Contacts        (Rel.) Home Phone Work Phone Mobile Phone    Bhavesh Nascimento(nephew) (Relative) [phone] -- [phone]    Tiffanie Cortes (Friend) [phone] -- [phone]                 History & Physical        Shaggy Ludwig MD at 01/24/24 1246              Patient Name:  Francisca Sr  YOB: 1950  MRN:  [id number]  Admit Date:  1/23/2024  Patient Care Team:  Pierre Chaudhry Jr., MD " as PCP - General (Internal Medicine)  Pierre Chaudhry Jr., MD as PCP - Internal Medicine  Manoj Gutiérrez Jr., MD as Consulting Physician (Pulmonary Disease)      Subjective  History Present Illness     Chief Complaint   Patient presents with    Shortness of Breath    Irregular Heart Beat         History of Present Illness  Ms. Sr is a 73 y.o. with past medical history of remote atrial fibrillation, hypertension, hyperlipidemia, interstitial lung disease presenting with acute onset of palpitations yesterday evening around 7 PM.  States it felt like when she had her atrial fibrillation from the past.  Associated with chest pain.  Patient has had some nausea and diarrhea since last Thursday.  Felt like he got better on Sunday and then got worse again on Monday.  No further diarrhea but having some nausea without vomiting or abdominal pain.  No known sick contacts.  Retired RN.  No recent antibiotics.  She has not been able to take her Coreg because of the nausea prior to admission for the last few days.  EKG in ER showing A-fib, but she converted to normal sinus rhythm.    Review of Systems   Constitutional:  Positive for fatigue. Negative for chills and fever.   HENT:  Negative for congestion, rhinorrhea and sinus pressure.    Respiratory:  Negative for cough and shortness of breath.    Cardiovascular:  Negative for chest pain and leg swelling.   Gastrointestinal:  Positive for diarrhea, nausea and vomiting. Negative for abdominal pain.   Genitourinary:  Negative for dysuria and frequency.        Personal History     Past Medical History:   Diagnosis Date    Abnormal ECG 5/21/18    Done in PCP office.  Hx of Atrial fib-?2007    Arthritis of neck     Atrial fibrillation     Cervical disc disorder 8/03    ACDF C6-7, 12/03/2003;  Dr. 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    CTS (carpal tunnel syndrome)     DDD  (degenerative disc disease), lumbosacral 11/09/2018    Depression     Fibrosis of lung     Fracture of wrist     Fracture, finger     Generalized anxiety disorder 07/14/2020    GERD (gastroesophageal reflux disease)     Hip arthrosis     Hyperlipidemia     Hypersensitivity pneumonitis     vs pulmonary fibrosis    Hypersensitivity pneumonitis     Hypersomnia due to another medical condition 10/28/2017    Hypertension     Knee swelling     Low back strain     Lower back pain 07/26/2019    Myocardial infarction     Neck strain     Neuroma of foot     Non-STEMI (non-ST elevated myocardial infarction) 05/21/2018    Nonsenile cataract     FUENTES on CPAP 10/19/2017    Palpitations 07/23/2019    Peripheral vestibulopathy of both ears 07/14/2020    Presence of artificial intra-ocular lens     Scoliosis     Sleep apnea     Thoracic disc disorder     Thyroid disease     Vertigo 07/14/2020    Vestibular neuronitis 07/17/2020     Past Surgical History:   Procedure Laterality Date    CARDIAC CATHETERIZATION N/A 05/22/2018    Procedure: Left Heart Cath;  Surgeon: Aleks Velazquez MD;  Location: [facility] CATH INVASIVE LOCATION;  Service: Cardiovascular    CARDIAC CATHETERIZATION N/A 05/22/2018    Procedure: Coronary angiography;  Surgeon: Aleks Velazquez MD;  Location: [facility] CATH INVASIVE LOCATION;  Service: Cardiovascular    CARDIAC CATHETERIZATION N/A 05/22/2018    Procedure: Left ventriculography;  Surgeon: Aleks Velazquez MD;  Location: [facility] CATH INVASIVE LOCATION;  Service: Cardiovascular    CARDIAC CATHETERIZATION N/A 05/22/2018    Procedure: Peripheral angiography;  Surgeon: Aleks Velazquez MD;  Location: [facility] CATH INVASIVE LOCATION;  Service: Cardiovascular    CATARACT EXTRACTION WITH INTRAOCULAR LENS IMPLANT Bilateral     COLONOSCOPY N/A 03/08/2023    Procedure: COLONOSCOPY to cecum/TI with cold biopsies;  Surgeon: Hamilton Francisco MD;  Location: [facility] ENDOSCOPY;  Service: Gastroenterology;   Laterality: N/A;  pre- diverticulitis  post- diverticulosis with stricture    DIAGNOSTIC LAPAROSCOPY      r/o endometriosis    ENDOSCOPY N/A 2023    Procedure: ESOPHAGOGASTRODUODENOSCOPY with cold biopsies and 52 Fr Kam Dilatation;  Surgeon: Hamilton Francisco MD;  Location: [facility] ENDOSCOPY;  Service: Gastroenterology;  Laterality: N/A;  pre- dysphagia  post- hiatal hernia, gastric polyps, esophageal ring @ 32    NECK SURGERY  2003    ACDF C6-7-Dr. Nix     RECTAL SURGERY      fistula/abscess/hemorrhoid     Family History   Problem Relation Age of Onset    Alzheimer's disease Mother     Heart failure Father     Arrhythmia Father     Heart attack Father         MI at age 56,  at 63.    Diabetes Sister     Atrial fibrillation Sister     Colon cancer Maternal Grandmother     Stroke Paternal Grandmother     Malig Hyperthermia Neg Hx      Social History     Tobacco Use    Smoking status: Never     Passive exposure: Never    Smokeless tobacco: Never    Tobacco comments:     caffeine use: TEA OCCASSIONALLY   Vaping Use    Vaping Use: Never used   Substance Use Topics    Alcohol use: No    Drug use: Never     No current facility-administered medications on file prior to encounter.     Current Outpatient Medications on File Prior to Encounter   Medication Sig Dispense Refill    amLODIPine (NORVASC) 5 MG tablet Take 1 tablet by mouth Daily. 90 tablet 3    ARIPiprazole (ABILIFY) 2 MG tablet Take 1 tablet by mouth Every Morning.      aspirin 81 MG EC tablet Take 1 tablet by mouth Daily. PT STATES *NOT CURRENTLY TAKING      carvedilol (COREG) 25 MG tablet TAKE 1 TABLET BY MOUTH TWICE A  tablet 3    cetirizine (zyrTEC) 5 MG tablet Take 1 tablet by mouth Daily.      Diclofenac Sodium 4 %, Topiramate 2 %, cloNIDine HCl 0.2 %, Lidocaine HCl 5 % Apply 1-2 g topically to the appropriate area as directed 3 (Three) to 4 (Four) times daily. 90 g 1    DULoxetine (CYMBALTA) 60 MG capsule Take 2  capsules by mouth Every Night.      escitalopram (LEXAPRO) 10 MG tablet Take 1 tablet by mouth Daily.      famotidine (PEPCID) 10 MG tablet Take 4 tablets by mouth Every Night.      fluticasone (FLONASE) 50 MCG/ACT nasal spray 2 sprays into the nostril(s) as directed by provider Daily. (Patient not taking: Reported on 1/12/2024)      hydrALAZINE (APRESOLINE) 50 MG tablet Take 1 tablet by mouth 2 (Two) Times a Day. 180 tablet 3    lamoTRIgine (LaMICtal) 100 MG tablet Take 1 tablet by mouth Every Night.      losartan (COZAAR) 100 MG tablet TAKE 1 TABLET BY MOUTH EVERY DAY 90 tablet 4    Nintedanib Esylate (Ofev) 100 MG capsule Take 1 capsule by mouth 2 (Two) Times a Day.      pantoprazole (PROTONIX) 40 MG EC tablet Take 1 tablet by mouth Daily.      pravastatin (PRAVACHOL) 40 MG tablet TAKE 1 TABLET BY MOUTH EVERY DAY AT NIGHT 90 tablet 3    traZODone (DESYREL) 50 MG tablet Take 0.5 tablets by mouth At Night As Needed for Sleep.       Allergies   Allergen Reactions    Simvastatin Myalgia     Cramps in thighs      Erythromycin Rash    Penicillins Rash       Objective   Objective     Vital Signs  Temp:  [97 °F (36.1 °C)-98.6 °F (37 °C)] 98.6 °F (37 °C)  Heart Rate:  [61-97] 61  Resp:  [18-20] 18  BP: (131-172)/() 154/79  SpO2:  [91 %-99 %] 93 %  on   ;   Device (Oxygen Therapy): room air  Body mass index is 33.2 kg/m².    Physical Exam  Constitutional:       General: She is not in acute distress.     Appearance: She is not ill-appearing.   Cardiovascular:      Rate and Rhythm: Normal rate and regular rhythm.   Pulmonary:      Effort: Pulmonary effort is normal. No respiratory distress.   Abdominal:      General: Abdomen is flat. There is no distension.      Tenderness: There is no abdominal tenderness.   Musculoskeletal:         General: No swelling or deformity. Normal range of motion.   Skin:     General: Skin is warm and dry.   Neurological:      General: No focal deficit present.      Mental Status: She is  alert. Mental status is at baseline.         Results Review:  I reviewed the patient's new clinical results.  I reviewed the patient's new imaging results and agree with the interpretation.  I reviewed the patient's other test results and agree with the interpretation  I personally viewed and interpreted the patient's EKG/Telemetry data  Discussed with ED provider.    Lab Results (last 24 hours)       Procedure Component Value Units Date/Time    CBC & Differential [[id number]]  (Abnormal) Collected: 01/23/24 2139    Specimen: Blood Updated: 01/23/24 2151    Narrative:      The following orders were created for panel order CBC & Differential.  Procedure                               Abnormality         Status                     ---------                               -----------         ------                     CBC Auto Differential[[id number]]        Abnormal            Final result                 Please view results for these tests on the individual orders.    Comprehensive Metabolic Panel [[id number]]  (Abnormal) Collected: 01/23/24 2139    Specimen: Blood Updated: 01/23/24 2211     Glucose 118 mg/dL      BUN 18 mg/dL      Creatinine 0.99 mg/dL      Sodium 144 mmol/L      Potassium 3.6 mmol/L      Chloride 104 mmol/L      CO2 23.3 mmol/L      Calcium 9.5 mg/dL      Total Protein 7.4 g/dL      Albumin 4.4 g/dL      ALT (SGPT) 11 U/L      AST (SGOT) 13 U/L      Alkaline Phosphatase 121 U/L      Total Bilirubin 0.4 mg/dL      Globulin 3.0 gm/dL      A/G Ratio 1.5 g/dL      BUN/Creatinine Ratio 18.2     Anion Gap 16.7 mmol/L      eGFR 61.1 mL/min/1.73     Narrative:      GFR Normal >60  Chronic Kidney Disease <60  Kidney Failure <15    The GFR formula is only valid for adults with stable renal function between ages 18 and 70.    High Sensitivity Troponin T [[id number]]  (Normal) Collected: 01/23/24 2139    Specimen: Blood Updated: 01/23/24 2211     HS Troponin T 11 ng/L     Narrative:      High Sensitive Troponin T  Reference Range:  <14.0 ng/L- Negative Female for AMI  <22.0 ng/L- Negative Male for AMI  >=14 - Abnormal Female indicating possible myocardial injury.  >=22 - Abnormal Male indicating possible myocardial injury.   Clinicians would have to utilize clinical acumen, EKG, Troponin, and serial changes to determine if it is an Acute Myocardial Infarction or myocardial injury due to an underlying chronic condition.         CBC Auto Differential [[id number]]  (Abnormal) Collected: 01/23/24 2139    Specimen: Blood Updated: 01/23/24 2151     WBC 11.64 10*3/mm3      RBC 5.80 10*6/mm3      Hemoglobin 15.4 g/dL      Hematocrit 48.8 %      MCV 84.1 fL      MCH 26.6 pg      MCHC 31.6 g/dL      RDW 13.9 %      RDW-SD 41.8 fl      MPV 9.1 fL      Platelets 367 10*3/mm3      Neutrophil % 69.8 %      Lymphocyte % 13.7 %      Monocyte % 9.3 %      Eosinophil % 6.7 %      Basophil % 0.2 %      Immature Grans % 0.3 %      Neutrophils, Absolute 8.12 10*3/mm3      Lymphocytes, Absolute 1.60 10*3/mm3      Monocytes, Absolute 1.08 10*3/mm3      Eosinophils, Absolute 0.78 10*3/mm3      Basophils, Absolute 0.02 10*3/mm3      Immature Grans, Absolute 0.04 10*3/mm3      nRBC 0.0 /100 WBC     Magnesium [[id number]]  (Normal) Collected: 01/23/24 2139    Specimen: Blood Updated: 01/23/24 2237     Magnesium 1.8 mg/dL     High Sensitivity Troponin T 2Hr [[id number]]  (Abnormal) Collected: 01/23/24 2348    Specimen: Blood Updated: 01/24/24 0020     HS Troponin T 16 ng/L      Troponin T Delta 5 ng/L     Narrative:      High Sensitive Troponin T Reference Range:  <14.0 ng/L- Negative Female for AMI  <22.0 ng/L- Negative Male for AMI  >=14 - Abnormal Female indicating possible myocardial injury.  >=22 - Abnormal Male indicating possible myocardial injury.   Clinicians would have to utilize clinical acumen, EKG, Troponin, and serial changes to determine if it is an Acute Myocardial Infarction or myocardial injury due to an underlying chronic condition.          CBC Auto Differential [[id number]]  (Abnormal) Collected: 01/24/24 0811    Specimen: Blood Updated: 01/24/24 0843     WBC 8.27 10*3/mm3      RBC 4.74 10*6/mm3      Hemoglobin 12.7 g/dL      Hematocrit 39.6 %      MCV 83.5 fL      MCH 26.8 pg      MCHC 32.1 g/dL      RDW 13.6 %      RDW-SD 41.0 fl      MPV 9.1 fL      Platelets 255 10*3/mm3      Neutrophil % 69.4 %      Lymphocyte % 13.7 %      Monocyte % 8.9 %      Eosinophil % 7.3 %      Basophil % 0.2 %      Immature Grans % 0.5 %      Neutrophils, Absolute 5.74 10*3/mm3      Lymphocytes, Absolute 1.13 10*3/mm3      Monocytes, Absolute 0.74 10*3/mm3      Eosinophils, Absolute 0.60 10*3/mm3      Basophils, Absolute 0.02 10*3/mm3      Immature Grans, Absolute 0.04 10*3/mm3      nRBC 0.0 /100 WBC     Comprehensive Metabolic Panel [[id number]]  (Abnormal) Collected: 01/24/24 0811    Specimen: Blood Updated: 01/24/24 0847     Glucose 109 mg/dL      BUN 13 mg/dL      Creatinine 0.77 mg/dL      Sodium 141 mmol/L      Potassium 3.5 mmol/L      Chloride 108 mmol/L      CO2 21.3 mmol/L      Calcium 8.7 mg/dL      Total Protein 6.0 g/dL      Albumin 3.3 g/dL      ALT (SGPT) 7 U/L      AST (SGOT) 11 U/L      Alkaline Phosphatase 93 U/L      Total Bilirubin 0.4 mg/dL      Globulin 2.7 gm/dL      A/G Ratio 1.2 g/dL      BUN/Creatinine Ratio 16.9     Anion Gap 11.7 mmol/L      eGFR 82.6 mL/min/1.73     Narrative:      GFR Normal >60  Chronic Kidney Disease <60  Kidney Failure <15    The GFR formula is only valid for adults with stable renal function between ages 18 and 70.    High Sensitivity Troponin T [[id number]]  (Normal) Collected: 01/24/24 0811    Specimen: Blood Updated: 01/24/24 0853     HS Troponin T 10 ng/L     Narrative:      High Sensitive Troponin T Reference Range:  <14.0 ng/L- Negative Female for AMI  <22.0 ng/L- Negative Male for AMI  >=14 - Abnormal Female indicating possible myocardial injury.  >=22 - Abnormal Male indicating possible myocardial injury.    Clinicians would have to utilize clinical acumen, EKG, Troponin, and serial changes to determine if it is an Acute Myocardial Infarction or myocardial injury due to an underlying chronic condition.         Phosphorus [[id number]]  (Normal) Collected: 01/24/24 0811    Specimen: Blood Updated: 01/24/24 0847     Phosphorus 2.9 mg/dL     Magnesium [[id number]]  (Normal) Collected: 01/24/24 0811    Specimen: Blood Updated: 01/24/24 0847     Magnesium 1.9 mg/dL     TSH Rfx On Abnormal To Free T4 [[id number]]  (Normal) Collected: 01/24/24 0811    Specimen: Blood Updated: 01/24/24 0853     TSH 3.760 uIU/mL             Imaging Results (Last 24 Hours)       Procedure Component Value Units Date/Time    XR Chest 1 View [[id number]] Collected: 01/23/24 2212     Updated: 01/23/24 2216    Narrative:      SINGLE VIEW OF THE CHEST     HISTORY: Chest pain     COMPARISON: August 5, 2021     FINDINGS:  There is cardiomegaly. There is no vascular congestion. No pneumothorax,  pleural effusion, or acute infiltrate is seen. There are changes of  prior cervical spinal fusion.       Impression:      No acute findings.     This report was finalized on 1/23/2024 10:13 PM by Dr. Samantha Alfredo M.D on Workstation: [id number]               Results for orders placed during the hospital encounter of 09/21/23    Adult Transthoracic Echo Complete W/ Cont if Necessary Per Protocol    Interpretation Summary    Left ventricular systolic function is normal. Calculated left ventricular EF = 63.2%    Left ventricular diastolic function was normal.      ECG 12 Lead Rhythm Change   Final Result   HEART RATE= 66  bpm   RR Interval= 909  ms   SC Interval= 154  ms   P Horizontal Axis= 31  deg   P Front Axis= 50  deg   QRSD Interval= 92  ms   QT Interval= 446  ms   QTcB= 468  ms   QRS Axis= -26  deg   T Wave Axis= 6  deg   - BORDERLINE ECG -   Sinus rhythm   Borderline left axis deviation   Low voltage, precordial leads   Consider anterior infarct   Since  prior tracing  no longer in a fib   Electronically Signed By: Allie Quevedo ([facility]) 24-Jan-2024 10:33:17   Date and Time of Study: 2024-01-23 23:45:09      ECG 12 Lead ED Triage Standing Order; Chest Pain   Final Result   HEART RATE= 127  bpm   RR Interval= 472  ms   NC Interval=   ms   P Horizontal Axis=   deg   P Front Axis=   deg   QRSD Interval= 76  ms   QT Interval= 300  ms   QTcB= 437  ms   QRS Axis= -33  deg   T Wave Axis= 107  deg   - ABNORMAL ECG -   Atrial fibrillation   Inferior infarct, old   Consider anterior infarct   Lateral leads are also involved   Since prior tracing  a fib is new   Electronically Signed By: Allie Quevedo ([facility]) 24-Jan-2024 10:32:44   Date and Time of Study: 2024-01-23 21:24:48      SCANNED - TELEMETRY     Final Result           Assessment/Plan     Active Hospital Problems    Diagnosis  POA    **A-fib [I48.91]  Yes    Elevated troponin [R79.89]  Yes    Chronic dyspnea [R06.09]  Yes    Essential hypertension [I10]  Yes    FUENTES on autoCPAP [G47.33]  Yes      Resolved Hospital Problems   No resolved problems to display.     Paroxysmal atrial fibrillation with RVR  Hypertension  Hyperlipidemia  -EKG on admission with heart rate of 127, now in NSR  -TSH WNL  -Troponin mildly elevated likely related to his RVR  -Echo from August 2023 reviewed, unremarkable  -Cardiology consulted-decrease preload to 12.5 mg twice a day.  Recommend Eliquis if no procedures planned, which will depend on how her nausea responds today  -Continue amlodipine, Cozaar, statin    Nausea/vomiting  Diarrhea  -Never had any abdominal pain  -Sounds like she had a viral gastroenteritis that is been slow to resolve  -Plan for liquid diet for lunch and to advance as tolerated  -CT abdomen if patient can't tolerate her diet    Depression-continue home Lexapro and duloxetine    GERD-continue home pantoprazole    I discussed the patient's findings and my recommendations with patient and nursing staff.    VTE Prophylaxis -  SCDs.  Code Status - Full code.       Shaggy Ludwig MD  [facility]  01/24/24  12:49 EST            Electronically signed by Shaggy Ludwig MD at 01/24/24 1255          Emergency Department Notes            Parvez Estrada MD at 01/23/24 2230          MD ATTESTATION NOTE    The PATTI and I have discussed this patient's history, physical exam, and treatment plan.  I have reviewed the documentation and personally had a face to face interaction with the patient. I affirm the documentation and agree with the treatment and plan.  The attached note describes my personal findings.      I provided a substantive portion of the care of the patient.  I personally performed the physical exam in its entirety, and below are my findings.      Brief HPI: This patient is a 73-year-old female with a previous history of atrial fibrillation presenting to the emergency room with shortness of breath as well as an irregular heartbeat.  She does report that she has had several episodes of nausea and vomiting over the last several days leading up to today's event.  She denies chest pain, fever/chills, or known sick contacts.      PHYSICAL EXAM  ED Triage Vitals   Temp Heart Rate Resp BP SpO2   01/23/24 2120 01/23/24 2120 01/23/24 2120 01/23/24 2133 01/23/24 2120   97 °F (36.1 °C) 61 20 147/99 92 %      Temp src Heart Rate Source Patient Position BP Location FiO2 (%)   01/23/24 2120 01/23/24 2120 01/23/24 2133 01/23/24 2133 --   Tympanic Monitor Lying Right arm          GENERAL: Resting comfortably and in no acute distress, nontoxic in appearance  HENT: nares patent  EYES: no scleral icterus  CV: regular rhythm, normal rate, no M/R/G  RESPIRATORY: normal effort, lungs clear bilaterally  ABDOMEN: soft, nontender, no rebound or guarding  MUSCULOSKELETAL: no deformity, no edema  NEURO: alert, moves all extremities, follows commands  PSYCH:  calm, cooperative  SKIN: warm, dry    Vital signs and nursing notes  reviewed.      Differential diagnosis includes but is not limited to atrial fibrillation with RVR, acute coronary syndrome, sepsis, gastroenteritis, dehydration, or severe electrolyte disturbance.      Plan: The patient is currently in a normal sinus rhythm.  We will monitor in the emergency room as we obtain labs including serial cardiac enzymes.  We will treat with antiemetics as well as fluid hydration.  We will reassess following.      Chest x-ray was independently interpreted by myself with my interpretation showing no cardiomegaly nor area of edema, infiltrate, or pneumothorax.           Parvez Estrada MD  01/23/24 2232      Electronically signed by Parvez Estrada MD at 01/23/24 2232       Samantha Segovia PA-C at 01/23/24 221       Attestation signed by Parvez Estrada MD at 01/24/24 1941    MD ATTESTATION NOTE    SHARED VISIT: This visit was performed by BOTH a physician and an APC. The substantive portion of the medical decision making was performed by this attesting physician who made or approved the management plan and takes responsibility for patient management. All studies in the APC note (if performed) were independently interpreted by me.     See separate note for personal assessment and findings.    Parvez Estrada MD                 EMERGENCY DEPARTMENT ENCOUNTER  Room Number:  13/13  PCP: Pierre Chaudhry Jr., MD  Independent Historians: Patient      HPI:  Chief Complaint: had concerns including Shortness of Breath and Irregular Heart Beat.     A complete HPI/ROS/PMH/PSH/SH/FH are unobtainable due to: None    Chronic or social conditions impacting patient care (Social Determinants of Health): None      Context: Katelyn Sr is a 73 y.o. female with a medical history of CAD, hypertension, hyperlipidemia, generalized anxiety disorder, and A-fib presents emergency department today in atrial fibrillation.  Patient says she felt her heart racing and pressure in her chest  suddenly while sitting at home.  She says she has had a GI bug over the last few days and has had copious amounts of diarrhea and a few episodes of nausea and vomiting.  She has not been able to tolerate her daily carvedilol.  She says she had an episode of A-fib years ago but to her knowledge it has not recurred.she is not anticoagulated.  Patient was concerned she may have electrolyte abnormalities due to the  vomiting and diarrhea that caused the A-fib.  She says she is feeling much better now and her chest pain has since resolved.  She is no longer in A-fib.  She denies urinary symptoms.  She denies abdominal pain.      Review of prior external notes (non-ED) -and- Review of prior external test results outside of this encounter:   08/25/2023: Echo    Left ventricular systolic function is normal. Calculated left ventricular EF = 63.2%    Left ventricular diastolic function was normal.        ALLERGIES  Simvastatin, Erythromycin, and Penicillins      REVIEW OF SYSTEMS  Included in HPI  All systems reviewed and negative except for those discussed in HPI.      PHYSICAL EXAM    I have reviewed the triage vital signs and nursing notes.    ED Triage Vitals   Temp Heart Rate Resp BP SpO2   01/23/24 2120 01/23/24 2120 01/23/24 2120 01/23/24 2133 01/23/24 2120   97 °F (36.1 °C) 61 20 147/99 92 %      Temp src Heart Rate Source Patient Position BP Location FiO2 (%)   01/23/24 2120 01/23/24 2120 01/23/24 2133 01/23/24 2133 --   Tympanic Monitor Lying Right arm        Physical Exam  Constitutional:       General: She is not in acute distress.     Appearance: Normal appearance. She is obese.   HENT:      Head: Normocephalic and atraumatic.      Nose: Nose normal.      Mouth/Throat:      Mouth: Mucous membranes are moist.   Eyes:      Extraocular Movements: Extraocular movements intact.      Pupils: Pupils are equal, round, and reactive to light.   Cardiovascular:      Rate and Rhythm: Normal rate and regular rhythm.       Pulses: Normal pulses.      Heart sounds: Normal heart sounds.   Pulmonary:      Effort: Pulmonary effort is normal. No respiratory distress.      Breath sounds: Normal breath sounds.   Abdominal:      General: Abdomen is flat. There is no distension.      Palpations: Abdomen is soft.      Tenderness: There is no abdominal tenderness.   Musculoskeletal:         General: Normal range of motion.      Cervical back: Normal range of motion and neck supple.   Skin:     General: Skin is warm and dry.      Capillary Refill: Capillary refill takes less than 2 seconds.   Neurological:      General: No focal deficit present.      Mental Status: She is alert and oriented to person, place, and time.   Psychiatric:         Mood and Affect: Mood normal.         Behavior: Behavior normal.           LAB RESULTS  Recent Results (from the past 24 hour(s))   ECG 12 Lead ED Triage Standing Order; Chest Pain    Collection Time: 01/23/24  9:24 PM   Result Value Ref Range    QT Interval 300 ms    QTC Interval 437 ms   Comprehensive Metabolic Panel    Collection Time: 01/23/24  9:39 PM    Specimen: Blood   Result Value Ref Range    Glucose 118 (H) 65 - 99 mg/dL    BUN 18 8 - 23 mg/dL    Creatinine 0.99 0.57 - 1.00 mg/dL    Sodium 144 136 - 145 mmol/L    Potassium 3.6 3.5 - 5.2 mmol/L    Chloride 104 98 - 107 mmol/L    CO2 23.3 22.0 - 29.0 mmol/L    Calcium 9.5 8.6 - 10.5 mg/dL    Total Protein 7.4 6.0 - 8.5 g/dL    Albumin 4.4 3.5 - 5.2 g/dL    ALT (SGPT) 11 1 - 33 U/L    AST (SGOT) 13 1 - 32 U/L    Alkaline Phosphatase 121 (H) 39 - 117 U/L    Total Bilirubin 0.4 0.0 - 1.2 mg/dL    Globulin 3.0 gm/dL    A/G Ratio 1.5 g/dL    BUN/Creatinine Ratio 18.2 7.0 - 25.0    Anion Gap 16.7 (H) 5.0 - 15.0 mmol/L    eGFR 61.1 >60.0 mL/min/1.73   High Sensitivity Troponin T    Collection Time: 01/23/24  9:39 PM    Specimen: Blood   Result Value Ref Range    HS Troponin T 11 <14 ng/L   Green Top (Gel)    Collection Time: 01/23/24  9:39 PM   Result Value  Ref Range    Extra Tube Hold for add-ons.    Lavender Top    Collection Time: 01/23/24  9:39 PM   Result Value Ref Range    Extra Tube hold for add-on    Gold Top - SST    Collection Time: 01/23/24  9:39 PM   Result Value Ref Range    Extra Tube Hold for add-ons.    Light Blue Top    Collection Time: 01/23/24  9:39 PM   Result Value Ref Range    Extra Tube Hold for add-ons.    CBC Auto Differential    Collection Time: 01/23/24  9:39 PM    Specimen: Blood   Result Value Ref Range    WBC 11.64 (H) 3.40 - 10.80 10*3/mm3    RBC 5.80 (H) 3.77 - 5.28 10*6/mm3    Hemoglobin 15.4 12.0 - 15.9 g/dL    Hematocrit 48.8 (H) 34.0 - 46.6 %    MCV 84.1 79.0 - 97.0 fL    MCH 26.6 26.6 - 33.0 pg    MCHC 31.6 31.5 - 35.7 g/dL    RDW 13.9 12.3 - 15.4 %    RDW-SD 41.8 37.0 - 54.0 fl    MPV 9.1 6.0 - 12.0 fL    Platelets 367 140 - 450 10*3/mm3    Neutrophil % 69.8 42.7 - 76.0 %    Lymphocyte % 13.7 (L) 19.6 - 45.3 %    Monocyte % 9.3 5.0 - 12.0 %    Eosinophil % 6.7 (H) 0.3 - 6.2 %    Basophil % 0.2 0.0 - 1.5 %    Immature Grans % 0.3 0.0 - 0.5 %    Neutrophils, Absolute 8.12 (H) 1.70 - 7.00 10*3/mm3    Lymphocytes, Absolute 1.60 0.70 - 3.10 10*3/mm3    Monocytes, Absolute 1.08 (H) 0.10 - 0.90 10*3/mm3    Eosinophils, Absolute 0.78 (H) 0.00 - 0.40 10*3/mm3    Basophils, Absolute 0.02 0.00 - 0.20 10*3/mm3    Immature Grans, Absolute 0.04 0.00 - 0.05 10*3/mm3    nRBC 0.0 0.0 - 0.2 /100 WBC   Magnesium    Collection Time: 01/23/24  9:39 PM    Specimen: Blood   Result Value Ref Range    Magnesium 1.8 1.6 - 2.4 mg/dL   ECG 12 Lead Rhythm Change    Collection Time: 01/23/24 11:45 PM   Result Value Ref Range    QT Interval 446 ms    QTC Interval 468 ms   High Sensitivity Troponin T 2Hr    Collection Time: 01/23/24 11:48 PM    Specimen: Blood   Result Value Ref Range    HS Troponin T 16 (H) <14 ng/L    Troponin T Delta 5 (C) >=-4 - <+4 ng/L         RADIOLOGY  XR Chest 1 View    Result Date: 1/23/2024  SINGLE VIEW OF THE CHEST  HISTORY: Chest  pain  COMPARISON: August 5, 2021  FINDINGS: There is cardiomegaly. There is no vascular congestion. No pneumothorax, pleural effusion, or acute infiltrate is seen. There are changes of prior cervical spinal fusion.      No acute findings.  This report was finalized on 1/23/2024 10:13 PM by Dr. Samantha Alfredo M.D on Workstation: [id number]         MEDICATIONS GIVEN IN ER  Medications   sodium chloride 0.9 % flush 10 mL (has no administration in time range)   aspirin tablet 325 mg (325 mg Oral Not Given 1/23/24 2147)   lactated ringers bolus 1,000 mL (0 mL Intravenous Stopped 1/24/24 0015)   ondansetron (ZOFRAN) injection 4 mg (4 mg Intravenous Given 1/23/24 2227)   ondansetron (ZOFRAN) injection 4 mg (4 mg Intravenous Given 1/24/24 0055)                   ED Course:  ED Course as of 01/24/24 0137   Tue Jan 23, 2024   2215 EKG performed at 2124 and interpreted by me: Atrial fibrillation with a rate of 127, nonspecific ST changes. [CC]   2217 Anion Gap(!): 16.7 [CC]   2219 I discussed the case with Dr. Estrada and they agree to evaluate the patient at the bedside.    [CC]   2246 Magnesium: 1.8 [CC]   2300 My independent interpretation of the Chest XR is No pneumothorax.     [CC]   2349 EKG interpreted by me demonstrates sinus rhythm, rate of 66, no WA/QT prolongation, mild ST elevation noted in lead III but not in any contiguous leads and no reciprocal depressions [MW]   Wed Jan 24, 2024   0027 HS Troponin T(!): 16  Will plan to admit for obs. [CC]   0037 I rechecked the patient.  I discussed the patient's labs, radiology findings (including all incidental findings), diagnosis, and plan for admission. The patient understands and agrees with the plan.   [CC]   0112 Spoke with SYLVIA Cisneros with [facility].  Reviewed history, exam, results, treatments.  She agrees admit the patient to Dr. Can.     [CC]      ED Course User Index  [CC] Samantha Segovia PA-C  [MW] Lawrence Bermudez MD           AS OF 01:37 EST  VITALS:    BP - 172/94  HR - 68  TEMP - 97 °F (36.1 °C) (Tympanic)  O2 SATS - 96%    Clinical Scores:   HEART Score: 5                MDM:  Patient is a 73-year-old female who presents emergency department today in A-fib with RVR.  Patient presented with a rate of 127, all other vitals reassuring.  By the time I evaluated the patient at the bedside she had converted to a normal sinus rhythm with a rate of 87.  She reported that she had had a GI bug over the last few days and was evaluated with labs here in the emergency department.  Electrolytes are within normal limits.  She also reported she was unable to take her carvedilol due to the vomiting which may have contributed to the episode of atrial fibrillation.  She has remained in normal sinus rhythm here in the emergency department for an extended period of time but her repeat troponin elevated with a delta of 5.  She does have some nonspecific ST changes on her EKG and has a heart score of 5.  Patient will require admission to the hospital for cardiac rule out and evaluation.  Her most recent echo was performed within the last year and was normal.  She is well-established with cardiology.  She is stable at the time of admission.      COMPLEXITY OF CARE  The patient requires admission.        DIAGNOSIS  Final diagnoses:   Atrial fibrillation with RVR   Elevated troponin         DISPOSITION  ED Disposition       ED Disposition   Decision to Admit    Condition   --    Comment   Level of Care: Telemetry [5]   Diagnosis: A-fib [227106]                       Samantha Segovia PA-C  01/24/24 0156      Electronically signed by Parvez Estrada MD at 01/24/24 1941           Vital Signs (last 5 days) before discharge       Date/Time Temp Temp src Pulse Resp BP Patient Position SpO2    01/27/24 1243 98.1 (36.7) Oral 64 18 120/73 Lying 94    01/27/24 1000 -- -- 34 -- -- -- 94    01/27/24 0755 98.2 (36.8) Oral 65 18 127/97 Lying 91    01/27/24 0416 97.8 (36.6) Oral 59 18  127/52 Lying 96    01/26/24 2355 98 (36.7) Oral 65 18 129/58 Lying 97    01/26/24 2100 97.7 (36.5) Oral 54 20 -- -- 95    01/26/24 2043 -- -- 61 -- 134/61 -- --    01/26/24 1720 98.2 (36.8) Oral 74 20 116/76 Sitting 96    01/26/24 1259 -- -- 65 20 113/73 Sitting 93    01/26/24 1151 98.1 (36.7) Oral 58 20 107/71 Lying 96    01/26/24 1100 -- -- 63 -- 124/61 Lying 93    01/26/24 0806 97.6 (36.4) Oral 61 20 147/72 Lying 97    01/26/24 0417 97.9 (36.6) Oral 65 20 116/71 Lying 98    01/26/24 0007 98.2 (36.8) Oral 61 20 124/66 Lying 97    01/25/24 2104 -- -- 70 20 125/69 Sitting 95    01/25/24 1956 98.3 (36.8) Oral 64 18 136/54 Lying 94    01/25/24 1600 97.9 (36.6) Oral 76 18 117/63 Lying --    01/25/24 1226 98.1 (36.7) Oral 62 17 107/61 Lying 93    01/25/24 0730 97.9 (36.6) Oral 76 17 145/76 Lying 99    01/25/24 0525 97.7 (36.5) Oral 86 17 130/90 Lying 98    01/25/24 0445 -- -- 128 -- 137/85 -- --    01/24/24 2335 98.2 (36.8) Oral 66 16 135/64 Sitting --    01/24/24 2125 -- -- 96 -- -- -- --    01/24/24 2020 98.1 (36.7) Oral 74 16 142/71 Lying --    01/24/24 1630 -- -- 87 -- -- -- --    01/24/24 1600 -- -- 66 -- -- -- --    01/24/24 1556 97.9 (36.6) Oral 70 18 133/66 Lying 95    01/24/24 1530 -- -- 72 -- -- -- --    01/24/24 1500 -- -- 72 -- -- -- --    01/24/24 1400 -- -- 69 -- -- -- 96    01/24/24 1300 -- -- 70 -- -- -- --    01/24/24 1244 98.6 (37) Oral 65 18 155/72 Lying 95    01/24/24 1100 -- -- 62 -- -- -- --    01/24/24 1000 -- -- -- -- -- -- 97    01/24/24 0900 -- -- 67 -- -- -- --    01/24/24 0802 98.6 (37) Oral 61 18 154/79 Lying 96    01/24/24 0553 -- -- 69 -- -- -- 93    01/24/24 0531 -- -- 68 -- 135/68 -- 91    01/24/24 0522 -- -- 75 -- -- -- 93    01/24/24 0501 -- -- 71 -- 131/61 -- 93    01/24/24 0349 -- -- 71 -- -- -- 92    01/24/24 0331 -- -- 68 -- 134/66 -- 94    01/24/24 0301 -- -- 69 -- 134/52 -- 94    01/24/24 0231 -- -- 73 -- 153/70 -- 95    01/24/24 0219 -- -- 70 -- -- -- 95    01/24/24 0218 -- --  75 -- -- -- 93    01/24/24 0212 -- -- 71 -- -- -- 96    01/24/24 0211 -- -- 71 -- -- -- 93    01/24/24 0201 -- -- 73 -- 162/79 -- 93    01/24/24 0140 -- -- 71 -- -- -- 95    01/24/24 0139 -- -- 71 -- -- -- 95    01/24/24 0131 -- -- 70 -- 163/90 -- 94    01/24/24 0126 -- -- 68 -- -- -- 96    01/24/24 0125 -- -- 70 -- -- -- 93    01/24/24 0124 -- -- 71 -- -- -- 94    01/24/24 0111 -- -- 69 -- -- -- 95    01/24/24 0110 -- -- 65 -- -- -- 95    01/24/24 0102 -- -- 65 -- -- -- 95    01/24/24 0101 -- -- 67 -- 172/94 -- 94    01/24/24 0100 -- -- 64 -- -- -- 95    01/24/24 0057 -- -- 69 18 159/79 -- 95    01/24/24 0047 -- -- 74 -- -- -- 97    01/24/24 0046 -- -- 73 -- -- -- 96    01/24/24 0032 -- -- 67 -- -- -- 96    01/24/24 0031 -- -- 65 -- 159/79 -- 97    01/24/24 0030 -- -- 65 -- -- -- 97    01/24/24 0018 -- -- 71 -- -- -- 98    01/24/24 0017 -- -- 66 -- -- -- 97    01/24/24 0007 -- -- 69 -- -- -- 99    01/24/24 0006 -- -- 67 -- -- -- 98    01/24/24 0002 -- -- 68 -- -- -- 97    01/24/24 0001 -- -- 67 -- 169/104 -- 98    01/24/24 0000 -- -- 66 -- -- -- 97    01/23/24 2351 -- -- 71 -- -- -- 98    01/23/24 2350 -- -- 75 -- -- -- 97    01/23/24 2331 -- -- 67 -- 149/85 -- 97    01/23/24 2322 -- -- 64 -- -- -- 98    01/23/24 2321 -- -- 68 -- -- -- 97    01/23/24 2306 -- -- 70 -- -- -- 97    01/23/24 2305 -- -- 71 -- -- -- 97    01/23/24 2301 -- -- 73 -- 147/80 -- 93    01/23/24 2249 -- -- 76 -- -- -- 97    01/23/24 2248 -- -- 79 -- -- -- 96    01/23/24 2236 -- -- 81 -- -- -- 97    01/23/24 2235 -- -- 78 -- -- -- 97    01/23/24 2232 -- -- 86 -- -- -- 95    01/23/24 2231 -- -- 89 -- 148/81 -- 96    01/23/24 2230 -- -- 84 -- -- -- 96    01/23/24 2222 -- -- 91 -- -- -- 95    01/23/24 2221 -- -- 90 -- -- -- 96    01/23/24 2216 -- -- 92 -- -- -- 96    01/23/24 2215 -- -- 89 -- -- -- 96    01/23/24 2213 -- -- 84 -- -- -- 95    01/23/24 2212 -- -- 90 -- -- -- 95    01/23/24 2202 -- -- 90 -- -- -- 95    01/23/24 2201 -- -- 92 -- 134/89  -- 94    01/23/24 2200 -- -- 89 -- -- -- 94    01/23/24 2136 -- -- 97 -- -- -- 97    01/23/24 2135 -- -- 96 -- -- -- 96    01/23/24 2134 -- -- 97 -- -- -- 96    01/23/24 2133 -- -- -- -- 147/99 Lying --    01/23/24 2120 97 (36.1) Tympanic 61 20 -- -- 92         Facility-Administered Medications as of 1/27/2024   Medication Dose Route Frequency Provider Last Rate Last Admin    [COMPLETED] digoxin (LANOXIN) injection 250 mcg  250 mcg Intravenous Once Jose Cleaning MD   250 mcg at 01/25/24 0445    [COMPLETED] lactated ringers bolus 1,000 mL  1,000 mL Intravenous Once Samantha Segovia PA-C   Stopped at 01/24/24 0015    [COMPLETED] ondansetron (ZOFRAN) injection 4 mg  4 mg Intravenous Once Samantha Segovia PA-C   4 mg at 01/23/24 2227    [COMPLETED] ondansetron (ZOFRAN) injection 4 mg  4 mg Intravenous Once Parvez Estrada MD   4 mg at 01/24/24 0055    [COMPLETED] potassium chloride (K-DUR,KLOR-CON) ER tablet 40 mEq  40 mEq Oral Once Shaggy Ludwig MD   40 mEq at 01/24/24 1824    [COMPLETED] potassium chloride (KLOR-CON) packet 40 mEq  40 mEq Oral Once Shaggy Ludwig MD   40 mEq at 01/25/24 1356     Orders (all)        Start     Ordered    01/27/24 2100  carvedilol (COREG) tablet 6.25 mg  2 Times Daily,   Status:  Discontinued         01/27/24 0938    01/27/24 1320  Holter Monitor - 72 Hour Up To 15 Days  Once        Comments: [facility]. Need live monitoring. Please place prior to discharge today.    01/27/24 1320    01/27/24 0740  Discontinue IV  Once,   Status:  Canceled         01/27/24 0739    01/27/24 0740  Obtain Discharge Clearance  Until Discontinued,   Status:  Canceled         01/27/24 0739    01/27/24 0739  Discharge patient  Once         01/27/24 0739    01/27/24 0600  Basic Metabolic Panel  Daily,   Status:  Canceled       01/26/24 1323    01/27/24 0600  CBC (No Diff)  Daily,   Status:  Canceled       01/26/24 1323    01/27/24 0600  Magnesium  Morning Draw         01/26/24 1323    01/27/24 0600   Phosphorus  Morning Draw         01/26/24 1323    01/27/24 0000  carvedilol (COREG) 12.5 MG tablet  2 Times Daily,   Status:  Discontinued         01/27/24 0739    01/27/24 0000  Activity as Tolerated         01/27/24 0739    01/27/24 0000  Diet: Regular/House Diet, Gastrointestinal Diets; Low Irritant, Fat-Restricted; Regular Texture (IDDSI 7); Thin (IDDSI 0)         01/27/24 0739    01/26/24 2100  carvedilol (COREG) tablet 12.5 mg  2 Times Daily,   Status:  Discontinued         01/26/24 1144    01/26/24 1527  Inpatient Admission  Once         01/26/24 1526    01/26/24 1045  Holter Monitor - 72 Hour Up To 15 Days  Once,   Status:  Canceled         01/26/24 1044    01/26/24 1010  Ok to discharge  Misc Nursing Order (Specify)  Once        Comments: Ok to discharge    01/26/24 1010    01/26/24 0855  Discharge patient  Once,   Status:  Canceled         01/26/24 0854    01/26/24 0855  Discontinue IV  Once,   Status:  Canceled         01/26/24 0854    01/26/24 0855  Obtain Discharge Clearance  Until Discontinued,   Status:  Canceled         01/26/24 0854    01/26/24 0000  apixaban (ELIQUIS) 5 MG tablet tablet  Every 12 Hours Scheduled         01/26/24 0907    01/25/24 2100  carvedilol (COREG) tablet 25 mg  2 Times Daily,   Status:  Discontinued         01/25/24 0933    01/25/24 2100  apixaban (ELIQUIS) tablet 5 mg  Every 12 Hours Scheduled,   Status:  Discontinued         01/25/24 1207    01/25/24 1701  traMADol (ULTRAM) tablet 50 mg  Every 6 Hours PRN,   Status:  Discontinued         01/25/24 1702    01/25/24 1415  potassium chloride (KLOR-CON) packet 40 mEq  Once         01/25/24 1321    01/25/24 1045  carvedilol (COREG) tablet 25 mg  2 Times Daily,   Status:  Discontinued         01/25/24 0947    01/25/24 0945  potassium chloride (K-DUR,KLOR-CON) ER tablet 40 mEq  Once,   Status:  Discontinued         01/25/24 0847    01/25/24 0855  Discharge patient  Once,   Status:  Canceled         01/25/24 0855    01/25/24 0855   Discontinue IV  Once,   Status:  Canceled         01/25/24 0855    01/25/24 0855  Obtain Discharge Clearance  Until Discontinued,   Status:  Canceled         01/25/24 0855    01/25/24 0849  Diet: Regular/House Diet, Gastrointestinal Diets; Fiber-Restricted, Fat-Restricted, Low Irritant; Texture: Regular Texture (IDDSI 7); Fluid Consistency: Thin (IDDSI 0)  Diet Effective Now,   Status:  Canceled         01/25/24 0848    01/25/24 0600  Basic Metabolic Panel  Morning Draw         01/25/24 0417    01/25/24 0515  digoxin (LANOXIN) injection 250 mcg  Once         01/25/24 0417    01/25/24 0414  Magnesium  STAT         01/25/24 0417    01/25/24 0357  ECG 12 Lead Rhythm Change  Once         01/25/24 0356    01/24/24 2100  DULoxetine (CYMBALTA) DR capsule 120 mg  Nightly,   Status:  Discontinued         01/24/24 0934    01/24/24 2100  famotidine (PEPCID) tablet 40 mg  Nightly,   Status:  Discontinued         01/24/24 0934    01/24/24 2100  lamoTRIgine (LaMICtal) tablet 100 mg  Nightly,   Status:  Discontinued         01/24/24 0934    01/24/24 2100  pravastatin (PRAVACHOL) tablet 40 mg  Nightly,   Status:  Discontinued         01/24/24 0934    01/24/24 2100  carvedilol (COREG) tablet 12.5 mg  2 Times Daily,   Status:  Discontinued         01/24/24 0945    01/24/24 1815  potassium chloride (K-DUR,KLOR-CON) ER tablet 40 mEq  Once         01/24/24 1721    01/24/24 1800  lactated ringers infusion  Continuous,   Status:  Discontinued         01/24/24 1714    01/24/24 1256  Advance Diet As Tolerated -  Until Discontinued,   Status:  Canceled         01/24/24 1256    01/24/24 1030  escitalopram (LEXAPRO) tablet 10 mg  Daily,   Status:  Discontinued         01/24/24 0934    01/24/24 1030  pantoprazole (PROTONIX) EC tablet 40 mg  Daily,   Status:  Discontinued         01/24/24 0934    01/24/24 1030  losartan (COZAAR) tablet 100 mg  Daily,   Status:  Discontinued         01/24/24 0934    01/24/24 1030  amLODIPine (NORVASC) tablet 5 mg   Daily,   Status:  Discontinued         01/24/24 0935    01/24/24 1030  amLODIPine (NORVASC) tablet 5 mg  Every 24 Hours Scheduled,   Status:  Discontinued         01/24/24 0940    01/24/24 0931  traZODone (DESYREL) tablet 25 mg  Nightly PRN,   Status:  Discontinued         01/24/24 0934    01/24/24 0927  Diet: Liquid Diets; Full Liquid; Fluid Consistency: Thin (IDDSI 0)  Diet Effective Now,   Status:  Canceled         01/24/24 0926    01/24/24 0900  sodium chloride 0.9 % flush 10 mL  Every 12 Hours Scheduled,   Status:  Discontinued         01/24/24 0258    01/24/24 0900  sennosides-docusate (PERICOLACE) 8.6-50 MG per tablet 2 tablet  2 Times Daily,   Status:  Discontinued        See Hyperspace for full Linked Orders Report.    01/24/24 0258    01/24/24 0900  carvedilol (COREG) tablet 25 mg  2 Times Daily,   Status:  Discontinued         01/24/24 0746    01/24/24 0800  Oral Care  2 Times Daily,   Status:  Canceled       01/24/24 0258    01/24/24 0746  TSH Rfx On Abnormal To Free T4  Once         01/24/24 0745    01/24/24 0743  Phosphorus  Once         01/24/24 0742    01/24/24 0743  Magnesium  Once         01/24/24 0742    01/24/24 0702  Inpatient Cardiology Consult  IN AM        Specialty:  Cardiology  Provider:  Aleks Velazquez MD    01/24/24 0258    01/24/24 0600  CBC Auto Differential  Morning Draw         01/24/24 0258    01/24/24 0600  Comprehensive Metabolic Panel  Morning Draw         01/24/24 0258    01/24/24 0600  High Sensitivity Troponin T  Morning Draw         01/24/24 0258    01/24/24 0400  Vital Signs  Every 4 Hours,   Status:  Canceled       01/24/24 0258    01/24/24 0400  Neuro Checks  Every 4 Hours,   Status:  Canceled       01/24/24 0258    01/24/24 0259  Continuous Cardiac Monitoring  Continuous,   Status:  Canceled        Comments: Follow Standing Orders As Outlined in Process Instructions (Open Order Report to View Full Instructions)    01/24/24 0258    01/24/24 0259  Telemetry -  Maintain IV Access  Continuous         01/24/24 0258 01/24/24 0259  Telemetry - Place Orders & Notify Provider of Results When Patient Experiences Acute Chest Pain, Dysrhythmia or Respiratory Distress  Until Discontinued         01/24/24 0258 01/24/24 0259  May Be Off Telemetry for Tests  Continuous         01/24/24 0258 01/24/24 0259  Pulse Oximetry, Continuous  Continuous         01/24/24 0258 01/24/24 0259  Diet: Cardiac Diets; Healthy Heart (2-3 Na+); Texture: Regular Texture (IDDSI 7); Fluid Consistency: Thin (IDDSI 0)  Diet Effective Now,   Status:  Canceled         01/24/24 0258 01/24/24 0258  ondansetron (ZOFRAN) injection 4 mg  Every 6 Hours PRN,   Status:  Discontinued         01/24/24 0258 01/24/24 0258  acetaminophen (TYLENOL) tablet 650 mg  Every 4 Hours PRN,   Status:  Discontinued        See Hyperspace for full Linked Orders Report.    01/24/24 0258 01/24/24 0258  acetaminophen (TYLENOL) 160 MG/5ML oral solution 650 mg  Every 4 Hours PRN,   Status:  Discontinued        See Hyperspace for full Linked Orders Report.    01/24/24 0258 01/24/24 0258  acetaminophen (TYLENOL) suppository 650 mg  Every 4 Hours PRN,   Status:  Discontinued        See Hyperspace for full Linked Orders Report.    01/24/24 0258 01/24/24 0258  Up With Assistance  As Needed,   Status:  Canceled       01/24/24 0258 01/24/24 0258  nitroglycerin (NITROSTAT) SL tablet 0.4 mg  Every 5 Minutes PRN,   Status:  Discontinued         01/24/24 0258    01/24/24 0258  Intake & Output  Every Shift,   Status:  Canceled       01/24/24 0258    01/24/24 0258  Weigh Patient  Once         01/24/24 0258 01/24/24 0258  Insert Peripheral IV  Once,   Status:  Canceled         01/24/24 0258 01/24/24 0258  Saline Lock & Maintain IV Access  Continuous,   Status:  Canceled         01/24/24 0258    01/24/24 0258  Code Status and Medical Interventions:  Continuous,   Status:  Canceled         01/24/24 0258    01/24/24 0258   Place Sequential Compression Device  Once         01/24/24 0258    01/24/24 0258  Maintain Sequential Compression Device  Continuous,   Status:  Canceled         01/24/24 0258    01/24/24 0257  sodium chloride 0.9 % flush 10 mL  As Needed,   Status:  Discontinued         01/24/24 0258    01/24/24 0257  sodium chloride 0.9 % infusion 40 mL  As Needed,   Status:  Discontinued         01/24/24 0258    01/24/24 0257  polyethylene glycol (MIRALAX) packet 17 g  Daily PRN,   Status:  Discontinued        See Hyperspace for full Linked Orders Report.    01/24/24 0258    01/24/24 0257  bisacodyl (DULCOLAX) EC tablet 5 mg  Daily PRN,   Status:  Discontinued        See Hyperspace for full Linked Orders Report.    01/24/24 0258    01/24/24 0257  bisacodyl (DULCOLAX) suppository 10 mg  Daily PRN,   Status:  Discontinued        See Hyperspace for full Linked Orders Report.    01/24/24 0258    01/24/24 0114  Initiate Observation Status  Once         01/24/24 0113    01/24/24 0107  ondansetron (ZOFRAN) injection 4 mg  Once         01/24/24 0051    01/24/24 0041  [facility] (on-call MD unless specified) Details  Once,   Status:  Canceled        Specialty:  Hospitalist  Provider:  (Not yet assigned)    01/24/24 0040    01/24/24 0013  Po challenge  Nursing Communication  Once        Comments: Po challenge    01/24/24 0012    01/23/24 2339  High Sensitivity Troponin T 2Hr  PROCEDURE ONCE         01/23/24 2211 01/23/24 2336  ECG 12 Lead Rhythm Change  STAT         01/23/24 2335    01/23/24 2235  lactated ringers bolus 1,000 mL  Once         01/23/24 2219 01/23/24 2235  ondansetron (ZOFRAN) injection 4 mg  Once         01/23/24 2219 01/23/24 2218  Magnesium  STAT         01/23/24 2219 01/23/24 2136  aspirin tablet 325 mg  Once,   Status:  Discontinued         01/23/24 2120 01/23/24 2121  NPO Diet NPO Type: Strict NPO  Diet Effective Now,   Status:  Canceled         01/23/24 2120 01/23/24 2121  Undress & Gown  Once          01/23/24 2120 01/23/24 2121  Cardiac Monitoring  Continuous,   Status:  Canceled        Comments: Follow Standing Orders As Outlined in Process Instructions (Open Order Report to View Full Instructions)    01/23/24 2120 01/23/24 2121  Continuous Pulse Oximetry  Continuous,   Status:  Canceled         01/23/24 2120 01/23/24 2121  ECG 12 Lead ED Triage Standing Order; Chest Pain  Once         01/23/24 2120 01/23/24 2121  XR Chest 1 View  1 Time Imaging         01/23/24 2120 01/23/24 2121  Insert Peripheral IV  Once,   Status:  Canceled         01/23/24 2120 01/23/24 2121  [place] Draw  Once         01/23/24 2120 01/23/24 2121  CBC & Differential  Once         01/23/24 2120 01/23/24 2121  Comprehensive Metabolic Panel  Once         01/23/24 2120 01/23/24 2121  High Sensitivity Troponin T  Once         01/23/24 2120 01/23/24 2121  Green Top (Gel)  PROCEDURE ONCE         01/23/24 2121 01/23/24 2121  Lavender Top  PROCEDURE ONCE         01/23/24 2121 01/23/24 2121  Gold Top - SST  PROCEDURE ONCE         01/23/24 2121 01/23/24 2121  Light Blue Top  PROCEDURE ONCE         01/23/24 2121 01/23/24 2121  CBC Auto Differential  PROCEDURE ONCE         01/23/24 2121 01/23/24 2120  Oxygen Therapy- Nasal Cannula; Titrate 1-6 LPM Per SpO2; 90 - 95%  Continuous PRN,   Status:  Canceled       01/23/24 2120 01/23/24 2120  ECG 12 Lead ED Triage Standing Order; Chest Pain  As Needed,   Status:  Canceled      Comments: Persistent, Unrelieved or Recurrent Chest Pain    01/23/24 2120 01/23/24 2120  sodium chloride 0.9 % flush 10 mL  As Needed,   Status:  Discontinued         01/23/24 2120    --  SCANNED - TELEMETRY           01/23/24 0000    --  SCANNED - TELEMETRY           01/23/24 0000    --  SCANNED - TELEMETRY           01/23/24 0000    --  SCANNED - TELEMETRY           01/23/24 0000    --  SCANNED - TELEMETRY           01/23/24 0000    --  SCANNED - TELEMETRY           01/23/24 0000     --  SCANNED - TELEMETRY           24 0000    --  SCANNED - TELEMETRY           24 0000    --  SCANNED - TELEMETRY           24 0000    --  SCANNED - TELEMETRY           24 0000    --  SCANNED - TELEMETRY           24 0000    --  SCANNED - TELEMETRY           24 0000    --  SCANNED - TELEMETRY           24 0000    --  SCANNED - TELEMETRY           24 0000    --  SCANNED - TELEMETRY           24 0000                     Physician Progress Notes (all)        Shaggy Solis, APRN at 24 1320                    Cardiology Follow-Up Note      Patient Name: Katelyn Sr  Age/Sex: 73 y.o. female  : 1950  MRN: [id number]      Day of Service: 24       Chief Complaint/Follow-up: PAF, sinus bradycardia     Interval History: doing well this afternoon. She had some bradycardia around 11AM but denies any symptoms.       Temp:  [97.7 °F (36.5 °C)-98.2 °F (36.8 °C)] 98.1 °F (36.7 °C)  Heart Rate:  [34-74] 64  Resp:  [18-20] 18  BP: (116-134)/(52-97) 120/73     PHYSICAL EXAM:    General Appearance: No acute distress, well developed and well nourished.   Eyes: Conjunctiva and lids: No erythema, swelling, or discharge. Sclera non-icteric.   HENT: Atraumatic, normocephalic. External eyes, ears, and nose normal.   Respiratory: No signs of respiratory distress. Respiration rhythm and depth normal.   Clear to auscultation. No rales, crackles, rhonchi, or wheezing auscultated.   Cardiovascular:  Heart Rate and Rhythm: Normal, Heart Sounds: Normal S1 and S2. No S3 or S4 noted.  Gastrointestinal:  Abdomen soft, non-distended, non-tender.  Musculoskeletal: Normal movement of extremities  Skin: Warm and dry.   Psychiatric: Patient alert and oriented to person, place, and time. Speech and behavior appropriate. Normal mood and affect.       ECG/TELE:         Results from last 7 days   Lab Units 24  0359 24  0439 24  0811   SODIUM mmol/L 143 140 141    POTASSIUM mmol/L 4.3 3.8 3.5   CHLORIDE mmol/L 108* 106 108*   CO2 mmol/L 25.6 21.4* 21.3*   BUN mg/dL 15 6* 13   CREATININE mg/dL 1.00 0.78 0.77   GLUCOSE mg/dL 98 111* 109*   CALCIUM mg/dL 9.0 8.9 8.7     Results from last 7 days   Lab Units 01/27/24  0359 01/24/24  0811 01/23/24  2139   WBC 10*3/mm3 8.34 8.27 11.64*   HEMOGLOBIN g/dL 12.1 12.7 15.4   HEMATOCRIT % 39.3 39.6 48.8*   PLATELETS 10*3/mm3 255 255 367         Results from last 7 days   Lab Units 01/24/24  0811 01/23/24  2348 01/23/24  2139   HSTROP T ng/L 10 16* 11     Results from last 7 days   Lab Units 01/24/24  0811   TSH uIU/mL 3.760           Current Medications:   Scheduled Meds:amLODIPine, 5 mg, Oral, Q24H  apixaban, 5 mg, Oral, Q12H  DULoxetine, 120 mg, Oral, Nightly  escitalopram, 10 mg, Oral, Daily  famotidine, 40 mg, Oral, Nightly  lamoTRIgine, 100 mg, Oral, Nightly  losartan, 100 mg, Oral, Daily  pantoprazole, 40 mg, Oral, Daily  pravastatin, 40 mg, Oral, Nightly  senna-docusate sodium, 2 tablet, Oral, BID  sodium chloride, 10 mL, Intravenous, Q12H            A-fib    FUENTES on autoCPAP    Essential hypertension    Chronic dyspnea    Elevated troponin       Plan:   PAF--- came in with AF in the setting of GI upset and dehydration. Says this was really her first episode in the past couple of years. On apixaban for AC.     2. SB, pause---- she has had some pauses and bradycardia this admission. Pause around 2.5 seconds. Better after reducing coreg. She denies any dizziness, lightheadedness, or syncope.       --- she denies any symptoms associated with bradycardia. This is really her first episode of AF in years. We had a long discussion, she really wants to go home.     ---I suspect this not a new problem. I am going to stop her coreg daily, she can take as needed for episodes of AF.       I am going to send her home today with a Zio AT with live monitoring to better understand her AF and bradycardia.     Advised to call or present back to ED  "if she develops dizziness, lightheadedness, or syncope. She is agreeable.           CARLIE Mahan  01/27/24  13:21 EST     Electronically signed by Shaggy Solis APRN at 01/27/24 1327       Shaggy Solis APRN at 01/27/24 0938          Saw patient this morning. Had a couple of pauses overnight ~3 seconds. She is asymptomatic.     I am going to decrease coreg to 6.25mg and monitor until this afternoon.     Reassess ability to discharge later today. We may even have to stop her beta blocker and if she has issues with AF, consider alternative treatment options.     Continue apixaban.     Electronically signed by Shaggy Solis APRN at 01/27/24 0942       Aleks Velazquez MD at 01/26/24 1529          Patient Care Team:  Pierre Chaudhry Jr., MD as PCP - General (Internal Medicine)  Pierre Chaudhry Jr., MD as PCP - Internal Medicine  Manoj Gutiérrez Jr., MD as Consulting Physician (Pulmonary Disease)    Chief Complaint: Paroxysmal atrial fibrillation.    Interval History:   Patient noted to have intermittent bradycardia and pauses up to 3 seconds.  Carvedilol decreased.    Objective   Vital Signs  Temp:  [97.6 °F (36.4 °C)-98.3 °F (36.8 °C)] 98.1 °F (36.7 °C)  Heart Rate:  [58-76] 65  Resp:  [18-20] 20  BP: (107-147)/(54-73) 113/73    Intake/Output Summary (Last 24 hours) at 1/26/2024 1529  Last data filed at 1/26/2024 1259  Gross per 24 hour   Intake 480 ml   Output 850 ml   Net -370 ml     Flowsheet Rows      Flowsheet Row First Filed Value   Admission Height 157.5 cm (62\") Documented at 01/23/2024 2120   Admission Weight 86.2 kg (190 lb) Documented at 01/23/2024 2120            Temp:  [97.6 °F (36.4 °C)-98.3 °F (36.8 °C)] 98.1 °F (36.7 °C)  Heart Rate:  [58-76] 65  Resp:  [18-20] 20  BP: (107-147)/(54-73) 113/73    Intake/Output Summary (Last 24 hours) at 1/26/2024 1529  Last data filed at 1/26/2024 1259  Gross per 24 hour   Intake 480 ml   Output 850 ml   Net -370 ml     Flowsheet Rows  " "    Flowsheet Row First Filed Value   Admission Height 157.5 cm (62\") Documented at 01/23/2024 2120   Admission Weight 86.2 kg (190 lb) Documented at 01/23/2024 2120            General Appearance:    Alert, cooperative, in no acute distress   Head:    Normocephalic, without obvious abnormality, atraumatic       Neck/Lymph   No adenopathy, supple, no thyromegaly, no carotid bruit, no    JVD   Lungs:     Clear to auscultation bilaterally, no wheezes, rales, or     rhonchi    Cardiac:    Normal rate, regular rhythm, no murmur, no rub, no gallop   Chest Wall:    No abnormalities observed   GI:     Normal bowel sounds, soft, nontender, nondistended,            no rebound tenderness   Extremities:   No cyanosis, clubbing, or edema   Circulatory/Peripheral Vascular :   Pulses palpable and equal bilaterally   Integumentary:   No bleeding or rash. Normal temperature       Neurologic:   Cranial nerves 2 - 12 grossly intact, sensation intact              amLODIPine, 5 mg, Oral, Q24H  apixaban, 5 mg, Oral, Q12H  carvedilol, 12.5 mg, Oral, BID  DULoxetine, 120 mg, Oral, Nightly  escitalopram, 10 mg, Oral, Daily  famotidine, 40 mg, Oral, Nightly  lamoTRIgine, 100 mg, Oral, Nightly  losartan, 100 mg, Oral, Daily  pantoprazole, 40 mg, Oral, Daily  pravastatin, 40 mg, Oral, Nightly  senna-docusate sodium, 2 tablet, Oral, BID  sodium chloride, 10 mL, Intravenous, Q12H        lactated ringers, 75 mL/hr, Last Rate: 75 mL/hr (01/25/24 0148)        Results Review:    Results from last 7 days   Lab Units 01/25/24  0439   SODIUM mmol/L 140   POTASSIUM mmol/L 3.8   CHLORIDE mmol/L 106   CO2 mmol/L 21.4*   BUN mg/dL 6*   CREATININE mg/dL 0.78   GLUCOSE mg/dL 111*   CALCIUM mg/dL 8.9     Results from last 7 days   Lab Units 01/24/24  0811 01/23/24  2348 01/23/24  2139   HSTROP T ng/L 10 16* 11     Results from last 7 days   Lab Units 01/24/24  0811   WBC 10*3/mm3 8.27   HEMOGLOBIN g/dL 12.7   HEMATOCRIT % 39.6   PLATELETS 10*3/mm3 255       "       Results from last 7 days   Lab Units 24  0439   MAGNESIUM mg/dL 2.2         @LABNT(bnp)@  I reviewed the patient's new clinical results.  I personally viewed and interpreted the patient's EKG/Telemetry data        Assessment & Plan   1.  Nausea/vomiting/diarrhea: Workup per the primary team.  2.  Paroxysmal atrial fibrillation: Currently sinus  3.  Interstitial lung disease  4.  Chronic dyspnea  5.  Obstructive sleep apnea    -Continue Eliquis.  - Decrease carvedilol to 12.5 mg p.o. twice daily.  Monitor overnight.  Hopefully home tomorrow.  Zio patch upon discharge.            Electronically signed by Aleks Velazquez MD at 24 1530       Shaggy Ludwig MD at 24 1322              Name: Katelyn Sr ADMIT: 2024   : 1950  PCP: Pierre Chaudhry Jr., MD    MRN: [id number] LOS: 0 days   AGE/SEX: 73 y.o. female  ROOM: [facility]     Subjective   Subjective   Patient with no complaints.  Tolerating regular diet.  Minimal intermittent nausea but much improved than she has been over the last weeks.    Review of Systems   Constitutional:  Negative for chills and fever.   Respiratory:  Negative for cough and shortness of breath.    Cardiovascular:  Negative for chest pain and leg swelling.   Gastrointestinal:  Negative for abdominal pain, diarrhea and nausea.     As above    Objective   Objective   Vital Signs  Temp:  [97.6 °F (36.4 °C)-98.3 °F (36.8 °C)] 98.1 °F (36.7 °C)  Heart Rate:  [58-76] 65  Resp:  [18-20] 20  BP: (107-147)/(54-73) 113/73  SpO2:  [93 %-98 %] 93 %  on   ;   Device (Oxygen Therapy): room air  Body mass index is 33.2 kg/m².  Physical Exam  Constitutional:       General: She is not in acute distress.     Appearance: She is not ill-appearing.   Cardiovascular:      Rate and Rhythm: Normal rate and regular rhythm.   Pulmonary:      Effort: Pulmonary effort is normal. No respiratory distress.   Abdominal:      General: Abdomen is flat. There is no distension.      " Tenderness: There is no abdominal tenderness.   Musculoskeletal:         General: No swelling or deformity. Normal range of motion.   Skin:     General: Skin is warm and dry.   Neurological:      General: No focal deficit present.      Mental Status: She is alert. Mental status is at baseline.   Results Review     I reviewed the patient's new clinical results.  Results from last 7 days   Lab Units 01/24/24  0811 01/23/24  2139   WBC 10*3/mm3 8.27 11.64*   HEMOGLOBIN g/dL 12.7 15.4   PLATELETS 10*3/mm3 255 367     Results from last 7 days   Lab Units 01/25/24  0439 01/24/24  0811 01/23/24  2139   SODIUM mmol/L 140 141 144   POTASSIUM mmol/L 3.8 3.5 3.6   CHLORIDE mmol/L 106 108* 104   CO2 mmol/L 21.4* 21.3* 23.3   BUN mg/dL 6* 13 18   CREATININE mg/dL 0.78 0.77 0.99   GLUCOSE mg/dL 111* 109* 118*   Estimated Creatinine Clearance: 65.8 mL/min (by C-G formula based on SCr of 0.78 mg/dL).  Results from last 7 days   Lab Units 01/24/24  0811 01/23/24  2139   ALBUMIN g/dL 3.3* 4.4   BILIRUBIN mg/dL 0.4 0.4   ALK PHOS U/L 93 121*   AST (SGOT) U/L 11 13   ALT (SGPT) U/L 7 11     Results from last 7 days   Lab Units 01/25/24  0439 01/24/24  0811 01/23/24  2139   CALCIUM mg/dL 8.9 8.7 9.5   ALBUMIN g/dL  --  3.3* 4.4   MAGNESIUM mg/dL 2.2 1.9 1.8   PHOSPHORUS mg/dL  --  2.9  --      No results found for: \"COVID19\"  No results found for: \"HGBA1C\", \"POCGLU\"    XR Chest 1 View  Narrative: SINGLE VIEW OF THE CHEST     HISTORY: Chest pain     COMPARISON: August 5, 2021     FINDINGS:  There is cardiomegaly. There is no vascular congestion. No pneumothorax,  pleural effusion, or acute infiltrate is seen. There are changes of  prior cervical spinal fusion.     Impression: No acute findings.     This report was finalized on 1/23/2024 10:13 PM by Dr. Samantha Alfredo M.D on Workstation: [id number]       I reviewed the patient's daily medications.  Scheduled Medications  amLODIPine, 5 mg, Oral, Q24H  apixaban, 5 mg, Oral, " Q12H  carvedilol, 12.5 mg, Oral, BID  DULoxetine, 120 mg, Oral, Nightly  escitalopram, 10 mg, Oral, Daily  famotidine, 40 mg, Oral, Nightly  lamoTRIgine, 100 mg, Oral, Nightly  losartan, 100 mg, Oral, Daily  pantoprazole, 40 mg, Oral, Daily  pravastatin, 40 mg, Oral, Nightly  senna-docusate sodium, 2 tablet, Oral, BID  sodium chloride, 10 mL, Intravenous, Q12H    Infusions  lactated ringers, 75 mL/hr, Last Rate: 75 mL/hr (01/25/24 0148)    Diet  Diet: Regular/House Diet, Gastrointestinal Diets; Fiber-Restricted, Fat-Restricted, Low Irritant; Texture: Regular Texture (IDDSI 7); Fluid Consistency: Thin (IDDSI 0)        I have personally reviewed:  [x]  Laboratory   []  Microbiology   []  Radiology   []  EKG/Telemetry   []  Cardiology/Vascular   []  Pathology   []  Records     Assessment/Plan     Active Hospital Problems    Diagnosis  POA    **A-fib [I48.91]  Yes    Elevated troponin [R79.89]  Yes    Chronic dyspnea [R06.09]  Yes    Essential hypertension [I10]  Yes    FUENTES on autoCPAP [G47.33]  Yes      Resolved Hospital Problems   No resolved problems to display.       73 y.o. female admitted with A-fib.    Paroxysmal atrial fibrillation with RVR  Hypertension  Hyperlipidemia  Sinus pauses  -EKG on admission with heart rate of 127, now in NSR  -TSH WNL  -Troponin mildly elevated likely related to his RVR  -Echo from August 2023 reviewed, unremarkable  -Cardiology consulted-with sinus pauses plan to decrease Coreg to 12.5 mg and observe overnight  -Continue amlodipine, Cozaar, statin  -Holding home hydralazine      Nausea/vomiting, resolved  Diarrhea, resolved  -Never had any abdominal pain  -Sounds like she had a viral gastroenteritis that is been slow to resolve  -Tolerating a diet     Depression-continue home Lexapro and duloxetine     GERD-continue home pantoprazole    Eliquis (home med) for DVT prophylaxis.  Full code.  Discussed with patient and nursing staff.  Anticipate discharge home tomorrow.  If okay with  cardiology    Expected Discharge Date: 2024; Expected Discharge Time:       Shaggy Ludwig MD  [facility]  24  13:22 EST            Electronically signed by Shaggy Ludwig MD at 24 1323       Shaggy Ludwig MD at 24 1208              Name: Katelyn Sr ADMIT: 2024   : 1950  PCP: Pierre Chaudhry Jr., MD    MRN: [id number] LOS: 0 days   AGE/SEX: 73 y.o. female  ROOM: [facility]     Subjective   Subjective   Patient went to [facility] with RVR overnight and received IV digoxin and then returned to sinus rhythm.  Nausea has greatly improved and she tolerated some grits for breakfast.  Feels like she can eat regular food so a normal diet was ordered for lunch.  No abdominal pain or diarrhea.  Patient can feels when she goes in and out of atrial fibrillation.    Review of Systems   Constitutional:  Negative for chills and fever.   Respiratory:  Negative for cough and shortness of breath.    Cardiovascular:  Negative for chest pain and leg swelling.   Gastrointestinal:  Negative for abdominal pain, diarrhea and nausea.     As above    Objective   Objective   Vital Signs  Temp:  [97.7 °F (36.5 °C)-98.6 °F (37 °C)] 97.9 °F (36.6 °C)  Heart Rate:  [] 76  Resp:  [16-18] 17  BP: (130-155)/(64-90) 145/76  SpO2:  [95 %-99 %] 99 %  on   ;   Device (Oxygen Therapy): room air  Body mass index is 33.2 kg/m².  Physical Exam  Constitutional:       General: She is not in acute distress.     Appearance: She is not ill-appearing.   Cardiovascular:      Rate and Rhythm: Normal rate and regular rhythm.   Pulmonary:      Effort: Pulmonary effort is normal. No respiratory distress.   Abdominal:      General: Abdomen is flat. There is no distension.      Tenderness: There is no abdominal tenderness.   Musculoskeletal:         General: No swelling or deformity. Normal range of motion.   Skin:     General: Skin is warm and dry.   Neurological:      General: No focal deficit present.  "     Mental Status: She is alert. Mental status is at baseline.   Results Review     I reviewed the patient's new clinical results.  Results from last 7 days   Lab Units 01/24/24  0811 01/23/24  2139   WBC 10*3/mm3 8.27 11.64*   HEMOGLOBIN g/dL 12.7 15.4   PLATELETS 10*3/mm3 255 367     Results from last 7 days   Lab Units 01/25/24  0439 01/24/24  0811 01/23/24  2139   SODIUM mmol/L 140 141 144   POTASSIUM mmol/L 3.8 3.5 3.6   CHLORIDE mmol/L 106 108* 104   CO2 mmol/L 21.4* 21.3* 23.3   BUN mg/dL 6* 13 18   CREATININE mg/dL 0.78 0.77 0.99   GLUCOSE mg/dL 111* 109* 118*   Estimated Creatinine Clearance: 65.8 mL/min (by C-G formula based on SCr of 0.78 mg/dL).  Results from last 7 days   Lab Units 01/24/24  0811 01/23/24  2139   ALBUMIN g/dL 3.3* 4.4   BILIRUBIN mg/dL 0.4 0.4   ALK PHOS U/L 93 121*   AST (SGOT) U/L 11 13   ALT (SGPT) U/L 7 11     Results from last 7 days   Lab Units 01/25/24  0439 01/24/24  0811 01/23/24  2139   CALCIUM mg/dL 8.9 8.7 9.5   ALBUMIN g/dL  --  3.3* 4.4   MAGNESIUM mg/dL 2.2 1.9 1.8   PHOSPHORUS mg/dL  --  2.9  --      No results found for: \"COVID19\"  No results found for: \"HGBA1C\", \"POCGLU\"    XR Chest 1 View  Narrative: SINGLE VIEW OF THE CHEST     HISTORY: Chest pain     COMPARISON: August 5, 2021     FINDINGS:  There is cardiomegaly. There is no vascular congestion. No pneumothorax,  pleural effusion, or acute infiltrate is seen. There are changes of  prior cervical spinal fusion.     Impression: No acute findings.     This report was finalized on 1/23/2024 10:13 PM by Dr. Samantha Alfredo M.D on Workstation: [id number]       I reviewed the patient's daily medications.  Scheduled Medications  amLODIPine, 5 mg, Oral, Q24H  apixaban, 5 mg, Oral, Q12H  carvedilol, 25 mg, Oral, BID  DULoxetine, 120 mg, Oral, Nightly  escitalopram, 10 mg, Oral, Daily  famotidine, 40 mg, Oral, Nightly  lamoTRIgine, 100 mg, Oral, Nightly  losartan, 100 mg, Oral, Daily  pantoprazole, 40 mg, Oral, " Daily  potassium chloride, 40 mEq, Oral, Once  pravastatin, 40 mg, Oral, Nightly  senna-docusate sodium, 2 tablet, Oral, BID  sodium chloride, 10 mL, Intravenous, Q12H    Infusions  lactated ringers, 75 mL/hr, Last Rate: 75 mL/hr (01/25/24 0148)    Diet  Diet: Regular/House Diet, Gastrointestinal Diets; Fiber-Restricted, Fat-Restricted, Low Irritant; Texture: Regular Texture (IDDSI 7); Fluid Consistency: Thin (IDDSI 0)        I have personally reviewed:  []  Laboratory   []  Microbiology   []  Radiology   []  EKG/Telemetry   []  Cardiology/Vascular   []  Pathology   []  Records     Assessment/Plan     Active Hospital Problems    Diagnosis  POA    **A-fib [I48.91]  Yes    Elevated troponin [R79.89]  Yes    Chronic dyspnea [R06.09]  Yes    Essential hypertension [I10]  Yes    FUENTES on autoCPAP [G47.33]  Yes      Resolved Hospital Problems   No resolved problems to display.       73 y.o. female admitted with A-fib.    Paroxysmal atrial fibrillation with RVR  Hypertension  Hyperlipidemia  -EKG on admission with heart rate of 127, now in NSR  -TSH WNL  -Troponin mildly elevated likely related to his RVR  -Echo from August 2023 reviewed, unremarkable  -Cardiology consulted-Coreg increased back to 25 mg.  Received IV digoxin overnight for an episode of A-fib RVR that converted back to sinus rhythm.  Eliquis dose this evening.  -Continue amlodipine, Cozaar, statin     Nausea/vomiting, resolved  Diarrhea, resolved  -Never had any abdominal pain  -Sounds like she had a viral gastroenteritis that is been slow to resolve  -Tolerated liquid diet, requesting to advance to regular diet.  [place] diet ordered for lunch     Depression-continue home Lexapro and duloxetine     GERD-continue home pantoprazole    Eliquis (home med) for DVT prophylaxis.  Full code.  Discussed with patient and nursing staff.  Anticipate discharge home tomorrow.  If okay with cardiology    Expected Discharge Date: 1/25/2024; Expected Discharge Time:       Shaggy  "BILLY Ludwig MD  [facility]  01/25/24  12:07 EST            Electronically signed by Shaggy Ludwig MD at 01/25/24 1209       Aleks Velazquez MD at 01/25/24 0934          Patient Care Team:  Pierre Chaudhry Jr., MD as PCP - General (Internal Medicine)  Pierre Chaudhry Jr., MD as PCP - Internal Medicine  Manoj Gutiérrez Jr., MD as Consulting Physician (Pulmonary Disease)    Chief Complaint: Paroxysmal atrial fibrillation.    Interval History:   Short duration of A-fib with RVR noted at around 4 AM.  Currently sinus    Objective   Vital Signs  Temp:  [97.7 °F (36.5 °C)-98.6 °F (37 °C)] 97.9 °F (36.6 °C)  Heart Rate:  [] 76  Resp:  [16-18] 17  BP: (130-155)/(64-90) 145/76    Intake/Output Summary (Last 24 hours) at 1/25/2024 0934  Last data filed at 1/25/2024 0900  Gross per 24 hour   Intake 720 ml   Output 1500 ml   Net -780 ml     Flowsheet Rows      Flowsheet Row First Filed Value   Admission Height 157.5 cm (62\") Documented at 01/23/2024 2120   Admission Weight 86.2 kg (190 lb) Documented at 01/23/2024 2120            Temp:  [97.7 °F (36.5 °C)-98.6 °F (37 °C)] 97.9 °F (36.6 °C)  Heart Rate:  [] 76  Resp:  [16-18] 17  BP: (130-155)/(64-90) 145/76    Intake/Output Summary (Last 24 hours) at 1/25/2024 0934  Last data filed at 1/25/2024 0900  Gross per 24 hour   Intake 720 ml   Output 1500 ml   Net -780 ml     Flowsheet Rows      Flowsheet Row First Filed Value   Admission Height 157.5 cm (62\") Documented at 01/23/2024 2120   Admission Weight 86.2 kg (190 lb) Documented at 01/23/2024 2120            General Appearance:    Alert, cooperative, in no acute distress   Head:    Normocephalic, without obvious abnormality, atraumatic       Neck/Lymph   No adenopathy, supple, no thyromegaly, no carotid bruit, no    JVD   Lungs:     Clear to auscultation bilaterally, no wheezes, rales, or     rhonchi    Cardiac:    Normal rate, regular rhythm, no murmur, no rub, no gallop "   Chest Wall:    No abnormalities observed   GI:     Normal bowel sounds, soft, nontender, nondistended,            no rebound tenderness   Extremities:   No cyanosis, clubbing, or edema   Circulatory/Peripheral Vascular :   Pulses palpable and equal bilaterally   Integumentary:   No bleeding or rash. Normal temperature       Neurologic:   Cranial nerves 2 - 12 grossly intact, sensation intact              amLODIPine, 5 mg, Oral, Q24H  carvedilol, 25 mg, Oral, BID  DULoxetine, 120 mg, Oral, Nightly  escitalopram, 10 mg, Oral, Daily  famotidine, 40 mg, Oral, Nightly  lamoTRIgine, 100 mg, Oral, Nightly  losartan, 100 mg, Oral, Daily  pantoprazole, 40 mg, Oral, Daily  potassium chloride, 40 mEq, Oral, Once  pravastatin, 40 mg, Oral, Nightly  senna-docusate sodium, 2 tablet, Oral, BID  sodium chloride, 10 mL, Intravenous, Q12H        lactated ringers, 75 mL/hr, Last Rate: 75 mL/hr (01/25/24 0148)        Results Review:    Results from last 7 days   Lab Units 01/25/24  0439   SODIUM mmol/L 140   POTASSIUM mmol/L 3.8   CHLORIDE mmol/L 106   CO2 mmol/L 21.4*   BUN mg/dL 6*   CREATININE mg/dL 0.78   GLUCOSE mg/dL 111*   CALCIUM mg/dL 8.9     Results from last 7 days   Lab Units 01/24/24  0811 01/23/24  2348 01/23/24  2139   HSTROP T ng/L 10 16* 11     Results from last 7 days   Lab Units 01/24/24  0811   WBC 10*3/mm3 8.27   HEMOGLOBIN g/dL 12.7   HEMATOCRIT % 39.6   PLATELETS 10*3/mm3 255             Results from last 7 days   Lab Units 01/25/24  0439   MAGNESIUM mg/dL 2.2         @LABRCNT(bnp)@  I reviewed the patient's new clinical results.  I personally viewed and interpreted the patient's EKG/Telemetry data        Assessment & Plan   1.  Nausea/vomiting/diarrhea: Workup per the primary team.  2.  Paroxysmal atrial fibrillation: Currently sinus  3.  Interstitial lung disease  4.  Chronic dyspnea  5.  Obstructive sleep apnea    -Recommend starting Eliquis today unless patient has upcoming procedure.  If any procedures  planned Lovenox or heparin drip would be recommended.  - Increase carvedilol to 25 mg p.o. every 12 hours.  - Recommend monitoring 1 additional day secondary to paroxysm of atrial fibrillation earlier this a.m.              Electronically signed by Aleks Velazquez MD at 01/25/24 0936          Consult Notes (all)        Aleks Velazquez MD at 01/24/24 0935        Consult Orders    1. Inpatient Cardiology Consult [[id number]] ordered by Amelia Jones APRN at 01/24/24 0258                 Referring Provider:       Patient Care Team:  Pierre Chaudhry Jr., MD as PCP - General (Internal Medicine)  Pierre Chaudhry Jr., MD as PCP - Internal Medicine  Manoj Gutiérrez Jr., MD as Consulting Physician (Pulmonary Disease)      Reason for Consultation:   Paroxysmal atrial fibrillation  Dyspnea    History of present illness:    73-year-old female with a medical history of essential hypertension, hyperlipidemia, interstitial lung disease, and chronic dyspnea who presented yesterday with report of shortness of breath that was worsened along with palpitations and an irregular heartbeat.  She reported several episodes of nausea and vomiting over the past several days prior to presenting to the ER.  Per her report the symptoms started around 7 PM.  She did report that she has had 4 days of loose stools.  Unfortunately secondary to her nausea she has not been taking her p.o. carvedilol therapy.  High-sensitivity troponin was 11 and then 16.  X-ray of the chest look normal.  Initial EKG showed atrial fibrillation with a rapid ventricular rate.  Repeat EKG showed conversion to sinus rhythm.    Of note she did have a transthoracic echocardiogram done in September 2023 that was completely normal.    Review of Systems  All other systems reviewed and negative.               Current Medications:   Current Facility-Administered Medications:     acetaminophen (TYLENOL) tablet 650 mg, 650 mg, Oral, Q4H PRN **OR**  acetaminophen (TYLENOL) 160 MG/5ML oral solution 650 mg, 650 mg, Oral, Q4H PRN **OR** acetaminophen (TYLENOL) suppository 650 mg, 650 mg, Rectal, Q4H PRN, Amelia Jones APRN    amLODIPine (NORVASC) tablet 5 mg, 5 mg, Oral, Daily, Shaggy Ludwig MD    aspirin tablet 325 mg, 325 mg, Oral, Once, Parvez Estrada MD    sennosides-docusate (PERICOLACE) 8.6-50 MG per tablet 2 tablet, 2 tablet, Oral, BID **AND** polyethylene glycol (MIRALAX) packet 17 g, 17 g, Oral, Daily PRN **AND** bisacodyl (DULCOLAX) EC tablet 5 mg, 5 mg, Oral, Daily PRN **AND** bisacodyl (DULCOLAX) suppository 10 mg, 10 mg, Rectal, Daily PRN, Amelia Jones APRN    carvedilol (COREG) tablet 25 mg, 25 mg, Oral, BID, Shaggy Ludwig MD    DULoxetine (CYMBALTA) DR capsule 120 mg, 120 mg, Oral, Nightly, Shaggy Ludwig MD    escitalopram (LEXAPRO) tablet 10 mg, 10 mg, Oral, Daily, Shaggy Ludwig MD    famotidine (PEPCID) tablet 40 mg, 40 mg, Oral, Nightly, Shaggy Ludwig MD    lamoTRIgine (LaMICtal) tablet 100 mg, 100 mg, Oral, Nightly, Shaggy Ludwig MD    losartan (COZAAR) tablet 100 mg, 100 mg, Oral, Daily, Shaggy Ludwig MD    nitroglycerin (NITROSTAT) SL tablet 0.4 mg, 0.4 mg, Sublingual, Q5 Min PRN, Amelia Jones APRN    ondansetron (ZOFRAN) injection 4 mg, 4 mg, Intravenous, Q6H PRN, Amelia Jones APRN    pantoprazole (PROTONIX) EC tablet 40 mg, 40 mg, Oral, Daily, Shaggy Ludwig MD    pravastatin (PRAVACHOL) tablet 40 mg, 40 mg, Oral, Nightly, Shaggy Ludwig MD    sodium chloride 0.9 % flush 10 mL, 10 mL, Intravenous, PRN, Parvez Estrada MD    sodium chloride 0.9 % flush 10 mL, 10 mL, Intravenous, Q12H, Amelia Jones APRN    sodium chloride 0.9 % flush 10 mL, 10 mL, Intravenous, PRN, Amelia Jones, CARLIE    sodium chloride 0.9 % infusion 40 mL, 40 mL, Intravenous, PRN, Amelia Jones APRN    traZODone (DESYREL) tablet 25 mg, 25 mg, Oral, Nightly PRN, Shaggy Ludwig MD     Allergies: Simvastatin,  "Erythromycin, and Penicillins      Vital Signs   Temp:  [97 °F (36.1 °C)-98.6 °F (37 °C)] 98.6 °F (37 °C)  Heart Rate:  [61-97] 61  Resp:  [18-20] 18  BP: (131-172)/() 154/79  Flowsheet Rows      Flowsheet Row First Filed Value   Admission Height 157.5 cm (62\") Documented at 01/23/2024 2120   Admission Weight 86.2 kg (190 lb) Documented at 01/23/2024 2120            General Appearance:    Alert, cooperative, in no acute distress   Head:    Normocephalic, without obvious abnormality, atraumatic       Neck:   No adenopathy, supple, no thyromegaly, no carotid bruit, no    JVD   Lungs:     Clear to auscultation bilaterally, no wheezes, rales, or     rhonchi    Heart:    Normal rate, regular rhythm, no murmur, no rub, no gallop   Chest Wall:    No abnormalities observed   Abdomen:     Normal bowel sounds, soft, nontender, nondistended,            no rebound tenderness   Extremities:   No cyanosis, clubbing, or edema   Pulses:   Pulses palpable and equal bilaterally   Skin:   No bleeding or rash   Lymph nodes:   No cervical adenopathy   Neurologic:   Cranial nerves 2 - 12 grossly intact, sensation intact               Results Review: I personally viewed and interpreted the patient's EKG/Telemetry data    Results from last 7 days   Lab Units 01/24/24  0811   WBC 10*3/mm3 8.27   HEMOGLOBIN g/dL 12.7   HEMATOCRIT % 39.6   PLATELETS 10*3/mm3 255     Results from last 7 days   Lab Units 01/24/24  0811   SODIUM mmol/L 141   POTASSIUM mmol/L 3.5   CHLORIDE mmol/L 108*   CO2 mmol/L 21.3*   BUN mg/dL 13   CREATININE mg/dL 0.77   GLUCOSE mg/dL 109*   CALCIUM mg/dL 8.7     Lab Results   Lab Value Date/Time    TROPONINT 10 01/24/2024 0811    TROPONINT 16 (H) 01/23/2024 2348    TROPONINT 11 01/23/2024 2139    TROPONINT <0.010 08/06/2021 0021    TROPONINT <0.010 08/05/2021 1936    TROPONINT <0.010 07/13/2020 2248    TROPONINT <0.010 12/20/2019 0815    TROPONINT 0.134 (C) 05/22/2018 1426    TROPONINT 0.174 (C) 05/22/2018 0525    " TROPONINT 0.185 (C) 2018    TROPONINT 0.208 (C) 2018 1659           Assessment & Plan   1.  Nausea/vomiting/diarrhea: Workup per the primary team.  2.  Paroxysmal atrial fibrillation: Currently sinus  3.  Interstitial lung disease  4.  Chronic dyspnea  5.  Obstructive sleep apnea    -Patient previously was on 25 mg of p.o. carvedilol at home.  Resting heart rate off carvedilol is only in the 60s.  I would recommend decreasing this to 12.5 mg p.o. every 12 hours and monitoring closely.  -I would recommend starting Eliquis 5 mg p.o. every 12 hours secondary to her paroxysmal atrial fibrillation and elevated chads 2 VASc score.  If there is no GI workup upcoming this could be started today.  -No repeat echo indicated.  I will continue to follow.    I discussed the patient's findings and my recommendations with the patient               Electronically signed by Aleks Velazquez MD at 24 0994          Discharge Summary        Diomedes Lemos MD at 24 0753              Patient Name: Katelyn Sr  : 1950  MRN: [id number]    Date of Admission: 2024  Date of Discharge:  2024  Primary Care Physician: Pierre Chaudhry Jr., MD      Chief Complaint:   Shortness of Breath and Irregular Heart Beat      Discharge Diagnoses     Active Hospital Problems    Diagnosis  POA    **A-fib [I48.91]  Yes    Elevated troponin [R79.89]  Yes    Chronic dyspnea [R06.09]  Yes    Essential hypertension [I10]  Yes    FUENTES on autoCPAP [G47.33]  Yes      Resolved Hospital Problems   No resolved problems to display.        Admitting HPI     Ms. Sr is a 73 y.o. with past medical history of remote atrial fibrillation, hypertension, hyperlipidemia, interstitial lung disease presenting with acute onset of palpitations yesterday evening around 7 PM.  States it felt like when she had her atrial fibrillation from the past.  Associated with chest pain.  Patient has had some nausea and  diarrhea since last Thursday.  Felt like he got better on Sunday and then got worse again on Monday.  No further diarrhea but having some nausea without vomiting or abdominal pain.  No known sick contacts.  Retired RN.  No recent antibiotics.  She has not been able to take her Coreg because of the nausea prior to admission for the last few days.  EKG in ER showing A-fib, but she converted to normal sinus rhythm.     Hospital Course     Pt admitted for nausea and vomiting and diarrhea, consistent with gastroenteritis which improved during hospitalization.  She was noted to be in A-fib with RVR and emergency department cardiology was consulted.  Her beta-blocker dose was adjusted multiple times, however she had issues with sinus pauses prompting discontinuation of her beta-blocker.  Given that this was patient's first episode of A-fib in several years, they decided to hold beta-blocker at this time and discharged with a ZIO monitor.  Based off of the results of this, further treatment options will be discussed with patient.  Indications discussed for returning to ED, which she expressed understanding of.  Started on Eliquis.  Stable for discharge home at this time.    Discharge Plan     Paroxysmal atrial fibrillation with RVR  Hypertension  Hyperlipidemia  Sinus pauses  -EKG on admission with heart rate of 127  -TSH WNL  -Echo from August 2023 reviewed, unremarkable  -Cardiology consulted-with sinus pauses plan to DC Coreg, Zio patch at discharge, and follow-up in outpatient setting for continued management  -Started on Eliquis  -Continue amlodipine, Cozaar, statin     Nausea/vomiting, resolved  Diarrhea, resolved  -Consistent with gastroenteritis, resolved     Depression-continue home Lexapro and duloxetine     GERD-continue home pantoprazole    Day of Discharge     Physical Exam:  Temp:  [97.7 °F (36.5 °C)-98.2 °F (36.8 °C)] 98.1 °F (36.7 °C)  Heart Rate:  [34-74] 64  Resp:  [18-20] 18  BP: (116-134)/(52-97)  120/73  Body mass index is 33.2 kg/m².  Physical Exam  Constitutional:       General: She is not in acute distress.     Appearance: Normal appearance. She is not toxic-appearing.   Cardiovascular:      Rate and Rhythm: Normal rate and regular rhythm.   Pulmonary:      Effort: Pulmonary effort is normal.   Abdominal:      General: Abdomen is flat. Bowel sounds are normal. There is no distension.      Palpations: Abdomen is soft.      Tenderness: There is no abdominal tenderness.   Skin:     General: Skin is warm.   Neurological:      General: No focal deficit present.      Mental Status: She is alert and oriented to person, place, and time.   Psychiatric:         Mood and Affect: Mood normal.         Behavior: Behavior normal.         Consultants     Consult Orders (all) (From admission, onward)       Start     Ordered    01/24/24 0702  Inpatient Cardiology Consult  IN AM        Specialty:  Cardiology  Provider:  Aleks Velazquez MD    01/24/24 0258    01/24/24 0041  [facility] (on-call MD unless specified) Details  Once        Specialty:  Hospitalist  Provider:  (Not yet assigned)    01/24/24 0040                  Procedures     * Surgery not found *      Imaging Results (All)       Procedure Component Value Units Date/Time    XR Chest 1 View [[id number]] Collected: 01/23/24 2212     Updated: 01/23/24 2216    Narrative:      SINGLE VIEW OF THE CHEST     HISTORY: Chest pain     COMPARISON: August 5, 2021     FINDINGS:  There is cardiomegaly. There is no vascular congestion. No pneumothorax,  pleural effusion, or acute infiltrate is seen. There are changes of  prior cervical spinal fusion.       Impression:      No acute findings.     This report was finalized on 1/23/2024 10:13 PM by Dr. Samantha Alfredo M.D on Workstation: [id number]               Results for orders placed during the hospital encounter of 09/21/23    Adult Transthoracic Echo Complete W/ Cont if Necessary Per Protocol    Interpretation Summary     "Left ventricular systolic function is normal. Calculated left ventricular EF = 63.2%    Left ventricular diastolic function was normal.    Pertinent Labs     Results from last 7 days   Lab Units 01/27/24  0359 01/24/24  0811 01/23/24 2139   WBC 10*3/mm3 8.34 8.27 11.64*   HEMOGLOBIN g/dL 12.1 12.7 15.4   PLATELETS 10*3/mm3 255 255 367     Results from last 7 days   Lab Units 01/27/24  0359 01/25/24  0439 01/24/24  0811 01/23/24 2139   SODIUM mmol/L 143 140 141 144   POTASSIUM mmol/L 4.3 3.8 3.5 3.6   CHLORIDE mmol/L 108* 106 108* 104   CO2 mmol/L 25.6 21.4* 21.3* 23.3   BUN mg/dL 15 6* 13 18   CREATININE mg/dL 1.00 0.78 0.77 0.99   GLUCOSE mg/dL 98 111* 109* 118*   EGFR mL/min/1.73 60.4 81.3 82.6 61.1     Results from last 7 days   Lab Units 01/24/24  0811 01/23/24  2139   ALBUMIN g/dL 3.3* 4.4   BILIRUBIN mg/dL 0.4 0.4   ALK PHOS U/L 93 121*   AST (SGOT) U/L 11 13   ALT (SGPT) U/L 7 11     Results from last 7 days   Lab Units 01/27/24  0359 01/25/24  0439 01/24/24  0811 01/23/24  2139   CALCIUM mg/dL 9.0 8.9 8.7 9.5   ALBUMIN g/dL  --   --  3.3* 4.4   MAGNESIUM mg/dL 2.2 2.2 1.9 1.8   PHOSPHORUS mg/dL 3.9  --  2.9  --        Results from last 7 days   Lab Units 01/24/24  0811 01/23/24  2348 01/23/24 2139   HSTROP T ng/L 10 16* 11           Invalid input(s): \"LDLCALC\"          Test Results Pending at Discharge       Discharge Details        Discharge Medications        New Medications        Instructions Start Date   Eliquis 5 MG tablet tablet  Generic drug: apixaban   5 mg, Oral, Every 12 Hours Scheduled             Continue These Medications        Instructions Start Date   amLODIPine 5 MG tablet  Commonly known as: NORVASC   5 mg, Oral, Daily      ARIPiprazole 2 MG tablet  Commonly known as: ABILIFY   1 tablet, Oral, Every Morning      cetirizine 5 MG tablet  Commonly known as: zyrTEC   5 mg, Oral, Daily      Diclofenac Sodium 4 %, Topiramate 2 %, cloNIDine HCl 0.2 %, Lidocaine HCl 5 %   1-2 g, Topical, 3 to 4 " Times Daily      DULoxetine 60 MG capsule  Commonly known as: CYMBALTA   120 mg, Oral, Nightly      escitalopram 10 MG tablet  Commonly known as: LEXAPRO   10 mg, Oral, Daily      famotidine 10 MG tablet  Commonly known as: PEPCID   40 mg, Oral, Nightly      fluticasone 50 MCG/ACT nasal spray  Commonly known as: FLONASE   2 sprays, Nasal, Daily      lamoTRIgine 100 MG tablet  Commonly known as: LaMICtal   100 mg, Oral, Nightly      losartan 100 MG tablet  Commonly known as: COZAAR   TAKE 1 TABLET BY MOUTH EVERY DAY      Ofev 100 MG capsule  Generic drug: Nintedanib Esylate   100 mg, Oral, 2 Times Daily      pantoprazole 40 MG EC tablet  Commonly known as: PROTONIX   40 mg, Oral, Daily      pravastatin 40 MG tablet  Commonly known as: PRAVACHOL   TAKE 1 TABLET BY MOUTH EVERY DAY AT NIGHT      traZODone 50 MG tablet  Commonly known as: DESYREL   25 mg, Oral, Nightly PRN             Stop These Medications      aspirin 81 MG EC tablet     carvedilol 25 MG tablet  Commonly known as: COREG     hydrALAZINE 50 MG tablet  Commonly known as: APRESOLINE              Allergies   Allergen Reactions    Simvastatin Myalgia     Cramps in thighs      Erythromycin Rash    Penicillins Rash       Discharge Disposition:  Home or Self Care      Discharge Diet:  Diet Order   Procedures    Diet: Regular/House Diet, Gastrointestinal Diets; Fiber-Restricted, Fat-Restricted, Low Irritant; Texture: Regular Texture (IDDSI 7); Fluid Consistency: Thin (IDDSI 0)       Discharge Activity:   Activity Instructions       Activity as Tolerated     Additional Activity Instructions:    Activity as tolerated           CODE STATUS:    Code Status and Medical Interventions:   Ordered at: 01/24/24 0258     Code Status (Patient has no pulse and is not breathing):    CPR (Attempt to Resuscitate)     Medical Interventions (Patient has pulse or is breathing):    Full Support       Future Appointments   Date Time Provider Department Center   2/1/2024  2:00 PM  "Deven Ward PA [facility]   2/22/2024  1:00 PM Charles Beard MD [facility] [facility]   4/16/2024  1:00 PM Sera García APRN [facility]   6/10/2024 12:40 PM Aleks Velazquez MD [facility]      Follow-up Information       Pierre Chaudhry Jr., MD .    Specialty: Internal Medicine  Why: febuary 2nd 1:30 pm  Contact information:  [address] [facility] [address]  [place] [address]  [phone]               Aleks Velazquez MD Follow up.    Specialty: Cardiology  Contact information:  [address] [facility] [address]  [place] [address]  [phone]                             Time Spent on Discharge:  Greater than 30 minutes spent on discharge management including final examination, discussion of hospital stay and patient education, preparation of records, medication reconciliation, follow up planning      Diomedes Lemos MD  [facility]  01/27/24  07:40 EST                Electronically signed by Diomedes Lemos MD at 01/27/24 1410        All medication doses during the admission are shown, including meds that are no longer on order.  Scheduled Meds Sorted by Name  for Katelyn Sr EITAN \"Jina\" as of 1/21/24 through 1/27/24    1 Day 3 Days 7 Days 10 Days < Today >   Legend:       Medications 01/21/24 01/22/24 01/23/24 01/24/24 01/25/24 01/26/24 01/27/24   amLODIPine (NORVASC) tablet 5 mg  Dose: 5 mg  Freq: Every 24 Hours Scheduled Route: PO  Start: 01/24/24 1030 End: 01/27/24 1702   Admin Instructions:          1109      0943      0849      0835     1702-D/C'd      amLODIPine (NORVASC) tablet 5 mg  Dose: 5 mg  Freq: Daily Route: PO  Start: 01/24/24 1030 End: 01/24/24 0940   Admin Instructions:          0940-D/C'd         apixaban (ELIQUIS) tablet 5 mg  Dose: 5 mg  Freq: Every 12 Hours Scheduled Route: PO  Indications Comment: Atrial Fibrillation  Start: 01/25/24 2100 End: 01/27/24 1702   Admin Instructions:           2104      0850     2043      0835   "   1702-D/C'd      aspirin tablet 325 mg  Dose: 325 mg  Freq: Once Route: PO  Start: 01/23/24 2136 End: 01/24/24 0945   Admin Instructions:         (2147) [C]      0945-D/C'd         carvedilol (COREG) tablet 12.5 mg  Dose: 12.5 mg  Freq: 2 Times Daily Route: PO  Start: 01/26/24 2100 End: 01/27/24 0938   Admin Instructions:            2043      0836     0938-D/C'd      carvedilol (COREG) tablet 12.5 mg  Dose: 12.5 mg  Freq: 2 Times Daily Route: PO  Start: 01/24/24 2100 End: 01/25/24 0933   Admin Instructions:          2102 0933-D/C'd  (0947)          carvedilol (COREG) tablet 25 mg  Dose: 25 mg  Freq: 2 Times Daily Route: PO  Start: 01/25/24 1045 End: 01/26/24 1144   Admin Instructions:           0949     2104      0850     1144-D/C'd       carvedilol (COREG) tablet 25 mg  Dose: 25 mg  Freq: 2 Times Daily Route: PO  Start: 01/25/24 2100 End: 01/25/24 0947   Admin Instructions:           0947-D/C'd        carvedilol (COREG) tablet 25 mg  Dose: 25 mg  Freq: 2 Times Daily Route: PO  Start: 01/24/24 0900 End: 01/24/24 0945   Admin Instructions:          0945-D/C'd  (1230)           carvedilol (COREG) tablet 6.25 mg  Dose: 6.25 mg  Freq: 2 Times Daily Route: PO  Start: 01/27/24 2100 End: 01/27/24 1320   Admin Instructions:             0942     1320     1320-D/C'd      digoxin (LANOXIN) injection 250 mcg  Dose: 250 mcg  Freq: Once Route: IV  Start: 01/25/24 0515 End: 01/25/24 0445   Admin Instructions:           0445          DULoxetine (CYMBALTA) DR capsule 120 mg  Dose: 120 mg  Freq: Nightly Route: PO  Start: 01/24/24 2100 End: 01/27/24 1702   Admin Instructions:          2058      2106 2043      1702-D/C'd      escitalopram (LEXAPRO) tablet 10 mg  Dose: 10 mg  Freq: Daily Route: PO  Start: 01/24/24 1030 End: 01/27/24 1702   Admin Instructions:          1109      0943      0850      0835     1702-D/C'd      famotidine (PEPCID) tablet 40 mg  Dose: 40 mg  Freq: Nightly Route: PO  Start: 01/24/24 2100 End:  01/27/24 1702 2058 2103 2043      1702-D/C'd      lactated ringers bolus 1,000 mL  Dose: 1,000 mL  Freq: Once Route: IV  Last Dose: Stopped (01/24/24 0015)  Start: 01/23/24 2235 End: 01/24/24 0015 2227 0015           lamoTRIgine (LaMICtal) tablet 100 mg  Dose: 100 mg  Freq: Nightly Route: PO  Start: 01/24/24 2100 End: 01/27/24 1702   Admin Instructions:          2058 2106 2043      1702-D/C'd      losartan (COZAAR) tablet 100 mg  Dose: 100 mg  Freq: Daily Route: PO  Start: 01/24/24 1030 End: 01/27/24 1702   Admin Instructions:          1109      0943      0850      0835     1702-D/C'd      ondansetron (ZOFRAN) injection 4 mg  Dose: 4 mg  Freq: Once Route: IV  Start: 01/24/24 0107 End: 01/24/24 0055   Admin Instructions:          0055           ondansetron (ZOFRAN) injection 4 mg  Dose: 4 mg  Freq: Once Route: IV  Start: 01/23/24 2235 End: 01/23/24 2227   Admin Instructions:         2227            pantoprazole (PROTONIX) EC tablet 40 mg  Dose: 40 mg  Freq: Daily Route: PO  Start: 01/24/24 1030 End: 01/27/24 1702   Admin Instructions:          1108      0942      0850      0836     1702-D/C'd      potassium chloride (K-DUR,KLOR-CON) ER tablet 40 mEq  Dose: 40 mEq  Freq: Once Route: PO  Start: 01/25/24 0945 End: 01/25/24 1321   Admin Instructions:           0945     1321-D/C'd        potassium chloride (K-DUR,KLOR-CON) ER tablet 40 mEq  Dose: 40 mEq  Freq: Once Route: PO  Start: 01/24/24 1815 End: 01/24/24 1824   Admin Instructions:          1824           potassium chloride (KLOR-CON) packet 40 mEq  Dose: 40 mEq  Freq: Once Route: PO  Start: 01/25/24 1415 End: 01/25/24 1356   Admin Instructions:           1356          pravastatin (PRAVACHOL) tablet 40 mg  Dose: 40 mg  Freq: Nightly Route: PO  Start: 01/24/24 2100 End: 01/27/24 1702   Admin Instructions:          2058      2106      2043      1702-D/C'd       sennosides-docusate (PERICOLACE) 8.6-50 MG per tablet 2 tablet  Dose: 2  "tablet  Freq: 2 Times Daily Route: PO  Start: 01/24/24 0900 End: 01/27/24 1702   Admin Instructions:               (4898) (7146) (7180) (7241)     2049 (3797)     1702-D/C'd      And  polyethylene glycol (MIRALAX) packet 17 g  Dose: 17 g  Freq: Daily PRN Route: PO  PRN Reason: Constipation  PRN Comment: Use if senna-docusate is ineffective  Start: 01/24/24 0257 End: 01/27/24 1702   Admin Instructions:             1702-D/C'd      And  bisacodyl (DULCOLAX) EC tablet 5 mg  Dose: 5 mg  Freq: Daily PRN Route: PO  PRN Reason: Constipation  PRN Comment: Use if polyethylene glycol is ineffective  Start: 01/24/24 0257 End: 01/27/24 1702   Admin Instructions:             1702-D/C'd      And  bisacodyl (DULCOLAX) suppository 10 mg  Dose: 10 mg  Freq: Daily PRN Route: RE  PRN Reason: Constipation  PRN Comment: Use if bisacodyl oral is ineffective  Start: 01/24/24 0257 End: 01/27/24 1702   Admin Instructions:             1702-D/C'd      sodium chloride 0.9 % flush 10 mL  Dose: 10 mL  Freq: Every 12 Hours Scheduled Route: IV  Start: 01/24/24 0900 End: 01/27/24 1702       0900     2103) (2156)     2101 5149     2046      (5868)     1702-D/C'd                  Continuous Meds Sorted by Name  for Katelyn Sr \"Jina\" as of 1/21/24 through 1/27/24  Legend:       Medications 01/21/24 01/22/24 01/23/24 01/24/24 01/25/24 01/26/24 01/27/24   lactated ringers infusion  Rate: 75 mL/hr Dose: 75 mL/hr  Freq: Continuous Route: IV  Last Dose: 75 mL/hr (01/25/24 0148)  Start: 01/24/24 1800 End: 01/27/24 1702       1824      0148       1702-D/C'd                  PRN Meds Sorted by Name  for Katelyn Sr \"Jina\" as of 1/21/24 through 1/27/24  Legend:       Medications 01/21/24 01/22/24 01/23/24 01/24/24 01/25/24 01/26/24 01/27/24    acetaminophen (TYLENOL) tablet 650 mg  Dose: 650 mg  Freq: Every 4 Hours PRN Route: PO  PRN Reason: Mild Pain  Start: 01/24/24 0258 End: 01/27/24 1702   Admin Instructions:    "          1702-D/C'd      Or  acetaminophen (TYLENOL) 160 MG/5ML oral solution 650 mg  Dose: 650 mg  Freq: Every 4 Hours PRN Route: PO  PRN Reason: Mild Pain  Start: 01/24/24 0258 End: 01/27/24 1702   Admin Instructions:             1702-D/C'd      Or  acetaminophen (TYLENOL) suppository 650 mg  Dose: 650 mg  Freq: Every 4 Hours PRN Route: RE  PRN Reason: Mild Pain  Start: 01/24/24 0258 End: 01/27/24 1702   Admin Instructions:             1702-D/C'd       sennosides-docusate (PERICOLACE) 8.6-50 MG per tablet 2 tablet  Dose: 2 tablet  Freq: 2 Times Daily Route: PO  Start: 01/24/24 0900 End: 01/27/24 1702   Admin Instructions:               (3980) (0353) (2152) (5048) 0564 (3647)     1702-D/C'd      And  polyethylene glycol (MIRALAX) packet 17 g  Dose: 17 g  Freq: Daily PRN Route: PO  PRN Reason: Constipation  PRN Comment: Use if senna-docusate is ineffective  Start: 01/24/24 0257 End: 01/27/24 1702   Admin Instructions:             1702-D/C'd      And  bisacodyl (DULCOLAX) EC tablet 5 mg  Dose: 5 mg  Freq: Daily PRN Route: PO  PRN Reason: Constipation  PRN Comment: Use if polyethylene glycol is ineffective  Start: 01/24/24 0257 End: 01/27/24 1702   Admin Instructions:             1702-D/C'd      And  bisacodyl (DULCOLAX) suppository 10 mg  Dose: 10 mg  Freq: Daily PRN Route: RE  PRN Reason: Constipation  PRN Comment: Use if bisacodyl oral is ineffective  Start: 01/24/24 0257 End: 01/27/24 1702   Admin Instructions:             1702-D/C'd      nitroglycerin (NITROSTAT) SL tablet 0.4 mg  Dose: 0.4 mg  Freq: Every 5 Minutes PRN Route: SL  PRN Reason: Chest Pain  PRN Comment: Only if SBP Greater Than 100  Start: 01/24/24 0258 End: 01/27/24 1702   Admin Instructions:             1702-D/C'd      ondansetron (ZOFRAN) injection 4 mg  Dose: 4 mg  Freq: Every 6 Hours PRN Route: IV  PRN Reasons: Nausea,Vomiting  Start: 01/24/24 0258 End: 01/27/24 1702   Admin Instructions:          0900        1702-D/C'd       sodium chloride 0.9 % flush 10 mL  Dose: 10 mL  Freq: As Needed Route: IV  PRN Reason: Line Care  Start: 01/24/24 0257 End: 01/27/24 1702          1702-D/C'd      sodium chloride 0.9 % flush 10 mL  Dose: 10 mL  Freq: As Needed Route: IV  PRN Reason: Line Care  Start: 01/23/24 2120 End: 01/27/24 1702          1702-D/C'd      sodium chloride 0.9 % infusion 40 mL  Dose: 40 mL  Freq: As Needed Route: IV  PRN Reason: Line Care  Start: 01/24/24 0257 End: 01/27/24 1702   Admin Instructions:             1702-D/C'd      traMADol (ULTRAM) tablet 50 mg  Dose: 50 mg  Freq: Every 6 Hours PRN Route: PO  PRN Reason: Moderate Pain  Start: 01/25/24 1701 End: 01/27/24 1702   Admin Instructions:           1751       1702-D/C'd      traZODone (DESYREL) tablet 25 mg  Dose: 25 mg  Freq: Nightly PRN Route: PO  PRN Reason: Sleep  Start: 01/24/24 0931 End: 01/27/24 1702   Admin Instructions:             1702-D/C'd

## 2024-01-29 NOTE — CASE MANAGEMENT/SOCIAL WORK
Case Management Discharge Note      Final Note: home no needs    Provided Post Acute Provider List?: N/A  N/A Provider List Comment: No need for list identified at this time.    Selected Continued Care - Discharged on 1/27/2024 Admission date: 1/23/2024 - Discharge disposition: Home or Self Care      Destination    No services have been selected for the patient.                Durable Medical Equipment    No services have been selected for the patient.                Dialysis/Infusion    No services have been selected for the patient.                Home Medical Care    No services have been selected for the patient.                Therapy    No services have been selected for the patient.                Community Resources    No services have been selected for the patient.                Community & DME    No services have been selected for the patient.                    Transportation Services  Private: Car    Final Discharge Disposition Code: 01 - home or self-care

## 2024-01-31 ENCOUNTER — READMISSION MANAGEMENT (OUTPATIENT)
Dept: CALL CENTER | Facility: HOSPITAL | Age: 74
End: 2024-01-31
Payer: MEDICARE

## 2024-01-31 ENCOUNTER — TELEPHONE (OUTPATIENT)
Dept: PAIN MEDICINE | Facility: CLINIC | Age: 74
End: 2024-01-31
Payer: MEDICARE

## 2024-01-31 NOTE — OUTREACH NOTE
Medical Week 1 Survey      Flowsheet Row Responses   [facility] patient discharged from? [employer]   Does the patient have one of the following disease processes/diagnoses(primary or secondary)? Other   Week 1 attempt successful? No   Unsuccessful attempts Attempt 1            AMELIA DYER - Registered Nurse

## 2024-01-31 NOTE — PAYOR COMM NOTE
"Francisca Sr \"Jina\" (73 y.o. Female)                                 ATTENTION; DISCHARGE CASE REF [id number]           Date of Birth   1950    Social Security Number       Address   [address] [place] [address]    Home Phone   [phone]    MRN   [id number]       [employer]   [facility]    Marital Status   Single                            Admission Date   1/23/24    Admission Type   Emergency    Admitting Provider   Shaggy Ludwig MD    Attending Provider       [facility]       Discharge Date   1/27/2024    Discharge Disposition   Home or Self Care    Discharge Destination                                 Attending Provider: (none)   Allergies: Simvastatin, Erythromycin, Penicillins    Isolation: None   Infection: None   Code Status: Prior    Ht: 157.5 cm (62\")   Wt: 82.3 kg (181 lb 8 oz)    Admission Cmt: None   Principal Problem: A-fib [I48.91]                   Active Insurance as of 1/23/2024       Primary Coverage       Payor Plan Insurance Group Employer/Plan Group    AETNA MEDICARE REPLACEMENT AETNA MEDICARE [place]AC[place] [id number]       Payor Plan Address Payor Plan Phone Number Payor Plan Fax Number Effective Dates    [address] [phone]  1/1/2024 - None Entered    [facility] [id number]         Subscriber Name Subscriber Birth Date Member ID       FRANCISCA SR 1950 [id number]                     Emergency Contacts        (Rel.) Home Phone Work Phone Mobile Phone    Bhavesh Nascimento(nephew) (Relative) [phone] -- [phone]    Tiffanie Cortes (Friend) [phone] -- [phone]               Christine Blackburn RN     Case Management     Case Management/Social Work     Signed     Date of Service: 01/27/24 1501  Creation Time: 01/29/24 1722     Signed         Case Management Discharge Note        Final Note: home no needs     Provided Post Acute Provider List?: N/A  N/A Provider List " Comment: No need for list identified at this time.     Selected Continued Care - Discharged on 1/27/2024 Admission date: 1/23/2024 - Discharge disposition: Home or Self Care        Destination    No services have been selected for the patient.                    Durable Medical Equipment    No services have been selected for the patient.                    Dialysis/Infusion    No services have been selected for the patient.                    Home Medical Care    No services have been selected for the patient.                    Therapy    No services have been selected for the patient.                    Community Resources    No services have been selected for the patient.                    Community & DME    No services have been selected for the patient.                         Transportation Services  Private: Car     Final Discharge Disposition Code: 01 - home or self-care

## 2024-02-01 ENCOUNTER — HOSPITAL ENCOUNTER (OUTPATIENT)
Dept: GENERAL RADIOLOGY | Facility: HOSPITAL | Age: 74
Discharge: HOME | End: 2024-02-01
Admitting: NURSE PRACTITIONER
Payer: MEDICARE

## 2024-02-01 ENCOUNTER — OFFICE VISIT (OUTPATIENT)
Dept: PAIN MEDICINE | Facility: CLINIC | Age: 74
End: 2024-02-01
Payer: MEDICARE

## 2024-02-01 ENCOUNTER — PREP FOR SURGERY (OUTPATIENT)
Dept: SURGERY | Facility: SURGERY CENTER | Age: 74
End: 2024-02-01
Payer: MEDICARE

## 2024-02-01 VITALS
RESPIRATION RATE: 18 BRPM | SYSTOLIC BLOOD PRESSURE: 119 MMHG | OXYGEN SATURATION: 94 % | WEIGHT: 186 LBS | HEIGHT: 62 IN | TEMPERATURE: 97.5 F | DIASTOLIC BLOOD PRESSURE: 85 MMHG | HEART RATE: 97 BPM | BODY MASS INDEX: 34.23 KG/M2

## 2024-02-01 DIAGNOSIS — M51.36 DDD (DEGENERATIVE DISC DISEASE), LUMBAR: ICD-10-CM

## 2024-02-01 DIAGNOSIS — M46.1 SACROILIITIS: Primary | ICD-10-CM

## 2024-02-01 DIAGNOSIS — M47.816 LUMBAR FACET ARTHROPATHY: ICD-10-CM

## 2024-02-01 DIAGNOSIS — M47.817 FACET ARTHROPATHY, LUMBOSACRAL: ICD-10-CM

## 2024-02-01 PROCEDURE — 3079F DIAST BP 80-89 MM HG: CPT | Performed by: PHYSICIAN ASSISTANT

## 2024-02-01 PROCEDURE — 72114 X-RAY EXAM L-S SPINE BENDING: CPT

## 2024-02-01 PROCEDURE — 1160F RVW MEDS BY RX/DR IN RCRD: CPT | Performed by: PHYSICIAN ASSISTANT

## 2024-02-01 PROCEDURE — 1159F MED LIST DOCD IN RCRD: CPT | Performed by: PHYSICIAN ASSISTANT

## 2024-02-01 PROCEDURE — 99204 OFFICE O/P NEW MOD 45 MIN: CPT | Performed by: PHYSICIAN ASSISTANT

## 2024-02-01 PROCEDURE — 1125F AMNT PAIN NOTED PAIN PRSNT: CPT | Performed by: PHYSICIAN ASSISTANT

## 2024-02-01 PROCEDURE — 3074F SYST BP LT 130 MM HG: CPT | Performed by: PHYSICIAN ASSISTANT

## 2024-02-01 RX ORDER — DIAZEPAM 5 MG/1
2.5 TABLET ORAL ONCE
OUTPATIENT
Start: 2024-02-01 | End: 2024-02-01

## 2024-02-01 RX ORDER — ARIPIPRAZOLE 5 MG/1
5 TABLET ORAL DAILY
COMMUNITY
Start: 2024-01-24

## 2024-02-01 NOTE — PROGRESS NOTES
CHIEF COMPLAINT  Ms. Sr has low back pain on the right side for 10 years.       Subjective   Katelyn Sr is a 73 y.o. female.   She presents to the office for initial evaluation of low back pain. She was referred here by CARLIE Cooley.  This patient reports a longstanding history of waxing and waning pain which initiated approximately 10 years ago which she attributes to being a nurse for 35 years.  She states that ultimately she went out on disability due to the back pain at age 67.  Over the last several months however she has noted progressive worsening of primarily right-sided lumbosacral spine pain which radiates into the right buttock and with extended walking will also have pain radiating into the lateral aspect of the hips bilaterally.  She denies lower extremity radicular symptoms, numbness or tingling in the leg but does state that the right leg occasionally feels weak.  She reports intermittent numbness and tingling affecting the bilateral feet but states that she has been diagnosed with osteoarthritis of the feet and does undergo cortisone injections.    Patient denies any history of lumbar spine surgery however she does have history of ACDF at [facility] completed by Dr. Nix.    Reports having undergone LESI's x 2 with suboptimal relief at [facility] over 10 years ago.    This patient completed a 3-month course of physical therapy from April - June 2023 with short-term relief.  She does have a physician guided HEP that she states that she tries to perform weekly.  She does see a chiropractor on a weekly basis with short-term relief.    Patient was recently hospitalized for 5 days and was released on 1/27/2024 for reoccurrence of atrial fibrillation.  This patient states that this is her second episode of having A-fib and has recently been placed on a 14-day heart monitor and also restarted on Eliquis (Dr. Velazquez).    Pain today 1/10 VAS in severity.      Back Pain  This is a chronic problem.  The current episode started more than 1 year ago. The problem occurs constantly. The problem has been worse since onset. The pain is present in the lumbar spine and sacro-iliac. The quality of the pain is described as aching and burning. The pain radiates to the right buttock. The pain is at a severity of 1/10. The pain is mild. The pain is Worse during the day. The symptoms are aggravated by sitting, standing, bending and twisting (Activity). Associated symptoms include weakness (right leg). Pertinent negatives include no numbness.        PEG Assessment   What number best describes your pain on average in the past week?5  What number best describes how, during the past week, pain has interfered with your enjoyment of life?8  What number best describes how, during the past week, pain has interfered with your general activity?  7        Current Outpatient Medications:     amLODIPine (NORVASC) 5 MG tablet, Take 1 tablet by mouth Daily., Disp: 90 tablet, Rfl: 3    apixaban (ELIQUIS) 5 MG tablet tablet, Take 1 tablet by mouth Every 12 (Twelve) Hours for 30 days. Indications: Atrial Fibrillation, Disp: 60 tablet, Rfl: 0    ARIPiprazole (ABILIFY) 5 MG tablet, Take 1 tablet by mouth Daily., Disp: , Rfl:     cetirizine (zyrTEC) 5 MG tablet, Take 1 tablet by mouth Daily., Disp: , Rfl:     Diclofenac Sodium 4 %, Topiramate 2 %, cloNIDine HCl 0.2 %, Lidocaine HCl 5 %, Apply 1-2 g topically to the appropriate area as directed 3 (Three) to 4 (Four) times daily., Disp: 90 g, Rfl: 1    DULoxetine (CYMBALTA) 60 MG capsule, Take 2 capsules by mouth Every Night., Disp: , Rfl:     escitalopram (LEXAPRO) 10 MG tablet, Take 1 tablet by mouth Daily., Disp: , Rfl:     famotidine (PEPCID) 10 MG tablet, Take 4 tablets by mouth Every Night., Disp: , Rfl:     lamoTRIgine (LaMICtal) 100 MG tablet, Take 1 tablet by mouth Every Night., Disp: , Rfl:     losartan (COZAAR) 100 MG tablet, TAKE 1 TABLET BY MOUTH EVERY DAY, Disp: 90 tablet, Rfl: 4     pantoprazole (PROTONIX) 40 MG EC tablet, Take 1 tablet by mouth Daily., Disp: , Rfl:     pravastatin (PRAVACHOL) 40 MG tablet, TAKE 1 TABLET BY MOUTH EVERY DAY AT NIGHT, Disp: 90 tablet, Rfl: 3    traZODone (DESYREL) 50 MG tablet, Take 0.5 tablets by mouth At Night As Needed for Sleep., Disp: , Rfl:     Nintedanib Esylate (Ofev) 100 MG capsule, Take 1 capsule by mouth 2 (Two) Times a Day. (Patient not taking: Reported on 2/1/2024), Disp: , Rfl:     The following portions of the patient's history were reviewed and updated as appropriate: allergies, current medications, past family history, past medical history, past social history, past surgical history, and problem list.      REVIEW OF PERTINENT MEDICAL DATA    MRI OF THE LUMBAR SPINE WITHOUT CONTRAST     CLINICAL HISTORY: Lumbar radiculopathy with bilateral numbness and  tingling in legs and feet.     TECHNIQUE: MRI of the lumbar spine was obtained with sagittal T1,  proton-density, and T2-weighted images. Additionally, there are axial T1  and T2-weighted images through the lumbar spine.     COMPARISON: Lumbar spine MRI dated 10/12/2018.     FINDINGS:     There is loss of the usual lordotic curvature of the lumbar spine. There  is mild dextroconvex scoliotic curvature of the lumbar spine with its  apex centered at L3-L4. There are advanced degenerative disc changes  seen along the inner margin of the scoliotic curvature within the left  aspect of the L4 disc space. Adjacent degenerative endplate marrow  changes are seen. Similar findings are seen on the prior exam.     The conus medullaris terminates at the level of the lower body of L2 and  has normal signal intensity. The visualized distal thoracic spinal cord  also has normal signal intensity.     At L1-L2, there is a disc bulge eccentric to the right which minimally  indents the ventral subarachnoid space. No significant interval change  is seen since the prior exam.     At L2-L3, there is a new minimal disc  bulge that is eccentric to the  right. This only minimally indents the ventral subarachnoid space and  minimally narrows the right neural foramen.     At L3-L4, loss of foraminal height and a disc osteophyte complex  moderately narrows the left neural foramen. There is mild interval  progression of the degree of foraminal narrowing when compared to the  prior study. There is no significant degree of right foraminal narrowing  or canal stenosis.     At L4-L5, there is no significant degree of canal or right foraminal  narrowing. There is moderate left facet hypertrophic change which  minimally narrows the left neural foramen. Similar findings are noted on  the prior exam.     At L5-S1, there are advanced degenerative disc changes seen along the  right side of the disc space. Adjacent degenerative endplate marrow  changes are noted. There is moderate right foraminal compromise  secondary to loss of foraminal height, a disc osteophyte complex, and  mild-to-moderate facet hypertrophic change. There is no significant  degree of left foraminal narrowing. Again, no significant interval  change is seen when compared to the prior exam.     IMPRESSION:     There is loss of the usual lordotic curvature of the lumbar spine. There  is mild dextroconvex scoliotic curvature of the lumbar spine with its  apex centered at L3-L4. There are advanced degenerative disc changes  seen along the inner margins of the scoliotic curvature within the left  aspect of the L3-L4 disc space. Loss of foraminal height, a disc  osteophyte complex and facet arthropathy result in a moderate degree of  left L3-4 foraminal narrowing. There is mild interval progression of  this foraminal narrowing when compared to the prior exam.     At L5-S1, there is moderate right foraminal compromise secondary to loss  of foraminal height, a disc osteophyte complex, and facet hypertrophic  change. No significant interval change is seen at this level.     Remaining  "multilevel degenerative phenomena within the lumbar spine are  as discussed in detail above.     This report was finalized on 12/29/2023 3:57 PM by Dr. Mikel Wood M.D  on Workstation: [id number]    Review of Systems   Gastrointestinal:  Negative for constipation and diarrhea.   Genitourinary:  Negative for difficulty urinating.   Musculoskeletal:  Positive for back pain.   Neurological:  Positive for weakness (right leg). Negative for numbness.   Psychiatric/Behavioral:  Negative for sleep disturbance and suicidal ideas. The patient is nervous/anxious.        I have reviewed and confirmed the accuracy of the ROS as documented by the MA/LPN/RN YULISA Nixon    Vitals:    02/01/24 1415   BP: 119/85   Pulse: 97   Resp: 18   Temp: 97.5 °F (36.4 °C)   SpO2: 94%   Weight: 84.4 kg (186 lb)   Height: 157.5 cm (62\")   PainSc:   1   PainLoc: Back         Objective       Physical Exam  Vitals and nursing note reviewed.   Constitutional:       Appearance: Normal appearance. She is obese.   HENT:      Head: Normocephalic.   Pulmonary:      Effort: Pulmonary effort is normal.   Musculoskeletal:      Lumbar back: Tenderness (MODERATE PAIN TO PALPATION OVER THE RIGHT SI JOINT SPACE; +PATRICKS/+SI COMPRESSION/+SI THRUST TESTS ON RIGHT) present. Decreased range of motion.        Back:    Skin:     General: Skin is warm and dry.   Neurological:      General: No focal deficit present.      Mental Status: She is alert and oriented to person, place, and time.      Cranial Nerves: Cranial nerves 2-12 are intact.      Sensory: Sensation is intact.      Motor: Motor function is intact.      Gait: Gait abnormal.      Deep Tendon Reflexes:      Reflex Scores:       Patellar reflexes are 1+ on the right side and 1+ on the left side.       Achilles reflexes are 1+ on the right side and 1+ on the left side.  Psychiatric:         Mood and Affect: Mood normal.         Behavior: Behavior normal.         Thought Content: Thought content " normal.         Judgment: Judgment normal.         Assessment & Plan   Diagnoses and all orders for this visit:    1. Sacroiliitis (Primary)    2. DDD (degenerative disc disease), lumbar    3. Lumbar facet arthropathy        --- Katelyn Sr reports a pain score of 1.  Given her pain assessment as noted, treatment options were discussed and the following options were decided upon as a follow-up plan to address the patient's pain: educational materials on pain management, home exercises and therapy, steroid injections, and use of non-medical modalities (ice, heat, stretching and/or behavior modifications).    --- Based on patient's physical exam findings with pain reproducible with SI provoking maneuvers I recommend proceeding with #1 diagnostic/therapeutic right sacroiliac joint injection.  The risk and benefits of the procedure been discussed with the patient and she has had all of her questions addressed.  The patient states that she is very easily sedated and request that the diazepam given prior to the injection be only 2.5 mg.   --- This patient has been newly initiated on Eliquis with recurrence of atrial fibrillation therefore I will contact her cardiologist, Dr. Velazquez, in order to determine if she would be able to hold the medication 72 hours prior to undergoing any other type of neuraxial procedures.  Since we are proceeding with a sacroiliac joint injection at this time we will not have to have her hold the medication.  --- Follow-up 4 weeks for further evaluation and treat recommendation      Pain / Disability Scale    The scale used for measurement of pain and/or disability for this patient was the [place] back pain disability scale.  The score was 65 on 02/01/2024          ----------  Education about Sacroiliac joint injections:  This Sacroiliac joint injection (blockade) we have suggested is intended for diagnostic purposes, with the intent of offering the patient Radiofrequency thermal rhizotomy  (RF) of the sensory branches to the joint if the block is diagnostically effective.  The diagnostic blockade is necessary to determine the likelihood that RF therapy could be efficacious in providing long term relief to the patient.    In this procedure, the sacroiliac joint is aligned with imaging, and under image guidance a needle is placed with the needle tip into the joint.  The needle position is confirmed to be appropriately in the joint before injection of medication into the joint.  When xray fluoroscopy is used, contrast dye is used to confirm a proper arthrogram (i.e., outline of the joint).  When ultrasound is used, IV fluid (normal saline) is injected to see the flow of the fluid into the joint.  Once confirmed, then the medication can be injected into the joint.  Oftentimes this medication is a combination of local anesthetics (for diagnostic purposes) and also a steroid (to decrease irritation & inflammation in the joint, also known as sacroilitis).      Medically, a successful RF procedure should provide a patient with 50% pain relief or more for at least 6 months.  Clinical experience suggests that successful patients receive relief more in the range of 12 months on average.  We also discussed that many patients receive therapeutic success from the intraarticular joint injection, and may not require RF ablation.  If a patient receives more than 8 weeks of relief from joint injection, then occasional repeat joint blocks for therapeutic purposes is a very reasonable alternative therapy.  This course of therapy is consistent with our LCDs according to our CMS  in the area, and therefore other insurance providers should follow accordingly.  We will monitor our patients to screen for these therapeutic responders and will offer RF therapy only when necessary.      We discussed that joint injections & also RF procedures are not without risks.  Best practices regarding anticoagulant use &  neuraxial procedures will be respected.  Oftentimes a patient on an anticoagulant can be offered a joint injection safely, but again this is not risk-free, and such patients give consent with regards to this increased bleeding risk, which could cause problems including but not limited to worsening of pain, nerve damage, or muscle damage.  Patients that are ill or otherwise may be at risk for sepsis will not have their spines accessed by neuraxial injections of any type.  This patient will not be offered these therapies if there is an increased risk.   We discussed that there is a risk of postprocedural pain and also a risk of worsening of clinical picture with these procedures as with any neuraxial procedure.    ----------       ABDOULAYE REPORT  ABDOULAYE report has been reviewed and scanned into the patient's chart.    As the clinician, I personally reviewed the ABDOULAYE from 2/1/24 while the patient was in the office today.        Dictated utilizing Dragon dictation.

## 2024-02-02 ENCOUNTER — TELEPHONE (OUTPATIENT)
Age: 74
End: 2024-02-02
Payer: MEDICARE

## 2024-02-02 NOTE — TELEPHONE ENCOUNTER
Pt is scheduled for a lumbar injection in the near future with [facility]. They would like to know if she is clear and if she can hold her Eliquis for 3 days prior to her procedure.    We saw her last on 9/21/23 in the office and she was seen more recently at [facility] on 1/23/24 -1/27/24 for AFib with RVR. And Eliquis was just started as she was a new re-onset Afib.    Do we need to see her back in the office first, I assume? We aren't due to see her back until 6/10/24 for her regular follow up appt.  Do we need to wait until her Zio is finished before we see her?    Please advise.    Thanks,  Karyn

## 2024-02-05 ENCOUNTER — READMISSION MANAGEMENT (OUTPATIENT)
Dept: CALL CENTER | Facility: HOSPITAL | Age: 74
End: 2024-02-05
Payer: MEDICARE

## 2024-02-05 RX ORDER — APIXABAN 5 MG/1
5 TABLET, FILM COATED ORAL EVERY 12 HOURS SCHEDULED
Qty: 60 TABLET | Refills: 0 | Status: CANCELLED | OUTPATIENT
Start: 2024-02-05 | End: 2024-03-06

## 2024-02-05 NOTE — OUTREACH NOTE
Medical Week 2 Survey      Flowsheet Row Responses   [facility] patient discharged from? [place]   Does the patient have one of the following disease processes/diagnoses(primary or secondary)? Other   Week 2 attempt successful? Yes   Call start time 1655   Discharge diagnosis A-fib   Call end time 1655   Person spoke with today (if not patient) and relationship Patient   Meds reviewed with patient/caregiver? Yes   Does the patient have all medications ordered at discharge? Yes   Is the patient taking all medications as directed (includes completed medication regime)? No   What is preventing the patient from taking all medications as directed? Other  [patient reports that she has not been taking the hydralazine listed on the AVS as continue because someone at hospital told her to DC. It is listed in computer as a DC at discharge.]   Nursing Interventions Advised patient to call provider  [Advised to monitor home B/P and contact provider to clarify.]   Does the patient have a primary care provider?  Yes   Does the patient have an appointment with their PCP within 7 days of discharge? Yes   Comments regarding PCP Follow up with Pierre Chaudhry PCP. Patient has had PCP f/u since discharge.   Has the patient kept scheduled appointments due by today? Yes   Comments Patient to follow up with Dr Velazquez. Next appt shows for 6/10   Has home health visited the patient within 72 hours of discharge? N/A   Psychosocial issues? No   Did the patient receive a copy of their discharge instructions? Yes   Nursing interventions Reviewed instructions with patient   What is the patient's perception of their health status since discharge? Improving   Is the patient/caregiver able to teach back signs and symptoms related to disease process for when to call PCP? Yes   Is the patient/caregiver able to teach back signs and symptoms related to disease process for when to call 911? Yes   Is the patient/caregiver able to teach back the  hierarchy of who to call/visit for symptoms/problems? PCP, Specialist, Home health nurse, Urgent Care, ED, 911 Yes   If the patient is a current smoker, are they able to teach back resources for cessation? Not a smoker   Week 2 Call Completed? Yes   Is the patient interested in additional calls from an ambulatory ? No   Would this patient benefit from a Referral to [facility] Social Work? No   Call end time 2293            AMELIA DYER - Registered Nurse

## 2024-02-05 NOTE — OUTREACH NOTE
Medical Week 2 Survey      Flowsheet Row Responses   [facility] patient discharged from? [place]   Does the patient have one of the following disease processes/diagnoses(primary or secondary)? Other   Week 2 attempt successful? Yes   Call start time 1635   Discharge diagnosis A-fib   Call end time 1655   Person spoke with today (if not patient) and relationship Patient   Meds reviewed with patient/caregiver? Yes   Does the patient have all medications ordered at discharge? Yes   Is the patient taking all medications as directed (includes completed medication regime)? No   What is preventing the patient from taking all medications as directed? Other  [patient reports that she has not been taking the hydralazine listed on the AVS as continue because someone at hospital told her to DC. It is listed in computer as a DC at discharge.]   Nursing Interventions Advised patient to call provider  [Advised to monitor home B/P and contact provider to clarify.]   Does the patient have a primary care provider?  Yes   Does the patient have an appointment with their PCP within 7 days of discharge? Yes   Comments regarding PCP Follow up with Pierre Chaudhry PCP. Patient has had PCP f/u since discharge.   Has the patient kept scheduled appointments due by today? Yes   Comments Patient to follow up with Dr Velazquez. Next appt shows for 6/10   Has home health visited the patient within 72 hours of discharge? N/A   Psychosocial issues? No   Did the patient receive a copy of their discharge instructions? Yes   Nursing interventions Reviewed instructions with patient   What is the patient's perception of their health status since discharge? Improving   Is the patient/caregiver able to teach back signs and symptoms related to disease process for when to call PCP? Yes   Is the patient/caregiver able to teach back signs and symptoms related to disease process for when to call 911? Yes   Is the patient/caregiver able to teach back the  hierarchy of who to call/visit for symptoms/problems? PCP, Specialist, Home health nurse, Urgent Care, ED, 911 Yes   If the patient is a current smoker, are they able to teach back resources for cessation? Not a smoker   Week 2 Call Completed? Yes   Is the patient interested in additional calls from an ambulatory ? No   Would this patient benefit from a Referral to [facility] Social Work? No   Call end time [id number]            AMELIA DYER - Registered Nurse

## 2024-02-06 ENCOUNTER — TELEPHONE (OUTPATIENT)
Dept: NEUROSURGERY | Facility: CLINIC | Age: 74
End: 2024-02-06
Payer: MEDICARE

## 2024-02-06 ENCOUNTER — TRANSCRIBE ORDERS (OUTPATIENT)
Dept: SURGERY | Facility: SURGERY CENTER | Age: 74
End: 2024-02-06
Payer: MEDICARE

## 2024-02-06 ENCOUNTER — TELEPHONE (OUTPATIENT)
Age: 74
End: 2024-02-06
Payer: MEDICARE

## 2024-02-06 DIAGNOSIS — Z41.9 SURGERY, ELECTIVE: Primary | ICD-10-CM

## 2024-02-06 NOTE — TELEPHONE ENCOUNTER
Richard, see below. Can you call and get her set up for a hospital follow up up with one of the [facility] in the next week and with Dr. Velazquez, hopefully the next month.    Thanks a bunch!    BONITA Sheppard

## 2024-02-06 NOTE — TELEPHONE ENCOUNTER
When either of you get a chance, can you call the pt about her Zio Monitor. She has some questions about how well it is staying on.    She can be reached at home P#[phone].    Thanks,  Karyn

## 2024-02-06 NOTE — TELEPHONE ENCOUNTER
"Called and Lvm to inform of message from Jeannie \"Please advise the patient that I reviewed the x-rays and there is nothing overtly concerning.  She has some scoliosis as well as degenerative changes but overall looks fairly stable.  She should keep the appointment in April so we can follow-up with her treatment response.\"  "

## 2024-02-07 NOTE — TELEPHONE ENCOUNTER
I called and let her know that her Hydralazine and her Carvedilol had been stopped.    She wanted me to let you know that off of the Carvedilol, her HR has been running 100-110 even at rest sometimes. She said that even though it has been running fast, it was regular so she didn't think it was Afib again. She was concerned since she had been taking Carvedilol for quite a few years and wondered if she may need a medication for her HR.    I have her on for a follow up with us on 2/9/24 as well.    Please advise.    Thanks,  Karyn

## 2024-02-08 NOTE — TELEPHONE ENCOUNTER
I think we should wait on the monitor results before making any changes. She should keep her follow up as scheduled.

## 2024-02-12 RX ORDER — APIXABAN 5 MG/1
5 TABLET, FILM COATED ORAL EVERY 12 HOURS SCHEDULED
Qty: 60 TABLET | Refills: 0 | Status: CANCELLED | OUTPATIENT
Start: 2024-02-05 | End: 2024-03-06

## 2024-02-14 ENCOUNTER — READMISSION MANAGEMENT (OUTPATIENT)
Dept: CALL CENTER | Facility: HOSPITAL | Age: 74
End: 2024-02-14
Payer: MEDICARE

## 2024-02-16 ENCOUNTER — TELEPHONE (OUTPATIENT)
Age: 74
End: 2024-02-16
Payer: MEDICARE

## 2024-02-19 ENCOUNTER — READMISSION MANAGEMENT (OUTPATIENT)
Dept: CALL CENTER | Facility: HOSPITAL | Age: 74
End: 2024-02-19
Payer: MEDICARE

## 2024-02-19 ENCOUNTER — TELEPHONE (OUTPATIENT)
Age: 74
End: 2024-02-19
Payer: MEDICARE

## 2024-02-19 NOTE — OUTREACH NOTE
Medical Week 3 Survey      Flowsheet Row Responses   [facility] patient discharged from? [place]   Does the patient have one of the following disease processes/diagnoses(primary or secondary)? Other   Week 3 attempt successful? Yes   Call start time 1616   Call end time 1627   Discharge diagnosis A-fib   Meds reviewed with patient/caregiver? Yes   Is the patient having any side effects they believe may be caused by any medication additions or changes? No   Is the patient taking all medications as directed (includes completed medication regime)? Yes   Does the patient have a primary care provider?  Yes   Does the patient have an appointment with their PCP within 7 days of discharge? Yes   Has the patient kept scheduled appointments due by today? Yes   Has home health visited the patient within 72 hours of discharge? N/A   Psychosocial issues? No   Comments /101 this afternoon, diastolic has been over 100 since d/c, HR 's. Pt has a call into her Dr regarding her BP meds that were dc'd when discharged   What is the patient's perception of their health status since discharge? Same  [Pt stated feeling fine, had one episode of racing HR, BP has been elevated,denies headache or chest pain]   Is the patient/caregiver able to teach back the hierarchy of who to call/visit for symptoms/problems? PCP, Specialist, Home health nurse, Urgent Care, ED, 911 Yes   Week 3 Call Completed? Yes   Graduated Yes   Is the patient interested in additional calls from an ambulatory ? No   Would this patient benefit from a Referral to Amb Social Work? No   Call end time 1627            Oliva COREA - Registered Nurse

## 2024-02-19 NOTE — TELEPHONE ENCOUNTER
See Telephone message from 2/6/24.    Pt called she has been having higher than normal BPs for the last week.    Her BP has been running 150-160s/90-100s and her HR has been running 90-100s. She has also been complaining of having a headache.    During her recent admission her Carvedilol 25mg bid and her Hydralazine 50 mg bid were stopped. The Carvedilol was reduced then increased back and then stopped because of bradycardia and pauses, EP saw her. Her Hydralazine was never ordered for admission, although I was having a hard time finding documentation as to why it wasn't ordered.    So she is only taking Amlodipine 5mg daily at this point.    Please advise.    Thanks,  Karyn

## 2024-02-20 RX ORDER — HYDRALAZINE HYDROCHLORIDE 50 MG/1
50 TABLET, FILM COATED ORAL 2 TIMES DAILY
Start: 2024-02-20

## 2024-02-21 ENCOUNTER — OFFICE VISIT (OUTPATIENT)
Age: 74
End: 2024-02-21
Payer: MEDICARE

## 2024-02-21 VITALS
BODY MASS INDEX: 34.96 KG/M2 | WEIGHT: 190 LBS | DIASTOLIC BLOOD PRESSURE: 92 MMHG | HEIGHT: 62 IN | SYSTOLIC BLOOD PRESSURE: 142 MMHG | HEART RATE: 97 BPM

## 2024-02-21 DIAGNOSIS — I27.20 PULMONARY HTN: Primary | ICD-10-CM

## 2024-02-21 DIAGNOSIS — E78.00 HYPERCHOLESTEROLEMIA: ICD-10-CM

## 2024-02-21 DIAGNOSIS — I10 ESSENTIAL HYPERTENSION: ICD-10-CM

## 2024-02-21 DIAGNOSIS — E78.2 MIXED HYPERLIPIDEMIA: ICD-10-CM

## 2024-02-21 RX ORDER — CARVEDILOL 3.12 MG/1
3.12 TABLET ORAL 2 TIMES DAILY
Qty: 180 TABLET | Refills: 3 | Status: SHIPPED | OUTPATIENT
Start: 2024-02-21

## 2024-02-21 NOTE — PROGRESS NOTES
Katelyn Sr  1950  Date of Office Visit: 02/21/24  Encounter Provider: Aleks Velazquez MD  Place of Service: [facility] CARDIOLOGY      CHIEF COMPLAINT:  Paroxysmal atrial fibrillation with RVR  Sinus bradycardia intermittently.      HISTORY OF PRESENT ILLNESS:  73-year-old female with a medical history of essential hypertension, hyperlipidemia, interstitial lung disease, and chronic dyspnea who presented Feb 2023 with report of shortness of breath that was worsened along with palpitations and an irregular heartbeat.  She reported several episodes of nausea and vomiting over the past several days prior to presenting to the ER.  Per her report the symptoms started around 7 PM. Unfortunately secondary to her nausea she was not been taking her p.o. carvedilol therapy.  High-sensitivity troponin was 11 and then 16.  X-ray of the chest look normal.  Initial EKG showed atrial fibrillation with a rapid ventricular rate.  Repeat EKG showed conversion to sinus rhythm.    She had some issues with bradycardia and occasional pauses that look to be predominantly postconversion and also infrequently in the setting of atrial fibrillation.  The pauses were not longer than 3 seconds.  She had no syncope.  She had a Holter monitor the placed after that with episodes of nonsustained supraventricular tachycardia but no atrial fibrillation or pauses.  Her heart rate has been running higher as of late and her blood pressure has been up.  She is been restarted on the hydralazine therapy    Review of Systems   Constitutional: Negative for fever and malaise/fatigue.   HENT:  Negative for nosebleeds and sore throat.    Eyes:  Negative for blurred vision and double vision.   Cardiovascular:  Negative for chest pain, claudication, palpitations and syncope.   Respiratory:  Negative for cough, shortness of breath and snoring.    Endocrine: Negative for cold intolerance, heat intolerance and  polydipsia.   Skin:  Negative for itching, poor wound healing and rash.   Musculoskeletal:  Negative for joint pain, joint swelling, muscle weakness and myalgias.   Gastrointestinal:  Negative for abdominal pain, melena, nausea and vomiting.   Neurological:  Negative for light-headedness, loss of balance, seizures, vertigo and weakness.   Psychiatric/Behavioral:  Negative for altered mental status and depression.        Past Medical History:   Diagnosis Date    Abnormal ECG 5/21/18    Done in PCP office.  Hx of Atrial fib-?2007    Arthritis of neck     Atrial fibrillation     Cervical disc disorder 8/03    ACDF C6-7, 12/03/2003;  Dr. 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    CTS (carpal tunnel syndrome)     DDD (degenerative disc disease), lumbosacral 11/09/2018    Depression     Fibrosis of lung     Fracture of wrist     Fracture, finger     Generalized anxiety disorder 07/14/2020    GERD (gastroesophageal reflux disease)     Hip arthrosis     Hyperlipidemia     Hypersensitivity pneumonitis     vs pulmonary fibrosis    Hypersensitivity pneumonitis     Hypersomnia due to another medical condition 10/28/2017    Hypertension     Knee swelling     Low back strain     Lower back pain 07/26/2019    Myocardial infarction     Neck strain     Neuroma of foot     Non-STEMI (non-ST elevated myocardial infarction) 05/21/2018    Nonsenile cataract     FUENTES on CPAP 10/19/2017    Palpitations 07/23/2019    Peripheral vestibulopathy of both ears 07/14/2020    Presence of artificial intra-ocular lens     Scoliosis     Sleep apnea     Thoracic disc disorder     Thyroid disease     Vertigo 07/14/2020    Vestibular neuronitis 07/17/2020       The following portions of the patient's history were reviewed and updated as appropriate: Social history , Family history, and Surgical history     Current Outpatient Medications on File Prior to  "Visit   Medication Sig Dispense Refill    amLODIPine (NORVASC) 5 MG tablet Take 1 tablet by mouth Daily. 90 tablet 3    apixaban (ELIQUIS) 5 MG tablet tablet Take 1 tablet by mouth Every 12 (Twelve) Hours for 30 days. Indications: Atrial Fibrillation 60 tablet 0    ARIPiprazole (ABILIFY) 5 MG tablet Take 1 tablet by mouth Daily.      cetirizine (zyrTEC) 5 MG tablet Take 1 tablet by mouth Daily.      Diclofenac Sodium 4 %, Topiramate 2 %, cloNIDine HCl 0.2 %, Lidocaine HCl 5 % Apply 1-2 g topically to the appropriate area as directed 3 (Three) to 4 (Four) times daily. 90 g 1    DULoxetine (CYMBALTA) 60 MG capsule Take 2 capsules by mouth Every Night.      escitalopram (LEXAPRO) 10 MG tablet Take 1 tablet by mouth Daily.      famotidine (PEPCID) 10 MG tablet Take 4 tablets by mouth Every Night.      hydrALAZINE (APRESOLINE) 50 MG tablet Take 1 tablet by mouth 2 (Two) Times a Day.      lamoTRIgine (LaMICtal) 100 MG tablet Take 1 tablet by mouth Every Night.      losartan (COZAAR) 100 MG tablet TAKE 1 TABLET BY MOUTH EVERY DAY 90 tablet 4    Nintedanib Esylate (Ofev) 100 MG capsule Take 1 capsule by mouth 2 (Two) Times a Day.      pantoprazole (PROTONIX) 40 MG EC tablet Take 1 tablet by mouth Daily.      pravastatin (PRAVACHOL) 40 MG tablet TAKE 1 TABLET BY MOUTH EVERY DAY AT NIGHT 90 tablet 3    traZODone (DESYREL) 50 MG tablet Take 0.5 tablets by mouth At Night As Needed for Sleep.       No current facility-administered medications on file prior to visit.       Allergies   Allergen Reactions    Simvastatin Myalgia     Cramps in thighs      Erythromycin Rash    Penicillins Rash       Vitals:    02/21/24 1357   BP: 142/92   Pulse: 97   Weight: 86.2 kg (190 lb)   Height: 157.5 cm (62\")     Body mass index is 34.75 kg/m².   Constitutional:       Appearance: Well-developed.   Eyes:      General: No scleral icterus.     Conjunctiva/sclera: Conjunctivae normal.   HENT:      Head: Normocephalic and atraumatic.   Neck:      " Thyroid: No thyromegaly.      Vascular: Normal carotid pulses. No carotid bruit, hepatojugular reflux or JVD.      Trachea: No tracheal deviation.   Pulmonary:      Effort: No respiratory distress.      Breath sounds: Normal breath sounds. No decreased breath sounds. No wheezing. No rhonchi. No rales.   Chest:      Chest wall: Not tender to palpatation.   Cardiovascular:      Normal rate. Regular rhythm.      No gallop.    Pulses:     Carotid: 2+ bilaterally.     Radial: 2+ bilaterally.     Femoral: 2+ bilaterally.     Dorsalis pedis: 2+ bilaterally.     Posterior tibial: 2+ bilaterally.  Edema:     Peripheral edema absent.   Abdominal:      General: Bowel sounds are normal. There is no distension.      Palpations: Abdomen is soft.      Tenderness: There is no abdominal tenderness.   Musculoskeletal:         General: No deformity.      Cervical back: Normal range of motion and neck supple. Skin:     Findings: No erythema or rash.   Neurological:      Mental Status: Alert and oriented to person, place, and time.      Sensory: No sensory deficit.   Psychiatric:         Behavior: Behavior normal.         Lab Results   Component Value Date    WBC 8.34 01/27/2024    HGB 12.1 01/27/2024    HCT 39.3 01/27/2024    MCV 85.2 01/27/2024     01/27/2024       Lab Results   Component Value Date    GLUCOSE 98 01/27/2024    BUN 15 01/27/2024    CREATININE 1.00 01/27/2024    EGFR 60.4 01/27/2024    BCR 15.0 01/27/2024    K 4.3 01/27/2024    CO2 25.6 01/27/2024    CALCIUM 9.0 01/27/2024    ALBUMIN 3.3 (L) 01/24/2024    BILITOT 0.4 01/24/2024    AST 11 01/24/2024    ALT 7 01/24/2024       Lab Results   Component Value Date    GLUCOSE 98 01/27/2024    CALCIUM 9.0 01/27/2024     01/27/2024    K 4.3 01/27/2024    CO2 25.6 01/27/2024     (H) 01/27/2024    BUN 15 01/27/2024    CREATININE 1.00 01/27/2024    EGFR 60.4 01/27/2024    BCR 15.0 01/27/2024    ANIONGAP 9.4 01/27/2024       Lab Results   Component Value Date     CHOL 196 09/21/2023    TRIG 129 09/21/2023    HDL 54 09/21/2023     (H) 09/21/2023         ECG 12 Lead    Date/Time: 2/21/2024 2:05 PM  Performed by: Aleks Velazquez MD    Authorized by: Aleks Velazquez MD  Comparison: compared with previous ECG from 1/25/2024  Similar to previous ECG  Comparison to previous ECG: Sinus is new    Rhythm: sinus rhythm  Rate: normal  QRS axis: left           Results for orders placed during the hospital encounter of 09/21/23    Adult Transthoracic Echo Complete W/ Cont if Necessary Per Protocol    Interpretation Summary    Left ventricular systolic function is normal. Calculated left ventricular EF = 63.2%    Left ventricular diastolic function was normal.      DISCUSSION/SUMMARY  Very pleasant 73-year-old female with a medical history of essential hypertension, hyperlipidemia, interstitial lung disease, chronic dyspnea and paroxysmal atrial fibrillation with a rapid ventricular rate.  Her blood pressure has been elevated as of late and she has started to run a little bit quicker with her heart rate although she is in sinus rhythm.    1.  Paroxysmal atrial fibrillation with RVR.  Currently sinus rhythm.  - Start low-dose carvedilol therapy 3.125 mg p.o. every 12 hours again.  She has not had any recent issues with pauses documented on her Holter monitor while off of that.  Resting heart rate in the 90s.  - Continue Eliquis.  No recent bleeding complications.    2.  Essential hypertension: Not at goal currently.  We recently have restarted the hydralazine therapy at 50 mg p.o. every 8 hours.  This will continue along with the losartan, amlodipine, and I will restart low-dose carvedilol therapy at 3.125 mg p.o. every 12 hours.  She will call me in a couple of weeks with repeat blood pressure measurements to assess whether we need to increase the therapy back up

## 2024-02-23 ENCOUNTER — TELEPHONE (OUTPATIENT)
Age: 74
End: 2024-02-23
Payer: MEDICARE

## 2024-02-23 NOTE — TELEPHONE ENCOUNTER
Pt called because she had a question about the hydralazine dose.    When you told me to have her resume the Hydralazine, I had her taking it as 50mg twice daily because that is what she was doing prior to her admission. That is also what was documented on her med list.    Your note from Wed 2/21 says that she restarted the Hydralazine as 50mg three times daily.     So she would like to know which way should she take it? You also restarted her Carvedilol at 3.125mg twice daily as well, on Wed.    Please advise.    Thanks,  Karyn

## 2024-03-13 NOTE — PROGRESS NOTES
Patient: Katelyn Sr  YOB: 1950  Date of Service: 3/13/2024    Chief Complaints: Right knee pain    Subjective:    History of Present Illness: Pt is seen in the office today with complaints of right knee pain I last saw her in July of last year with thought she had some patellofemoral issues and squaring off of the lateral femoral condyle she still had good maintenance with joint space medially and laterally most of her symptoms she is having more lateral knee symptoms as well as patella patellofemoral but a lot more lateral right now it does catch and lock        Allergies:   Allergies   Allergen Reactions    Simvastatin Myalgia     Cramps in thighs      Erythromycin Rash    Penicillins Rash       Medications:   Home Medications:  Current Outpatient Medications on File Prior to Visit   Medication Sig    amLODIPine (NORVASC) 5 MG tablet Take 1 tablet by mouth Daily.    apixaban (ELIQUIS) 5 MG tablet tablet Take 1 tablet by mouth Every 12 (Twelve) Hours for 30 days. Indications: Atrial Fibrillation    ARIPiprazole (ABILIFY) 5 MG tablet Take 1 tablet by mouth Daily.    carvedilol (COREG) 6.25 MG tablet Take 1 tablet by mouth 2 (Two) Times a Day.    cetirizine (zyrTEC) 5 MG tablet Take 1 tablet by mouth Daily.    Diclofenac Sodium 4 %, Topiramate 2 %, cloNIDine HCl 0.2 %, Lidocaine HCl 5 % Apply 1-2 g topically to the appropriate area as directed 3 (Three) to 4 (Four) times daily.    DULoxetine (CYMBALTA) 60 MG capsule Take 2 capsules by mouth Every Night.    escitalopram (LEXAPRO) 10 MG tablet Take 1 tablet by mouth Daily.    famotidine (PEPCID) 10 MG tablet Take 4 tablets by mouth Every Night.    hydrALAZINE (APRESOLINE) 50 MG tablet Take 1 tablet by mouth 2 (Two) Times a Day.    lamoTRIgine (LaMICtal) 100 MG tablet Take 1 tablet by mouth Every Night.    losartan (COZAAR) 100 MG tablet TAKE 1 TABLET BY MOUTH EVERY DAY    Nintedanib Esylate (Ofev) 100 MG capsule Take 1 capsule by mouth 2 (Two)  Times a Day.    pantoprazole (PROTONIX) 40 MG EC tablet Take 1 tablet by mouth Daily.    pravastatin (PRAVACHOL) 40 MG tablet TAKE 1 TABLET BY MOUTH EVERY DAY AT NIGHT    traZODone (DESYREL) 50 MG tablet Take 0.5 tablets by mouth At Night As Needed for Sleep.     No current facility-administered medications on file prior to visit.     Current Medications:  Scheduled Meds:  Continuous Infusions:No current facility-administered medications for this visit.    PRN Meds:.    I have reviewed the patient's medical history in detail and updated the computerized patient record.  Review and summarization of old records include:    Past Medical History:   Diagnosis Date    Abnormal ECG 5/21/18    Done in PCP office.  Hx of Atrial fib-?2007    Arthritis of neck     Atrial fibrillation     Cervical disc disorder 8/03    ACDF C6-7, 12/03/2003;  Dr. 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    CTS (carpal tunnel syndrome)     DDD (degenerative disc disease), lumbosacral 11/09/2018    Depression     Fibrosis of lung     Fracture of wrist     Fracture, finger     Generalized anxiety disorder 07/14/2020    GERD (gastroesophageal reflux disease)     Hip arthrosis     Hyperlipidemia     Hypersensitivity pneumonitis     vs pulmonary fibrosis    Hypersensitivity pneumonitis     Hypersomnia due to another medical condition 10/28/2017    Hypertension     Knee swelling     Low back strain     Lower back pain 07/26/2019    Myocardial infarction     Neck strain     Neuroma of foot     Non-STEMI (non-ST elevated myocardial infarction) 05/21/2018    Nonsenile cataract     FUENTES on CPAP 10/19/2017    Palpitations 07/23/2019    Peripheral vestibulopathy of both ears 07/14/2020    Presence of artificial intra-ocular lens     Scoliosis     Sleep apnea     Thoracic disc disorder     Thyroid disease     Vertigo 07/14/2020    Vestibular neuronitis 07/17/2020         Past Surgical History:   Procedure Laterality Date    CARDIAC CATHETERIZATION N/A 05/22/2018    Procedure: Left Heart Cath;  Surgeon: Aleks Velazquez MD;  Location[facility]AS[facility];  Service: Cardiovascular    CARDIAC CATHETERIZATION N/A 05/22/2018    Procedure: Coronary angiography;  Surgeon: Aleks Velazquez MD;  Location: [facility] C[facility]ASIVE LOC[facility];  Service: Cardiovascular    CARDIAC CATHETERIZATION N/A 05/22/2018    Procedure: Left ventriculography;  Surgeon: Aleks Velazquez MD;  Location: [facility] C[facility] LOC[facility];  Service: Cardiovascular    CARDIAC CATHETERIZATION N/A 05/22/2018    Procedure: Peripheral angiography;  Surgeon: Aleks Velazquez MD;  Location: [facility] C[facility]ASIVE LOC[facility];  Service: Cardiovascular    CATARACT EXTRACTION WITH INTRAOCULAR LENS IMPLANT Bilateral     COLONOSCOPY N/A 03/08/2023    Procedure: COLONOSCOPY to cecum/TI with cold biopsies;  Surgeon: Hamilton Francicso MD;  Location: [facility] ENDOSCOPY;  Service: Gastroenterology;  Laterality: N/A;  pre- diverticulitis  post- diverticulosis with stricture    DIAGNOSTIC LAPAROSCOPY  1990    r/o endometriosis    ENDOSCOPY N/A 03/08/2023    Procedure: ESOPHAGOGASTRODUODENOSCOPY with cold biopsies and 52 Fr Kam Dilatation;  Surgeon: Hamilton Francisco MD;  Location: [facility] ENDOSCOPY;  Service: Gastroenterology;  Laterality: N/A;  pre- dysphagia  post- hiatal hernia, gastric polyps, esophageal ring @ 32    NECK SURGERY  12/03/2003    ACDF C6-7-Dr. Nix     RECTAL SURGERY  1992    fistula/abscess/hemorrhoid        Social History     Occupational History    Occupation: RETIRED RN    Tobacco Use    Smoking status: Never     Passive exposure: Never    Smokeless tobacco: Never    Tobacco comments:     caffeine use: TEA OCCASSIONALLY   Vaping Use    Vaping status: Never Used   Substance and Sexual Activity    Alcohol use: No    Drug use: Never    Sexual activity: Not Currently      Social  History     Social History Narrative    Not on file        Family History   Problem Relation Age of Onset    Alzheimer's disease Mother     Heart failure Father     Arrhythmia Father     Heart attack Father         MI at age 56,  at 63.    Diabetes Sister     Atrial fibrillation Sister     Colon cancer Maternal Grandmother     Stroke Paternal Grandmother     Malig Hyperthermia Neg Hx        ROS: 14 point review of systems was performed and was negative except for documented findings in HPI and today's encounter.     Allergies:   Allergies   Allergen Reactions    Simvastatin Myalgia     Cramps in thighs      Erythromycin Rash    Penicillins Rash     Constitutional:  Denies fever, shaking or chills   Eyes:  Denies change in visual acuity   HENT:  Denies nasal congestion or sore throat   Respiratory:  Denies cough or shortness of breath   Cardiovascular:  Denies chest pain or severe LE edema   GI:  Denies abdominal pain, nausea, vomiting, bloody stools or diarrhea   Musculoskeletal:  Numbness, tingling, or loss of motor function only as noted above in history of present illness.  : Denies painful urination or hematuria  Integument:  Denies rash, lesion or ulceration   Neurologic:  Denies headache or focal weakness  Endocrine:  Denies lymphadenopathy  Psych:  Denies confusion or change in mental status   Hem:  Denies active bleeding      Physical Exam: 73 y.o. female  Wt Readings from Last 3 Encounters:   24 86.2 kg (190 lb)   24 84.4 kg (186 lb)   24 82.3 kg (181 lb 8 oz)       There is no height or weight on file to calculate BMI.    There were no vitals filed for this visit.  Vital signs reviewed.   General Appearance:    Alert, cooperative, in no acute distress                    Ortho exam             Physical exam of the right knee reveals no effusion no redness.  The patient does have tenderness about the lateral joint line.  No tenderness about the medial joint line.  A negative bounce  home and a positive lateral Brook.    Patient has a stable ligamentous exam.  The patient has a negative Lachman and negative anterior drawer and a negative pivot shift.  Quads are reasonable and symmetric bilaterally.  Calf is soft and nontender.  There is no overlying skin changes no lymphedema lymphadenopathy.  Patient has good hip range of motion full symmetric and asymptomatic and a normal ankle exam     Assessment: Right knee lateral meniscus tear and degenerative changes I am going to get an MRI to see what is going on in that lateral compartment if it is more arthritis than we think that I would recommend arthroplasty if she is got a lateral meniscus tear that can be addressed and would consider that    Plan: Is as above  Follow up as indicated.  Ice, elevate, and rest as needed.  Discussed conservative measures of pain control including ice, bracing.  Also talked about the importance of strengthening and maintaining ideal body weight    Angy Galvez M.D.

## 2024-03-14 ENCOUNTER — OFFICE VISIT (OUTPATIENT)
Dept: ORTHOPEDIC SURGERY | Facility: CLINIC | Age: 74
End: 2024-03-14
Payer: MEDICARE

## 2024-03-14 VITALS — HEIGHT: 62 IN | BODY MASS INDEX: 35.15 KG/M2 | TEMPERATURE: 98.6 F | WEIGHT: 191 LBS

## 2024-03-14 DIAGNOSIS — S83.281D TEAR OF LATERAL MENISCUS OF RIGHT KNEE, CURRENT, UNSPECIFIED TEAR TYPE, SUBSEQUENT ENCOUNTER: Primary | ICD-10-CM

## 2024-03-14 DIAGNOSIS — M17.11 PRIMARY LOCALIZED OSTEOARTHROSIS OF RIGHT LOWER LEG: ICD-10-CM

## 2024-03-26 ENCOUNTER — TELEPHONE (OUTPATIENT)
Age: 74
End: 2024-03-26
Payer: MEDICARE

## 2024-03-26 NOTE — TELEPHONE ENCOUNTER
Pt called back about a couple of things.    She was wanting to know, because of cost, if she could take Aspirin 650mg qd and Vit E qd instead of taking Eliquis. Apparently she has a friend who takes this regimen for her stroke.    I did give her a different number to call for Eliquis, as the number she called for patient assistance told her she wasn't eligible for a co-pay card because of medicare. But it appears she called the number for patients with commercial insurance. P#[phone]    She also said her BP is still running at 150s/80-100s even with the increase of Coreg to 6.25mg bid.    She is currently taking:  Amlodipine 5mg qd  Carvedilol 6.25mg bid  Hydralazine 50mg bid  Losartan 100mg qd    Please advise about the Aspirin/Vit E and BP meds.    Thanks,  Karyn

## 2024-03-29 NOTE — TELEPHONE ENCOUNTER
This regimen that she is recommended is not as good for stroke prevention.  If we can get her set up with patient assistance my recommendation is still Eliquis    Please find out what her heart rate is running with the increased dose of carvedilol

## 2024-03-29 NOTE — TELEPHONE ENCOUNTER
Called pt and left a message asking for a call back on what her HR is running.   I also let her know the number below for [employer] patient assistance for patient's with Medicare. I will also look into the form from my side as well.    Karyn

## 2024-04-01 NOTE — PROGRESS NOTES
[facility]  New Problem      Patient Name: Katelyn Sr  : 1950  Primary Care Physician: Pierre Chaudhry Jr., MD        Chief Complaint: Left knee pain     HPI:   Katelyn Sr is a 73 y.o. year old who presents today for evaluation. This is an established patient of Dr. Galvez, she has been under treatment for her right knee and is scheduled to have an MRI on Thursday of this week.    She reports a new event, she slipped on - helping sister back to bed and slipped on oxygen tubing. This was a twisting mechanism and she did not fall to the ground but landed in her sisters chair immediately. Felt a pop and felt sick to stomach it hurt so bad. Pain was 10/10 initially. All  it was painful. She has done ice and ibuprofen. It is much better today.  Pain is anterior and lateral, worse with extension but not as much with flexion.    Advil PRN- was advised not to take on blood thinner. Tylenol has never really helped her in the past.    Eliquis for A-fib but has been out for 5 days. Recent diagnosis of A-fib. She is working to get another sample or a discount card from the .       Past Medical/Surgical, Social and Family History:  I have reviewed and/or updated pertinent history as noted in the medical record including:  Past Medical History:   Diagnosis Date    Abnormal ECG 18    Done in PCP office.  Hx of Atrial fib-?    Arthritis of neck     Atrial fibrillation     Cervical disc disorder     ACDF C6-7, 2003;  Dr. Tr Nix    Cervical stenosis of spinal canal 2020    Circadian rhythm sleep disorder, delayed sleep phase type 2017    CTS (carpal tunnel syndrome)     DDD (degenerative disc disease), lumbosacral 2018    Depression     Fibrosis of lung     Fracture of wrist     Fracture, finger     Generalized anxiety disorder 2020    GERD (gastroesophageal reflux disease)     Hip arthrosis     Hyperlipidemia      Hypersensitivity pneumonitis     vs pulmonary fibrosis    Hypersensitivity pneumonitis     Hypersomnia due to another medical condition 10/28/2017    Hypertension     Knee swelling     Low back strain     Lower back pain 07/26/2019    Myocardial infarction     Neck strain     Neuroma of foot     Non-STEMI (non-ST elevated myocardial infarction) 05/21/2018    Nonsenile cataract     FUENTES on CPAP 10/19/2017    Palpitations 07/23/2019    Peripheral vestibulopathy of both ears 07/14/2020    Presence of artificial intra-ocular lens     Scoliosis     Sleep apnea     Thoracic disc disorder     Thyroid disease     Vertigo 07/14/2020    Vestibular neuronitis 07/17/2020     Past Surgical History:   Procedure Laterality Date    CARDIAC CATHETERIZATION N/A 05/22/2018    Procedure: Left Heart Cath;  Surgeon: Aleks Velazquez MD;  [facility];  Service: Cardiovascular    CARDIAC CATHETERIZATION N/A 05/22/2018    Procedure: Coronary angiography;  Surgeon: Aleks Velazquez MD;  Location:  [facility] CATH INVASIVE LOCATION;  Service: Cardiovascular    CARDIAC CATHETERIZATION N/A 05/22/2018    Procedure: Left ventriculography;  Surgeon: Aleks Velazquez MD;  [facility];  Service: Cardiovascular    CARDIAC CATHETERIZATION N/A 05/22/2018    Procedure: Peripheral angiography;  Surgeon: Aleks Velazquez MD;  [facility];  Service: Cardiovascular    CATARACT EXTRACTION WITH INTRAOCULAR LENS IMPLANT Bilateral     COLONOSCOPY N/A 03/08/2023    Procedure: COLONOSCOPY to cecum/TI with cold biopsies;  Surgeon: Hamilton Francisco MD;  Location: [facility] ENDOSCOPY;  Service: Gastroenterology;  Laterality: N/A;  pre- diverticulitis  post- diverticulosis with stricture    DIAGNOSTIC LAPAROSCOPY  1990    r/o endometriosis    ENDOSCOPY N/A 03/08/2023    Procedure: ESOPHAGOGASTRODUODENOSCOPY with cold biopsies and 52 Fr Kam Dilatation;  Surgeon: Nolan  Hamilton JENNINGS MD;  Location: [facility] ENDOSCOPY;  Service: Gastroenterology;  Laterality: N/A;  pre- dysphagia  post- hiatal hernia, gastric polyps, esophageal ring @ 32    NECK SURGERY  12/03/2003    ACDF C6-7-Dr. Nix     RECTAL SURGERY  1992    fistula/abscess/hemorrhoid     Social History     Occupational History    Occupation: RETIRED RN    Tobacco Use    Smoking status: Never     Passive exposure: Never    Smokeless tobacco: Never    Tobacco comments:     caffeine use: TEA OCCASSIONALLY   Vaping Use    Vaping status: Never Used   Substance and Sexual Activity    Alcohol use: No    Drug use: Never    Sexual activity: Not Currently          Allergies:   Allergies   Allergen Reactions    Simvastatin Myalgia     Cramps in thighs      Erythromycin Rash    Penicillins Rash       Medications:   Home Medications:  Current Outpatient Medications on File Prior to Visit   Medication Sig    amLODIPine (NORVASC) 5 MG tablet Take 1 tablet by mouth Daily.    carvedilol (COREG) 6.25 MG tablet Take 1 tablet by mouth 2 (Two) Times a Day.    cetirizine (zyrTEC) 5 MG tablet Take 1 tablet by mouth Daily.    Diclofenac Sodium 4 %, Topiramate 2 %, cloNIDine HCl 0.2 %, Lidocaine HCl 5 % Apply 1-2 g topically to the appropriate area as directed 3 (Three) to 4 (Four) times daily.    DULoxetine (CYMBALTA) 60 MG capsule Take 2 capsules by mouth Every Night.    escitalopram (LEXAPRO) 10 MG tablet Take 1 tablet by mouth Daily.    famotidine (PEPCID) 10 MG tablet Take 4 tablets by mouth Every Night.    hydrALAZINE (APRESOLINE) 50 MG tablet Take 1 tablet by mouth 2 (Two) Times a Day.    ibuprofen (ADVIL,MOTRIN) 100 MG/5ML suspension Take  by mouth Every 6 (Six) Hours As Needed for Mild Pain.    lamoTRIgine (LaMICtal) 100 MG tablet Take 1 tablet by mouth Every Night.    losartan (COZAAR) 100 MG tablet TAKE 1 TABLET BY MOUTH EVERY DAY    pantoprazole (PROTONIX) 40 MG EC tablet Take 1 tablet by mouth Daily.    pravastatin (PRAVACHOL) 40 MG  tablet TAKE 1 TABLET BY MOUTH EVERY DAY AT NIGHT    apixaban (ELIQUIS) 5 MG tablet tablet Take 1 tablet by mouth 2 (Two) Times a Day. (Patient not taking: Reported on 4/2/2024)    ARIPiprazole (ABILIFY) 5 MG tablet Take 1 tablet by mouth Daily. (Patient not taking: Reported on 4/2/2024)    Nintedanib Esylate (Ofev) 100 MG capsule Take 1 capsule by mouth 2 (Two) Times a Day. (Patient not taking: Reported on 4/2/2024)    traZODone (DESYREL) 50 MG tablet Take 0.5 tablets by mouth At Night As Needed for Sleep. (Patient not taking: Reported on 4/2/2024)     No current facility-administered medications on file prior to visit.         ROS:  14 point review of systems was negative except as listed in the HPI.    Physical Exam:   73 y.o. female  Body mass index is 35.2 kg/m²., 87.3 kg (192 lb 8 oz)  Vitals:    04/02/24 1438   Temp: 97.8 °F (36.6 °C)     General: Alert, cooperative, appears well and in no observable distress. Appears stated age and BMI as listed above.  HEENT: Normocephalic, atraumatic on external visual inspection.  CV: No significant peripheral edema.  Respiratory: Normal respiratory effort.  Skin: Warm & well perfused; appropriate skin turgor.  Psych: Appropriate mood & affect.  Neuro: Gross sensation and motor intact in affected extremity/extremities.  Vascular: Peripheral pulses palpable in affected extremity/extremities.     MSK Exam:    Knee Exam:  Left   No deformity or wounds appreciated. No significant redness or warmth.  Trace effusion noted.  Tenderness along the joint line appreciated laterally, more anterior right over the anterior horn of the meniscus.  ROM 0-130 with pain at terminal motion.  Ligamentous exam grossly stable.  Brook + lateral  Bounce Home -  Quad strength 4-4+/5    Brief hip exam in the affected extremity(ies) grossly unremarkable.  Moves ankle and toes up and down, no significant pain or swelling in the foot, ankle or calf.       Radiology:    lThe following X-rays were  ordered/reviewed today to evaluate the patient's symptoms: Single Knee: AP standing and sunrise views of both knees, and lateral view of painful knee show Degenerative changes in all 3 compartments of the knee, she maintains reasonable joint space medial and lateral what she does have some narrowing laterally with some small osteophytes present.  She has some mild patellofemoral changes, more significant so noted in the right knee than the left.  No obvious fractures or other acute pathology otherwise to account for symptoms.    Procedure:   N/A    Misc. Data/Labs: N/A    Assessment & Plan:    ICD-10-CM ICD-9-CM   1. Acute pain of left knee  M25.562 719.46   2. Primary osteoarthritis of left knee  M17.12 715.16     No orders of the defined types were placed in this encounter.    Orders Placed This Encounter   Procedures    XR Knee 3 View Left     This is a 73-year-old female with acute pain of her left knee.  This is quite suspicious for lateral meniscal tear.  Her ligamentous exam feels stable and she has improved with conservative care over the last couple of days.  She has an MRI of the right knee pending for concern about meniscal pathology.  We talked about different options including injections, meds and watchful waiting.    Ultimately since she has improved a fair amount we have decided to manage this conservatively we will have her continue to rest ice and elevate and wear her knee brace over the next few days.  Lets wait for the MRI of her right knee I will make a note to check that when I am back in the office.  I told her that Dr. Galvez will have her staff reach out to her about those results as well.  Perhaps we can have her sit back down with Dr. Galvez in follow-up for both knees.  I am going to have her schedule an appoint with me in 2 weeks just so we do not lose track of her 1 to make sure that this knee gets better if she fails to improve with conservative care we will consider an MRI of that left  knee as well.  Might also consider some dedicated physical therapy for both of her knees pending the outcome of this right knee MRI.    I also expressed my concerns to her about coming off of her Eliquis, I strongly recommend that she contact cardiology about another sample to hold her over or ask to consider switching to another agent that might be more affordable. She understands there is a risk of stroke being off AC. I will pass this along to her cardiologist as well, hopefully they can assist her.       Return in about 2 weeks (around 4/16/2024) for Recheck. If symptoms change call for sooner appt..    Patient encouraged to call with questions or concerns prior to follow up.  Recommend ICE and/or HEAT PRN as discussed.  Will discuss with attending physician as needed.  Consider additional referrals, work up and/or advanced imaging as indicated or if patient fails to respond to conservative care.        Kash Mazariegos, APRN

## 2024-04-02 ENCOUNTER — OFFICE VISIT (OUTPATIENT)
Dept: ORTHOPEDIC SURGERY | Facility: CLINIC | Age: 74
End: 2024-04-02
Payer: MEDICARE

## 2024-04-02 VITALS — WEIGHT: 192.5 LBS | TEMPERATURE: 97.8 F | BODY MASS INDEX: 35.43 KG/M2 | HEIGHT: 62 IN

## 2024-04-02 DIAGNOSIS — M25.562 ACUTE PAIN OF LEFT KNEE: Primary | ICD-10-CM

## 2024-04-02 DIAGNOSIS — M17.12 PRIMARY OSTEOARTHRITIS OF LEFT KNEE: ICD-10-CM

## 2024-04-02 PROCEDURE — 1159F MED LIST DOCD IN RCRD: CPT | Performed by: NURSE PRACTITIONER

## 2024-04-02 PROCEDURE — 73562 X-RAY EXAM OF KNEE 3: CPT | Performed by: NURSE PRACTITIONER

## 2024-04-02 PROCEDURE — 1160F RVW MEDS BY RX/DR IN RCRD: CPT | Performed by: NURSE PRACTITIONER

## 2024-04-02 PROCEDURE — 99213 OFFICE O/P EST LOW 20 MIN: CPT | Performed by: NURSE PRACTITIONER

## 2024-04-02 NOTE — TELEPHONE ENCOUNTER
I have not heard back from Ms. Sr on her HR, I will check back in with her tomorrow as she has an appt today. I have also mailed her information about Eliquis patient assistance, what the different medications her insurance will cover, and information about going on warfarin.    Karyn

## 2024-04-02 NOTE — TELEPHONE ENCOUNTER
She called me back today. She picked up samples of her Eliquis and I let her know about the information that I sent to her house.    She also let me know her HR is running in the 80s mostly. A few times in the 70s, but not lower than that. Her BP is still running 140-150s systolic.    Please advise.    Thanks,  Karyn

## 2024-04-02 NOTE — Clinical Note
Rolando Velazquez- I have asked the patient to contact you about her eliquis. She is unable to afford it and has been off x 5 days (newer dx a-fib). She is trying to work with the  for an RX discount card but I told her I would not recommend waiting. Thank you so much, Kash

## 2024-04-04 ENCOUNTER — HOSPITAL ENCOUNTER (OUTPATIENT)
Dept: MRI IMAGING | Facility: HOSPITAL | Age: 74
Discharge: HOME | End: 2024-04-04
Admitting: ORTHOPAEDIC SURGERY
Payer: MEDICARE

## 2024-04-04 DIAGNOSIS — S83.281D TEAR OF LATERAL MENISCUS OF RIGHT KNEE, CURRENT, UNSPECIFIED TEAR TYPE, SUBSEQUENT ENCOUNTER: ICD-10-CM

## 2024-04-04 DIAGNOSIS — M17.11 PRIMARY LOCALIZED OSTEOARTHROSIS OF RIGHT LOWER LEG: ICD-10-CM

## 2024-04-04 PROCEDURE — 73721 MRI JNT OF LWR EXTRE W/O DYE: CPT

## 2024-04-05 ENCOUNTER — TELEPHONE (OUTPATIENT)
Dept: ORTHOPEDIC SURGERY | Facility: CLINIC | Age: 74
End: 2024-04-05
Payer: MEDICARE

## 2024-04-05 NOTE — TELEPHONE ENCOUNTER
----- Message from Angy Galvez MD sent at 4/4/2024  5:02 PM EDT -----  Please tell her she does not have a meniscus tear is mostly arthritis I would like her to follow-up so we can discuss what our next step is

## 2024-04-05 NOTE — TELEPHONE ENCOUNTER
"Patient called back to clarify [employer] message that stated she does have a meniscus tear due to report she read on [employer] stated that meniscus was intact. I apologized and let her know that the message mistakenly had the word \"not\" left out and that she does not have a meniscus tear.   "

## 2024-04-08 NOTE — TELEPHONE ENCOUNTER
Tell her I would like her to double her carvedilol again and give us a call back in a week or 2.  I think this should help.

## 2024-04-12 RX ORDER — CARVEDILOL 12.5 MG/1
12.5 TABLET ORAL 2 TIMES DAILY
Start: 2024-04-12

## 2024-04-12 NOTE — TELEPHONE ENCOUNTER
Pt called back so I gave her the instructions on increasing her Carvedilol to 12.5mg twice daily.     She still has plenty of carvedilol at home, so she will let me know when she needs a refill.   She will call back in a week and let me know about her BP/HR and bring me her pt assistance form for the Eliquis.    Karyn   3A RN

## 2024-04-17 NOTE — PROGRESS NOTES
[facility]              Follow Up      Patient Name: Katelyn Sr  : 1950  Primary Care Physician: Pierre Chaudhry Jr., MD        Chief Complaint: Bilateral knee pain    HPI:   Katelyn Sr is a 73 y.o. year old who presents today for evaluation.  This is an established patient of Dr. Galvez, I last saw her approximately 2 weeks ago when she had a new injury with her left knee.  She is also completed her right knee MRI in the interim and is here today to discuss both.    Her right knee MRI suggested severe patellofemoral arthritis with a 1.4 cm osteochondral body in the lateral infra patellar space.  Some milder arthritis in the medial and lateral compartments.  Dr. Galvez recommended a follow-up to discuss next steps.  Her right knee is the more symptomatic of the 2 most of her pain is anterior worse with flexion and extension of the knee.    Her left knee had a injury a couple of weeks ago while helping her sister back to bed she slipped on some oxygen tubing and had a little bit of a twisting extension mechanism.  Most of her pain is lateral, she felt a pop with severe pain it did improve somewhat on its own before I saw her and we elected to continue monitoring her versus further workup.      Past Medical/Surgical, Social and Family History:  I have reviewed and/or updated pertinent history as noted in the medical record including:  Past Medical History:   Diagnosis Date    Abnormal ECG 18    Done in PCP office.  Hx of Atrial fib-?    Arthritis of neck     Atrial fibrillation     Cervical disc disorder     ACDF C6-7, 2003;  Dr. Tr Nix    Cervical stenosis of spinal canal 2020    Circadian rhythm sleep disorder, delayed sleep phase type 2017    CTS (carpal tunnel syndrome)     DDD (degenerative disc disease), lumbosacral 2018    Depression     Fibrosis of lung     Fracture of wrist     Fracture, finger     Generalized anxiety disorder  07/14/2020    GERD (gastroesophageal reflux disease)     Hip arthrosis     Hyperlipidemia     Hypersensitivity pneumonitis     vs pulmonary fibrosis    Hypersensitivity pneumonitis     Hypersomnia due to another medical condition 10/28/2017    Hypertension     Knee swelling     Low back strain     Lower back pain 07/26/2019    Myocardial infarction     Neck strain     Neuroma of foot     Non-STEMI (non-ST elevated myocardial infarction) 05/21/2018    Nonsenile cataract     FUENTES on CPAP 10/19/2017    Palpitations 07/23/2019    Peripheral vestibulopathy of both ears 07/14/2020    Presence of artificial intra-ocular lens     Scoliosis     Sleep apnea     Thoracic disc disorder     Thyroid disease     Vertigo 07/14/2020    Vestibular neuronitis 07/17/2020     Past Surgical History:   Procedure Laterality Date    CARDIAC CATHETERIZATION N/A 05/22/2018    Procedure: Left Heart Cath;  Surgeon: Aleks Velazquez MD;  Location: [facility] CATH INVASIVE LOCATION;  Service: Cardiovascular    CARDIAC CATHETERIZATION N/A 05/22/2018    Procedure: Coronary angiography;  Surgeon: Aleks Velazquez MD;  Location: [facility] CATH INVASIVE LOCATION;  Service: Cardiovascular    CARDIAC CATHETERIZATION N/A 05/22/2018    Procedure: Left ventriculography;  Surgeon: Aleks Velazquez MD;  Location: [facility] CATH INVASIVE LOCATION;  Service: Cardiovascular    CARDIAC CATHETERIZATION N/A 05/22/2018    Procedure: Peripheral angiography;  Surgeon: Aleks Velazquez MD;  Location: [facility] CATH INVASIVE LOCATION;  Service: Cardiovascular    CATARACT EXTRACTION WITH INTRAOCULAR LENS IMPLANT Bilateral     COLONOSCOPY N/A 03/08/2023    Procedure: COLONOSCOPY to cecum/TI with cold biopsies;  Surgeon: Hamilton Francisco MD;  Location: [facility] ENDOSCOPY;  Service: Gastroenterology;  Laterality: N/A;  pre- diverticulitis  post- diverticulosis with stricture    DIAGNOSTIC LAPAROSCOPY  1990    r/o endometriosis    ENDOSCOPY N/A 03/08/2023    Procedure:  ESOPHAGOGASTRODUODENOSCOPY with cold biopsies and 52 Fr Kam Dilatation;  Surgeon: Hamilton Francisco MD;  Location: [facility] ENDOSCOPY;  Service: Gastroenterology;  Laterality: N/A;  pre- dysphagia  post- hiatal hernia, gastric polyps, esophageal ring @ 32    NECK SURGERY  12/03/2003    ACDF C6-7-Dr. Nix     RECTAL SURGERY  1992    fistula/abscess/hemorrhoid     Social History     Occupational History    Occupation: RETIRED RN    Tobacco Use    Smoking status: Never     Passive exposure: Never    Smokeless tobacco: Never    Tobacco comments:     caffeine use: TEA OCCASSIONALLY   Vaping Use    Vaping status: Never Used   Substance and Sexual Activity    Alcohol use: No    Drug use: Never    Sexual activity: Not Currently          Allergies:   Allergies   Allergen Reactions    Simvastatin Myalgia     Cramps in thighs      Erythromycin Rash    Penicillins Rash       Medications:   Home Medications:  Current Outpatient Medications on File Prior to Visit   Medication Sig    amLODIPine (NORVASC) 5 MG tablet Take 1 tablet by mouth Daily.    carvedilol (COREG) 12.5 MG tablet Take 1 tablet by mouth 2 (Two) Times a Day.    cetirizine (zyrTEC) 5 MG tablet Take 1 tablet by mouth Daily.    Diclofenac Sodium 4 %, Topiramate 2 %, cloNIDine HCl 0.2 %, Lidocaine HCl 5 % Apply 1-2 g topically to the appropriate area as directed 3 (Three) to 4 (Four) times daily.    DULoxetine (CYMBALTA) 60 MG capsule Take 2 capsules by mouth Every Night.    escitalopram (LEXAPRO) 10 MG tablet Take 1 tablet by mouth Daily.    famotidine (PEPCID) 10 MG tablet Take 4 tablets by mouth Every Night.    hydrALAZINE (APRESOLINE) 50 MG tablet Take 1 tablet by mouth 2 (Two) Times a Day.    ibuprofen (ADVIL,MOTRIN) 100 MG/5ML suspension Take  by mouth Every 6 (Six) Hours As Needed for Mild Pain.    lamoTRIgine (LaMICtal) 100 MG tablet Take 1 tablet by mouth Every Night.    losartan (COZAAR) 100 MG tablet TAKE 1 TABLET BY MOUTH EVERY DAY    pantoprazole  (PROTONIX) 40 MG EC tablet Take 1 tablet by mouth Daily.    pravastatin (PRAVACHOL) 40 MG tablet TAKE 1 TABLET BY MOUTH EVERY DAY AT NIGHT    traZODone (DESYREL) 50 MG tablet Take 0.5 tablets by mouth At Night As Needed for Sleep.    apixaban (ELIQUIS) 5 MG tablet tablet Take 1 tablet by mouth 2 (Two) Times a Day. (Patient not taking: Reported on 4/2/2024)    ARIPiprazole (ABILIFY) 5 MG tablet Take 1 tablet by mouth Daily. (Patient not taking: Reported on 4/2/2024)    Nintedanib Esylate (Ofev) 100 MG capsule Take 1 capsule by mouth 2 (Two) Times a Day. (Patient not taking: Reported on 4/2/2024)     No current facility-administered medications on file prior to visit.         ROS:  ROS negative except as listed in the HPI.    Physical Exam:   73 y.o. female  Body mass index is 35.04 kg/m²., 86.9 kg (191 lb 9.6 oz)  Vitals:    04/18/24 1515   Temp: 98.2 °F (36.8 °C)     General: Alert, cooperative, appears well and in no observable distress. Appears stated age and BMI as listed above.  HEENT: Normocephalic, atraumatic on external visual inspection.  CV: No significant peripheral edema.  Respiratory: Normal respiratory effort.  Skin: Warm & well perfused; appropriate skin turgor.  Psych: Appropriate mood & affect.  Neuro: Gross sensation and motor intact in affected extremity/extremities.  Vascular: Peripheral pulses palpable in affected extremity/extremities.     MSK Exam:  Right Knee  No deformity or wounds appreciated. No significant redness or warmth.  Trace effusion noted  Tenderness along the joint line appreciated patellofemoral compartment  ROM 0-130, +crepitus  Ligamentous exam grossly stable  Quad strength 4 /5      Left   No deformity or wounds appreciated. No significant redness or warmth.  Trace effusion noted.  Tenderness along the joint line appreciated laterally, more anterior right over the anterior horn of the meniscus.  ROM 5-110 with pain at terminal motion-++.  Ligamentous exam grossly  stable.  Brook + lateral  Bounce [place] -  Quad strength 4-4+/5     Brief hip exam in the affected extremity(ies) grossly unremarkable.  Moves ankle and toes up and down, no significant pain or swelling in the foot, ankle or calf.      Radiology:    No new images were needed at the visit today.     4/4/2024 Single Knee: AP standing and sunrise views of both knees, and lateral view of painful knee show Degenerative changes in all 3 compartments of the knee, she maintains reasonable joint space medial and lateral what she does have some narrowing laterally with some small osteophytes present.  She has some moderate patellofemoral changes, severe PF disease in the R knee.  No obvious fractures or other acute pathology otherwise to account for symptoms.     MRI is as below: I have reviewed the images independently and with Dr. Galvez and we agree with the findings as noted below.    MRI KNEE RIGHT  WO CONTRAST-     Date of Exam: 4/4/2024 12:33 PM     Indication: Order states: Lateral meniscus tear. Osteoarthritis.     Comparison: None available.     Technique: Multiplanar, multisequence MR imaging of the knee was  performed without contrast.     FINDINGS:     Soft tissues: Small joint effusion with mild diffuse synovial thickening  and/or intra-articular debris. Ovoid 1.4 cm osteochondral body in the  lateral infrapatellar knee joint space. Small ovoid 1 cm cystic lesion  along the posterior lateral knee joint space, probable ganglion. No  popliteal cyst. Mild nonspecific patchy prepatellar soft tissue edema.  No solid soft tissue mass.     Menisci: The medial meniscus is intact.  The lateral meniscus is intact.     Cruciate Ligaments: The ACL is intact.  The PCL is intact.     Collateral ligaments: The MCL is intact.  The LCL complex is intact.     Extensor Mechanism: The quadriceps tendon is intact.  The patellar  tendon is intact.     Articular cartilage: Severe diffuse grade III-IV chondromalacia in  the  patellofemoral compartment with large regions of full-thickness chondral  loss from the superior pole of the patella, the lateral patellar facet,  and the lateral trochlea. Diffuse grade II chondromalacia at the  anterior lateral weightbearing portion of the medial femoral condyle.  Diffuse grade II chondromalacia in the lateral compartment with focal  full-thickness chondral fissuring at the central weightbearing portion  of the lateral femoral condyle and associated 3 mm chondral flap.     Bones: Subchondral cystic changes in the patella and lateral trochlea,  likely related to overlying chondromalacia. Lobulated cystic changes in  the lateral tibial plateau in the posterior proximal tibia at the  attachment site of the medial meniscus posterior root, probably benign  intraosseous ganglion formation. Tricompartmental osteophyte formation.  No acute fracture. No concerning bone marrow lesions or marrow replacing  process.     IMPRESSION:     1. Tricompartmental chondromalacia/DJD, most severe in the  patellofemoral compartment, as detailed above.  2. Small joint effusion with mild diffuse synovial thickening and/or  intra-articular debris and large osteochondral body in the lateral  infrapatellar knee joint space.  4. The menisci, cruciate ligaments, and collateral ligaments are intact.           This report was finalized on 4/4/2024 4:50 PM by Pranav Langston MD on  Workstation: [id number]    Procedure:   See Procedure Note: The potential risks and benefits of performing a diagnostic and therapeutic injection were discussed with the patient prior to procedure. Risks include, but are not limited to infection, swelling, transient increase in pain, bleeding, bruising. Patient was advised that injections are a diagnostic and therapeutic tool meaning they may not alleviate symptoms at all, or may only provide partial or temporary relief. Injection precautions and aftercare discussed. and Patient is currently on  anticoagulation and/or a blood thinning agent which poses an increased risk of more significant bleeding or bruising following an injection. While joint injections are generally well tolerated even on AC, the patient was counseled on this increased risk and advised to monitor for signs of bleeding with proper follow up instructions. Injection precautions and aftercare discussed.      [facility] [place]. Data/Labs: N/A    Assessment & Plan:    ICD-10-CM ICD-9-CM   1. Bilateral chronic knee pain  M25.561 719.46    M25.562 338.29    G89.29    2. Patellofemoral arthrosis  M17.10 715.96   3. Mechanical knee pain, left  M25.562 719.46     No orders of the defined types were placed in this encounter.    Orders Placed This Encounter   Procedures    Large Joint Arthrocentesis: R knee    Large Joint Arthrocentesis: L knee    Ambulatory Referral to Physical Therapy     This is a 73-year-old female with bilateral knee pain.  Her right knee really is far more symptomatic of the 2.  While she does have that large loose body I really think her symptoms are more coming from the patellofemoral joint as she does not have clear and consistent mechanical symptoms in the right knee.  As far as her left knee goes I still have some suspicion for lateral meniscus tear but she has improved.  Plan is to proceed with injections of both knees to try and address arthritis, pain and inflammation and then I will have her see Dr. Galvez in about 4 weeks for follow-up evaluation and consideration of arthroscopy.  If her left knee does not improve we may need to consider an MRI as well. We will get her into some physical therapy to work on quad and core strengthening and mechanics hopefully all of this will help improve her symptoms and she might avoid the need for surgery at this time.  Eventually she may need to consider joint replacement- particularly for the R knee, but hopefully we can delay or avoid that.    Return in about 4 weeks (around 5/16/2024) for  appointment with Dr. Galvez.    Patient encouraged to call with questions or concerns prior to follow up.  Recommend ICE and/or HEAT PRN as discussed.  Will discuss with attending physician as needed.  Consider additional referrals, work up and/or advanced imaging as indicated or if patient fails to respond to conservative care.          Large Joint Arthrocentesis: R knee  Date/Time: 4/18/2024 4:00 PM  Consent given by: patient  Site marked: site marked  Timeout: Immediately prior to procedure a time out was called to verify the correct patient, procedure, equipment, support staff and site/side marked as required   Supporting Documentation  Indications: pain   Procedure Details  Location: knee - R knee  Preparation: Patient was prepped and draped in the usual sterile fashion  Needle gauge: 21G.  Approach: anterolateral  Medications administered: 1 mL methylPREDNISolone acetate 80 MG/ML; 2 mL lidocaine PF 1% 1 %  Patient tolerance: patient tolerated the procedure well with no immediate complications      Large Joint Arthrocentesis: L knee  Date/Time: 4/18/2024 4:00 PM  Consent given by: patient  Site marked: site marked  Timeout: Immediately prior to procedure a time out was called to verify the correct patient, procedure, equipment, support staff and site/side marked as required   Supporting Documentation  Indications: pain   Procedure Details  Location: knee - L knee  Preparation: Patient was prepped and draped in the usual sterile fashion  Needle gauge: 21G.  Approach: anterolateral  Medications administered: 1 mL methylPREDNISolone acetate 80 MG/ML; 2 mL lidocaine PF 1% 1 %  Patient tolerance: patient tolerated the procedure well with no immediate complications         CARLIE Hoskins      Dictated Utilizing Dragon Dictation

## 2024-04-18 ENCOUNTER — OFFICE VISIT (OUTPATIENT)
Dept: ORTHOPEDIC SURGERY | Facility: CLINIC | Age: 74
End: 2024-04-18
Payer: MEDICARE

## 2024-04-18 VITALS — BODY MASS INDEX: 35.26 KG/M2 | HEIGHT: 62 IN | TEMPERATURE: 98.2 F | WEIGHT: 191.6 LBS

## 2024-04-18 DIAGNOSIS — M25.562 BILATERAL CHRONIC KNEE PAIN: Primary | ICD-10-CM

## 2024-04-18 DIAGNOSIS — G89.29 BILATERAL CHRONIC KNEE PAIN: Primary | ICD-10-CM

## 2024-04-18 DIAGNOSIS — M17.10 PATELLOFEMORAL ARTHROSIS: ICD-10-CM

## 2024-04-18 DIAGNOSIS — M25.561 BILATERAL CHRONIC KNEE PAIN: Primary | ICD-10-CM

## 2024-04-18 DIAGNOSIS — M25.562 MECHANICAL KNEE PAIN, LEFT: ICD-10-CM

## 2024-04-18 RX ADMIN — METHYLPREDNISOLONE ACETATE 1 ML: 80 INJECTION, SUSPENSION INTRA-ARTICULAR; INTRALESIONAL; INTRAMUSCULAR; SOFT TISSUE at 16:00

## 2024-04-18 RX ADMIN — LIDOCAINE HYDROCHLORIDE 2 ML: 10 INJECTION, SOLUTION EPIDURAL; INFILTRATION; INTRACAUDAL; PERINEURAL at 16:00

## 2024-04-19 RX ORDER — LIDOCAINE HYDROCHLORIDE 10 MG/ML
2 INJECTION, SOLUTION EPIDURAL; INFILTRATION; INTRACAUDAL; PERINEURAL
Status: COMPLETED | OUTPATIENT
Start: 2024-04-18 | End: 2024-04-18

## 2024-04-19 RX ORDER — METHYLPREDNISOLONE ACETATE 80 MG/ML
1 INJECTION, SUSPENSION INTRA-ARTICULAR; INTRALESIONAL; INTRAMUSCULAR; SOFT TISSUE
Status: COMPLETED | OUTPATIENT
Start: 2024-04-18 | End: 2024-04-18

## 2024-04-22 RX ORDER — HYDRALAZINE HYDROCHLORIDE 50 MG/1
50 TABLET, FILM COATED ORAL 2 TIMES DAILY
Qty: 180 TABLET | Refills: 4 | Status: SHIPPED | OUTPATIENT
Start: 2024-04-22

## 2024-04-30 RX ORDER — CARVEDILOL 12.5 MG/1
12.5 TABLET ORAL 2 TIMES DAILY
Qty: 180 TABLET | Refills: 4 | Status: SHIPPED | OUTPATIENT
Start: 2024-04-30

## 2024-05-07 ENCOUNTER — TRANSCRIBE ORDERS (OUTPATIENT)
Dept: ADMINISTRATIVE | Facility: HOSPITAL | Age: 74
End: 2024-05-07
Payer: MEDICARE

## 2024-05-07 DIAGNOSIS — J84.10 PULMONARY FIBROSIS: Primary | ICD-10-CM

## 2024-05-07 RX ORDER — LOSARTAN POTASSIUM 100 MG/1
TABLET ORAL
Qty: 90 TABLET | Refills: 4 | Status: SHIPPED | OUTPATIENT
Start: 2024-05-07

## 2024-05-07 RX ORDER — AMLODIPINE BESYLATE 5 MG/1
5 TABLET ORAL DAILY
Qty: 90 TABLET | Refills: 3 | Status: SHIPPED | OUTPATIENT
Start: 2024-05-07

## 2024-05-14 NOTE — TELEPHONE ENCOUNTER
Left patient a voicemail stating we have samples of Eliquis ready for her on the 4th floor. CC 5/14/24

## 2024-05-16 ENCOUNTER — OFFICE VISIT (OUTPATIENT)
Dept: ORTHOPEDIC SURGERY | Facility: CLINIC | Age: 74
End: 2024-05-16
Payer: MEDICARE

## 2024-05-16 VITALS — BODY MASS INDEX: 35 KG/M2 | WEIGHT: 190.2 LBS | TEMPERATURE: 98.3 F | HEIGHT: 62 IN

## 2024-05-16 DIAGNOSIS — M23.41 LOOSE BODY OF RIGHT KNEE: ICD-10-CM

## 2024-05-16 DIAGNOSIS — M17.10 PATELLOFEMORAL ARTHRITIS: Primary | ICD-10-CM

## 2024-05-16 PROCEDURE — 1159F MED LIST DOCD IN RCRD: CPT | Performed by: ORTHOPAEDIC SURGERY

## 2024-05-16 PROCEDURE — 1160F RVW MEDS BY RX/DR IN RCRD: CPT | Performed by: ORTHOPAEDIC SURGERY

## 2024-05-16 PROCEDURE — 99214 OFFICE O/P EST MOD 30 MIN: CPT | Performed by: ORTHOPAEDIC SURGERY

## 2024-05-16 RX ORDER — ONDANSETRON 4 MG/1
TABLET, ORALLY DISINTEGRATING ORAL
COMMUNITY
Start: 2024-05-15

## 2024-05-16 NOTE — PROGRESS NOTES
"New Knee      Patient: Katelyn Sr        YOB: 1950    Medical Record Number: [id number]        Chief Complaints:   Right knee pain    History of Present Illness: This is a 73-year-old female who is referred here by Kash who presents complaining of several month history of right knee pain she did see Kash who injected her knee and states it really felt \"like heaven.\"  She states stairs and the only thing that bother her now she does have an MRI which shows mostly patellofemoral arthritis but a large osteochondral loose body sitting in the suprapatellar space.  She does not give any history of loose body in the knee no locking and catching past medical history as listed below and reviewed by me she is on chronic Eliquis for A-fib        Allergies:   Allergies   Allergen Reactions    Simvastatin Myalgia     Cramps in thighs      Erythromycin Rash    Penicillins Rash       Medications:   Home Medications:  Current Outpatient Medications on File Prior to Visit   Medication Sig    amLODIPine (NORVASC) 5 MG tablet TAKE 1 TABLET BY MOUTH EVERY DAY    apixaban (ELIQUIS) 5 MG tablet tablet Take 1 tablet by mouth 2 (Two) Times a Day.    ARIPiprazole (ABILIFY) 5 MG tablet Take 1 tablet by mouth Daily.    carvedilol (COREG) 12.5 MG tablet Take 1 tablet by mouth 2 (Two) Times a Day.    Diclofenac Sodium 4 %, Topiramate 2 %, cloNIDine HCl 0.2 %, Lidocaine HCl 5 % Apply 1-2 g topically to the appropriate area as directed 3 (Three) to 4 (Four) times daily.    DULoxetine (CYMBALTA) 60 MG capsule Take 2 capsules by mouth Every Night.    escitalopram (LEXAPRO) 10 MG tablet Take 1 tablet by mouth Daily.    famotidine (PEPCID) 10 MG tablet Take 4 tablets by mouth Every Night.    hydrALAZINE (APRESOLINE) 50 MG tablet Take 1 tablet by mouth 2 (Two) Times a Day.    ibuprofen (ADVIL,MOTRIN) 100 MG/5ML suspension Take  by mouth Every 6 (Six) Hours As Needed for Mild Pain.    lamoTRIgine (LaMICtal) 100 MG tablet Take 1 " tablet by mouth Every Night.    losartan (COZAAR) 100 MG tablet TAKE 1 TABLET BY MOUTH EVERY DAY    ondansetron ODT (ZOFRAN-ODT) 4 MG disintegrating tablet     pantoprazole (PROTONIX) 40 MG EC tablet Take 1 tablet by mouth Daily.    pravastatin (PRAVACHOL) 40 MG tablet TAKE 1 TABLET BY MOUTH EVERY DAY AT NIGHT    cetirizine (zyrTEC) 5 MG tablet Take 1 tablet by mouth Daily. (Patient not taking: Reported on 5/16/2024)    Nintedanib Esylate (Ofev) 100 MG capsule Take 1 capsule by mouth 2 (Two) Times a Day. (Patient not taking: Reported on 5/16/2024)    traZODone (DESYREL) 50 MG tablet Take 0.5 tablets by mouth At Night As Needed for Sleep. (Patient not taking: Reported on 5/16/2024)     No current facility-administered medications on file prior to visit.     Current Medications:  Scheduled Meds:  Continuous Infusions:No current facility-administered medications for this visit.    PRN Meds:.    Past Medical History:   Diagnosis Date    Abnormal ECG 5/21/18    Done in PCP office.  Hx of Atrial fib-?2007    Arthritis of neck     Atrial fibrillation     Cervical disc disorder 8/03    ACDF C6-7, 12/03/2003;  Dr. 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    CTS (carpal tunnel syndrome)     DDD (degenerative disc disease), lumbosacral 11/09/2018    Depression     Fibrosis of lung     Fracture of wrist     Fracture, finger     Generalized anxiety disorder 07/14/2020    GERD (gastroesophageal reflux disease)     Hip arthrosis     Hyperlipidemia     Hypersensitivity pneumonitis     vs pulmonary fibrosis    Hypersensitivity pneumonitis     Hypersomnia due to another medical condition 10/28/2017    Hypertension     Knee swelling     Low back strain     Lower back pain 07/26/2019    Myocardial infarction     Neck strain     Neuroma of foot     Non-STEMI (non-ST elevated myocardial infarction) 05/21/2018    Nonsenile cataract     FUENTES on  CPAP 10/19/2017    Palpitations 07/23/2019    Peripheral vestibulopathy of both ears 07/14/2020    Presence of artificial intra-ocular lens     Scoliosis     Sleep apnea     Thoracic disc disorder     Thyroid disease     Vertigo 07/14/2020    Vestibular neuronitis 07/17/2020        Past Surgical History:   Procedure Laterality Date    CARDIAC CATHETERIZATION N/A 05/22/2018    Procedure: Left Heart Cath;  Surgeon: Aleks Velazquez MD;  Location: [facility] CATH INVASIVE LOCATION;  Service: Cardiovascular    CARDIAC CATHETERIZATION N/A 05/22/2018    Procedure: Coronary angiography;  Surgeon: Aleks Vealzquez MD;  [facility];  Service: Cardiovascular    CARDIAC CATHETERIZATION N/A 05/22/2018    Procedure: Left ventriculography;  Surgeon: Aleks Velazquez MD;  [facility];  Service: Cardiovascular    CARDIAC CATHETERIZATION N/A 05/22/2018    Procedure: Peripheral angiography;  Surgeon: Aleks Velazquez MD;  Location: [facility] CATH INVASIVE LOCATION;  Service: Cardiovascular    CATARACT EXTRACTION WITH INTRAOCULAR LENS IMPLANT Bilateral     COLONOSCOPY N/A 03/08/2023    Procedure: COLONOSCOPY to cecum/TI with cold biopsies;  Surgeon: Hamilton Francisco MD;  Location: [facility] ENDOSCOPY;  Service: Gastroenterology;  Laterality: N/A;  pre- diverticulitis  post- diverticulosis with stricture    DIAGNOSTIC LAPAROSCOPY  1990    r/o endometriosis    ENDOSCOPY N/A 03/08/2023    Procedure: ESOPHAGOGASTRODUODENOSCOPY with cold biopsies and 52 Fr Kam Dilatation;  Surgeon: Hamilton Francisco MD;  Location: [facility] ENDOSCOPY;  Service: Gastroenterology;  Laterality: N/A;  pre- dysphagia  post- hiatal hernia, gastric polyps, esophageal ring @ 32    NECK SURGERY  12/03/2003    ACDF C6-7-Dr. Nix     RECTAL SURGERY  1992    fistula/abscess/hemorrhoid        Social History     Occupational History    Occupation: RETIRED RN    Tobacco Use    Smoking status: Never  "    Passive exposure: Never    Smokeless tobacco: Never    Tobacco comments:     caffeine use: TEA OCCASSIONALLY   Vaping Use    Vaping status: Never Used   Substance and Sexual Activity    Alcohol use: No    Drug use: Never    Sexual activity: Not Currently      Social History     Social History Narrative    Not on file        Family History   Problem Relation Age of Onset    Alzheimer's disease Mother     Heart failure Father     Arrhythmia Father     Heart attack Father         MI at age 56,  at 63.    Diabetes Sister     Atrial fibrillation Sister     Colon cancer Maternal Grandmother     Stroke Paternal Grandmother     Malig Hyperthermia Neg Hx              Review of Systems:     Review of Systems      Physical Exam: 73 y.o. female  General Appearance:    Alert, cooperative, in no acute distress                   Vitals:    24 1510   Temp: 98.3 °F (36.8 °C)   Weight: 86.3 kg (190 lb 3.2 oz)   Height: 157.5 cm (62\")   PainSc:   3      Patient is alert and read ×3 no acute distress appears her above-listed at height weight and age.  Affect is normal respiratory rate is normal unlabored. Heart rate regular rate rhythm, sclera, dentition and hearing are normal for the purpose of this exam.        Ortho Exam  Physical exam of the right knee reveals no effusion, no erythema.  The patient has no palpable tenderness along the medial joint line, no tenderness about the lateral joint line.  Patient does have crepitus with patellofemoral range of motion.  They also have subjective symptoms anteriorly during exam.  The patient has a negative bounce home, negative Brook and a stable ligamentous exam.  Quad tone is reasonable and symmetric.  There are no overlying skin changes no lymphedema no lymphadenopathy.  There is good hip range of motion which is full symmetric and asymptomatic and a normal ankle exam.  Hamstrings and IT band are tight bilaterally.     Procedures             Radiology:   AP, Lateral and " merchant views of the right  knee  were/reviewed to evauateknee pain.  She has some patellofemoral arthritis I do not appreciate a loose body on plain films on MRI do sit with significant patellofemoral arthritis to have reviewed and agree   Imaging Results (Most Recent)       None          Assessment/Plan:  right knee pain with patellofemoral arthritis she does have a loose body but she does not give me any symptoms of locking or catching or symptoms from that loose body at this point she is doing well with conservative management we will continue that she knows importance of quad and core strengthening will continue to work on that should her symptoms change in the future we could certainly consider arthroscopy with

## 2024-05-21 ENCOUNTER — TELEPHONE (OUTPATIENT)
Age: 74
End: 2024-05-21
Payer: MEDICARE

## 2024-05-22 NOTE — TELEPHONE ENCOUNTER
I spoke with her and scheduled her for November. She says she is not having any issues   
Mrs. Sr called about her 6/10/24 appt with us. We saw her last on 2/21/24, but the 6/10/24 appt was scheduled long before that.    What she wants to know is when she should come back? The 6/10/24 appt would be 4 month instead of 6 months. And she wants to know if she should cancel that appt and just move it out to August as a 6 month follow up or next February as a 1 year follow up.    Please advise.    Thanks,  Karyn  
No we can move her up to 6 to 9 months.  Thanks.  
none

## 2024-06-04 ENCOUNTER — TELEPHONE (OUTPATIENT)
Age: 74
End: 2024-06-04
Payer: MEDICARE

## 2024-06-04 NOTE — TELEPHONE ENCOUNTER
"Pt called asking about a medication text she got from [place] about simvastatin being filled. I assured her it wasn't from us, as we have her on pravastatin since 2019. She will let me know if she needs another refill on that.    But as a by the way, when I had her on the phone, she wanted to let me know her BP was still running high.    Her BP is running in 150s/80s HR in 70-80s. We made the last adjustment in her medication on 4/12/24.  She is taking the follow ing medications for BP:    Carvedilol 12.5mg twice daily  Losartan 100mg daily  Hydralazine 50mg twice daily  Amlodipine 5mg daily    She also mentioned some brief bursts/runs of fast heart rate \"like SVT\" recently.    Please advise about her BP and fast heart rate runs.    Thanks,  Karyn"

## 2024-06-05 ENCOUNTER — TELEPHONE (OUTPATIENT)
Dept: NEUROSURGERY | Facility: CLINIC | Age: 74
End: 2024-06-05
Payer: MEDICARE

## 2024-06-07 RX ORDER — CARVEDILOL 12.5 MG/1
25 TABLET ORAL 2 TIMES DAILY
Start: 2024-06-07

## 2024-06-10 ENCOUNTER — OFFICE VISIT (OUTPATIENT)
Dept: ORTHOPEDIC SURGERY | Facility: CLINIC | Age: 74
End: 2024-06-10
Payer: MEDICARE

## 2024-06-10 VITALS — BODY MASS INDEX: 35.7 KG/M2 | TEMPERATURE: 98.5 F | HEIGHT: 62 IN | WEIGHT: 194 LBS

## 2024-06-10 DIAGNOSIS — M19.079 ARTHRITIS OF FOOT: Primary | ICD-10-CM

## 2024-06-10 DIAGNOSIS — M19.071 ARTHRITIS OF RIGHT ANKLE: ICD-10-CM

## 2024-06-10 DIAGNOSIS — M20.11 HALLUX VALGUS OF RIGHT FOOT: ICD-10-CM

## 2024-06-10 DIAGNOSIS — M19.071 ARTHRITIS OF FIRST METATARSOPHALANGEAL (MTP) JOINT OF RIGHT FOOT: ICD-10-CM

## 2024-06-10 PROCEDURE — 73630 X-RAY EXAM OF FOOT: CPT | Performed by: ORTHOPAEDIC SURGERY

## 2024-06-10 PROCEDURE — 99214 OFFICE O/P EST MOD 30 MIN: CPT | Performed by: ORTHOPAEDIC SURGERY

## 2024-06-10 RX ORDER — UBIDECARENONE 100 MG
100 CAPSULE ORAL DAILY
COMMUNITY

## 2024-06-10 NOTE — PROGRESS NOTES
"Foot Follow Up      Patient: Katelyn Sr    YOB: 1950 73 y.o. female    Chief Complaints: Foot is sore    History of Present Illness:Patient was seen on 8/11/2022 with complaints of moderate and occasionally severe pain mainly in the dorsum of her right midfoot.  Symptoms were worse with \"walking a lot\" and were stabbing aching and burning with associated swelling worse with standing driving and climbing steps.     She had used ibuprofen which had helped to some degree.     I had seen her back in 2014 for this similar problem and she had injection done in her first TMT joint which gave her significant relief for many many years.     She reported pain mainly around the first TMT joint more so than the first MTP joint and although had some mild hammertoes they had  not been bothersome to her.     It was felt she had exacerbation of midfoot arthritis mostly at the first and second TMT joints and also had hallux valgus with metatarsus primus varus first MTP arthritis and hammertoes.  Reviewed her that she could ultimately require midfoot fusion of the first and second TMT joints but also with some arthritis of the first MTP joint could eventually require fusion but would not recommend simultaneous fusion of those.  She was uninterested in surgical treatment and instructed on heel cord stretching exercises, use of power step orthotics and prescribed compounding cream of ketoprofen 10%, lidocaine 5% and gabapentin 5% to apply to the midfoot.  She was sent for radiographic guided injections of the first and second TMT joints.     She underwent injections of the right midfoot and per review of those records it was of the second and third TMT joints on 8/25/2022.     Patient was seen on 1/4/2023 reporting that she really did not have any relief and still had pain mainly around the first TMT joint more so than the second and third where injection did seem to help some.  She was not really doing her heel " cord stretching exercises.  Compounding cream did seem to help some.  She had initially gotten some over-the-counter orthotics which had helped but had had recurrent pain mainly in the midfoot more so than around the first MTP joint but has some there as well.  She did feel like the pain in her midfoot is affecting her gait.     We discussed treatment going forward fairly be fairly complex and especially with her living alone in second-floor [facility].  She was to continue with nonoperative treatment and was sent for radiographic guided injections of the right first second and third TMT joints.  She was also instructed to increase her heel cord stretching exercises and use of compounding cream and was referred to see Alessio at [facility] for work on gait training mechanics and possible orthotic     She was to been seen back 2 months but did not return until 8/7/2023.  She had her injections done on 2/15/2023.  This was mainly the first TMT joint but did have significant relief for several months.     She did start some physical therapy with Alessio in March or April and did this for about 10 weeks and got some arch supports     When seen on 8/7/2023 she had recurrent throbbing pain mainly in the dorsal medial aspect of the right midfoot.  She really had not been doing the heel cord stretching exercises as previously described and had not been using compounding cream.  Pain was rated 4 out of 10.     We discussed treatment options including extensive midfoot fusion which would be at the first TMT joint with realignment and bunion correction but also arthritis at the first MTP joint but would not want to fuse both of those joints.  She was sent for repeat radiographic guided injections of the first second and third TMT joints.  She was also instructed on heel cord stretching exercises.     Patient had injections done first second and third tarsometatarsal joints on 9/6/2023.     Patient was seen on 11/29/2023 reporting that she  "and initially had 100% relief which lasted for several weeks.  She had had recurrence of pain mainly over the dorsal medial aspect of the right midfoot more so than any around the first MTP joint.  She had only been heel cord stretching several times per week and had not really noted any appreciable relief with the compounding cream.   However she did report that her right ankle pain was worse than her midfoot pain.  She reported right ankle pain had begun several months prior.  It was mainly over the anteromedial aspect of the right ankle.  She reported swelling laterally but not really pain but did have pain over the inferomedial aspect of the right ankle especially when she inverted her foot.  She reported that she had to to walk on the lateral side of her foot to minimize the pain which was somewhat contradictory however she felt that the symptoms had been somewhat worse when she started wearing the orthotics.     We discussed treatment and the ankle is more bothersome than the midfoot and she wished to avoid surgical treatment.  She was sent for MRI of the right ankle evaluate extent of arthrosis again at the midfoot but also evaluate for avascular necrosis of the talus.  She was fitted with an ASO brace and instructed she could try orthotic or could try taking it out and see if that helped.  She instructed continue with compounding cream and we held off any further injections of her right foot.  Offered her a cane which she declined.  She did report she had several falls with issues of her \"entire right side feeling weak\" and thought it may be something from her back and has been going on for several years.  She had been seen by Dr. Nix in the past and was contemplating an appointment to see him which I encouraged her to do.     Patient was seen on 12/21/2023 and had not yet made appointment with Dr. Nix.  She reports mainly pain over the dorsal medial aspect of the right foot around the first " TMT joint and naviculocuneiform joint more so than the second and third TMT joints and only mild discomfort about the ankle joint itself.  Previous compounding cream had not really been beneficial.  Symptoms were rated 6 out of 10 down from 7 out of 10 at previous visit.    At that time we discussed treatment and she had good response to injections in the past and could not give her any guarantees that surgery would relieve her symptoms and could have potential complications.  The ankle itself did not show osteonecrosis but did have some arthritis but that was not all that symptomatic so we held off on injecting it.  She was sent for radiographic guided injections of the right first second and third tarsometatarsal joints and medial naviculocuneiform joint and was prescribed a different compounding cream formulation.    Patient had the injections done on 2024.    Patient reports that she had excellent relief up until mid April with recurrence of intermittent moderate pain.  She has not been doing any heel cord stretching exercises.  She is using some orthotics she got from [place].  Her compounding cream  and she did not really notice any improvement with that.  She has mostly mild to moderate pain over the medial aspect of the midfoot around the first second and third TMT joints more so than the naviculocuneiform joints.  HPI    ROS: Foot pain  Past Medical History:   Diagnosis Date    Abnormal ECG 18    Done in PCP office.  Hx of Atrial fib-?    Arthritis of neck     Atrial fibrillation     Cervical disc disorder     ACDF C6-7, 2003;  Dr. Tr Nix    Cervical stenosis of spinal canal 2020    Circadian rhythm sleep disorder, delayed sleep phase type 2017    CTS (carpal tunnel syndrome)     DDD (degenerative disc disease), lumbosacral 2018    Depression     Fibrosis of lung     Fracture of wrist     Fracture, finger     Generalized anxiety disorder 2020     "GERD (gastroesophageal reflux disease)     Hip arthrosis     Hyperlipidemia     Hypersensitivity pneumonitis     vs pulmonary fibrosis    Hypersensitivity pneumonitis     Hypersomnia due to another medical condition 10/28/2017    Hypertension     Knee swelling     Low back strain     Lower back pain 07/26/2019    Myocardial infarction     Neck strain     Neuroma of foot     Non-STEMI (non-ST elevated myocardial infarction) 05/21/2018    Nonsenile cataract     FUENTES on CPAP 10/19/2017    Palpitations 07/23/2019    Peripheral vestibulopathy of both ears 07/14/2020    Presence of artificial intra-ocular lens     Scoliosis     Sleep apnea     Thoracic disc disorder     Thyroid disease     Vertigo 07/14/2020    Vestibular neuronitis 07/17/2020     Physical Exam:   Vitals:    06/10/24 1411   Temp: 98.5 °F (36.9 °C)   Weight: 88 kg (194 lb)   Height: 157.5 cm (62\")   PainSc:   7   PainLoc: Foot     Well developed with normal mood.  On exam she has mild tenderness to palpation of the dorsum of the midfoot mostly around the first TMT joint and somewhat around the second and third TMT joints and only slight discomfort on the medial naviculocuneiform joint.  She was nontender about the first MTP joint.      Radiology:  3 views right foot ordered to evaluate pain and alignment reviewed and compared to previous x-rays.  There remains hallux valgus deformity with some mild arthritis of the first MTP joint with osteoarthritis of the first second and third tarsometatarsal joints and some to the medial naviculocuneiform joint.    MRI films and report of the right ankle dated 12/8/2023 reviewed.  There is a marker denoting the area of pain  Along the medial side of the foot superficial to the navicular and first cuneiform.  There are degenerative changes tibiotalar joint observed lateral to the midline where there is full-thickness chondral loss on the talar dome with no joint effusion or significant marrow edema.  There is " degenerative changes of the navicular and first cuneiform and also at the midfoot along the tarsometatarsal joints relatively sparing the fourth and fifth.  Posterior tib tendon and other flexor and extensor tendons appear normal     There is ossification along the course of the ATFL consistent with old sprain.  Distal lower leg and proximal foot musculature appeared normal        Assessment/Plan: 1.  Right ankle pain with degenerative change with full-thickness chondral loss along the talar dome lateral to the midline  2.  Right hindfoot degenerative change talonavicular and first cuneiform,  3.  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  Right hallux valgus with metatarsus primus varus and first MTP arthritis  5.  Right forefoot mild hammertoes  6.  Right dorsal midfoot deep peroneal neuritis    We discussed treatment going forward and she has had some exacerbation of the midfoot arthritis and nothing else is particular bothersome at this point.    We discussed continued nonoperative versus operative treatment options with decision made to continue with nonoperative treatment.  Will send her for repeat radiographic guided injections of the first second and third tarsometatarsal joints and medial naviculocuneiform joint.  She was instructed on continued use of orthotics and to increase her heel cord stretching exercises and do these at least 3 times a day.    When had refilled her compounding cream as hopefully this will give her some modicum of relief.    Anything worsens to let me know otherwise I will see her back in 4 months x-rays of her right foot.

## 2024-06-19 RX ORDER — PRAVASTATIN SODIUM 40 MG
40 TABLET ORAL
Qty: 90 TABLET | Refills: 3 | Status: SHIPPED | OUTPATIENT
Start: 2024-06-19

## 2024-06-24 ENCOUNTER — LAB (OUTPATIENT)
Dept: LAB | Facility: HOSPITAL | Age: 74
End: 2024-06-24
Payer: MEDICARE

## 2024-06-24 ENCOUNTER — TRANSCRIBE ORDERS (OUTPATIENT)
Dept: ADMINISTRATIVE | Facility: HOSPITAL | Age: 74
End: 2024-06-24
Payer: MEDICARE

## 2024-06-24 DIAGNOSIS — E78.5 HYPERLIPIDEMIA, UNSPECIFIED HYPERLIPIDEMIA TYPE: ICD-10-CM

## 2024-06-24 DIAGNOSIS — E05.90 HYPERTHYROIDISM: Primary | ICD-10-CM

## 2024-06-24 DIAGNOSIS — E05.90 HYPERTHYROIDISM: ICD-10-CM

## 2024-06-24 DIAGNOSIS — Z51.81 THERAPEUTIC DRUG MONITORING: ICD-10-CM

## 2024-06-24 DIAGNOSIS — R79.9 ABNORMAL FINDING OF BLOOD CHEMISTRY, UNSPECIFIED: ICD-10-CM

## 2024-06-24 DIAGNOSIS — Z51.81 THERAPEUTIC DRUG MONITORING: Primary | ICD-10-CM

## 2024-06-24 LAB
ALBUMIN SERPL-MCNC: 4.2 G/DL (ref 3.5–5.2)
ALBUMIN/GLOB SERPL: 1.5 G/DL
ALP SERPL-CCNC: 130 U/L (ref 39–117)
ALT SERPL W P-5'-P-CCNC: 14 U/L (ref 1–33)
ANION GAP SERPL CALCULATED.3IONS-SCNC: 10 MMOL/L (ref 5–15)
AST SERPL-CCNC: 13 U/L (ref 1–32)
BASOPHILS # BLD AUTO: 0.03 10*3/MM3 (ref 0–0.2)
BASOPHILS NFR BLD AUTO: 0.3 % (ref 0–1.5)
BILIRUB CONJ SERPL-MCNC: <0.2 MG/DL (ref 0–0.3)
BILIRUB SERPL-MCNC: 0.6 MG/DL (ref 0–1.2)
BUN SERPL-MCNC: 16 MG/DL (ref 8–23)
BUN/CREAT SERPL: 17.2 (ref 7–25)
CALCIUM SPEC-SCNC: 9.3 MG/DL (ref 8.6–10.5)
CHLORIDE SERPL-SCNC: 104 MMOL/L (ref 98–107)
CHOLEST SERPL-MCNC: 200 MG/DL (ref 0–200)
CO2 SERPL-SCNC: 25 MMOL/L (ref 22–29)
CREAT SERPL-MCNC: 0.93 MG/DL (ref 0.57–1)
DEPRECATED RDW RBC AUTO: 45 FL (ref 37–54)
EGFRCR SERPLBLD CKD-EPI 2021: 65.8 ML/MIN/1.73
EOSINOPHIL # BLD AUTO: 0.49 10*3/MM3 (ref 0–0.4)
EOSINOPHIL NFR BLD AUTO: 5.6 % (ref 0.3–6.2)
ERYTHROCYTE [DISTWIDTH] IN BLOOD BY AUTOMATED COUNT: 14.6 % (ref 12.3–15.4)
GLOBULIN UR ELPH-MCNC: 2.8 GM/DL
GLUCOSE SERPL-MCNC: 91 MG/DL (ref 65–99)
HBA1C MFR BLD: 5.9 % (ref 4.8–5.6)
HCT VFR BLD AUTO: 41.9 % (ref 34–46.6)
HDLC SERPL-MCNC: 49 MG/DL (ref 40–60)
HGB BLD-MCNC: 13 G/DL (ref 12–15.9)
IMM GRANULOCYTES # BLD AUTO: 0.03 10*3/MM3 (ref 0–0.05)
IMM GRANULOCYTES NFR BLD AUTO: 0.3 % (ref 0–0.5)
LDLC SERPL CALC-MCNC: 119 MG/DL (ref 0–100)
LDLC/HDLC SERPL: 2.33 {RATIO}
LYMPHOCYTES # BLD AUTO: 1.4 10*3/MM3 (ref 0.7–3.1)
LYMPHOCYTES NFR BLD AUTO: 16.1 % (ref 19.6–45.3)
MCH RBC QN AUTO: 26.5 PG (ref 26.6–33)
MCHC RBC AUTO-ENTMCNC: 31 G/DL (ref 31.5–35.7)
MCV RBC AUTO: 85.3 FL (ref 79–97)
MONOCYTES # BLD AUTO: 0.74 10*3/MM3 (ref 0.1–0.9)
MONOCYTES NFR BLD AUTO: 8.5 % (ref 5–12)
NEUTROPHILS NFR BLD AUTO: 6 10*3/MM3 (ref 1.7–7)
NEUTROPHILS NFR BLD AUTO: 69.2 % (ref 42.7–76)
NRBC BLD AUTO-RTO: 0 /100 WBC (ref 0–0.2)
PLATELET # BLD AUTO: 306 10*3/MM3 (ref 140–450)
PMV BLD AUTO: 8.9 FL (ref 6–12)
POTASSIUM SERPL-SCNC: 4 MMOL/L (ref 3.5–5.2)
PROT SERPL-MCNC: 7 G/DL (ref 6–8.5)
RBC # BLD AUTO: 4.91 10*6/MM3 (ref 3.77–5.28)
SODIUM SERPL-SCNC: 139 MMOL/L (ref 136–145)
T4 FREE SERPL-MCNC: 2.46 NG/DL (ref 0.92–1.68)
TRIGL SERPL-MCNC: 184 MG/DL (ref 0–150)
TSH SERPL DL<=0.05 MIU/L-ACNC: 3.98 UIU/ML (ref 0.27–4.2)
VLDLC SERPL-MCNC: 32 MG/DL (ref 5–40)
WBC NRBC COR # BLD AUTO: 8.69 10*3/MM3 (ref 3.4–10.8)

## 2024-06-24 PROCEDURE — 36415 COLL VENOUS BLD VENIPUNCTURE: CPT

## 2024-06-24 PROCEDURE — 84443 ASSAY THYROID STIM HORMONE: CPT

## 2024-06-24 PROCEDURE — 80061 LIPID PANEL: CPT

## 2024-06-24 PROCEDURE — 80053 COMPREHEN METABOLIC PANEL: CPT

## 2024-06-24 PROCEDURE — 82248 BILIRUBIN DIRECT: CPT

## 2024-06-24 PROCEDURE — 85025 COMPLETE CBC W/AUTO DIFF WBC: CPT

## 2024-06-24 PROCEDURE — 83036 HEMOGLOBIN GLYCOSYLATED A1C: CPT

## 2024-06-24 PROCEDURE — 84439 ASSAY OF FREE THYROXINE: CPT

## 2024-07-03 ENCOUNTER — HOSPITAL ENCOUNTER (OUTPATIENT)
Dept: GENERAL RADIOLOGY | Facility: HOSPITAL | Age: 74
Discharge: HOME | End: 2024-07-03
Payer: MEDICARE

## 2024-07-03 DIAGNOSIS — M19.079 ARTHRITIS OF FOOT: ICD-10-CM

## 2024-07-03 PROCEDURE — 25010000002 METHYLPREDNISOLONE PER 125 MG: Performed by: ORTHOPAEDIC SURGERY

## 2024-07-03 PROCEDURE — 25010000002 BUPIVACAINE (PF) 0.25 % SOLUTION: Performed by: ORTHOPAEDIC SURGERY

## 2024-07-03 PROCEDURE — 77002 NEEDLE LOCALIZATION BY XRAY: CPT

## 2024-07-03 PROCEDURE — 25010000002 LIDOCAINE 1 % SOLUTION: Performed by: ORTHOPAEDIC SURGERY

## 2024-07-03 PROCEDURE — 25510000001 IOPAMIDOL 61 % SOLUTION: Performed by: ORTHOPAEDIC SURGERY

## 2024-07-03 RX ORDER — METHYLPREDNISOLONE SODIUM SUCCINATE 125 MG/2ML
80 INJECTION, POWDER, LYOPHILIZED, FOR SOLUTION INTRAMUSCULAR; INTRAVENOUS
Status: COMPLETED | OUTPATIENT
Start: 2024-07-03 | End: 2024-07-03

## 2024-07-03 RX ORDER — LIDOCAINE HYDROCHLORIDE 10 MG/ML
10 INJECTION, SOLUTION INFILTRATION; PERINEURAL ONCE
Status: COMPLETED | OUTPATIENT
Start: 2024-07-03 | End: 2024-07-03

## 2024-07-03 RX ORDER — BUPIVACAINE HYDROCHLORIDE 2.5 MG/ML
10 INJECTION, SOLUTION EPIDURAL; INFILTRATION; INTRACAUDAL ONCE
Status: COMPLETED | OUTPATIENT
Start: 2024-07-03 | End: 2024-07-03

## 2024-07-03 RX ADMIN — METHYLPREDNISOLONE SODIUM SUCCINATE 80 MG: 125 INJECTION, POWDER, FOR SOLUTION INTRAMUSCULAR; INTRAVENOUS at 11:34

## 2024-07-03 RX ADMIN — IOPAMIDOL 1 ML: 612 INJECTION, SOLUTION INTRAVENOUS at 11:34

## 2024-07-03 RX ADMIN — LIDOCAINE HYDROCHLORIDE 1 ML: 10 INJECTION, SOLUTION INFILTRATION; PERINEURAL at 11:34

## 2024-07-03 RX ADMIN — BUPIVACAINE HYDROCHLORIDE 5 ML: 2.5 INJECTION, SOLUTION EPIDURAL; INFILTRATION; INTRACAUDAL; PERINEURAL at 11:34

## 2024-07-09 RX ORDER — CARVEDILOL 25 MG/1
25 TABLET ORAL 2 TIMES DAILY
Qty: 180 TABLET | Refills: 1
Start: 2024-07-09

## 2024-07-30 ENCOUNTER — CLINICAL SUPPORT (OUTPATIENT)
Dept: CARDIOLOGY | Facility: CLINIC | Age: 74
End: 2024-07-30
Payer: MEDICARE

## 2024-07-30 VITALS — DIASTOLIC BLOOD PRESSURE: 88 MMHG | SYSTOLIC BLOOD PRESSURE: 107 MMHG

## 2024-07-30 DIAGNOSIS — Z01.30 BLOOD PRESSURE CHECK: Primary | ICD-10-CM

## 2024-07-30 NOTE — PROGRESS NOTES
Pt is in the office for a bp check per Dr Velazquez  manually I got 134/74 on the right arm   With her bp cuff it read 107/88 on the left arm we check with her bp cuff on the right arm it read 127/76

## 2024-09-25 ENCOUNTER — HOSPITAL ENCOUNTER (OUTPATIENT)
Facility: HOSPITAL | Age: 74
Setting detail: OBSERVATION
Discharge: HOME | End: 2024-09-26
Attending: EMERGENCY MEDICINE | Admitting: EMERGENCY MEDICINE
Payer: MEDICARE

## 2024-09-25 ENCOUNTER — APPOINTMENT (OUTPATIENT)
Dept: GENERAL RADIOLOGY | Facility: HOSPITAL | Age: 74
End: 2024-09-25
Payer: MEDICARE

## 2024-09-25 DIAGNOSIS — I48.0 PAF (PAROXYSMAL ATRIAL FIBRILLATION): ICD-10-CM

## 2024-09-25 DIAGNOSIS — R55 NEAR SYNCOPE: Primary | ICD-10-CM

## 2024-09-25 LAB
ALBUMIN SERPL-MCNC: 3.9 G/DL (ref 3.5–5.2)
ALBUMIN/GLOB SERPL: 1.4 G/DL
ALP SERPL-CCNC: 101 U/L (ref 39–117)
ALT SERPL W P-5'-P-CCNC: 13 U/L (ref 1–33)
ANION GAP SERPL CALCULATED.3IONS-SCNC: 9 MMOL/L (ref 5–15)
AST SERPL-CCNC: 14 U/L (ref 1–32)
BASOPHILS # BLD AUTO: 0.04 10*3/MM3 (ref 0–0.2)
BASOPHILS NFR BLD AUTO: 0.5 % (ref 0–1.5)
BILIRUB SERPL-MCNC: 0.4 MG/DL (ref 0–1.2)
BUN SERPL-MCNC: 11 MG/DL (ref 8–23)
BUN/CREAT SERPL: 9.7 (ref 7–25)
CALCIUM SPEC-SCNC: 9.5 MG/DL (ref 8.6–10.5)
CHLORIDE SERPL-SCNC: 106 MMOL/L (ref 98–107)
CO2 SERPL-SCNC: 26 MMOL/L (ref 22–29)
CREAT SERPL-MCNC: 1.13 MG/DL (ref 0.57–1)
DEPRECATED RDW RBC AUTO: 48.8 FL (ref 37–54)
EGFRCR SERPLBLD CKD-EPI 2021: 52.1 ML/MIN/1.73
EOSINOPHIL # BLD AUTO: 0.43 10*3/MM3 (ref 0–0.4)
EOSINOPHIL NFR BLD AUTO: 4.9 % (ref 0.3–6.2)
ERYTHROCYTE [DISTWIDTH] IN BLOOD BY AUTOMATED COUNT: 15.1 % (ref 12.3–15.4)
GLOBULIN UR ELPH-MCNC: 2.7 GM/DL
GLUCOSE SERPL-MCNC: 110 MG/DL (ref 65–99)
HCT VFR BLD AUTO: 40.9 % (ref 34–46.6)
HGB BLD-MCNC: 12.8 G/DL (ref 12–15.9)
IMM GRANULOCYTES # BLD AUTO: 0.03 10*3/MM3 (ref 0–0.05)
IMM GRANULOCYTES NFR BLD AUTO: 0.3 % (ref 0–0.5)
LYMPHOCYTES # BLD AUTO: 1.09 10*3/MM3 (ref 0.7–3.1)
LYMPHOCYTES NFR BLD AUTO: 12.4 % (ref 19.6–45.3)
MCH RBC QN AUTO: 27.9 PG (ref 26.6–33)
MCHC RBC AUTO-ENTMCNC: 31.3 G/DL (ref 31.5–35.7)
MCV RBC AUTO: 89.3 FL (ref 79–97)
MONOCYTES # BLD AUTO: 0.86 10*3/MM3 (ref 0.1–0.9)
MONOCYTES NFR BLD AUTO: 9.8 % (ref 5–12)
NEUTROPHILS NFR BLD AUTO: 6.37 10*3/MM3 (ref 1.7–7)
NEUTROPHILS NFR BLD AUTO: 72.1 % (ref 42.7–76)
NRBC BLD AUTO-RTO: 0 /100 WBC (ref 0–0.2)
PLATELET # BLD AUTO: 292 10*3/MM3 (ref 140–450)
PMV BLD AUTO: 8.9 FL (ref 6–12)
POTASSIUM SERPL-SCNC: 4.2 MMOL/L (ref 3.5–5.2)
PROT SERPL-MCNC: 6.6 G/DL (ref 6–8.5)
RBC # BLD AUTO: 4.58 10*6/MM3 (ref 3.77–5.28)
SODIUM SERPL-SCNC: 141 MMOL/L (ref 136–145)
TROPONIN T SERPL HS-MCNC: 7 NG/L
TROPONIN T SERPL HS-MCNC: 9 NG/L
WBC NRBC COR # BLD AUTO: 8.82 10*3/MM3 (ref 3.4–10.8)

## 2024-09-25 PROCEDURE — 85025 COMPLETE CBC W/AUTO DIFF WBC: CPT | Performed by: PHYSICIAN ASSISTANT

## 2024-09-25 PROCEDURE — 36415 COLL VENOUS BLD VENIPUNCTURE: CPT

## 2024-09-25 PROCEDURE — 99285 EMERGENCY DEPT VISIT HI MDM: CPT

## 2024-09-25 PROCEDURE — G0378 HOSPITAL OBSERVATION PER HR: HCPCS

## 2024-09-25 PROCEDURE — 71045 X-RAY EXAM CHEST 1 VIEW: CPT

## 2024-09-25 PROCEDURE — 25810000003 SODIUM CHLORIDE 0.9 % SOLUTION: Performed by: PHYSICIAN ASSISTANT

## 2024-09-25 PROCEDURE — 84484 ASSAY OF TROPONIN QUANT: CPT | Performed by: PHYSICIAN ASSISTANT

## 2024-09-25 PROCEDURE — 80053 COMPREHEN METABOLIC PANEL: CPT | Performed by: PHYSICIAN ASSISTANT

## 2024-09-25 PROCEDURE — 93010 ELECTROCARDIOGRAM REPORT: CPT | Performed by: INTERNAL MEDICINE

## 2024-09-25 PROCEDURE — 93005 ELECTROCARDIOGRAM TRACING: CPT | Performed by: PHYSICIAN ASSISTANT

## 2024-09-25 RX ORDER — ONDANSETRON 4 MG/1
4 TABLET, ORALLY DISINTEGRATING ORAL EVERY 6 HOURS PRN
Status: DISCONTINUED | OUTPATIENT
Start: 2024-09-25 | End: 2024-09-26 | Stop reason: HOSPADM

## 2024-09-25 RX ORDER — ARIPIPRAZOLE 2 MG/1
5 TABLET ORAL DAILY
Status: DISCONTINUED | OUTPATIENT
Start: 2024-09-26 | End: 2024-09-26 | Stop reason: HOSPADM

## 2024-09-25 RX ORDER — PANTOPRAZOLE SODIUM 40 MG/1
40 TABLET, DELAYED RELEASE ORAL DAILY
Status: DISCONTINUED | OUTPATIENT
Start: 2024-09-26 | End: 2024-09-26 | Stop reason: HOSPADM

## 2024-09-25 RX ORDER — SODIUM CHLORIDE 9 MG/ML
40 INJECTION, SOLUTION INTRAVENOUS AS NEEDED
Status: DISCONTINUED | OUTPATIENT
Start: 2024-09-25 | End: 2024-09-26 | Stop reason: HOSPADM

## 2024-09-25 RX ORDER — HYDRALAZINE HYDROCHLORIDE 25 MG/1
50 TABLET, FILM COATED ORAL 2 TIMES DAILY
Status: DISCONTINUED | OUTPATIENT
Start: 2024-09-26 | End: 2024-09-26 | Stop reason: HOSPADM

## 2024-09-25 RX ORDER — SODIUM CHLORIDE 0.9 % (FLUSH) 0.9 %
10 SYRINGE (ML) INJECTION AS NEEDED
Status: DISCONTINUED | OUTPATIENT
Start: 2024-09-25 | End: 2024-09-26 | Stop reason: HOSPADM

## 2024-09-25 RX ORDER — ACETAMINOPHEN 650 MG/1
650 SUPPOSITORY RECTAL EVERY 4 HOURS PRN
Status: DISCONTINUED | OUTPATIENT
Start: 2024-09-25 | End: 2024-09-26 | Stop reason: HOSPADM

## 2024-09-25 RX ORDER — ONDANSETRON 2 MG/ML
4 INJECTION INTRAMUSCULAR; INTRAVENOUS EVERY 6 HOURS PRN
Status: DISCONTINUED | OUTPATIENT
Start: 2024-09-25 | End: 2024-09-26 | Stop reason: HOSPADM

## 2024-09-25 RX ORDER — NITROGLYCERIN 0.4 MG/1
0.4 TABLET SUBLINGUAL
Status: DISCONTINUED | OUTPATIENT
Start: 2024-09-25 | End: 2024-09-26 | Stop reason: HOSPADM

## 2024-09-25 RX ORDER — ACETAMINOPHEN 325 MG/1
650 TABLET ORAL EVERY 4 HOURS PRN
Status: DISCONTINUED | OUTPATIENT
Start: 2024-09-25 | End: 2024-09-26 | Stop reason: HOSPADM

## 2024-09-25 RX ORDER — AMOXICILLIN 250 MG
2 CAPSULE ORAL 2 TIMES DAILY PRN
Status: DISCONTINUED | OUTPATIENT
Start: 2024-09-25 | End: 2024-09-26 | Stop reason: HOSPADM

## 2024-09-25 RX ORDER — PRAVASTATIN SODIUM 40 MG
40 TABLET ORAL NIGHTLY
Status: DISCONTINUED | OUTPATIENT
Start: 2024-09-26 | End: 2024-09-26 | Stop reason: HOSPADM

## 2024-09-25 RX ORDER — LOSARTAN POTASSIUM 100 MG/1
100 TABLET ORAL DAILY
Status: DISCONTINUED | OUTPATIENT
Start: 2024-09-26 | End: 2024-09-26 | Stop reason: HOSPADM

## 2024-09-25 RX ORDER — AMLODIPINE BESYLATE 5 MG/1
5 TABLET ORAL DAILY
Status: DISCONTINUED | OUTPATIENT
Start: 2024-09-26 | End: 2024-09-26 | Stop reason: HOSPADM

## 2024-09-25 RX ORDER — FAMOTIDINE 20 MG/1
40 TABLET, FILM COATED ORAL NIGHTLY
Status: DISCONTINUED | OUTPATIENT
Start: 2024-09-26 | End: 2024-09-26 | Stop reason: HOSPADM

## 2024-09-25 RX ORDER — ACETAMINOPHEN 160 MG/5ML
650 SOLUTION ORAL EVERY 4 HOURS PRN
Status: DISCONTINUED | OUTPATIENT
Start: 2024-09-25 | End: 2024-09-26 | Stop reason: HOSPADM

## 2024-09-25 RX ORDER — CARVEDILOL 25 MG/1
25 TABLET ORAL 2 TIMES DAILY
Status: DISCONTINUED | OUTPATIENT
Start: 2024-09-26 | End: 2024-09-26 | Stop reason: HOSPADM

## 2024-09-25 RX ORDER — LAMOTRIGINE 25 MG/1
100 TABLET ORAL NIGHTLY
Status: DISCONTINUED | OUTPATIENT
Start: 2024-09-26 | End: 2024-09-26 | Stop reason: HOSPADM

## 2024-09-25 RX ORDER — BISACODYL 5 MG/1
5 TABLET, DELAYED RELEASE ORAL DAILY PRN
Status: DISCONTINUED | OUTPATIENT
Start: 2024-09-25 | End: 2024-09-26 | Stop reason: HOSPADM

## 2024-09-25 RX ORDER — SODIUM CHLORIDE 0.9 % (FLUSH) 0.9 %
10 SYRINGE (ML) INJECTION EVERY 12 HOURS SCHEDULED
Status: DISCONTINUED | OUTPATIENT
Start: 2024-09-25 | End: 2024-09-26 | Stop reason: HOSPADM

## 2024-09-25 RX ORDER — TRAZODONE HYDROCHLORIDE 50 MG/1
25 TABLET, FILM COATED ORAL NIGHTLY PRN
Status: DISCONTINUED | OUTPATIENT
Start: 2024-09-25 | End: 2024-09-26 | Stop reason: HOSPADM

## 2024-09-25 RX ORDER — ESCITALOPRAM OXALATE 10 MG/1
10 TABLET ORAL DAILY
Status: DISCONTINUED | OUTPATIENT
Start: 2024-09-26 | End: 2024-09-26 | Stop reason: HOSPADM

## 2024-09-25 RX ORDER — POLYETHYLENE GLYCOL 3350 17 G/17G
17 POWDER, FOR SOLUTION ORAL DAILY PRN
Status: DISCONTINUED | OUTPATIENT
Start: 2024-09-25 | End: 2024-09-26 | Stop reason: HOSPADM

## 2024-09-25 RX ORDER — BISACODYL 10 MG
10 SUPPOSITORY, RECTAL RECTAL DAILY PRN
Status: DISCONTINUED | OUTPATIENT
Start: 2024-09-25 | End: 2024-09-26 | Stop reason: HOSPADM

## 2024-09-25 RX ADMIN — Medication 10 ML: at 23:06

## 2024-09-25 RX ADMIN — LAMOTRIGINE 100 MG: 25 TABLET ORAL at 23:51

## 2024-09-25 RX ADMIN — SODIUM CHLORIDE 1000 ML: 9 INJECTION, SOLUTION INTRAVENOUS at 20:59

## 2024-09-25 RX ADMIN — PRAVASTATIN SODIUM 40 MG: 40 TABLET ORAL at 23:51

## 2024-09-25 RX ADMIN — FAMOTIDINE 40 MG: 20 TABLET, FILM COATED ORAL at 23:51

## 2024-09-25 RX ADMIN — CARVEDILOL 25 MG: 25 TABLET, FILM COATED ORAL at 23:51

## 2024-09-25 RX ADMIN — APIXABAN 5 MG: 5 TABLET, FILM COATED ORAL at 23:51

## 2024-09-26 ENCOUNTER — APPOINTMENT (OUTPATIENT)
Dept: CT IMAGING | Facility: HOSPITAL | Age: 74
End: 2024-09-26
Payer: MEDICARE

## 2024-09-26 VITALS
WEIGHT: 192 LBS | RESPIRATION RATE: 18 BRPM | BODY MASS INDEX: 35.33 KG/M2 | HEART RATE: 64 BPM | DIASTOLIC BLOOD PRESSURE: 75 MMHG | OXYGEN SATURATION: 97 % | HEIGHT: 62 IN | TEMPERATURE: 97.7 F | SYSTOLIC BLOOD PRESSURE: 149 MMHG

## 2024-09-26 LAB
ANION GAP SERPL CALCULATED.3IONS-SCNC: 10 MMOL/L (ref 5–15)
B PARAPERT DNA SPEC QL NAA+PROBE: NOT DETECTED
B PERT DNA SPEC QL NAA+PROBE: NOT DETECTED
BUN SERPL-MCNC: 12 MG/DL (ref 8–23)
BUN/CREAT SERPL: 14.3 (ref 7–25)
C PNEUM DNA NPH QL NAA+NON-PROBE: NOT DETECTED
CALCIUM SPEC-SCNC: 8.8 MG/DL (ref 8.6–10.5)
CHLORIDE SERPL-SCNC: 108 MMOL/L (ref 98–107)
CO2 SERPL-SCNC: 22 MMOL/L (ref 22–29)
CREAT SERPL-MCNC: 0.84 MG/DL (ref 0.57–1)
DEPRECATED RDW RBC AUTO: 46.5 FL (ref 37–54)
EGFRCR SERPLBLD CKD-EPI 2021: 74.4 ML/MIN/1.73
ERYTHROCYTE [DISTWIDTH] IN BLOOD BY AUTOMATED COUNT: 14.5 % (ref 12.3–15.4)
FLUAV SUBTYP SPEC NAA+PROBE: NOT DETECTED
FLUBV RNA ISLT QL NAA+PROBE: NOT DETECTED
GLUCOSE SERPL-MCNC: 98 MG/DL (ref 65–99)
HADV DNA SPEC NAA+PROBE: NOT DETECTED
HCOV 229E RNA SPEC QL NAA+PROBE: NOT DETECTED
HCOV HKU1 RNA SPEC QL NAA+PROBE: NOT DETECTED
HCOV NL63 RNA SPEC QL NAA+PROBE: NOT DETECTED
HCOV OC43 RNA SPEC QL NAA+PROBE: NOT DETECTED
HCT VFR BLD AUTO: 36.3 % (ref 34–46.6)
HGB BLD-MCNC: 11.7 G/DL (ref 12–15.9)
HMPV RNA NPH QL NAA+NON-PROBE: NOT DETECTED
HPIV1 RNA ISLT QL NAA+PROBE: NOT DETECTED
HPIV2 RNA SPEC QL NAA+PROBE: NOT DETECTED
HPIV3 RNA NPH QL NAA+PROBE: NOT DETECTED
HPIV4 P GENE NPH QL NAA+PROBE: NOT DETECTED
M PNEUMO IGG SER IA-ACNC: NOT DETECTED
MCH RBC QN AUTO: 28.3 PG (ref 26.6–33)
MCHC RBC AUTO-ENTMCNC: 32.2 G/DL (ref 31.5–35.7)
MCV RBC AUTO: 87.7 FL (ref 79–97)
PLATELET # BLD AUTO: 255 10*3/MM3 (ref 140–450)
PMV BLD AUTO: 9.1 FL (ref 6–12)
POTASSIUM SERPL-SCNC: 4 MMOL/L (ref 3.5–5.2)
QT INTERVAL: 449 MS
QTC INTERVAL: 449 MS
RBC # BLD AUTO: 4.14 10*6/MM3 (ref 3.77–5.28)
RHINOVIRUS RNA SPEC NAA+PROBE: NOT DETECTED
RSV RNA NPH QL NAA+NON-PROBE: NOT DETECTED
SARS-COV-2 RNA NPH QL NAA+NON-PROBE: NOT DETECTED
SODIUM SERPL-SCNC: 140 MMOL/L (ref 136–145)
TROPONIN T SERPL HS-MCNC: 9 NG/L
WBC NRBC COR # BLD AUTO: 7.5 10*3/MM3 (ref 3.4–10.8)

## 2024-09-26 PROCEDURE — 85027 COMPLETE CBC AUTOMATED: CPT

## 2024-09-26 PROCEDURE — 84484 ASSAY OF TROPONIN QUANT: CPT

## 2024-09-26 PROCEDURE — 0202U NFCT DS 22 TRGT SARS-COV-2: CPT | Performed by: HOSPITALIST

## 2024-09-26 PROCEDURE — G0378 HOSPITAL OBSERVATION PER HR: HCPCS

## 2024-09-26 PROCEDURE — 99213 OFFICE O/P EST LOW 20 MIN: CPT | Performed by: NURSE PRACTITIONER

## 2024-09-26 PROCEDURE — 71250 CT THORAX DX C-: CPT

## 2024-09-26 PROCEDURE — 80048 BASIC METABOLIC PNL TOTAL CA: CPT

## 2024-09-26 RX ORDER — CARVEDILOL 25 MG/1
25 TABLET ORAL 2 TIMES DAILY
Qty: 180 TABLET | Refills: 4 | Status: SHIPPED | OUTPATIENT
Start: 2024-09-26

## 2024-09-26 RX ADMIN — Medication 10 ML: at 10:00

## 2024-09-26 RX ADMIN — CARVEDILOL 25 MG: 25 TABLET, FILM COATED ORAL at 10:43

## 2024-09-26 RX ADMIN — ESCITALOPRAM OXALATE 10 MG: 10 TABLET, FILM COATED ORAL at 09:29

## 2024-09-26 RX ADMIN — ACETAMINOPHEN 325MG 650 MG: 325 TABLET ORAL at 05:42

## 2024-09-26 RX ADMIN — AMLODIPINE BESYLATE 5 MG: 5 TABLET ORAL at 09:29

## 2024-09-26 RX ADMIN — LOSARTAN POTASSIUM 100 MG: 100 TABLET, FILM COATED ORAL at 09:29

## 2024-09-26 RX ADMIN — HYDRALAZINE HYDROCHLORIDE 50 MG: 25 TABLET ORAL at 00:34

## 2024-09-26 RX ADMIN — HYDRALAZINE HYDROCHLORIDE 50 MG: 25 TABLET ORAL at 09:29

## 2024-09-26 RX ADMIN — PANTOPRAZOLE SODIUM 40 MG: 40 TABLET, DELAYED RELEASE ORAL at 09:29

## 2024-09-26 RX ADMIN — APIXABAN 5 MG: 5 TABLET, FILM COATED ORAL at 10:43

## 2024-09-26 NOTE — DISCHARGE SUMMARY
ED OBSERVATION PROGRESS/DISCHARGE SUMMARY    Date of Admission: 9/25/2024   LOS: 0 days   PCP: Pierre Chaudhry Jr., MD    Subjective patient reports feeling improved today compared to yesterday.    Hospital Outcome: 73-year-old female admitted to the observation unit for further evaluation of syncope.  Patient reports that she has been feeling poorly for about 6 weeks since she had COVID.  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  She does also report worsening dyspnea on exertion over the last month or so.  High-sensitivity troponin 9, 7, 9.  Chest x-ray negative acute.  EKG on admission shows normal sinus rhythm with rate of 60 bpm.    9/26/2024: Patient was seen by cardiology, CARLIE Bailey.  No further cardiac testing warranted this admission, suspect dehydration played a role.    Patient does have a history of pulmonary fibrosis and is due for monitoring CT scan next week.  CT chest today shows moderate patchy predominantly peripheral and basilar groundglass opacities with interstitial thickening in both lungs, greatest in the right lower lobe and the base of right upper lobe, which could reflect atypical pneumonia such as COVID-pneumonia or progression of chronic interstitial lung disease and an NSIP pattern.  Patient was seen by pulmonology, Dr. Randall.  Respiratory viral panel pan negative.  Patient has follow-up appointment with pulmonology in November that she has been instructed to keep, they will repeat pulmonary function tests plus or minus repeat imaging at that time.  Usual return to ER precautions discussed with patient who expresses understanding and is in agreement with the plan.    ROS:  General: no fevers, chills  Respiratory: no cough, dyspnea  Cardiovascular: no chest pain, palpitations  Abdomen: No abdominal pain, nausea, vomiting, or diarrhea  Neurologic: No focal weakness    Objective    Physical Exam:  I have reviewed the vital signs.  Temp:  [97.9 °F (36.6 °C)-98.4 °F (36.9 °C)] 98.4 °F (36.9 °C)  Heart Rate:  [54-81] 61  Resp:  [18] 18  BP: (121-159)/(66-93) 145/73  General Appearance:    Alert, cooperative, no distress  Head:    Normocephalic, atraumatic  Eyes:    Sclerae anicteric  Neck:   Supple, no mass  Lungs: Clear to auscultation bilaterally, respirations unlabored  Heart: Regular rate and rhythm, S1 and S2 normal, no murmur, rub or gallop  Abdomen:  Soft, nontender, bowel sounds active, nondistended  Extremities: No clubbing, cyanosis, or edema to lower extremities  Pulses:  2+ and symmetric in distal lower extremities  Skin: No rashes   Neurologic: Oriented x3, Normal strength to extremities    Results Review:    I have reviewed the labs, radiology results and diagnostic studies.    Results from last 7 days   Lab Units 09/26/24  0638   WBC 10*3/mm3 7.50   HEMOGLOBIN g/dL 11.7*   HEMATOCRIT % 36.3   PLATELETS 10*3/mm3 255     Results from last 7 days   Lab Units 09/26/24  0638 09/25/24  2046   SODIUM mmol/L 140 141   POTASSIUM mmol/L 4.0 4.2   CHLORIDE mmol/L 108* 106   CO2 mmol/L 22.0 26.0   BUN mg/dL 12 11   CREATININE mg/dL 0.84 1.13*   CALCIUM mg/dL 8.8 9.5   BILIRUBIN mg/dL  --  0.4   ALK PHOS U/L  --  101   ALT (SGPT) U/L  --  13   AST (SGOT) U/L  --  14   GLUCOSE mg/dL 98 110*     Imaging Results (Last 24 Hours)       Procedure Component Value Units Date/Time    CT Chest Without Contrast Diagnostic [[id number]] Resulted: 09/26/24 0826     Updated: 09/26/24 0827    XR Chest 1 View [[id number]] Collected: 09/25/24 2157     Updated: 09/25/24 2201    Narrative:      SINGLE VIEW OF THE CHEST     HISTORY: Syncope     COMPARISON: January 23, 2024     FINDINGS:  There is cardiomegaly. There is no vascular congestion. No pneumothorax  or pleural effusion is seen. The patient appears to have some volume  loss within the right hemithorax, with suspected chronic scarring. Some  additional mild  scarring is noted at the left lung base. No acute  infiltrates are identified. There is some tortuosity of the thoracic  aorta.       Impression:      No acute findings.     This report was finalized on 9/25/2024 9:58 PM by Dr. Samantha Alfredo M.D on Workstation: [id number]               I have reviewed the medications.  ---------------------------------------------------------------------------------------------  Assessment & Plan   Assessment/Problem List    Syncope      Plan:    Syncope  -Cardiac monitoring  -Vital signs every 4 hours  -Cardiology consult- Mai Hernandez, APRN: No further cardiac testing warranted this admission, keep follow-up appointment with Dr. Velazquez in November  -High Sensitivity Troponin 9, 7,repeat in a.m.  -EKG SR  -N.p.o. after midnight     Atrial fibrillation  -Continue home dose Eliquis, Coreg     Elevated creatinine  -Creatinine 1.13, last creatinine documented 6/24/2024 at 0.93  -Received 1 L bolus IV fluids in ED  -Repeat in a.m.    Pulmonary fibrosis  -CT scan chest today reviewed  -Pulmonology consult, Dr. Randall  -Respiratory viral panel pan negative  -Follow-up outpatient with pulmonology in November as scheduled, repeat pulmonary function tests and possibly repeat imaging at that time    Disposition: Home    Follow-up after Discharge: PCP, cardiology, pulmonology    33 minutes has been spent by [facility] providers in the care of this patient while under observation status     This note will serve as discharge summary    YULISA Barbosa 09/26/24 08:58 EDT    I have worn appropriate PPE during this patient encounter and sanitized my hands with entering and exiting patient room.

## 2024-09-26 NOTE — PROGRESS NOTES
RADHA SHERIFF Attestation Note     I supervised care provided by the midlevel provider. We have discussed this patient's history, physical exam, and treatment plan. I have reviewed the midlevel provider's note and I agree with the midlevel provider's findings and plan of care.   SHARED VISIT: This visit was performed by BOTH a physician and an APC. The substantive portion of the medical decision making was performed by this attesting physician who made or approved the management plan and takes responsibility for patient management. All studies in the APC note (if performed) were independently interpreted by me.   I have personally had a face to face encounter with the patient.   My personal findings are documented below:      Subjective  Pt is a 73 y.o. female admitted from Emergency Department for evaluation and treatment of syncope.  Patient has been feeling poorly for about a month since barry COVID.  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  Feels okay this morning without complaints of chest pain or shortness of breath.  She does report worsening dyspnea on exertion over the last month or so.    Physical Exam  GENERAL: Alert and in NAD, Vitals reviewed-blood pressure 145/73.  Pulse 61  HENT: nares patent  EYES: no scleral icterus  CV: regular rhythm, regular rate-no murmur  RESPIRATORY: normal effort, clear to auscultation bilaterally-O2 sats upper 90s  ABDOMEN: soft, obese-nontender  MUSCULOSKELETAL: no deformity  NEURO: Strength sensation and coordination are grossly intact.  Speech and mentation are unremarkable  SKIN: warm, dry    Assessment/Plan  I discussed tx and evaluation of this patient with YULISA Alcantara.  I did review ED workup including EKG, x-ray and labs.  Patient does have history of pulmonary fibrosis and is followed by Dr. Gutiérrez.  She is due to get a CT next week but we will go ahead and get it today as  worsening of fibrosis could have contributed to her syncopal spell and dyspnea on exertion.  Awaiting cardiology consult and recommendations.

## 2024-09-26 NOTE — PLAN OF CARE
Goal Outcome Evaluation:         Pt is a 72 yo female admitted to the obs unit for a near syncopal episode. Pt denies any pain or dizziness. Pt has shortness of breath at times. 2L O2 via NC started for sats dropping into 80s. Pt has sleep apnea. A&O x4. NSR/afib. Pt went NPO @ [facility] sips w/ meds. X1 assist. Cardiology consulted. Pt has no further questions or complaints at this time.

## 2024-09-26 NOTE — PROGRESS NOTES
Patient Identification:  Katelyn Sr  73 y.o.  female  1950  [id number]          LOS 0    Requesting physician:   Melvin Gomez MD    Reason for Consultation:    Pulmonary fibrosis    History of Present Illness:   73-year-old female with a history of pulmonary fibrosis, atrial fibrillation, degenerative disc disease, hypertension, coronary artery disease, hypothyroidism who presented to the hospital with syncopal episode.    Patient presented to the hospital with syncopal episode and underwent workup with cardiology.  Also had a CT chest performed as she was supposed to have a high-resolution performed on Monday for follow-up of her pulmonary fibrosis prior to her appointment with Dr. Gutiérrez.  CT chest demonstrated increased opacities concerning for infection versus worsening fibrosis.  Of note patient was recently diagnosed with COVID approximately a month ago, received Paxlovid however did not require hospitalization.  States that shortness of breath has mildly improved since her diagnosis however persist.  Denies any other infectious symptoms at this time.    Past Medical History:  Past Medical History:   Diagnosis Date    Abnormal ECG 5/21/18    Done in PCP office.  Hx of Atrial fib-?2007    Arthritis of neck     Atrial fibrillation     Cervical disc disorder 8/03    ACDF C6-7, 12/03/2003;  Dr. 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    CTS (carpal tunnel syndrome)     DDD (degenerative disc disease), lumbosacral 11/09/2018    Depression     Fibrosis of lung     Fracture, finger     Generalized anxiety disorder 07/14/2020    GERD (gastroesophageal reflux disease)     Hip arthrosis     Hyperlipidemia     Hypersensitivity pneumonitis     vs pulmonary fibrosis    Hypersensitivity pneumonitis     Hypersomnia due to another medical condition 10/28/2017    Hypertension     Knee swelling     Low back  strain     Lower back pain 07/26/2019    Myocardial infarction     Neck strain     Neuroma of foot     Non-STEMI (non-ST elevated myocardial infarction) 05/21/2018    Nonsenile cataract     FUENTES on CPAP 10/19/2017    Palpitations 07/23/2019    Peripheral vestibulopathy of both ears 07/14/2020    Presence of artificial intra-ocular lens     Scoliosis     Sleep apnea     Thoracic disc disorder     Thyroid disease     Vertigo 07/14/2020    Vestibular neuronitis 07/17/2020       Past Surgical History:  Past Surgical History:   Procedure Laterality Date    CARDIAC CATHETERIZATION N/A 05/22/2018    Procedure: Left Heart Cath;  Surgeon: Aleks Velazquez MD;  Location: [facility] CATH INVASIVE LOCATION;  Service: Cardiovascular    CARDIAC CATHETERIZATION N/A 05/22/2018    Procedure: Coronary angiography;  Surgeon: Aleks Velazquez MD;  Location: [facility] CATH INVASIVE LOCATION;  Service: Cardiovascular    CARDIAC CATHETERIZATION N/A 05/22/2018    Procedure: Left ventriculography;  Surgeon: Aleks Velazquez MD;  Location: [facility] CATH INVASIVE LOCATION;  Service: Cardiovascular    CARDIAC CATHETERIZATION N/A 05/22/2018    Procedure: Peripheral angiography;  Surgeon: Aleks Velazquez MD;  Location: [facility] CATH INVASIVE LOCATION;  Service: Cardiovascular    CATARACT EXTRACTION WITH INTRAOCULAR LENS IMPLANT Bilateral     COLONOSCOPY N/A 03/08/2023    Procedure: COLONOSCOPY to cecum/TI with cold biopsies;  Surgeon: Hamilton Francisco MD;  Location: [facility] ENDOSCOPY;  Service: Gastroenterology;  Laterality: N/A;  pre- diverticulitis  post- diverticulosis with stricture    DIAGNOSTIC LAPAROSCOPY  1990    r/o endometriosis    ENDOSCOPY N/A 03/08/2023    Procedure: ESOPHAGOGASTRODUODENOSCOPY with cold biopsies and 52 Fr Kam Dilatation;  Surgeon: Hamilton Francisco MD;  Location: [facility] ENDOSCOPY;  Service: Gastroenterology;  Laterality: N/A;  pre- dysphagia  post- hiatal hernia, gastric polyps, esophageal ring @ 30     NECK SURGERY  12/03/2003    ACDF C6-7-Dr. Nix     RECTAL SURGERY  1992    fistula/abscess/hemorrhoid        Home Meds:  Medications Prior to Admission   Medication Sig Dispense Refill Last Dose    amLODIPine (NORVASC) 5 MG tablet TAKE 1 TABLET BY MOUTH EVERY DAY 90 tablet 3 9/25/2024    apixaban (ELIQUIS) 5 MG tablet tablet Take 1 tablet by mouth 2 (Two) Times a Day. 28 tablet 0 9/25/2024    carvedilol (COREG) 25 MG tablet Take 1 tablet by mouth 2 (Two) Times a Day. 180 tablet 1 9/25/2024    escitalopram (LEXAPRO) 10 MG tablet Take 1 tablet by mouth Daily.   9/24/2024    famotidine (PEPCID) 10 MG tablet Take 4 tablets by mouth Every Night.   9/24/2024    hydrALAZINE (APRESOLINE) 50 MG tablet Take 1 tablet by mouth 2 (Two) Times a Day. 180 tablet 4 9/25/2024    lamoTRIgine (LaMICtal) 100 MG tablet Take 1 tablet by mouth Every Night.   9/24/2024    losartan (COZAAR) 100 MG tablet TAKE 1 TABLET BY MOUTH EVERY DAY 90 tablet 4 9/25/2024    Nintedanib Esylate (Ofev) 100 MG capsule Take 1 capsule by mouth 2 (Two) Times a Day.   9/24/2024    ondansetron ODT (ZOFRAN-ODT) 4 MG disintegrating tablet    Past Week    pantoprazole (PROTONIX) 40 MG EC tablet Take 1 tablet by mouth Daily.   9/25/2024    pravastatin (PRAVACHOL) 40 MG tablet Take 1 tablet by mouth every night at bedtime. 90 tablet 3 9/24/2024    ARIPiprazole (ABILIFY) 5 MG tablet Take 1 tablet by mouth Daily.   Unknown    traZODone (DESYREL) 50 MG tablet Take 0.5 tablets by mouth At Night As Needed for Sleep.   More than a month         Allergies:  Allergies   Allergen Reactions    Simvastatin Myalgia     Cramps in thighs      Erythromycin Rash    Penicillins Rash       Social History:   Social History     Socioeconomic History    Marital status: Single    Number of children: 0    Years of education: BS    Tobacco Use    Smoking status: Never     Passive exposure: Never    Smokeless tobacco: Never    Tobacco comments:     caffeine use: TEA OCCASSIONALLY  "  Vaping Use    Vaping status: Never Used   Substance and Sexual Activity    Alcohol use: No    Drug use: Never    Sexual activity: Not Currently       Family History:  Family History   Problem Relation Age of Onset    Alzheimer's disease Mother     Heart failure Father     Arrhythmia Father     Heart attack Father         MI at age 56,  at 63.    Diabetes Sister     Atrial fibrillation Sister     Colon cancer Maternal Grandmother     Stroke Paternal Grandmother     Malig Hyperthermia Neg Hx        Review of Systems:  12 point review of systems performed and all else negative except as per HPI above.    Objective:    PHYSICAL EXAM:    /92 (BP Location: Right arm, Patient Position: Lying)   Pulse 65   Temp 97.8 °F (36.6 °C) (Oral)   Resp 18   Ht 157.5 cm (62\")   Wt 87.1 kg (192 lb)   SpO2 96%   BMI 35.12 kg/m²  Body mass index is 35.12 kg/m². 96% 87.1 kg (192 lb)    General: Alert, nontoxic, NAD  HEENT: NC/AT, EOMI, MMM  Neck: Supple, trachea midline  Cardiac: RRR, no murmur, gallops, rubs  Pulmonary: Mild bibasilar crackles  GI: Soft, non-tender, non-distended, normal bowel sounds  Extremities: Warm, well perfused, no LE edema  Skin: no visible rash  Neuro: CN II - XII grossly intact  Psychiatry: Normal mood and affect    Lab Review:   Results from last 7 days   Lab Units 24  0638 24   WBC 10*3/mm3 7.50 8.82   HEMOGLOBIN g/dL 11.7* 12.8   PLATELETS 10*3/mm3 255 292     Results from last 7 days   Lab Units 24  0638 24   SODIUM mmol/L 140 141   POTASSIUM mmol/L 4.0 4.2   CHLORIDE mmol/L 108* 106   CO2 mmol/L 22.0 26.0   BUN mg/dL 12 11   CREATININE mg/dL 0.84 1.13*   GLUCOSE mg/dL 98 110*   CALCIUM mg/dL 8.8 9.5   Estimated Creatinine Clearance: 62.9 mL/min (by C-G formula based on SCr of 0.84 mg/dL).    Results from last 7 days   Lab Units 24  0638 24   AST (SGOT) U/L  --  14   ALT (SGPT) U/L  --  13   PLATELETS 10*3/mm3 255 292           "    Imaging reviewed  Chest imaging from this hospitalization reviewed.       Assessment / Recommendations:    Pulmonary fibrosis on Ofev  Near syncope  Recent COVID-19  Atrial fibrillation  Degenerative disc disease  Hypertension  Coronary disease  Hypothyroidism    -Patient presented for neurosacral episode with cardiology workup unremarkable and suspicion for dehydration.  CT chest performed which demonstrated worsening opacities-concerning for worsening pulmonary fibrosis versus infectious etiology.  Patient recently with COVID-19 infection and this may be residual inflammation.  -Will obtain respiratory viral panel for further evaluation  -Continue Ofev therapy  -Patient has an appointment coming up with Dr. Gutiérrez in November, will have repeat pulmonary function testing performed at that time, if diminished suspicion for worsening pulmonary fibrosis will be higher.  Repeat imaging will be considered at that time.  -Stable for discharge from pulmonary standpoint.    Thank you for allowing me to participate in the care of this patient.  Pulmonary will sign off at this time.  Please contact us if further questions or concerns arise.    Charly Randall MD  [facility], [facility]  Pulmonary and Critical Care Medicine, Interventional Pulmonology    9/26/2024  11:13 EDT    Parts of this note may be an electronic transcription/translation of spoken language to printed text using the Dragon dictation system.

## 2024-09-26 NOTE — PLAN OF CARE
Goal Outcome Evaluation:  Plan of Care Reviewed With: patient        Progress: improving  Outcome Evaluation: Pt verbalized understanding of discharge instructions, no complaints at this time.

## 2024-09-26 NOTE — ED PROVIDER NOTES
EMERGENCY DEPARTMENT ENCOUNTER    Room Number:  119/1  Date of encounter:  9/25/2024  PCP: Pierre Chaudhry Jr., MD  Historian: Patient  Chronic or social conditions impacting care (social determinants of health): None    HPI:  Chief Complaint: Near syncope  A complete HPI/ROS/PMH/PSH/SH/FH are unobtainable due to: Nothing    Context: Katelyn Sr is a 73 y.o. female with a history of hypertension, paroxysmal A-fib, NSTEMI who presents to the ED c/o acute near syncopal episode.  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  Patient ultimately fell into the piano.  She denies any complete loss of consciousness.  She denies any associated shortness of breath, chest pain.  Denies any significant trauma from the fall.  Patient denies any recent history of syncope.  She is getting over COVID and states she has not felt great for the past month.    Review of prior external notes (non-ED):   Reviewed cardiology office visit from 2/21/2024.  Patient seen for paroxysmal A-fib.    Review of prior external test results outside of this encounter:  I reviewed a CMP from 6/24/2024.  Creatinine 0.93, potassium 4.0    PAST MEDICAL HISTORY  Active Ambulatory Problems     Diagnosis Date Noted    FUENTES on autoCPAP 10/19/2017    Circadian rhythm sleep disorder, delayed sleep phase type 10/28/2017    Hypersomnia due to another medical condition 10/28/2017    NSTEMI (non-ST elevated myocardial infarction) 05/21/2018    Essential hypertension 06/29/2018    Degeneration of cervical intervertebral disc 11/09/2018    DDD (degenerative disc disease), thoracic 11/09/2018    Disc degeneration, lumbar 11/27/2018    Mixed hyperlipidemia 01/10/2019    Palpitations 07/23/2019    Extremity pain 07/26/2019    LBP (low back pain) 07/26/2019    DDD (degenerative disc disease), lumbar 07/26/2019    Atypical chest pain 10/30/2019    Vertigo 07/14/2020    Depression  07/14/2020    Generalized anxiety disorder 07/14/2020    Peripheral vestibulopathy of both ears 07/14/2020    Cervical stenosis of spinal canal 07/14/2020    Vestibular neuronitis 07/17/2020    Hammer toe of right foot 08/11/2022    Arthritis of foot 08/11/2022    Arthritis of first metatarsophalangeal (MTP) joint of right foot 08/11/2022    Chronic dyspnea 09/21/2023    Hallux valgus of right foot 11/29/2023    Lumbar facet arthropathy 01/12/2024    A-fib 01/24/2024    Elevated troponin 01/24/2024    Sacroiliitis 02/01/2024     Resolved Ambulatory Problems     Diagnosis Date Noted    No Resolved Ambulatory Problems     Past Medical History:   Diagnosis Date    Abnormal ECG 5/21/18    Arthritis of neck     Atrial fibrillation     Cervical disc disorder 8/03    CTS (carpal tunnel syndrome)     DDD (degenerative disc disease), lumbosacral 11/09/2018    Fibrosis of lung     Fracture, finger     GERD (gastroesophageal reflux disease)     Hip arthrosis     Hyperlipidemia     Hypersensitivity pneumonitis     Hypersensitivity pneumonitis     Hypertension     Knee swelling     Low back strain     Lower back pain 07/26/2019    Myocardial infarction     Neck strain     Neuroma of foot     Non-STEMI (non-ST elevated myocardial infarction) 05/21/2018    Nonsenile cataract     Presence of artificial intra-ocular lens     Scoliosis     Sleep apnea     Thoracic disc disorder     Thyroid disease          PAST SURGICAL HISTORY  Past Surgical History:   Procedure Laterality Date    CARDIAC CATHETERIZATION N/A 05/22/2018    Procedure: Left Heart Cath;  Surgeon: Aleks Velazquez MD;  Location: [facility] CATH INVASIVE LOCATION;  Service: Cardiovascular    CARDIAC CATHETERIZATION N/A 05/22/2018    Procedure: Coronary angiography;  Surgeon: Aleks Velazquez MD;  Location: [facility] CATH INVASIVE LOCATION;  Service: Cardiovascular    CARDIAC CATHETERIZATION N/A 05/22/2018    Procedure: Left ventriculography;  Surgeon: Aleks VILLASEÑOR  MD Marcus;  Location:  [facility] CATH INVASIVE LOCATION;  Service: Cardiovascular    CARDIAC CATHETERIZATION N/A 2018    Procedure: Peripheral angiography;  Surgeon: Aleks Velazquez MD;  Location:  [facility] CATH INVASIVE LOCATION;  Service: Cardiovascular    CATARACT EXTRACTION WITH INTRAOCULAR LENS IMPLANT Bilateral     COLONOSCOPY N/A 2023    Procedure: COLONOSCOPY to cecum/TI with cold biopsies;  Surgeon: Hamilton Francisco MD;  Location:  [facility]OS[facility];  Service: Gastroenterology;  Laterality: N/A;  pre- diverticulitis  post- diverticulosis with stricture    DIAGNOSTIC LAPAROSCOPY      r/o endometriosis    ENDOSCOPY N/A 2023    Procedure: ESOPHAGOGASTRODUODENOSCOPY with cold biopsies and 52 Fr Kam Dilatation;  Surgeon: Hamilton Francisco MD;  Location:  [facility];  Service: Gastroenterology;  Laterality: N/A;  pre- dysphagia  post- hiatal hernia, gastric polyps, esophageal ring @ 32    NECK SURGERY  2003    ACDF C6-7-Dr. Nix     RECTAL SURGERY      fistula/abscess/hemorrhoid         FAMILY HISTORY  Family History   Problem Relation Age of Onset    Alzheimer's disease Mother     Heart failure Father     Arrhythmia Father     Heart attack Father         MI at age 56,  at 63.    Diabetes Sister     Atrial fibrillation Sister     Colon cancer Maternal Grandmother     Stroke Paternal Grandmother     Malig Hyperthermia Neg Hx          SOCIAL HISTORY  Social History     Socioeconomic History    Marital status: Single    Number of children: 0    Years of education: BS    Tobacco Use    Smoking status: Never     Passive exposure: Never    Smokeless tobacco: Never    Tobacco comments:     caffeine use: TEA OCCASSIONALLY   Vaping Use    Vaping status: Never Used   Substance and Sexual Activity    Alcohol use: No    Drug use: Never    Sexual activity: Not Currently         ALLERGIES  Simvastatin, Erythromycin, and Penicillins        REVIEW OF SYSTEMS  All systems reviewed  and negative except for those discussed in HPI.       PHYSICAL EXAM    I have reviewed the triage vital signs and nursing notes.    ED Triage Vitals   Temp Heart Rate Resp BP SpO2   09/25/24 2014 09/25/24 2014 09/25/24 2014 09/25/24 2014 09/25/24 2014   98.3 °F (36.8 °C) 54 18 121/68 96 %      Temp src Heart Rate Source Patient Position BP Location FiO2 (%)   -- 09/25/24 2014 09/25/24 2050 -- --    Monitor Lying         Physical Exam  GENERAL: Alert, oriented, not distressed  HENT: head atraumatic, no nuchal rigidity  EYES: no scleral icterus, EOMI  CV: regular rhythm, regular rate, no murmur  RESPIRATORY: normal effort, CTA  ABDOMEN: soft, nontender  MUSCULOSKELETAL: no deformity, FROM, no calf swelling or tenderness  NEURO: alert, moves all extremities, follows commands  SKIN: warm, dry        LAB RESULTS  Recent Results (from the past 24 hour(s))   ECG 12 Lead Syncope    Collection Time: 09/25/24  8:28 PM   Result Value Ref Range    QT Interval 449 ms    QTC Interval 449 ms   Comprehensive Metabolic Panel    Collection Time: 09/25/24  8:46 PM    Specimen: Blood   Result Value Ref Range    Glucose 110 (H) 65 - 99 mg/dL    BUN 11 8 - 23 mg/dL    Creatinine 1.13 (H) 0.57 - 1.00 mg/dL    Sodium 141 136 - 145 mmol/L    Potassium 4.2 3.5 - 5.2 mmol/L    Chloride 106 98 - 107 mmol/L    CO2 26.0 22.0 - 29.0 mmol/L    Calcium 9.5 8.6 - 10.5 mg/dL    Total Protein 6.6 6.0 - 8.5 g/dL    Albumin 3.9 3.5 - 5.2 g/dL    ALT (SGPT) 13 1 - 33 U/L    AST (SGOT) 14 1 - 32 U/L    Alkaline Phosphatase 101 39 - 117 U/L    Total Bilirubin 0.4 0.0 - 1.2 mg/dL    Globulin 2.7 gm/dL    A/G Ratio 1.4 g/dL    BUN/Creatinine Ratio 9.7 7.0 - 25.0    Anion Gap 9.0 5.0 - 15.0 mmol/L    eGFR 52.1 (L) >60.0 mL/min/1.73   Single High Sensitivity Troponin T    Collection Time: 09/25/24  8:46 PM    Specimen: Blood   Result Value Ref Range    HS Troponin T 9 <14 ng/L   CBC Auto Differential    Collection Time: 09/25/24  8:46 PM    Specimen: Blood    Result Value Ref Range    WBC 8.82 3.40 - 10.80 10*3/mm3    RBC 4.58 3.77 - 5.28 10*6/mm3    Hemoglobin 12.8 12.0 - 15.9 g/dL    Hematocrit 40.9 34.0 - 46.6 %    MCV 89.3 79.0 - 97.0 fL    MCH 27.9 26.6 - 33.0 pg    MCHC 31.3 (L) 31.5 - 35.7 g/dL    RDW 15.1 12.3 - 15.4 %    RDW-SD 48.8 37.0 - 54.0 fl    MPV 8.9 6.0 - 12.0 fL    Platelets 292 140 - 450 10*3/mm3    Neutrophil % 72.1 42.7 - 76.0 %    Lymphocyte % 12.4 (L) 19.6 - 45.3 %    Monocyte % 9.8 5.0 - 12.0 %    Eosinophil % 4.9 0.3 - 6.2 %    Basophil % 0.5 0.0 - 1.5 %    Immature Grans % 0.3 0.0 - 0.5 %    Neutrophils, Absolute 6.37 1.70 - 7.00 10*3/mm3    Lymphocytes, Absolute 1.09 0.70 - 3.10 10*3/mm3    Monocytes, Absolute 0.86 0.10 - 0.90 10*3/mm3    Eosinophils, Absolute 0.43 (H) 0.00 - 0.40 10*3/mm3    Basophils, Absolute 0.04 0.00 - 0.20 10*3/mm3    Immature Grans, Absolute 0.03 0.00 - 0.05 10*3/mm3    nRBC 0.0 0.0 - 0.2 /100 WBC   Single High Sensitivity Troponin T    Collection Time: 09/25/24 10:30 PM    Specimen: Arm, Right; Blood   Result Value Ref Range    HS Troponin T 7 <14 ng/L       Ordered the above labs and independently reviewed the results.        RADIOLOGY  XR Chest 1 View    Result Date: 9/25/2024  SINGLE VIEW OF THE CHEST  HISTORY: Syncope  COMPARISON: January 23, 2024  FINDINGS: There is cardiomegaly. There is no vascular congestion. No pneumothorax or pleural effusion is seen. The patient appears to have some volume loss within the right hemithorax, with suspected chronic scarring. Some additional mild scarring is noted at the left lung base. No acute infiltrates are identified. There is some tortuosity of the thoracic aorta.      No acute findings.  This report was finalized on 9/25/2024 9:58 PM by Dr. Samantha Alfredo M.D on Workstation: [id number]       I ordered the above noted radiological studies. Reviewed by me and discussed with radiologist.  See dictation for official radiology interpretation.      MEDICATIONS GIVEN IN  ER    Medications   sodium chloride 0.9 % flush 10 mL (has no administration in time range)   sodium chloride 0.9 % flush 10 mL (10 mL Intravenous Given 9/25/24 4896)   sodium chloride 0.9 % flush 10 mL (has no administration in time range)   sodium chloride 0.9 % infusion 40 mL (has no administration in time range)   ondansetron ODT (ZOFRAN-ODT) disintegrating tablet 4 mg (has no administration in time range)     Or   ondansetron (ZOFRAN) injection 4 mg (has no administration in time range)   nitroglycerin (NITROSTAT) SL tablet 0.4 mg (has no administration in time range)   Potassium Replacement - Follow Nurse / BPA Driven Protocol (has no administration in time range)   Magnesium Standard Dose Replacement - Follow Nurse / BPA Driven Protocol (has no administration in time range)   Phosphorus Replacement - Follow Nurse / BPA Driven Protocol (has no administration in time range)   Calcium Replacement - Follow Nurse / BPA Driven Protocol (has no administration in time range)   acetaminophen (TYLENOL) tablet 650 mg (has no administration in time range)     Or   acetaminophen (TYLENOL) 160 MG/5ML oral solution 650 mg (has no administration in time range)     Or   acetaminophen (TYLENOL) suppository 650 mg (has no administration in time range)   sennosides-docusate (PERICOLACE) 8.6-50 MG per tablet 2 tablet (has no administration in time range)     And   polyethylene glycol (MIRALAX) packet 17 g (has no administration in time range)     And   bisacodyl (DULCOLAX) EC tablet 5 mg (has no administration in time range)     And   bisacodyl (DULCOLAX) suppository 10 mg (has no administration in time range)   amLODIPine (NORVASC) tablet 5 mg (has no administration in time range)   apixaban (ELIQUIS) tablet 5 mg (5 mg Oral Given 9/25/24 7761)   ARIPiprazole (ABILIFY) tablet 5 mg (has no administration in time range)   carvedilol (COREG) tablet 25 mg (25 mg Oral Given 9/25/24 1241)   escitalopram (LEXAPRO) tablet 10 mg (has no  administration in time range)   famotidine (PEPCID) tablet 40 mg (40 mg Oral Given 9/25/24 2351)   hydrALAZINE (APRESOLINE) tablet 50 mg (has no administration in time range)   lamoTRIgine (LaMICtal) tablet 100 mg (100 mg Oral Given 9/25/24 2351)   losartan (COZAAR) tablet 100 mg (has no administration in time range)   pantoprazole (PROTONIX) EC tablet 40 mg (has no administration in time range)   pravastatin (PRAVACHOL) tablet 40 mg (40 mg Oral Given 9/25/24 2351)   traZODone (DESYREL) tablet 25 mg (has no administration in time range)   sodium chloride 0.9 % bolus 1,000 mL (0 mL Intravenous Stopped 9/25/24 2351)         ADDITIONAL ORDERS CONSIDERED BUT NOT ORDERED:  Nothing    PROGRESS, DATA ANALYSIS, CONSULTS, AND MEDICAL DECISION MAKING    All labs have been independently interpreted by myself.  All radiology studies have been independently interpreted by myself and discussed with radiologist dictating the report.   EKG's independently interpreted by myself.  Discussion below represents my analysis of pertinent findings related to patient's condition, differential diagnosis, treatment plan and final disposition.    I have discussed case with Dr. Sears, emergency room physician.  He has performed his own bedside examination and agrees with treatment plan.    ED Course as of 09/25/24 2357   Wed Sep 25, 2024   2108 Patient presents after near syncopal episode prior to arrival.  Patient does have a history of atrial fibrillation, hypertension.  Vital stable at this time. [EE]   2112 WBC: 8.82 [EE]   2112 Hemoglobin: 12.8 [EE]   2124 EKG independently interpreted myself.  At time 2028.  Sinus rhythm, 60 bpm.  Normal P/SUMMER.  QRS shows left axis deviation.  No significant ST abnormalities.  Similar to previous EKG from 2/21/2024. [EE]   2127 Creatinine(!): 1.13  This was 0.93, 3 months ago [EE]   2140 No clear etiology to patient's near syncope.  Given her history of A-fib and previous bradycardia, NSTEMI we will  admit for further evaluation.    I discussed the case with Eva Ashby NP in the observation unit.  She agrees to admit. [EE]      ED Course User Index  [EE] Mohan Wahl PA       AS OF 23:57 EDT VITALS:    BP - 159/89  HR - 80  TEMP - 97.9 °F (36.6 °C) (Oral)  O2 SATS - 94%        DIAGNOSIS  Final diagnoses:   Near syncope   PAF (paroxysmal atrial fibrillation)         DISPOSITION  Admitted        Dictated utilizing Dragon dictation     Mohan Wahl PA  09/25/24 7273

## 2024-09-26 NOTE — H&P
[facility]   HISTORY AND PHYSICAL    Patient Name: Katelyn Sr  : 1950  MRN: [id number]  Primary Care Physician:  Pierre Chaudhry Jr., MD  Date of admission: 2024    Subjective   Subjective     Chief Complaint:   Chief Complaint   Patient presents with    Syncope         HPI:    Katelyn Sr is a 73 y.o. 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  She states that she had COVID approximately 6 weeks ago and has continued to have weakness and fatigue.  She was planned to be at [employer] when her arms felt very heavy and she felt very fatigued.  This lasted approximately 5 minutes.  She then began having tunnel vision and had a syncopal episode.  She states that her blood pressure was very low (100s / 50s) when EMS got to her.  She also states that her oxygen level has been in the 80s at home.  She states she is scheduled for a repeat CT scan and appointment with pulmonology at the end of the month.  Cardiology has been consulted to see the patient in the AM.      Review of Systems   All systems were reviewed and negative except for: What was mentioned above in the HPI.    Personal History     Past Medical History:   Diagnosis Date    Abnormal ECG 18    Done in PCP office.  Hx of Atrial fib-?    Arthritis of neck     Atrial fibrillation     Cervical disc disorder     ACDF C6-7, 2003;  Dr. Tr Nix    Cervical stenosis of spinal canal 2020    Circadian rhythm sleep disorder, delayed sleep phase type 2017    CTS (carpal tunnel syndrome)     DDD (degenerative disc disease), lumbosacral 2018    Depression     Fibrosis of lung     Fracture of wrist     Fracture, finger     Generalized anxiety disorder  07/14/2020    GERD (gastroesophageal reflux disease)     Hip arthrosis     Hyperlipidemia     Hypersensitivity pneumonitis     vs pulmonary fibrosis    Hypersensitivity pneumonitis     Hypersomnia due to another medical condition 10/28/2017    Hypertension     Knee swelling     Low back strain     Lower back pain 07/26/2019    Myocardial infarction     Neck strain     Neuroma of foot     Non-STEMI (non-ST elevated myocardial infarction) 05/21/2018    Nonsenile cataract     FUENTES on CPAP 10/19/2017    Palpitations 07/23/2019    Peripheral vestibulopathy of both ears 07/14/2020    Presence of artificial intra-ocular lens     Scoliosis     Sleep apnea     Thoracic disc disorder     Thyroid disease     Vertigo 07/14/2020    Vestibular neuronitis 07/17/2020       Past Surgical History:   Procedure Laterality Date    CARDIAC CATHETERIZATION N/A 05/22/2018    Procedure: Left Heart Cath;  Surgeon: Aleks Velazquez MD;  Location: [facility] CATH INVASIVE LOCATION;  Service: Cardiovascular    CARDIAC CATHETERIZATION N/A 05/22/2018    Procedure: Coronary angiography;  Surgeon: Aleks Velazquez MD;  Location: [facility] CATH INVASIVE LOCATION;  Service: Cardiovascular    CARDIAC CATHETERIZATION N/A 05/22/2018    Procedure: Left ventriculography;  Surgeon: Aleks Velazquez MD;  Location: [facility] CATH INVASIVE LOCATION;  Service: Cardiovascular    CARDIAC CATHETERIZATION N/A 05/22/2018    Procedure: Peripheral angiography;  Surgeon: Aleks Velazquez MD;  Location: [facility] CATH INVASIVE LOCATION;  Service: Cardiovascular    CATARACT EXTRACTION WITH INTRAOCULAR LENS IMPLANT Bilateral     COLONOSCOPY N/A 03/08/2023    Procedure: COLONOSCOPY to cecum/TI with cold biopsies;  Surgeon: Hamilton Francisco MD;  Location: [facility] ENDOSCOPY;  Service: Gastroenterology;  Laterality: N/A;  pre- diverticulitis  post- diverticulosis with stricture    DIAGNOSTIC LAPAROSCOPY  1990    r/o endometriosis    ENDOSCOPY N/A 03/08/2023     Procedure: ESOPHAGOGASTRODUODENOSCOPY with cold biopsies and 52 Fr Kam Dilatation;  Surgeon: Hamilton Francisco MD;  Location: [facility] ENDOSCOPY;  Service: Gastroenterology;  Laterality: N/A;  pre- dysphagia  post- hiatal hernia, gastric polyps, esophageal ring @ 32    NECK SURGERY  12/03/2003    ACDF C6-7-Dr. Nix     RECTAL SURGERY  1992    fistula/abscess/hemorrhoid       Family History: family history includes Alzheimer's disease in her mother; Arrhythmia in her father; Atrial fibrillation in her sister; Colon cancer in her maternal grandmother; Diabetes in her sister; Heart attack in her father; Heart failure in her father; Stroke in her paternal grandmother. Otherwise pertinent FHx was reviewed and not pertinent to current issue.    Social History:  reports that she has never smoked. She has never been exposed to tobacco smoke. She has never used smokeless tobacco. She reports that she does not drink alcohol and does not use drugs.    Home Medications:  ARIPiprazole, DULoxetine, (Diclofenac Sodium 4 %, Topiramate 2 %, cloNIDine HCl 0.2 %, Lidocaine HCl 5 %), Nintedanib Esylate, amLODIPine, apixaban, carvedilol, cetirizine, coenzyme Q10, escitalopram, famotidine, hydrALAZINE, ibuprofen, lamoTRIgine, losartan, ondansetron ODT, pantoprazole, pravastatin, and traZODone    Allergies:  Allergies   Allergen Reactions    Simvastatin Myalgia     Cramps in thighs      Erythromycin Rash    Penicillins Rash       Objective   Objective     Vitals:   Temp:  [98.3 °F (36.8 °C)] 98.3 °F (36.8 °C)  Heart Rate:  [54-81] 81  Resp:  [18] 18  BP: (121-127)/(66-78) 123/78  Physical Exam   Constitutional: Awake, alert   Eyes: PERRLA   HENT: NCAT, mucous membranes moist   Neck: Supple   Respiratory: Clear to auscultation bilaterally, nonlabored respirations    Cardiovascular: regular rate, palpable pedal pulses bilaterally   Gastrointestinal: Positive bowel sounds, soft, nontender, nondistended   Musculoskeletal: No bilateral  ankle edema   Psychiatric: Appropriate affect, cooperative   Neurologic: Oriented x 3, speech clear   Skin: No rashes       Result Review    Result Review:  I have personally reviewed the results from the time of this admission to 9/25/2024 21:44 EDT and agree with these findings:  [x]  Laboratory list / accordion  []  Microbiology  [x]  Radiology  [x]  EKG/Telemetry   []  Cardiology/Vascular   []  Pathology  [x]  Old records  []  Other:  Initial lab workup in the emergency department shows high-sensitivity troponin of 9, creatinine 1.13, glucose 110, all of the lab work is at baseline for the patient.  Chest x-ray shows no acute findings.  EKG shows sinus rhythm.        Assessment & Plan   Assessment / Plan     Brief Patient Summary:  Katelyn Sr is a 73 y.o. female who was admitted to the observation unit for further evaluation and treatment of her syncopal episode.    Active Hospital Problems:  Active Hospital Problems    Diagnosis     **Syncope      Plan:     Syncope  -Cardiac monitoring  -Vital signs every 4 hours  -Cardiology consult  -High Sensitivity Troponin 9, 7,repeat in a.m.  -EKG SR  -N.p.o. after midnight    Atrial fibrillation  -Continue home dose Eliquis, Coreg    Elevated creatinine  -Creatinine 1.13, last creatinine documented 6/24/2024 at 0.93  -Received 1 L bolus IV fluids in ED  -Repeat in a.m.      VTE Prophylaxis:  Mechanical VTE prophylaxis orders are present.        CODE STATUS:    Code Status (Patient has no pulse and is not breathing): CPR (Attempt to Resuscitate)  Medical Interventions (Patient has pulse or is breathing): Full Support    Admission Status:  I believe this patient meets observation status.    76 minutes have been spent by [facility] providers in the care of this patient while under observation status.      Appropriate PPE worn during patient encounter.  Hand hygeine performed before and after seeing the patient.      Electronically  signed by Eva Ashby, CARLIE, 09/25/24, 9:44 PM EDT.

## 2024-09-26 NOTE — ED PROVIDER NOTES
" EMERGENCY DEPARTMENT MD ATTESTATION NOTE    Room Number:  119/1  PCP: Pierre Chaudhry Jr., MD  Independent Historians: Patient and EMS    HPI:  A complete HPI/ROS/PMH/PSH/SH/FH are unobtainable due to: None    Chronic or social conditions impacting patient care (Social Determinants of Health): None      Context: Katelyn Sr is a 73 y.o. female with a medical history of hypertension, paroxysmal atrial fibrillation, and GERD who presents to the ED c/o acute near syncopal episode which occurred this evening while she was at [employer].  She began to feel lightheaded and then broke out into a sweat.  She fell forward and \"nearly lost consciousness.\"  She did not have any chest pain or shortness of breath, however.  She says that she did have a COVID infection last month and ever since that time has continued to feel unwell.       Review of prior external notes (non-ED) -and- Review of prior external test results outside of this encounter: I independently reviewed the cardiology office progress note from February 21, 2024.  She had consultation for paroxysmal atrial fibrillation with RVR and intermittent sinus bradycardia.  Instructed to start low-dose carvedilol therapy 3.125 mg p.o. every 12 hours.  Also restarted hydralazine therapy at 50 mg p.o. every 8 hours.    Prescription drug monitoring program review: ABDOULAYE reviewed by Lele Smith MD, Alvaro Sears MD         PHYSICAL EXAM    I have reviewed the triage vital signs and nursing notes.    ED Triage Vitals   Temp Heart Rate Resp BP SpO2   09/25/24 2014 09/25/24 2014 09/25/24 2014 09/25/24 2014 09/25/24 2014   98.3 °F (36.8 °C) 54 18 121/68 96 %      Temp src Heart Rate Source Patient Position BP Location FiO2 (%)   -- 09/25/24 2014 09/25/24 2050 -- --    Monitor Lying         Physical Exam  GENERAL: alert, no acute distress  SKIN: Warm, dry, no rashes, mild pallor  HENT: Normocephalic, atraumatic  EYES: no scleral icterus  CV: Regular rhythm, " regular rate  RESPIRATORY: normal effort, lungs clear  ABDOMEN: soft, nontender, nondistended  MUSCULOSKELETAL: no deformity, no asymmetry of extremities  NEURO: alert, moves all extremities, follows commands      MEDICATIONS GIVEN IN ER  Medications   sodium chloride 0.9 % flush 10 mL (has no administration in time range)   sodium chloride 0.9 % flush 10 mL (10 mL Intravenous Given 9/25/24 6038)   sodium chloride 0.9 % flush 10 mL (has no administration in time range)   sodium chloride 0.9 % infusion 40 mL (has no administration in time range)   ondansetron ODT (ZOFRAN-ODT) disintegrating tablet 4 mg (has no administration in time range)     Or   ondansetron (ZOFRAN) injection 4 mg (has no administration in time range)   nitroglycerin (NITROSTAT) SL tablet 0.4 mg (has no administration in time range)   Potassium Replacement - Follow Nurse / BPA Driven Protocol (has no administration in time range)   Magnesium Standard Dose Replacement - Follow Nurse / BPA Driven Protocol (has no administration in time range)   Phosphorus Replacement - Follow Nurse / BPA Driven Protocol (has no administration in time range)   Calcium Replacement - Follow Nurse / BPA Driven Protocol (has no administration in time range)   acetaminophen (TYLENOL) tablet 650 mg (has no administration in time range)     Or   acetaminophen (TYLENOL) 160 MG/5ML oral solution 650 mg (has no administration in time range)     Or   acetaminophen (TYLENOL) suppository 650 mg (has no administration in time range)   sennosides-docusate (PERICOLACE) 8.6-50 MG per tablet 2 tablet (has no administration in time range)     And   polyethylene glycol (MIRALAX) packet 17 g (has no administration in time range)     And   bisacodyl (DULCOLAX) EC tablet 5 mg (has no administration in time range)     And   bisacodyl (DULCOLAX) suppository 10 mg (has no administration in time range)   amLODIPine (NORVASC) tablet 5 mg (has no administration in time range)   apixaban  (ELIQUIS) tablet 5 mg (5 mg Oral Given 9/25/24 2351)   ARIPiprazole (ABILIFY) tablet 5 mg (has no administration in time range)   carvedilol (COREG) tablet 25 mg (25 mg Oral Given 9/25/24 2351)   escitalopram (LEXAPRO) tablet 10 mg (has no administration in time range)   famotidine (PEPCID) tablet 40 mg (40 mg Oral Given 9/25/24 2351)   hydrALAZINE (APRESOLINE) tablet 50 mg (has no administration in time range)   lamoTRIgine (LaMICtal) tablet 100 mg (100 mg Oral Given 9/25/24 2351)   losartan (COZAAR) tablet 100 mg (has no administration in time range)   pantoprazole (PROTONIX) EC tablet 40 mg (has no administration in time range)   pravastatin (PRAVACHOL) tablet 40 mg (40 mg Oral Given 9/25/24 2351)   traZODone (DESYREL) tablet 25 mg (has no administration in time range)   sodium chloride 0.9 % bolus 1,000 mL (0 mL Intravenous Stopped 9/25/24 2351)         ORDERS PLACED DURING THIS VISIT:  Orders Placed This Encounter   Procedures    XR Chest 1 View    Comprehensive Metabolic Panel    Single High Sensitivity Troponin T    CBC Auto Differential    Single High Sensitivity Troponin T    CBC (No Diff)    Basic Metabolic Panel    High Sensitivity Troponin T    NPO Diet NPO Type: Sips with Meds    Orthostatic Vitals (Blood Pressure & Heart Rate)    Advance Diet As Tolerated -    Intake & Output    Weigh Patient    Oral Care    Place Sequential Compression Device    Maintain Sequential Compression Device    Maintain IV Access    Telemetry - Place Orders & Notify Provider of Results When Patient Experiences Acute Chest Pain, Dysrhythmia or Respiratory Distress    May Be Off Telemetry for Tests    Vital Signs    Continuous Pulse Oximetry    Up With Assistance    Code Status and Medical Interventions: CPR (Attempt to Resuscitate); Full Support    Inpatient Cardiology Consult    ECG 12 Lead Syncope    Insert Peripheral IV    Insert Peripheral IV    Initiate ED Observation Status    CBC & Differential          PROCEDURES  Procedures        PROGRESS, DATA ANALYSIS, CONSULTS, AND MEDICAL DECISION MAKING  All labs have been independently interpreted by me.  All radiology studies have been reviewed by me. All EKG's have been independently viewed and interpreted by me.  Discussion below represents my analysis of pertinent findings related to patient's condition, differential diagnosis, treatment plan and final disposition.    Differential diagnosis includes but is not limited to acute MI, hypoglycemia, dehydration, arrhythmia, stroke, seizure.    Clinical Scores:                   ED Course as of 09/25/24 2359   Wed Sep 25, 2024   2108 Patient presents after near syncopal episode prior to arrival.  Patient does have a history of atrial fibrillation, hypertension.  Vital stable at this time. [EE]   2112 WBC: 8.82 [EE]   2112 Hemoglobin: 12.8 [EE]   2124 EKG independently interpreted myself.  At time 2028.  Sinus rhythm, 60 bpm.  Normal P/SUMMER.  QRS shows left axis deviation.  No significant ST abnormalities.  Similar to previous EKG from 2/21/2024. [EE]   2127 Creatinine(!): 1.13  This was 0.93, 3 months ago [EE]   2140 No clear etiology to patient's near syncope.  Given her history of A-fib and previous bradycardia, NSTEMI we will admit for further evaluation.    I discussed the case with Eva Ashby NP in the observation unit.  She agrees to admit. [EE]      ED Course User Index  [EE] Mohan Wahl PA       MDM:   EKG         EKG time/Interp time: 2028/2045  Rhythm/Rate: Sinus rhythm, 60 bpm  P waves and VA: Present, normal interval  QRS, axis: 103 ms, LVH, left axis deviation  ST and T waves: No acute ischemic changes are evident  Independently interpreted by me contemporaneously with treatment    I independently interpreted the Chest X-ray and my findings are: No Pneumothorax, No Effusion, No Infiltrate    I have reviewed all the labs from today's ED visit.  There is no evidence of anemia.  There is no evidence of  electrolyte imbalance.  Overall, the renal function and liver function appear to be normal.  Troponin is normal range.  No arrhythmias detected at this time.  We do not have a clear etiology for the patient's syncopal event while she was seated at [employer].  Given her cardiac history, I think it is wise to keep her in the observation unit tonight for further monitoring on telemetry and completion of cardiac workup.  She is agreeable with this plan as outlined.      COMPLEXITY OF CARE  The patient requires admission.    Please note that portions of this document were completed with a voice recognition program.    Note Disclaimer: At [facility], we believe that sharing information builds trust and better relationships. You are receiving this note because you recently visited [facility]. It is possible you will see health information before a provider has talked with you about it. This kind of information can be easy to misunderstand. To help you fully understand what it means for your health, we urge you to discuss this note with your provider.       Alvaro Sears MD  09/25/24 3917       Alvaro Sears MD  09/25/24 2599

## 2024-09-26 NOTE — ED NOTES
Nursing report ED to floor  Katelyn Sr  73 y.o.  female    HPI :  HPI (Adult)  Stated Reason for Visit: near syncope    Chief Complaint  Chief Complaint   Patient presents with    Syncope       Admitting doctor:   Lele Smith MD    Admitting diagnosis:   The primary encounter diagnosis was Near syncope. A diagnosis of PAF (paroxysmal atrial fibrillation) was also pertinent to this visit.    Code status:   Current Code Status       Date Active Code Status Order ID Comments User Context       9/25/2024 2143 CPR (Attempt to Resuscitate) [id number]  Eva Ashby APRN ED        Question Answer    Code Status (Patient has no pulse and is not breathing) CPR (Attempt to Resuscitate)    Medical Interventions (Patient has pulse or is breathing) Full Support                    Allergies:   Simvastatin, Erythromycin, and Penicillins    Isolation:   No active isolations    Intake and Output    Intake/Output Summary (Last 24 hours) at 9/25/2024 2151  Last data filed at 9/25/2024 2015  Gross per 24 hour   Intake 100 ml   Output --   Net 100 ml       Weight:       09/25/24 2014   Weight: 84.4 kg (186 lb)       Most recent vitals:   Vitals:    09/25/24 2050 09/25/24 2051 09/25/24 2055 09/25/24 2151   BP: 127/75 127/66 123/78 131/93   Patient Position: Lying Sitting Standing    Pulse: 63 69 81 67   Resp:       Temp:       SpO2:   91% 100%   Weight:       Height:           Active LDAs/IV Access:   Lines, Drains & Airways       Active LDAs       Name Placement date Placement time Site Days    Peripheral IV 09/25/24 2016 Anterior;Left Forearm 09/25/24 2016  Forearm  less than 1                    Labs (abnormal labs have a star):   Labs Reviewed   COMPREHENSIVE METABOLIC PANEL - Abnormal; Notable for the following components:       Result Value    Glucose 110 (*)     Creatinine 1.13 (*)     eGFR 52.1 (*)     All other components within normal limits    Narrative:     GFR Normal >60  Chronic Kidney Disease <60  Kidney  Failure <15    The GFR formula is only valid for adults with stable renal function between ages 18 and 70.   CBC WITH AUTO DIFFERENTIAL - Abnormal; Notable for the following components:    MCHC 31.3 (*)     Lymphocyte % 12.4 (*)     Eosinophils, Absolute 0.43 (*)     All other components within normal limits   SINGLE HS TROPONIN T - Normal    Narrative:     High Sensitive Troponin T Reference Range:  <14.0 ng/L- Negative Female for AMI  <22.0 ng/L- Negative Male for AMI  >=14 - Abnormal Female indicating possible myocardial injury.  >=22 - Abnormal Male indicating possible myocardial injury.   Clinicians would have to utilize clinical acumen, EKG, Troponin, and serial changes to determine if it is an Acute Myocardial Infarction or myocardial injury due to an underlying chronic condition.        SINGLE HS TROPONIN T   CBC AND DIFFERENTIAL    Narrative:     The following orders were created for panel order CBC & Differential.  Procedure                               Abnormality         Status                     ---------                               -----------         ------                     CBC Auto Differential[[id number]]        Abnormal            Final result                 Please view results for these tests on the individual orders.       EKG:   ECG 12 Lead Syncope   Preliminary Result   HEART RATE=60  bpm   RR Tuwbdhim=4724  ms   OR Dmtpptsr=848  ms   P Horizontal Axis=11  deg   P Front Axis=47  deg   QRSD Auqztgzq=041  ms   QT Cviqrvgb=629  ms   UHqW=539  ms   QRS Axis=-36  deg   T Wave Axis=1  deg   - ABNORMAL ECG -   Sinus rhythm   Left ventricular hypertrophy   Inferior infarct, old   Date and Time of Study:2024-09-25 20:28:26          Meds given in ED:   Medications   sodium chloride 0.9 % flush 10 mL (has no administration in time range)   sodium chloride 0.9 % flush 10 mL (has no administration in time range)   sodium chloride 0.9 % flush 10 mL (has no administration in time range)   sodium  chloride 0.9 % infusion 40 mL (has no administration in time range)   ondansetron ODT (ZOFRAN-ODT) disintegrating tablet 4 mg (has no administration in time range)     Or   ondansetron (ZOFRAN) injection 4 mg (has no administration in time range)   nitroglycerin (NITROSTAT) SL tablet 0.4 mg (has no administration in time range)   Potassium Replacement - Follow Nurse / BPA Driven Protocol (has no administration in time range)   Magnesium Standard Dose Replacement - Follow Nurse / BPA Driven Protocol (has no administration in time range)   Phosphorus Replacement - Follow Nurse / BPA Driven Protocol (has no administration in time range)   Calcium Replacement - Follow Nurse / BPA Driven Protocol (has no administration in time range)   acetaminophen (TYLENOL) tablet 650 mg (has no administration in time range)     Or   acetaminophen (TYLENOL) 160 MG/5ML oral solution 650 mg (has no administration in time range)     Or   acetaminophen (TYLENOL) suppository 650 mg (has no administration in time range)   sennosides-docusate (PERICOLACE) 8.6-50 MG per tablet 2 tablet (has no administration in time range)     And   polyethylene glycol (MIRALAX) packet 17 g (has no administration in time range)     And   bisacodyl (DULCOLAX) EC tablet 5 mg (has no administration in time range)     And   bisacodyl (DULCOLAX) suppository 10 mg (has no administration in time range)   sodium chloride 0.9 % bolus 1,000 mL (1,000 mL Intravenous New Bag 9/25/24 2059)       Imaging results:  No radiology results for the last day    Ambulatory status:   - with assist    Social issues:   Social History     Socioeconomic History    Marital status: Single    Number of children: 0    Years of education: BS    Tobacco Use    Smoking status: Never     Passive exposure: Never    Smokeless tobacco: Never    Tobacco comments:     caffeine use: TEA OCCASSIONALLY   Vaping Use    Vaping status: Never Used   Substance and Sexual Activity    Alcohol use: No    Drug  use: Never    Sexual activity: Not Currently       Peripheral Neurovascular  Peripheral Neurovascular (Adult)  Peripheral Neurovascular WDL: WDL    Neuro Cognitive  Neuro Cognitive (Adult)  Cognitive/Neuro/Behavioral WDL: WDL    Learning  Learning Assessment (Adult)  Learning Readiness and Ability: no barriers identified  Education Provided  Person Taught: patient  Teaching Method: verbal instruction    Respiratory  Respiratory (Adult)  Airway WDL: WDL  Respiratory WDL  Respiratory WDL: WDL    Abdominal Pain       Pain Assessments  Pain (Adult)  (0-10) Pain Rating: Rest: 0    NIH Stroke Scale       aMi Rehman RN  09/25/24 21:51 EDT

## 2024-09-26 NOTE — CONSULTS
[facility]iology [facility] Consult    Patient Name: Katelyn Sr  Age/Sex: 73 y.o. female  : 1950  MRN: [id number]    Date of Admission: 2024  Date of Encounter Visit: 24  Encounter Provider: CARLIE Gold  Referring Provider: Alvaro Sears MD  Place of Service: [facility] CARDIOLOGY  Patient Care Team:  Pierre Chaudhry Jr., MD as PCP - General (Internal Medicine)  Pierre Chaudhry Jr., MD as PCP - Internal Medicine  Manoj Gutiérrez Jr., MD as Consulting Physician (Pulmonary Disease)  Aleks Velazquez MD as Consulting Physician (Cardiology)    Subjective:     Consulted for: near syncope    Chief Complaint: near syncope    History of Present Illness:  Katelyn Sr is a 73 y.o. female with past medical history of paroxysmal atrial fibrillation, for cholesterolemia, pulmonary fibrosis, hypertension, coronary artery disease, hypothyroidism, SVT, obstructive sleep apnea and degenerative disc disease.  Patient notes that she had COVID approximately 6 weeks ago and has continued to have weakness and fatigue.  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  She said it took everything out of her to push the keys.  She noted that she felt like she was either going to pass out or vomit and felt as though she was in a tunnel.  She never lost consciousness.  She noted that EMS reported her blood pressures being 100/50 which is unlike her normal readings.  She denies ever having chest pain, pressure or palpitations.    Upon arrival to the emergency room, blood pressure was in the normal range.  High sensitivity troponin negative x 3.  EKG sinus rhythm and stable.  Creatinine was slightly higher than her normal.  She was given IV fluids and feels much better.      Coronary angiography in 2018 showed normal coronary arteries, distal inferior wall hypokinesis and an LVEF  of 55%. Most recent echocardiogram 9/2023 shows normal LV size and function, normal RV size systolic function and no significant valvular disease.   She wore a 14-day Holter monitor in 2/2024 which was relatively benign, showed 52 episodes of nonsustained SVT with longest lasting 16 beats with a rate of 200 bpm.  No AV block, no atrial fibrillation or flutter.    HbA1c 5.9, ,  Past Medical History:  Past Medical History:   Diagnosis Date    Abnormal ECG 5/21/18    Done in PCP office.  Hx of Atrial fib-?2007    Arthritis of neck     Atrial fibrillation     Cervical disc disorder 8/03    ACDF C6-7, 12/03/2003;  Dr. 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    CTS (carpal tunnel syndrome)     DDD (degenerative disc disease), lumbosacral 11/09/2018    Depression     Fibrosis of lung     Fracture, finger     Generalized anxiety disorder 07/14/2020    GERD (gastroesophageal reflux disease)     Hip arthrosis     Hyperlipidemia     Hypersensitivity pneumonitis     vs pulmonary fibrosis    Hypersensitivity pneumonitis     Hypersomnia due to another medical condition 10/28/2017    Hypertension     Knee swelling     Low back strain     Lower back pain 07/26/2019    Myocardial infarction     Neck strain     Neuroma of foot     Non-STEMI (non-ST elevated myocardial infarction) 05/21/2018    Nonsenile cataract     FUENTES on CPAP 10/19/2017    Palpitations 07/23/2019    Peripheral vestibulopathy of both ears 07/14/2020    Presence of artificial intra-ocular lens     Scoliosis     Sleep apnea     Thoracic disc disorder     Thyroid disease     Vertigo 07/14/2020    Vestibular neuronitis 07/17/2020       Past Surgical History:   Procedure Laterality Date    CARDIAC CATHETERIZATION N/A 05/22/2018    Procedure: Left Heart Cath;  Surgeon: Aleks Velazquez MD;  Location: [facility] CATH INVASIVE LOCATION;  Service: Cardiovascular     CARDIAC CATHETERIZATION N/A 05/22/2018    Procedure: Coronary angiography;  Surgeon: Aleks Velazquez MD;  Location:  [facility] CATH INVASIVE LOCATION;  Service: Cardiovascular    CARDIAC CATHETERIZATION N/A 05/22/2018    Procedure: Left ventriculography;  Surgeon: Aleks Velazquez MD;  Location[facility] CATH [facility]ASIVE LOC[facility];  Service: Cardiovascular    CARDIAC CATHETERIZATION N/A 05/22/2018    Procedure: Peripheral angiography;  Surgeon: Aleks Velazquez MD;  [facility] CATH [facility] LOC[facility];  Service: Cardiovascular    CATARACT EXTRACTION WITH INTRAOCULAR LENS IMPLANT Bilateral     COLONOSCOPY N/A 03/08/2023    Procedure: COLONOSCOPY to cecum/TI with cold biopsies;  Surgeon: Hamilton Francisco MD;  Location: [facility] ENDOSCOPY;  Service: Gastroenterology;  Laterality: N/A;  pre- diverticulitis  post- diverticulosis with stricture    DIAGNOSTIC LAPAROSCOPY  1990    r/o endometriosis    ENDOSCOPY N/A 03/08/2023    Procedure: ESOPHAGOGASTRODUODENOSCOPY with cold biopsies and 52 Fr Kam Dilatation;  Surgeon: Hamilton Francisco MD;  Location[facility] END[facility]COPY;  Service: Gastroenterology;  Laterality: N/A;  pre- dysphagia  post- hiatal hernia, gastric polyps, esophageal ring @ 30    NECK SURGERY  12/03/2003    ACDF C6-7-Dr. Nix     RECTAL SURGERY  1992    fistula/abscess/hemorrhoid       Home Medications:   Medications Prior to Admission   Medication Sig Dispense Refill Last Dose    amLODIPine (NORVASC) 5 MG tablet TAKE 1 TABLET BY MOUTH EVERY DAY 90 tablet 3 9/25/2024    apixaban (ELIQUIS) 5 MG tablet tablet Take 1 tablet by mouth 2 (Two) Times a Day. 28 tablet 0 9/25/2024    carvedilol (COREG) 25 MG tablet Take 1 tablet by mouth 2 (Two) Times a Day. 180 tablet 1 9/25/2024    escitalopram (LEXAPRO) 10 MG tablet Take 1 tablet by mouth Daily.   9/24/2024    famotidine (PEPCID) 10 MG tablet Take 4 tablets by mouth Every Night.   9/24/2024    hydrALAZINE (APRESOLINE) 50 MG tablet Take 1 tablet by  mouth 2 (Two) Times a Day. 180 tablet 4 2024    lamoTRIgine (LaMICtal) 100 MG tablet Take 1 tablet by mouth Every Night.   2024    losartan (COZAAR) 100 MG tablet TAKE 1 TABLET BY MOUTH EVERY DAY 90 tablet 4 2024    Nintedanib Esylate (Ofev) 100 MG capsule Take 1 capsule by mouth 2 (Two) Times a Day.   2024    ondansetron ODT (ZOFRAN-ODT) 4 MG disintegrating tablet    Past Week    pantoprazole (PROTONIX) 40 MG EC tablet Take 1 tablet by mouth Daily.   2024    pravastatin (PRAVACHOL) 40 MG tablet Take 1 tablet by mouth every night at bedtime. 90 tablet 3 2024    ARIPiprazole (ABILIFY) 5 MG tablet Take 1 tablet by mouth Daily.   Unknown    traZODone (DESYREL) 50 MG tablet Take 0.5 tablets by mouth At Night As Needed for Sleep.   More than a month       Allergies:  Allergies   Allergen Reactions    Simvastatin Myalgia     Cramps in thighs      Erythromycin Rash    Penicillins Rash       Past Social History:  Social History     Socioeconomic History    Marital status: Single    Number of children: 0    Years of education: BS    Tobacco Use    Smoking status: Never     Passive exposure: Never    Smokeless tobacco: Never    Tobacco comments:     caffeine use: TEA OCCASSIONALLY   Vaping Use    Vaping status: Never Used   Substance and Sexual Activity    Alcohol use: No    Drug use: Never    Sexual activity: Not Currently       Past Family History:  Family History   Problem Relation Age of Onset    Alzheimer's disease Mother     Heart failure Father     Arrhythmia Father     Heart attack Father         MI at age 56,  at 63.    Diabetes Sister     Atrial fibrillation Sister     Colon cancer Maternal Grandmother     Stroke Paternal Grandmother     Malig Hyperthermia Neg Hx        Review of Systems:     CONSTITUTIONAL: No weight loss, fever, chills, weakness or fatigue.   HEENT: Eyes: No visual loss, blurred vision, double vision or yellow sclerae. Ears, Nose, Throat: No hearing loss,  sneezing, congestion, runny nose or sore throat.   SKIN: No rash or itching.     RESPIRATORY: No shortness of breath, hemoptysis, cough or sputum.   GASTROINTESTINAL: No anorexia, nausea, vomiting or diarrhea. No abdominal pain, bright red blood per rectum or melena.  GENITOURINARY: No burning on urination, hematuria or increased frequency.  NEUROLOGICAL: No headache, dizziness, syncope, paralysis, ataxia, numbness or tingling in the extremities. No change in bowel or bladder control.   MUSCULOSKELETAL: No muscle, back pain, joint pain or stiffness.   HEMATOLOGIC: No anemia, bleeding or bruising.   LYMPHATICS: No enlarged nodes. No history of splenectomy.   PSYCHIATRIC: No history of depression, anxiety, hallucinations.   ENDOCRINOLOGIC: No reports of sweating, cold or heat intolerance. No polyuria or polydipsia.     Objective:   Temp:  [97.9 °F (36.6 °C)-98.4 °F (36.9 °C)] 98.4 °F (36.9 °C)  Heart Rate:  [54-81] 61  Resp:  [18] 18  BP: (121-159)/(66-93) 145/73     Intake/Output Summary (Last 24 hours) at 9/26/2024 0826  Last data filed at 9/25/2024 2015  Gross per 24 hour   Intake 100 ml   Output --   Net 100 ml     Body mass index is 35.12 kg/m².      09/25/24 2014 09/25/24 2243   Weight: 84.4 kg (186 lb) 87.1 kg (192 lb)     Weight change:     Physical Exam:   General Appearance:    Alert, cooperative, in no acute distress   Head:    Normocephalic, without obvious abnormality, atraumatic   Eyes:            Conjunctivae and sclerae normal, no   icterus, no pallor, corneas clear, PERRLA   Neck:   No adenopathy, supple, trachea midline, no thyromegaly, no   carotid bruit, no JVD   Lungs:     Clear to auscultation,respirations regular, even and unlabored    Heart:    Regular rhythm and normal rate, normal S1 and S2, no murmur, no gallop, no rub, no click   Chest Wall:    No abnormalities observed   Abdomen:     Normal bowel sounds, no masses, no organomegaly, soft, nontender, nondistended, no guarding, no rebound   "tenderness   Extremities:   Moves all extremities well, no edema, no cyanosis, no redness   Pulses:   Pulses palpable and equal bilaterally.      Lab Review:   Results from last 7 days   Lab Units 24  0638 24   SODIUM mmol/L 140 141   POTASSIUM mmol/L 4.0 4.2   CHLORIDE mmol/L 108* 106   CO2 mmol/L 22.0 26.0   BUN mg/dL 12 11   CREATININE mg/dL 0.84 1.13*   GLUCOSE mg/dL 98 110*   CALCIUM mg/dL 8.8 9.5   AST (SGOT) U/L  --  14   ALT (SGPT) U/L  --  13     Results from last 7 days   Lab Units 24  0638 24   HSTROP T ng/L 9 7 9     Results from last 7 days   Lab Units 24  0638 24   WBC 10*3/mm3 7.50 8.82   HEMOGLOBIN g/dL 11.7* 12.8   HEMATOCRIT % 36.3 40.9   PLATELETS 10*3/mm3 255 292                   Invalid input(s): \"LDLCALC\"            Echo 2023:    Left ventricular systolic function is normal. Calculated left ventricular EF = 63.2%    Left ventricular diastolic function was normal.     Left Heart Cath 2018:  Left main:  Large-caliber vessel that bifurcates to an LAD and circumflex coronary artery.  Left main coronary artery is normal  LAD: Large-caliber vessel that gives rise to a small caliber diagonal branch in the proximal and mid segment.  The LAD is normal.  LCX: Large caliber vessel that gives rise to a medium caliber OM1 and OM 2.  Normal  RCA: Large caliber vessel that gives rise to a small caliber PDA.  Normal     Left ventricular angiography: Normal left ventricular size and systolic function.  Ejection fraction 55%.  Distal inferior wall hypokinesis.  Likely catheter induced      EK2024:      2024:      Imaging:  Imaging Results (Most Recent)       Procedure Component Value Units Date/Time    XR Chest 1 View [[id number]] Collected: 24     Updated: 24    Narrative:      SINGLE VIEW OF THE CHEST     HISTORY: Syncope     COMPARISON: 2024     FINDINGS:  There is cardiomegaly. " There is no vascular congestion. No pneumothorax  or pleural effusion is seen. The patient appears to have some volume  loss within the right hemithorax, with suspected chronic scarring. Some  additional mild scarring is noted at the left lung base. No acute  infiltrates are identified. There is some tortuosity of the thoracic  aorta.       Impression:      No acute findings.     This report was finalized on 9/25/2024 9:58 PM by Dr. Samantha Alfredo M.D on Workstation: [id number]                 Assessment:       Syncope        Plan:     Near syncope: Patient is stable from a cardiac standpoint.  Stable EKG.  Troponin negative x 3.  She has normal coronaries per coronary angiography in 2018.  Normal LV function on echo last year.  No reports of chest pain, pressure or palpitations.  She feels back to her normal state.  This was likely secondary to not feeling well for the last few days, as well as, some dehydration.    Thank you for allowing me to participate in the care of Katelyn Sr. Feel free to contact me directly with any further questions or concerns.    CARLIE Gold  [facility]  09/26/24  08:26 EDT    EMR Dragon/Transcription disclaimer:   Much of this encounter note is an electronic transcription/translation of spoken language to printed text. The electronic translation of spoken language may permit erroneous, or at times, nonsensical words or phrases to be inadvertently transcribed; Although I have reviewed the note for such errors, some may still exist.

## 2024-09-26 NOTE — ED TRIAGE NOTES
Pt was brought in by ems from [facility] for near syncope. Pt was playing piano when she became dizzy. Denies loc, chest pain. Pt states she feels shaky and weak

## 2024-09-26 NOTE — DISCHARGE INSTRUCTIONS
Follow-up with cardiology and pulmonology as scheduled.  Return to the emergency department with worsening symptoms, uncontrolled pain, inability to tolerate oral liquids, fever greater than 101°F not controlled by Tylenol or as needed with emergent concerns.

## 2024-09-26 NOTE — ED NOTES
"Pt here for near syncopal episode while at choir practice.  Pt states started feeling bad then she said she needed to lay down, and states she \"went out\" over the keyboard but denies syncope (pt is retired nurse). Pt denies any pain or trauma at this time.   "

## 2024-09-27 NOTE — CASE MANAGEMENT/SOCIAL WORK
Case Management Discharge Note      Final Note: home         Selected Continued Care - Discharged on 9/26/2024 Admission date: 9/25/2024 - Discharge disposition: Home or Self Care      Destination    No services have been selected for the patient.                Durable Medical Equipment    No services have been selected for the patient.                Dialysis/Infusion    No services have been selected for the patient.                Home Medical Care    No services have been selected for the patient.                Therapy    No services have been selected for the patient.                Community Resources    No services have been selected for the patient.                Community & DME    No services have been selected for the patient.                         Final Discharge Disposition Code: 01 - home or self-care

## 2024-11-01 ENCOUNTER — OFFICE VISIT (OUTPATIENT)
Dept: ORTHOPEDIC SURGERY | Facility: CLINIC | Age: 74
End: 2024-11-01
Payer: MEDICARE

## 2024-11-01 VITALS — WEIGHT: 187 LBS | HEIGHT: 62 IN | TEMPERATURE: 98.2 F | BODY MASS INDEX: 34.41 KG/M2

## 2024-11-01 DIAGNOSIS — M25.562 CHRONIC PAIN OF BOTH KNEES: Primary | ICD-10-CM

## 2024-11-01 DIAGNOSIS — G89.29 CHRONIC PAIN OF BOTH KNEES: Primary | ICD-10-CM

## 2024-11-01 DIAGNOSIS — M25.561 CHRONIC PAIN OF BOTH KNEES: Primary | ICD-10-CM

## 2024-11-01 DIAGNOSIS — M17.10 PATELLOFEMORAL ARTHRITIS: ICD-10-CM

## 2024-11-01 DIAGNOSIS — M17.0 PRIMARY OSTEOARTHRITIS OF BOTH KNEES: ICD-10-CM

## 2024-11-01 RX ORDER — LIDOCAINE HYDROCHLORIDE 10 MG/ML
2 INJECTION, SOLUTION EPIDURAL; INFILTRATION; INTRACAUDAL; PERINEURAL
Status: COMPLETED | OUTPATIENT
Start: 2024-11-01 | End: 2024-11-01

## 2024-11-01 RX ORDER — METHYLPREDNISOLONE ACETATE 80 MG/ML
1 INJECTION, SUSPENSION INTRA-ARTICULAR; INTRALESIONAL; INTRAMUSCULAR; SOFT TISSUE
Status: COMPLETED | OUTPATIENT
Start: 2024-11-01 | End: 2024-11-01

## 2024-11-01 RX ADMIN — METHYLPREDNISOLONE ACETATE 1 ML: 80 INJECTION, SUSPENSION INTRA-ARTICULAR; INTRALESIONAL; INTRAMUSCULAR; SOFT TISSUE at 13:36

## 2024-11-01 RX ADMIN — LIDOCAINE HYDROCHLORIDE 2 ML: 10 INJECTION, SOLUTION EPIDURAL; INFILTRATION; INTRACAUDAL; PERINEURAL at 13:36

## 2024-11-01 NOTE — PROGRESS NOTES
[facility]              Follow Up      Patient Name: Katelyn Sr  : 1950  Primary Care Physician: Pierre Chaudhry Jr., MD        Chief Complaint:  Bilateral knee pain    HPI:   Katelyn Sr is a 73 y.o. year old who presents today for evaluation.  History of Present Illness  The patient is a 73-year-old female who presents for evaluation of bilateral knee pain.    She reports that her knee pain was well-managed until the last month, with the right knee being more problematic than the left. The pain is particularly noticeable when descending stairs, but not as much when walking on flat surfaces. Despite her knee pain, she does not require a walker for mobility. She has not undergone physical therapy for her knees.    Additionally, she has consulted with Dr. Beard for her foot issues.    Her right knee suggested severe patellofemoral arthritis she did have a loose body and saw Dr. Galvez for follow-up arthroscopy was not recommended at the time as her pain had improved.  Her left knee was injured after she had a twisting and popping mechanism we injected it had some concerns about lateral meniscus but she did improve with conservative care and was essentially pain-free in both knees until just recently.      Past Medical/Surgical, Social and Family History:  I have reviewed and/or updated pertinent history as noted in the medical record including:  Past Medical History:   Diagnosis Date    Abnormal ECG 18    Done in PCP office.  Hx of Atrial fib-?    Arthritis of neck     Atrial fibrillation     Cervical disc disorder     ACDF C6-7, 2003;  Dr. Tr Nix    Cervical stenosis of spinal canal 2020    Circadian rhythm sleep disorder, delayed sleep phase type 2017    CTS (carpal tunnel syndrome)     DDD (degenerative disc disease), lumbosacral 2018    Depression     Fibrosis of lung     Fracture, finger     Generalized anxiety disorder 2020     GERD (gastroesophageal reflux disease)     Hip arthrosis     Hyperlipidemia     Hypersensitivity pneumonitis     vs pulmonary fibrosis    Hypersensitivity pneumonitis     Hypersomnia due to another medical condition 10/28/2017    Hypertension     Knee swelling     Low back strain     Lower back pain 07/26/2019    Myocardial infarction     Neck strain     Neuroma of foot     Non-STEMI (non-ST elevated myocardial infarction) 05/21/2018    Nonsenile cataract     FUENTES on CPAP 10/19/2017    Palpitations 07/23/2019    Peripheral vestibulopathy of both ears 07/14/2020    Presence of artificial intra-ocular lens     Scoliosis     Sleep apnea     Thoracic disc disorder     Thyroid disease     Vertigo 07/14/2020    Vestibular neuronitis 07/17/2020     Past Surgical History:   Procedure Laterality Date    CARDIAC CATHETERIZATION N/A 05/22/2018    Procedure: Left Heart Cath;  Surgeon: Aleks Velazquez MD;  Location: [facility] CATH INVASIVE LOCATION;  Service: Cardiovascular    CARDIAC CATHETERIZATION N/A 05/22/2018    Procedure: Coronary angiography;  Surgeon: Aleks Velazquez MD;  Location: [facility] CATH INVASIVE LOCATION;  Service: Cardiovascular    CARDIAC CATHETERIZATION N/A 05/22/2018    Procedure: Left ventriculography;  Surgeon: Aleks Velazquez MD;  Location: [facility] CATH INVASIVE LOCATION;  Service: Cardiovascular    CARDIAC CATHETERIZATION N/A 05/22/2018    Procedure: Peripheral angiography;  Surgeon: Aleks Velazquez MD;  Location: [facility] CATH INVASIVE LOCATION;  Service: Cardiovascular    CATARACT EXTRACTION WITH INTRAOCULAR LENS IMPLANT Bilateral     COLONOSCOPY N/A 03/08/2023    Procedure: COLONOSCOPY to cecum/TI with cold biopsies;  Surgeon: Hamilton Francisco MD;  Location: [facility] ENDOSCOPY;  Service: Gastroenterology;  Laterality: N/A;  pre- diverticulitis  post- diverticulosis with stricture    DIAGNOSTIC LAPAROSCOPY  1990    r/o endometriosis    ENDOSCOPY N/A 03/08/2023    Procedure:  ESOPHAGOGASTRODUODENOSCOPY with cold biopsies and 52 Fr Kam Dilatation;  Surgeon: Hamilton Francisco MD;  Location: [facility] ENDOSCOPY;  Service: Gastroenterology;  Laterality: N/A;  pre- dysphagia  post- hiatal hernia, gastric polyps, esophageal ring @ 32    NECK SURGERY  12/03/2003    ACDF C6-7-Dr. Nix     RECTAL SURGERY  1992    fistula/abscess/hemorrhoid     Social History     Occupational History    Occupation: RETIRED RN    Tobacco Use    Smoking status: Never     Passive exposure: Never    Smokeless tobacco: Never    Tobacco comments:     caffeine use: TEA OCCASSIONALLY   Vaping Use    Vaping status: Never Used   Substance and Sexual Activity    Alcohol use: No    Drug use: Never    Sexual activity: Not Currently          Allergies:   Allergies   Allergen Reactions    Simvastatin Myalgia     Cramps in thighs      Erythromycin Rash    Penicillins Rash       Medications:   Home Medications:  Current Outpatient Medications on File Prior to Visit   Medication Sig    amLODIPine (NORVASC) 5 MG tablet TAKE 1 TABLET BY MOUTH EVERY DAY    apixaban (ELIQUIS) 5 MG tablet tablet Take 1 tablet by mouth 2 (Two) Times a Day.    ARIPiprazole (ABILIFY) 5 MG tablet Take 1 tablet by mouth Daily.    carvedilol (COREG) 25 MG tablet Take 1 tablet by mouth 2 (Two) Times a Day.    escitalopram (LEXAPRO) 10 MG tablet Take 1 tablet by mouth Daily.    famotidine (PEPCID) 10 MG tablet Take 4 tablets by mouth Every Night.    hydrALAZINE (APRESOLINE) 50 MG tablet Take 1 tablet by mouth 2 (Two) Times a Day.    lamoTRIgine (LaMICtal) 100 MG tablet Take 1 tablet by mouth Every Night.    losartan (COZAAR) 100 MG tablet TAKE 1 TABLET BY MOUTH EVERY DAY    Nintedanib Esylate (Ofev) 100 MG capsule Take 1 capsule by mouth 2 (Two) Times a Day.    ondansetron ODT (ZOFRAN-ODT) 4 MG disintegrating tablet     pantoprazole (PROTONIX) 40 MG EC tablet Take 1 tablet by mouth Daily.    pravastatin (PRAVACHOL) 40 MG tablet Take 1 tablet by mouth  every night at bedtime.    traZODone (DESYREL) 50 MG tablet Take 0.5 tablets by mouth At Night As Needed for Sleep.     No current facility-administered medications on file prior to visit.         ROS:  ROS negative except as listed in the HPI.    Physical Exam:   73 y.o. female  Body mass index is 34.2 kg/m²., 84.8 kg (187 lb)  Vitals:    11/01/24 1332   Temp: 98.2 °F (36.8 °C)     General: Alert, cooperative, appears well and in no observable distress. Appears stated age and BMI as listed above.  HEENT: Normocephalic, atraumatic on external visual inspection.  CV: No significant peripheral edema.  Respiratory: Normal respiratory effort.  Skin: Warm & well perfused; appropriate skin turgor.  Psych: Appropriate mood & affect.  Neuro: Gross sensation and motor intact in affected extremity/extremities.  Vascular: Peripheral pulses palpable in affected extremity/extremities.   Physical Exam      MSK Exam:  Exam is unchanged     Radiology:    No new images were needed at the visit today.   Results  Imaging  4/4/2024 Single Knee: AP standing and sunrise views of both knees, and lateral view of painful knee show Degenerative changes in all 3 compartments of the knee, she maintains reasonable joint space medial and lateral what she does have some narrowing laterally with some small osteophytes present.  She has some moderate patellofemoral changes, severe PF disease in the R knee.  No obvious fractures or other acute pathology otherwise to account for symptoms.        Procedure:   See Procedure Note: The potential risks and benefits of performing a diagnostic and therapeutic injection were discussed with the patient prior to procedure. Risks include, but are not limited to infection, swelling, transient increase in pain, bleeding, bruising. Patient was advised that injections are a diagnostic and therapeutic tool meaning they may not alleviate symptoms at all, or may only provide partial or temporary relief. Injection  precautions and aftercare discussed.      [facility]. Data/Labs: N/A    Assessment & Plan:      ICD-10-CM ICD-9-CM   1. Chronic pain of both knees  M25.561 719.46    M25.562 338.29    G89.29    2. Primary osteoarthritis of both knees  M17.0 715.16   3. Patellofemoral arthritis  M17.10 716.96     No orders of the defined types were placed in this encounter.    Orders Placed This Encounter   Procedures    Large Joint Arthrocentesis: R knee    Large Joint Arthrocentesis: L knee       Assessment & Plan  1. Bilateral knee pain.  The patient's bilateral knee pain is likely due to arthritis, with the right knee exhibiting more severe symptoms. The pain is exacerbated by activities that stress the kneecap, such as climbing stairs or hills, and squatting. Despite the presence of arthritis, the joint space remains adequate. Previous injections have provided relief for approximately six months. Knee replacement is a potential future option, but it is not currently necessary. She is not yet prepared for this procedure, and it is not recommended until she is ready. The risks associated with anesthesia, particularly in older patients, were discussed. A repeat injection was administered today to manage the pain.      Continue with activity modifications as needed/discussed.  Continue ICE and/or HEAT PRN.  Recommend to continue activity as tolerated, focus on quad and hip/core strengthening as well as gentle stretching.  Recommend lowering or maintaining BMI within a healthy range to reduce symptoms.   Patient encouraged to call with questions or concerns prior to follow up.  Will discuss with attending as needed.   Consider additional referrals, work up and/or advanced imaging as indicated or if patient fails to respond to conservative care.    Return if symptoms worsen or fail to improve.    Patient or patient representative verbalized consent for the use of Ambient Listening during the visit with  CARLIE Cruz for chart  documentation. 11/1/2024  13:48 CARLIE Veragra    Dictation software was used to complete a portion or all of this note.    Large Joint Arthrocentesis: R knee  Date/Time: 11/1/2024 1:36 PM  Consent given by: patient  Site marked: site marked  Timeout: Immediately prior to procedure a time out was called to verify the correct patient, procedure, equipment, support staff and site/side marked as required   Supporting Documentation  Indications: pain   Procedure Details  Location: knee - R knee  Preparation: Patient was prepped and draped in the usual sterile fashion  Needle gauge: 21G.  Medications administered: 1 mL methylPREDNISolone acetate 80 MG/ML; 2 mL lidocaine PF 1% 1 %  Patient tolerance: patient tolerated the procedure well with no immediate complications      Large Joint Arthrocentesis: L knee  Date/Time: 11/1/2024 1:36 PM  Consent given by: patient  Site marked: site marked  Timeout: Immediately prior to procedure a time out was called to verify the correct patient, procedure, equipment, support staff and site/side marked as required   Supporting Documentation  Indications: pain   Procedure Details  Location: knee - L knee  Preparation: Patient was prepped and draped in the usual sterile fashion  Needle gauge: 21G.  Medications administered: 1 mL methylPREDNISolone acetate 80 MG/ML; 2 mL lidocaine PF 1% 1 %  Patient tolerance: patient tolerated the procedure well with no immediate complications

## 2024-11-07 ENCOUNTER — OFFICE VISIT (OUTPATIENT)
Age: 74
End: 2024-11-07
Payer: MEDICARE

## 2024-11-07 VITALS
DIASTOLIC BLOOD PRESSURE: 82 MMHG | WEIGHT: 190 LBS | BODY MASS INDEX: 34.96 KG/M2 | SYSTOLIC BLOOD PRESSURE: 142 MMHG | HEART RATE: 59 BPM | HEIGHT: 62 IN

## 2024-11-07 DIAGNOSIS — I10 ESSENTIAL HYPERTENSION: Primary | ICD-10-CM

## 2024-11-07 DIAGNOSIS — R06.09 CHRONIC DYSPNEA: ICD-10-CM

## 2024-11-07 PROCEDURE — 93000 ELECTROCARDIOGRAM COMPLETE: CPT | Performed by: INTERNAL MEDICINE

## 2024-11-07 PROCEDURE — 3079F DIAST BP 80-89 MM HG: CPT | Performed by: INTERNAL MEDICINE

## 2024-11-07 PROCEDURE — 3077F SYST BP >= 140 MM HG: CPT | Performed by: INTERNAL MEDICINE

## 2024-11-07 PROCEDURE — 99214 OFFICE O/P EST MOD 30 MIN: CPT | Performed by: INTERNAL MEDICINE

## 2024-11-07 RX ORDER — MODAFINIL 100 MG/1
1 TABLET ORAL DAILY
COMMUNITY
Start: 2024-09-17

## 2024-11-07 NOTE — PROGRESS NOTES
Katelyn Sr  1950  Date of Office Visit: 11/07/24  Encounter Provider: Aleks Velazquez MD  Place of Service: [facility] CARDIOLOGY      CHIEF COMPLAINT:  Paroxysmal atrial fibrillation with RVR  Sinus bradycardia     HISTORY OF PRESENT ILLNESS:  74-year-old female with a medical history of essential hypertension, hyperlipidemia, interstitial lung disease, and chronic dyspnea who presented Feb 2023 with report of shortness of breath that was worsened along with palpitations and an irregular heartbeat.  She reported several episodes of nausea and vomiting over the past several days prior to presenting to the ER.  Per her report the symptoms started around 7 PM. Unfortunately secondary to her nausea she was not been taking her p.o. carvedilol therapy.  High-sensitivity troponin was 11 and then 16.  X-ray of the chest look normal.  Initial EKG showed atrial fibrillation with a rapid ventricular rate.  Repeat EKG showed conversion to sinus rhythm.    She has since presented to the emergency room on 9/25/2024 with report of syncope.  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  Upon arrival of EMS her blood pressure was 100/59 which is low for her.  She had normal blood pressures in the ER.  She was given IV fluids and did feel clinically improved.  High-sensitivity troponin was negative.  Creatinine was slightly elevated from her baseline at 1.13.    That time she has been doing pretty well.  She has not had any recurrent syncopal episodes.  Her blood pressure has been relatively well-controlled.  She typically stays around 130 to 140 mmHg systolic.  She states she is higher here today than she was in Dr. Husain today's office.    Review of Systems   Constitutional: Negative for fever and malaise/fatigue.   HENT:   Negative for nosebleeds and sore throat.    Eyes:  Negative for blurred vision and double vision.   Cardiovascular:  Negative for chest pain, claudication, palpitations and syncope.   Respiratory:  Negative for cough, shortness of breath and snoring.    Endocrine: Negative for cold intolerance, heat intolerance and polydipsia.   Skin:  Negative for itching, poor wound healing and rash.   Musculoskeletal:  Negative for joint pain, joint swelling, muscle weakness and myalgias.   Gastrointestinal:  Negative for abdominal pain, melena, nausea and vomiting.   Neurological:  Negative for light-headedness, loss of balance, seizures, vertigo and weakness.   Psychiatric/Behavioral:  Negative for altered mental status and depression.        Past Medical History:   Diagnosis Date    Abnormal ECG 5/21/18    Done in PCP office.  Hx of Atrial fib-?2007    Arthritis of neck     Asthma 12/15/2021    Hypersensitivity Pneumonitis; under treatment at [place]    Atrial fibrillation     Cervical disc disorder 8/03    ACDF C6-7, 12/03/2003;  Dr. 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    CTS (carpal tunnel syndrome)     DDD (degenerative disc disease), lumbosacral 11/09/2018    Depression     Fibrosis of lung     Fracture, finger     Generalized anxiety disorder 07/14/2020    GERD (gastroesophageal reflux disease)     Hip arthrosis     Hyperlipidemia     Hypersensitivity pneumonitis     vs pulmonary fibrosis    Hypersensitivity pneumonitis     Hypersomnia due to another medical condition 10/28/2017    Hypertension     Knee swelling     Low back strain     Lower back pain 07/26/2019    Myocardial infarction     Neck strain     Neuroma of foot     Non-STEMI (non-ST elevated myocardial infarction) 05/21/2018    Nonsenile cataract     FUENTES on CPAP 10/19/2017    Palpitations 07/23/2019    Peripheral vestibulopathy of both ears 07/14/2020     Presence of artificial intra-ocular lens     Scoliosis     Sleep apnea     Thoracic disc disorder     Thyroid disease     Vertigo 07/14/2020    Vestibular neuronitis 07/17/2020       The following portions of the patient's history were reviewed and updated as appropriate: Social history , Family history, and Surgical history     Current Outpatient Medications on File Prior to Visit   Medication Sig Dispense Refill    amLODIPine (NORVASC) 5 MG tablet TAKE 1 TABLET BY MOUTH EVERY DAY 90 tablet 3    apixaban (ELIQUIS) 5 MG tablet tablet Take 1 tablet by mouth 2 (Two) Times a Day. 28 tablet 0    ARIPiprazole (ABILIFY) 5 MG tablet Take 1 tablet by mouth Daily.      carvedilol (COREG) 25 MG tablet Take 1 tablet by mouth 2 (Two) Times a Day. 180 tablet 4    escitalopram (LEXAPRO) 20 MG tablet Take 1 tablet by mouth Daily.      famotidine (PEPCID) 40 MG tablet Take 1 tablet by mouth Every Night.      hydrALAZINE (APRESOLINE) 50 MG tablet Take 1 tablet by mouth 2 (Two) Times a Day. 180 tablet 4    lamoTRIgine (LaMICtal) 100 MG tablet Take 1 tablet by mouth Every Night.      losartan (COZAAR) 100 MG tablet TAKE 1 TABLET BY MOUTH EVERY DAY 90 tablet 4    modafinil (PROVIGIL) 100 MG tablet Take 1 tablet by mouth Daily.      Nintedanib Esylate (Ofev) 100 MG capsule Take 1 capsule by mouth 2 (Two) Times a Day.      ondansetron ODT (ZOFRAN-ODT) 4 MG disintegrating tablet       pantoprazole (PROTONIX) 40 MG EC tablet Take 1 tablet by mouth Daily.      pravastatin (PRAVACHOL) 40 MG tablet Take 1 tablet by mouth every night at bedtime. 90 tablet 3    traZODone (DESYREL) 50 MG tablet Take 0.5 tablets by mouth At Night As Needed for Sleep.       No current facility-administered medications on file prior to visit.       Allergies   Allergen Reactions    Simvastatin Myalgia     Cramps in thighs      Erythromycin Rash    Penicillins Rash       Vitals:    11/07/24 1459   BP: 142/82   Pulse: 59   Weight: 86.2 kg (190 lb)   Height: 157.5 cm  "(62\")       Body mass index is 34.75 kg/m².   Constitutional:       Appearance: Well-developed.   Eyes:      General: No scleral icterus.     Conjunctiva/sclera: Conjunctivae normal.   HENT:      Head: Normocephalic and atraumatic.   Neck:      Thyroid: No thyromegaly.      Vascular: Normal carotid pulses. No carotid bruit, hepatojugular reflux or JVD.      Trachea: No tracheal deviation.   Pulmonary:      Effort: No respiratory distress.      Breath sounds: Normal breath sounds. No decreased breath sounds. No wheezing. No rhonchi. No rales.   Chest:      Chest wall: Not tender to palpatation.   Cardiovascular:      Normal rate. Regular rhythm.      No gallop.    Pulses:     Carotid: 2+ bilaterally.     Radial: 2+ bilaterally.     Femoral: 2+ bilaterally.     Dorsalis pedis: 2+ bilaterally.     Posterior tibial: 2+ bilaterally.  Edema:     Peripheral edema absent.   Abdominal:      General: Bowel sounds are normal. There is no distension.      Palpations: Abdomen is soft.      Tenderness: There is no abdominal tenderness.   Musculoskeletal:         General: No deformity.      Cervical back: Normal range of motion and neck supple. Skin:     Findings: No erythema or rash.   Neurological:      Mental Status: Alert and oriented to person, place, and time.      Sensory: No sensory deficit.   Psychiatric:         Behavior: Behavior normal.         Lab Results   Component Value Date    WBC 7.50 09/26/2024    HGB 11.7 (L) 09/26/2024    HCT 36.3 09/26/2024    MCV 87.7 09/26/2024     09/26/2024       Lab Results   Component Value Date    GLUCOSE 98 09/26/2024    BUN 12 09/26/2024    CREATININE 0.84 09/26/2024    EGFR 74.4 09/26/2024    BCR 14.3 09/26/2024    K 4.0 09/26/2024    CO2 22.0 09/26/2024    CALCIUM 8.8 09/26/2024    ALBUMIN 3.9 09/25/2024    BILITOT 0.4 09/25/2024    AST 14 09/25/2024    ALT 13 09/25/2024       Lab Results   Component Value Date    GLUCOSE 98 09/26/2024    CALCIUM 8.8 09/26/2024     " 09/26/2024    K 4.0 09/26/2024    CO2 22.0 09/26/2024     (H) 09/26/2024    BUN 12 09/26/2024    CREATININE 0.84 09/26/2024    EGFR 74.4 09/26/2024    BCR 14.3 09/26/2024    ANIONGAP 10.0 09/26/2024       Lab Results   Component Value Date    CHOL 200 06/24/2024    TRIG 184 (H) 06/24/2024    HDL 49 06/24/2024     (H) 06/24/2024         ECG 12 Lead    Date/Time: 11/7/2024 4:03 PM  Performed by: Aleks Velazquez MD    Authorized by: Aleks Velazquez MD  Comparison: compared with previous ECG from 9/25/2024  Similar to previous ECG  Rhythm: sinus rhythm  Rate: normal  QRS axis: normal    Clinical impression: normal ECG           Results for orders placed during the hospital encounter of 09/21/23    Adult Transthoracic Echo Complete W/ Cont if Necessary Per Protocol    Interpretation Summary    Left ventricular systolic function is normal. Calculated left ventricular EF = 63.2%    Left ventricular diastolic function was normal.      DISCUSSION/SUMMARY  74-year-old female with a medical history of essential hypertension, hyperlipidemia, interstitial lung disease, chronic dyspnea and paroxysmal atrial fibrillation with a rapid ventricular rate.  She was in the ER in September with a syncopal episode and was hydrated.  She has been doing well since that time.  He is maintaining sinus rhythm.  Her blood pressure is acceptable.  She continues to follow with the pulmonary team and they have plans for repeat CT scan secondary to significant abnormality in the right lower lobe documented on the last scan.    1.  Paroxysmal atrial fibrillation with RVR.  Currently sinus rhythm.  - Continue carvedilol therapy at current dose.  Mild resting sinus bradycardia but I do not think we need to back off yet  - Continue Eliquis.  No recent bleeding complications.    2.  Essential hypertension: Acceptable.  No changes in current medical regimen which does include losartan 100 mg p.o. daily, hydralazine 50 mg p.o.  twice daily, carvedilol and amlodipine therapy.    3.  Interstitial lung disease: Continue to follow with Dr. Gutiérrez: Repeat CT scan has been ordered

## 2024-11-08 ENCOUNTER — TRANSCRIBE ORDERS (OUTPATIENT)
Dept: ADMINISTRATIVE | Facility: HOSPITAL | Age: 74
End: 2024-11-08
Payer: MEDICARE

## 2024-11-08 DIAGNOSIS — R59.1 LYMPHADENOPATHY: Primary | ICD-10-CM

## 2024-12-12 ENCOUNTER — TELEPHONE (OUTPATIENT)
Age: 74
End: 2024-12-12
Payer: MEDICARE

## 2024-12-12 NOTE — TELEPHONE ENCOUNTER
Katelyn called. She said that she noticed blood in her stool yesterday. It was no bright red, but dark red, so she didn't think it was hemorrhoids. It was also on the toilet tissue as well. She said she also had loose stools.    She held her Eliquis 5 mg last night. She wanted to know if she should continue to hold her Eliquis, or do something else. She also stated she had an appt with GI , but not until next month.    Please advise.    Thanks,  Karyn

## 2024-12-12 NOTE — TELEPHONE ENCOUNTER
Wanted to have her cut it in half and just take half in the morning and half in the evening for a week and see if things clear up

## 2024-12-17 ENCOUNTER — OFFICE VISIT (OUTPATIENT)
Dept: ORTHOPEDIC SURGERY | Facility: CLINIC | Age: 74
End: 2024-12-17
Payer: MEDICARE

## 2024-12-17 VITALS — HEIGHT: 62 IN | TEMPERATURE: 97.8 F | BODY MASS INDEX: 34.32 KG/M2 | WEIGHT: 186.5 LBS

## 2024-12-17 DIAGNOSIS — M25.511 ACUTE PAIN OF RIGHT SHOULDER: ICD-10-CM

## 2024-12-17 DIAGNOSIS — M67.911 DYSFUNCTION OF RIGHT ROTATOR CUFF: Primary | ICD-10-CM

## 2024-12-17 RX ORDER — LIDOCAINE HYDROCHLORIDE 10 MG/ML
2 INJECTION, SOLUTION EPIDURAL; INFILTRATION; INTRACAUDAL; PERINEURAL
Status: COMPLETED | OUTPATIENT
Start: 2024-12-17 | End: 2024-12-17

## 2024-12-17 RX ORDER — METHYLPREDNISOLONE ACETATE 80 MG/ML
80 INJECTION, SUSPENSION INTRA-ARTICULAR; INTRALESIONAL; INTRAMUSCULAR; SOFT TISSUE
Status: COMPLETED | OUTPATIENT
Start: 2024-12-17 | End: 2024-12-17

## 2024-12-17 RX ADMIN — LIDOCAINE HYDROCHLORIDE 2 ML: 10 INJECTION, SOLUTION EPIDURAL; INFILTRATION; INTRACAUDAL; PERINEURAL at 15:19

## 2024-12-17 RX ADMIN — METHYLPREDNISOLONE ACETATE 80 MG: 80 INJECTION, SUSPENSION INTRA-ARTICULAR; INTRALESIONAL; INTRAMUSCULAR; SOFT TISSUE at 15:19

## 2024-12-17 NOTE — PROGRESS NOTES
[facility] [place] [facility]              New Problem      Patient Name: Katelyn Sr  : 1950  Primary Care Physician: Pierre Chaudhry Jr., MD        Chief Complaint:  Right shoulder pain    HPI:   Katelyn Sr is a 74 y.o. year old who presents today for evaluation.  History of Present Illness  The patient presents for evaluation of right shoulder pain.    She reports experiencing severe, constant pain in her right shoulder, which she describes as intolerable. The onset of the pain was approximately 2 weeks ago, following a popping sensation she felt while reaching over to hang a towel. She is right-handed and notes that the pain intensifies when she attempts to reach away or elevate her elbow. Activities such as fastening her seatbelt or driving are particularly challenging due to the pain. The pain is persistent, requiring her to adopt specific sleeping positions for comfort. She identifies the most tender area as being on the top of her shoulder, with occasional radiation down to her elbow and arm. She has not previously had an x-ray of her shoulders. She expresses doubt about her ability to participate in physical therapy at this time. She has not applied ice to the affected area and reports that ibuprofen provides minimal relief. She has Voltaren gel but is currently unable to locate it. She also mentions that Tylenol typically does not significantly alleviate her pain.    She has a history of hypersensitivity pneumonitis, which falls under the pulmonary fibrosis family. She undergoes annual CT scans under the care of Dr. Gutiérrez. During her last visit, Dr. Gutiérrez recommended a follow-up CT scan in 3 months due to the observation of some lymph nodes along her esophagus. She had COVID-19 infection in 2024, and there is uncertainty whether the lymph nodes are a result of the infection.    Supplemental Information  She has a history of fibromyalgia and chronic neck pain, but she asserts that the  current shoulder pain is distinct from these conditions.    MEDICATIONS  Current: ibuprofen, Voltaren gel, Tylenol    Past Medical/Surgical, Social and Family History:  I have reviewed and/or updated pertinent history as noted in the medical record including:  Past Medical History:   Diagnosis Date    Abnormal ECG 5/21/18    Done in PCP office.  Hx of Atrial fib-?2007    Arthritis of neck     Asthma 12/15/2021    Hypersensitivity Pneumonitis; under treatment at [facility]    Atrial fibrillation     Cervical disc disorder 8/03    ACDF C6-7, 12/03/2003;  Dr. 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    CTS (carpal tunnel syndrome)     DDD (degenerative disc disease), lumbosacral 11/09/2018    Depression     Fibrosis of lung     Fracture, finger     Generalized anxiety disorder 07/14/2020    GERD (gastroesophageal reflux disease)     Hip arthrosis     Hyperlipidemia     Hypersensitivity pneumonitis     vs pulmonary fibrosis    Hypersensitivity pneumonitis     Hypersomnia due to another medical condition 10/28/2017    Hypertension     Knee swelling     Low back strain     Lower back pain 07/26/2019    Myocardial infarction     Neck strain     Neuroma of foot     Non-STEMI (non-ST elevated myocardial infarction) 05/21/2018    Nonsenile cataract     FUENTES on CPAP 10/19/2017    Palpitations 07/23/2019    Peripheral vestibulopathy of both ears 07/14/2020    Presence of artificial intra-ocular lens     Scoliosis     Sleep apnea     Thoracic disc disorder     Thyroid disease     Vertigo 07/14/2020    Vestibular neuronitis 07/17/2020     Past Surgical History:   Procedure Laterality Date    CARDIAC CATHETERIZATION N/A 05/22/2018    Procedure: Left Heart Cath;  Surgeon: Aleks Velazquez MD;  Location: [facility] INVASIVE LOCATION;  Service: Cardiovascular    CARDIAC CATHETERIZATION N/A 05/22/2018    Procedure: Coronary  angiography;  Surgeon: Aleks Velazquez MD;  Location: [facility] CATH INVASIVE LOCATION;  Service: Cardiovascular    CARDIAC CATHETERIZATION N/A 05/22/2018    Procedure: Left ventriculography;  Surgeon: Aleks Velazquez MD;  Location: [facility] CATH INVASIVE LOCATION;  Service: Cardiovascular    CARDIAC CATHETERIZATION N/A 05/22/2018    Procedure: Peripheral angiography;  Surgeon: Aleks Velazquez MD;  Location: [facility] CATH INVASIVE LOCATION;  Service: Cardiovascular    CATARACT EXTRACTION WITH INTRAOCULAR LENS IMPLANT Bilateral     COLONOSCOPY N/A 03/08/2023    Procedure: COLONOSCOPY to cecum/TI with cold biopsies;  Surgeon: Hamilton Francisco MD;  Location: [facility] ENDOSCOPY;  Service: Gastroenterology;  Laterality: N/A;  pre- diverticulitis  post- diverticulosis with stricture    DIAGNOSTIC LAPAROSCOPY  1990    r/o endometriosis    ENDOSCOPY N/A 03/08/2023    Procedure: ESOPHAGOGASTRODUODENOSCOPY with cold biopsies and 52 Fr Kam Dilatation;  Surgeon: Hamilton Francisco MD;  Location: [facility] ENDOSCOPY;  Service: Gastroenterology;  Laterality: N/A;  pre- dysphagia  post- hiatal hernia, gastric polyps, esophageal ring @ 32    NECK SURGERY  12/03/2003    ACDF C6-7-Dr. Nix     RECTAL SURGERY  1992    fistula/abscess/hemorrhoid     Social History     Occupational History    Occupation: RETIRED RN    Tobacco Use    Smoking status: Never     Passive exposure: Never    Smokeless tobacco: Never    Tobacco comments:     caffeine use: TEA OCCASSIONALLY   Vaping Use    Vaping status: Never Used   Substance and Sexual Activity    Alcohol use: No    Drug use: Never    Sexual activity: Not Currently     Birth control/protection: Post-menopausal          Allergies:   Allergies   Allergen Reactions    Simvastatin Myalgia     Cramps in thighs      Erythromycin Rash    Penicillins Rash       Medications:   Home Medications:  Current Outpatient Medications on File Prior to Visit   Medication Sig    amLODIPine (NORVASC)  5 MG tablet TAKE 1 TABLET BY MOUTH EVERY DAY    apixaban (ELIQUIS) 5 MG tablet tablet Take 1 tablet by mouth 2 (Two) Times a Day.    ARIPiprazole (ABILIFY) 5 MG tablet Take 1 tablet by mouth Daily.    carvedilol (COREG) 25 MG tablet Take 1 tablet by mouth 2 (Two) Times a Day.    escitalopram (LEXAPRO) 20 MG tablet Take 1 tablet by mouth Daily.    famotidine (PEPCID) 40 MG tablet Take 1 tablet by mouth Every Night.    hydrALAZINE (APRESOLINE) 50 MG tablet Take 1 tablet by mouth 2 (Two) Times a Day.    lamoTRIgine (LaMICtal) 100 MG tablet Take 1 tablet by mouth Every Night.    losartan (COZAAR) 100 MG tablet TAKE 1 TABLET BY MOUTH EVERY DAY    modafinil (PROVIGIL) 100 MG tablet Take 1 tablet by mouth Daily.    Nintedanib Esylate (Ofev) 100 MG capsule Take 1 capsule by mouth 2 (Two) Times a Day.    ondansetron ODT (ZOFRAN-ODT) 4 MG disintegrating tablet     pantoprazole (PROTONIX) 40 MG EC tablet Take 1 tablet by mouth Daily.    pravastatin (PRAVACHOL) 40 MG tablet Take 1 tablet by mouth every night at bedtime.    traZODone (DESYREL) 50 MG tablet Take 0.5 tablets by mouth At Night As Needed for Sleep.     No current facility-administered medications on file prior to visit.         ROS:  ROS negative except as listed in the HPI.    Physical Exam:   74 y.o. female  Body mass index is 34.11 kg/m²., 84.6 kg (186 lb 8 oz)  Vitals:    12/17/24 1452   Temp: 97.8 °F (36.6 °C)     General: Alert, cooperative, appears well and in no observable distress. Appears stated age and BMI as listed above.  HEENT: Normocephalic, atraumatic on external visual inspection.  CV: No significant peripheral edema.  Respiratory: Normal respiratory effort.  Skin: Warm & well perfused; appropriate skin turgor.  Psych: Appropriate mood & affect.  Neuro: Gross sensation and motor intact in affected extremity/extremities.  Vascular: Peripheral pulses palpable in affected extremity/extremities.   Physical Exam  Right shoulder was examined.    MSK  Exam:  She is quite limited in her shoulder with active range of motion. Passively I can forward elevate her to 180, ER to 60 with pain along the rotator cuff interval. Strength is globally weak and painful with isometric testing but she does maintain strength to some degree. Provocative testing of the cuff is limited given her severe pain and limited motion. No abnormal findings distal to the elbow or obvious radicular pattern.     Radiology:    Results        The following X-rays were ordered/reviewed today to evaluate the patient's symptoms: Shoulder: AP, Scapular Y and Axillary Lateral of right shoulder(s) show glenohumeral arthrosis, moderate, mild AC joint changes. No obvious fracture or dislocation. There are some changes over her lung field on the right that correlate with areas of concern on prior CT scan from September 2024. There are no prior films available for direct comparison.    Procedure:   See Procedure Note: The potential risks and benefits of performing a diagnostic and therapeutic injection were discussed with the patient prior to procedure. Risks include, but are not limited to infection, swelling, transient increase in pain, bleeding, bruising. Patient was advised that injections are a diagnostic and therapeutic tool meaning they may not alleviate symptoms at all, or may only provide partial or temporary relief. Injection precautions and aftercare discussed. and Patient is currently on anticoagulation and/or a blood thinning agent which poses an increased risk of more significant bleeding or bruising following an injection. While joint injections are generally well tolerated even on AC, the patient was counseled on this increased risk and advised to monitor for signs of bleeding with proper follow up instructions. Injection precautions and aftercare discussed.      [facility] [place]. Data/Labs: N/A    Assessment & Plan:      ICD-10-CM ICD-9-CM   1. Dysfunction of right rotator cuff  M67.911 726.10   2.  Acute pain of right shoulder  M25.511 719.41     No orders of the defined types were placed in this encounter.    Orders Placed This Encounter   Procedures    Large Joint Arthrocentesis: R subacromial bursa    XR Shoulder 2+ View Right       Assessment & Plan  1. Right shoulder pain.  The symptoms suggest a potential issue with the rotator cuff or bicep tendon, possibly a partial tear. The x-ray reveals mild arthritis and narrowing at the ball-and-socket joint, indicative of arthritis. The pain could be due to inflammation following the popping sensation experienced 2 weeks ago. An injection was administered today to alleviate the pain. She is advised to apply ice to the affected area tonight and take Tylenol for pain management. If she locates her Voltaren gel, she can apply it 3 to 4 times daily. If the injection does not provide relief, an MRI will be considered to further investigate the cause of the pain. She knows not to take ibuprofen while on the blood thinner.    2. History of Hypersensitivity pneumonitis.  The patient has a history of hypersensitivity pneumonitis. Her shoulder x-rays suggest some abnormalities of the lung fields although incompletely visualized on dedicated shoulder views. She will keep her scheduled CT scan appointment for next month to better evaluate for changes from prior CT scan. She does not have any acute pulmonary complaints.    Follow-up  The patient will follow up in 2 weeks to assess improvement in motion and strength.    PROCEDURE  An injection was administered today to alleviate the pain in the right shoulder.    Continue with activity modifications as needed/discussed.  Continue ICE and/or HEAT PRN.  Recommend to continue activity as tolerated, focus on stretching and strengthening of the joint.    Focus on posture and body mechanics to improve/prevent symptoms.   Patient encouraged to call with questions or concerns prior to follow up.  Will discuss with attending as  needed.  Consider additional referrals, work up and/or advanced imaging as indicated or if patient fails to respond to conservative care.    Return in about 2 weeks (around 12/31/2024) for Recheck. If symptoms change call for sooner appt..    Patient or patient representative verbalized consent for the use of Ambient Listening during the visit with  CARLIE Cruz for chart documentation. 12/17/2024  15:42 EST     CARLIE Hoskins    Dictation software was used to complete a portion or all of this note.    Large Joint Arthrocentesis: R subacromial bursa  Date/Time: 12/17/2024 3:19 PM  Consent given by: patient  Site marked: site marked  Timeout: Immediately prior to procedure a time out was called to verify the correct patient, procedure, equipment, support staff and site/side marked as required   Supporting Documentation  Indications: pain   Procedure Details  Location: shoulder - R subacromial bursa  Preparation: Patient was prepped and draped in the usual sterile fashion  Needle gauge: 21G.  Approach: posterior  Medications administered: 80 mg methylPREDNISolone acetate 80 MG/ML; 2 mL lidocaine PF 1% 1 %  Patient tolerance: patient tolerated the procedure well with no immediate complications

## 2024-12-30 ENCOUNTER — OFFICE VISIT (OUTPATIENT)
Dept: ORTHOPEDIC SURGERY | Facility: CLINIC | Age: 74
End: 2024-12-30
Payer: MEDICARE

## 2024-12-30 VITALS — TEMPERATURE: 97.8 F | HEIGHT: 61 IN | WEIGHT: 185.4 LBS | BODY MASS INDEX: 35.01 KG/M2

## 2024-12-30 DIAGNOSIS — M75.21 BICEPS TENDINITIS ON RIGHT: ICD-10-CM

## 2024-12-30 DIAGNOSIS — M25.511 ACUTE PAIN OF RIGHT SHOULDER: Primary | ICD-10-CM

## 2024-12-30 DIAGNOSIS — M67.911 DYSFUNCTION OF RIGHT ROTATOR CUFF: ICD-10-CM

## 2024-12-30 NOTE — PROGRESS NOTES
[facility] [place]              Follow Up      Patient Name: Katelyn Sr  : 1950  Primary Care Physician: Pierre Chaudhry Jr., MD        Chief Complaint:      HPI:   Katelyn Sr is a 74 y.o. year old who presents today for evaluation.  History of Present Illness  The patient presents for evaluation of right shoulder pain.    She reports that her right shoulder pain has been ongoing for a couple of weeks. Initially, there was some improvement following an injection, but the relief was not complete. The most severe pain is localized in the front of her shoulder, extending down to her forearm and bicep. She experiences discomfort when moving her arm up and down, although it remains tolerable. However, she finds it challenging to dress and undress due to the pain. She has not observed any changes in her arm, such as bulging. She reports no symptoms below the elbow, including numbness or tingling. She expresses willingness to try physical therapy but is concerned about the potential pain associated with it. She also mentions that she plays the piano and organ, but these activities do not seem to exacerbate her symptoms. However, tasks that require her to bring her arm backwards, such as fastening her seatbelt, do cause discomfort. She has been managing the pain with ibuprofen, which she finds beneficial, and diclofenac topical, which provides some relief.        MEDICATIONS  Current: Eliquis, ibuprofen, diclofenac       Past Medical/Surgical, Social and Family History:  I have reviewed and/or updated pertinent history as noted in the medical record including:  Past Medical History:   Diagnosis Date    Abnormal ECG 18    Done in PCP office.  Hx of Atrial fib-?    Arthritis of neck     Asthma 12/15/2021    Hypersensitivity Pneumonitis; under treatment at [facility]    Atrial fibrillation     Cervical disc disorder     ACDF C6-7, 2003;  Dr. Tr Nix    Cervical stenosis of  spinal canal 07/14/2020    Circadian rhythm sleep disorder, delayed sleep phase type 12/28/2017    CTS (carpal tunnel syndrome)     DDD (degenerative disc disease), lumbosacral 11/09/2018    Depression     Fibrosis of lung     Fracture, finger     Generalized anxiety disorder 07/14/2020    GERD (gastroesophageal reflux disease)     Hip arthrosis     Hyperlipidemia     Hypersensitivity pneumonitis     vs pulmonary fibrosis    Hypersensitivity pneumonitis     Hypersomnia due to another medical condition 10/28/2017    Hypertension     Knee swelling     Low back strain     Lower back pain 07/26/2019    Myocardial infarction     Neck strain     Neuroma of foot     Non-STEMI (non-ST elevated myocardial infarction) 05/21/2018    Nonsenile cataract     FUENTES on CPAP 10/19/2017    Palpitations 07/23/2019    Peripheral vestibulopathy of both ears 07/14/2020    Presence of artificial intra-ocular lens     Scoliosis     Sleep apnea     Thoracic disc disorder     Thyroid disease     Vertigo 07/14/2020    Vestibular neuronitis 07/17/2020     Past Surgical History:   Procedure Laterality Date    CARDIAC CATHETERIZATION N/A 05/22/2018    Procedure: Left Heart Cath;  Surgeon: Aleks Velazquez MD;  Location:  [facility] CATH INVASIVE LOCATION;  Service: Cardiovascular    CARDIAC CATHETERIZATION N/A 05/22/2018    Procedure: Coronary angiography;  Surgeon: Aleks Velazquez MD;  Location:  [facility] CATH INVASIVE LOCATION;  Service: Cardiovascular    CARDIAC CATHETERIZATION N/A 05/22/2018    Procedure: Left ventriculography;  Surgeon: Aleks Velazquez MD;  Location:  [facility] CATH INVASIVE LOCATION;  Service: Cardiovascular    CARDIAC CATHETERIZATION N/A 05/22/2018    Procedure: Peripheral angiography;  Surgeon: Aleks Velazquez MD;  Location:  [facility] CATH INVASIVE LOCATION;  Service: Cardiovascular    CATARACT EXTRACTION WITH INTRAOCULAR LENS IMPLANT Bilateral     COLONOSCOPY N/A 03/08/2023    Procedure: COLONOSCOPY to cecum/TI  with cold biopsies;  Surgeon: Hamilton Francisco MD;  Location:  [facility] ENDOSCOPY;  Service: Gastroenterology;  Laterality: N/A;  pre- diverticulitis  post- diverticulosis with stricture    DIAGNOSTIC LAPAROSCOPY  1990    r/o endometriosis    ENDOSCOPY N/A 03/08/2023    Procedure: ESOPHAGOGASTRODUODENOSCOPY with cold biopsies and 52 Fr Kam Dilatation;  Surgeon: Hamilton Francisco MD;  Location:  [facility];  Service: Gastroenterology;  Laterality: N/A;  pre- dysphagia  post- hiatal hernia, gastric polyps, esophageal ring @ 32    NECK SURGERY  12/03/2003    ACDF C6-7-Dr. Nix     RECTAL SURGERY  1992    fistula/abscess/hemorrhoid     Social History     Occupational History    Occupation: RETIRED RN    Tobacco Use    Smoking status: Never     Passive exposure: Never    Smokeless tobacco: Never    Tobacco comments:     caffeine use: TEA OCCASSIONALLY   Vaping Use    Vaping status: Never Used   Substance and Sexual Activity    Alcohol use: No    Drug use: Never    Sexual activity: Not Currently     Birth control/protection: Post-menopausal          Allergies:   Allergies   Allergen Reactions    Simvastatin Myalgia     Cramps in thighs      Erythromycin Rash    Penicillins Rash       Medications:   Home Medications:  Current Outpatient Medications on File Prior to Visit   Medication Sig    amLODIPine (NORVASC) 5 MG tablet TAKE 1 TABLET BY MOUTH EVERY DAY    apixaban (ELIQUIS) 5 MG tablet tablet Take 1 tablet by mouth 2 (Two) Times a Day.    ARIPiprazole (ABILIFY) 5 MG tablet Take 1 tablet by mouth Daily.    carvedilol (COREG) 25 MG tablet Take 1 tablet by mouth 2 (Two) Times a Day.    escitalopram (LEXAPRO) 20 MG tablet Take 1 tablet by mouth Daily.    famotidine (PEPCID) 40 MG tablet Take 1 tablet by mouth Every Night.    hydrALAZINE (APRESOLINE) 50 MG tablet Take 1 tablet by mouth 2 (Two) Times a Day.    lamoTRIgine (LaMICtal) 100 MG tablet Take 1 tablet by mouth Every Night.    losartan (COZAAR) 100 MG  tablet TAKE 1 TABLET BY MOUTH EVERY DAY    modafinil (PROVIGIL) 100 MG tablet Take 1 tablet by mouth Daily.    Nintedanib Esylate (Ofev) 100 MG capsule Take 1 capsule by mouth 2 (Two) Times a Day.    ondansetron ODT (ZOFRAN-ODT) 4 MG disintegrating tablet     pantoprazole (PROTONIX) 40 MG EC tablet Take 1 tablet by mouth Daily.    pravastatin (PRAVACHOL) 40 MG tablet Take 1 tablet by mouth every night at bedtime.    traZODone (DESYREL) 50 MG tablet Take 0.5 tablets by mouth At Night As Needed for Sleep.     No current facility-administered medications on file prior to visit.         ROS:  ROS negative except as listed in the HPI.    Physical Exam:   74 y.o. female  Body mass index is 35.03 kg/m²., 84.1 kg (185 lb 6.4 oz)  Vitals:    12/30/24 1334   Temp: 97.8 °F (36.6 °C)     General: Alert, cooperative, appears well and in no observable distress. Appears stated age and BMI as listed above.  HEENT: Normocephalic, atraumatic on external visual inspection.  CV: No significant peripheral edema.  Respiratory: Normal respiratory effort.  Skin: Warm & well perfused; appropriate skin turgor.  Psych: Appropriate mood & affect.  Neuro: Gross sensation and motor intact in affected extremity/extremities.  Vascular: Peripheral pulses palpable in affected extremity/extremities.   Physical Exam  Right shoulder was examined.    MSK Exam:  Right Shoulder  No obvious deformities or wounds.   No redness or significant swelling.  Tenderness  biceps tendon  Definitive painful arc.  +empty can  +Speed  -Yergason  +[place]  Negative drop arm test  AROM  Flexion 170   ER 50   IR SI region     Rotator Cuff Strength:  Supraspinatus: 4  ER: 4   IR: 4  Isometric testing of the rotator cuff is painful.    Left Shoulder  No deformity or wounds.  No redness or swelling.  No tenderness to palpation.  Full, painless AROM.  Cuff Strength 4+ to 5/5 with isometric testing of the rotator cuff and painless.  Negative provocative testing.    Brief  examination of the cervical spine did not reproduce any specific radicular pattern or symptoms.   Strength is reasonable and symmetric in the upper extremities.       Radiology:    No new images were needed at the visit today.     Shoulder: AP, Scapular Y and Axillary Lateral of right shoulder(s) show glenohumeral arthrosis, moderate, mild AC joint changes. No obvious fracture or dislocation. There are some changes over her lung field on the right that correlate with areas of concern on prior CT scan from September 2024. There are no prior films available for direct comparison.   Results      Procedure:   N/A      Misc. Data/Labs: N/A    Assessment & Plan:        ICD-10-CM ICD-9-CM   1. Acute pain of right shoulder  M25.511 719.41   2. Dysfunction of right rotator cuff  M67.911 726.10   3. Biceps tendinitis on right  M75.21 726.12     No orders of the defined types were placed in this encounter.    Orders Placed This Encounter   Procedures    MRI Shoulder Right Without Contrast       Assessment & Plan  1. Right shoulder pain.  The symptoms suggest a potential issue with the biceps tendon, possibly a partial tear.  She got very temporary and modest relief with a subacromial injection but did get her pain somewhat down to a more manageable level however she was really in severe pain when I last saw her.  An MRI of the right shoulder will be ordered to provide a more detailed view and guide the treatment plan.  We may consider some ultrasound-guided injections ultimately the potential for arthroscopy exist if we are unable to control her symptoms otherwise.      Continue with activity modifications as needed/discussed.  Continue ICE and/or HEAT PRN.  Recommend to continue activity as tolerated, focus on stretching and strengthening of the joint.    Focus on posture and body mechanics to improve/prevent symptoms.   Patient encouraged to call with questions or concerns prior to follow up.  Will discuss with attending as  needed.  Consider additional referrals, work up and/or advanced imaging as indicated or if patient fails to respond to conservative care.    Return for follow up after the MRI.  Patient or patient representative verbalized consent for the use of Ambient Listening during the visit with  CARLIE Cruz for chart documentation. 1/2/2025  16:39 EST    CARLIE Hoskins    Dictation software was used to complete a portion or all of this note.

## 2025-01-04 ENCOUNTER — HOSPITAL ENCOUNTER (EMERGENCY)
Facility: HOSPITAL | Age: 75
Discharge: HOME | End: 2025-01-04
Attending: STUDENT IN AN ORGANIZED HEALTH CARE EDUCATION/TRAINING PROGRAM
Payer: MEDICARE

## 2025-01-04 VITALS
SYSTOLIC BLOOD PRESSURE: 96 MMHG | HEART RATE: 76 BPM | OXYGEN SATURATION: 94 % | DIASTOLIC BLOOD PRESSURE: 62 MMHG | TEMPERATURE: 97 F | RESPIRATION RATE: 18 BRPM

## 2025-01-04 DIAGNOSIS — R55 NEAR SYNCOPE: Primary | ICD-10-CM

## 2025-01-04 LAB
ALBUMIN SERPL-MCNC: 3.7 G/DL (ref 3.5–5.2)
ALBUMIN/GLOB SERPL: 1.2 G/DL
ALP SERPL-CCNC: 110 U/L (ref 39–117)
ALT SERPL W P-5'-P-CCNC: 14 U/L (ref 1–33)
ANION GAP SERPL CALCULATED.3IONS-SCNC: 13 MMOL/L (ref 5–15)
AST SERPL-CCNC: 14 U/L (ref 1–32)
BASOPHILS # BLD AUTO: 0.04 10*3/MM3 (ref 0–0.2)
BASOPHILS NFR BLD AUTO: 0.4 % (ref 0–1.5)
BILIRUB SERPL-MCNC: 0.4 MG/DL (ref 0–1.2)
BUN SERPL-MCNC: 18 MG/DL (ref 8–23)
BUN/CREAT SERPL: 16.5 (ref 7–25)
CALCIUM SPEC-SCNC: 8.9 MG/DL (ref 8.6–10.5)
CHLORIDE SERPL-SCNC: 105 MMOL/L (ref 98–107)
CO2 SERPL-SCNC: 21 MMOL/L (ref 22–29)
CREAT SERPL-MCNC: 1.09 MG/DL (ref 0.57–1)
DEPRECATED RDW RBC AUTO: 45.3 FL (ref 37–54)
EGFRCR SERPLBLD CKD-EPI 2021: 54.1 ML/MIN/1.73
EOSINOPHIL # BLD AUTO: 0.26 10*3/MM3 (ref 0–0.4)
EOSINOPHIL NFR BLD AUTO: 2.8 % (ref 0.3–6.2)
ERYTHROCYTE [DISTWIDTH] IN BLOOD BY AUTOMATED COUNT: 14.4 % (ref 12.3–15.4)
GEN 5 1HR TROPONIN T REFLEX: 10 NG/L
GLOBULIN UR ELPH-MCNC: 3 GM/DL
GLUCOSE SERPL-MCNC: 115 MG/DL (ref 65–99)
HCT VFR BLD AUTO: 40.9 % (ref 34–46.6)
HGB BLD-MCNC: 13.2 G/DL (ref 12–15.9)
HOLD SPECIMEN: NORMAL
HOLD SPECIMEN: NORMAL
IMM GRANULOCYTES # BLD AUTO: 0.06 10*3/MM3 (ref 0–0.05)
IMM GRANULOCYTES NFR BLD AUTO: 0.6 % (ref 0–0.5)
LYMPHOCYTES # BLD AUTO: 1.35 10*3/MM3 (ref 0.7–3.1)
LYMPHOCYTES NFR BLD AUTO: 14.3 % (ref 19.6–45.3)
MAGNESIUM SERPL-MCNC: 2 MG/DL (ref 1.6–2.4)
MCH RBC QN AUTO: 27.6 PG (ref 26.6–33)
MCHC RBC AUTO-ENTMCNC: 32.3 G/DL (ref 31.5–35.7)
MCV RBC AUTO: 85.6 FL (ref 79–97)
MONOCYTES # BLD AUTO: 0.69 10*3/MM3 (ref 0.1–0.9)
MONOCYTES NFR BLD AUTO: 7.3 % (ref 5–12)
NEUTROPHILS NFR BLD AUTO: 7.01 10*3/MM3 (ref 1.7–7)
NEUTROPHILS NFR BLD AUTO: 74.6 % (ref 42.7–76)
NRBC BLD AUTO-RTO: 0 /100 WBC (ref 0–0.2)
PLATELET # BLD AUTO: 297 10*3/MM3 (ref 140–450)
PMV BLD AUTO: 8.5 FL (ref 6–12)
POTASSIUM SERPL-SCNC: 3.6 MMOL/L (ref 3.5–5.2)
PROT SERPL-MCNC: 6.7 G/DL (ref 6–8.5)
QT INTERVAL: 454 MS
QTC INTERVAL: 439 MS
RBC # BLD AUTO: 4.78 10*6/MM3 (ref 3.77–5.28)
SODIUM SERPL-SCNC: 139 MMOL/L (ref 136–145)
TROPONIN T NUMERIC DELTA: 0 NG/L
TROPONIN T SERPL HS-MCNC: 10 NG/L
WBC NRBC COR # BLD AUTO: 9.41 10*3/MM3 (ref 3.4–10.8)
WHOLE BLOOD HOLD COAG: NORMAL

## 2025-01-04 PROCEDURE — 85025 COMPLETE CBC W/AUTO DIFF WBC: CPT | Performed by: STUDENT IN AN ORGANIZED HEALTH CARE EDUCATION/TRAINING PROGRAM

## 2025-01-04 PROCEDURE — 93005 ELECTROCARDIOGRAM TRACING: CPT | Performed by: STUDENT IN AN ORGANIZED HEALTH CARE EDUCATION/TRAINING PROGRAM

## 2025-01-04 PROCEDURE — 84484 ASSAY OF TROPONIN QUANT: CPT | Performed by: STUDENT IN AN ORGANIZED HEALTH CARE EDUCATION/TRAINING PROGRAM

## 2025-01-04 PROCEDURE — 83735 ASSAY OF MAGNESIUM: CPT | Performed by: STUDENT IN AN ORGANIZED HEALTH CARE EDUCATION/TRAINING PROGRAM

## 2025-01-04 PROCEDURE — 36415 COLL VENOUS BLD VENIPUNCTURE: CPT

## 2025-01-04 PROCEDURE — 80053 COMPREHEN METABOLIC PANEL: CPT | Performed by: STUDENT IN AN ORGANIZED HEALTH CARE EDUCATION/TRAINING PROGRAM

## 2025-01-04 PROCEDURE — 99283 EMERGENCY DEPT VISIT LOW MDM: CPT

## 2025-01-04 NOTE — ED NOTES
Pt to Er via [facility] from [facility] for near syncope- pt was at [facility] and felt like she was going to vomit

## 2025-01-04 NOTE — ED PROVIDER NOTES
EMERGENCY DEPARTMENT ENCOUNTER  Room Number:  11/11  PCP: Pierre Chaudhry Jr., MD  Independent Historians: Patient and EMS      HPI:  Chief Complaint: had concerns including Vomiting and Dizziness.     Context: Katelyn Sr is a 74 y.o. female with a medical history of FUENTES, HTN, HLD, A-fib, anxiety who presents to the ED c/o acute near syncope.  Patient was at a restaurant when suddenly she felt like everything started getting dark.  Patient never actually lost consciousness but did have an episode of vomiting following this event.  Patient denies chest pain or palpitations.  Patient had a syncope event earlier this year and was admitted to the hospital with overall unremarkable workup.  Patient was to follow-up with cardiology on an outpatient basis but has not recurred.  Patient currently feels back to her baseline.      Review of prior external notes (non-ED) -and- Review of prior external test results outside of this encounter: Discharge summary from 9/26/2024 reviewed and notable for admission secondary to syncope.  Patient seen by cardiology who felt that no further cardiac workup was warranted.  Patient had unremarkable EKG and troponins of 9 and 7.  Plan was to continue on current home dose of Eliquis and Coreg with an outpatient follow-up with cardiology.    Prescription drug monitoring program review:         PAST MEDICAL HISTORY  Active Ambulatory Problems     Diagnosis Date Noted    FUENTES on autoCPAP 10/19/2017    Circadian rhythm sleep disorder, delayed sleep phase type 10/28/2017    Hypersomnia due to another medical condition 10/28/2017    NSTEMI (non-ST elevated myocardial infarction) 05/21/2018    Essential hypertension 06/29/2018    Degeneration of cervical intervertebral disc 11/09/2018    DDD (degenerative disc disease), thoracic 11/09/2018    Disc degeneration, lumbar 11/27/2018    Mixed hyperlipidemia 01/10/2019    Palpitations 07/23/2019    Extremity pain 07/26/2019    LBP (low back  pain) 07/26/2019    DDD (degenerative disc disease), lumbar 07/26/2019    Atypical chest pain 10/30/2019    Vertigo 07/14/2020    Depression 07/14/2020    Generalized anxiety disorder 07/14/2020    Peripheral vestibulopathy of both ears 07/14/2020    Cervical stenosis of spinal canal 07/14/2020    Vestibular neuronitis 07/17/2020    Hammer toe of right foot 08/11/2022    Arthritis of foot 08/11/2022    Arthritis of first metatarsophalangeal (MTP) joint of right foot 08/11/2022    Chronic dyspnea 09/21/2023    Hallux valgus of right foot 11/29/2023    Lumbar facet arthropathy 01/12/2024    A-fib 01/24/2024    Elevated troponin 01/24/2024    Sacroiliitis 02/01/2024    Syncope 09/25/2024     Resolved Ambulatory Problems     Diagnosis Date Noted    No Resolved Ambulatory Problems     Past Medical History:   Diagnosis Date    Abnormal ECG 5/21/18    Arthritis of neck     Asthma 12/15/2021    Atrial fibrillation     Cervical disc disorder 8/03    CTS (carpal tunnel syndrome)     DDD (degenerative disc disease), lumbosacral 11/09/2018    Fibrosis of lung     Fracture, finger     GERD (gastroesophageal reflux disease)     Hip arthrosis     Hyperlipidemia     Hypersensitivity pneumonitis     Hypersensitivity pneumonitis     Hypertension     Knee swelling     Low back strain     Lower back pain 07/26/2019    Myocardial infarction     Neck strain     Neuroma of foot     Non-STEMI (non-ST elevated myocardial infarction) 05/21/2018    Nonsenile cataract     Presence of artificial intra-ocular lens     Scoliosis     Sleep apnea     Thoracic disc disorder     Thyroid disease          PAST SURGICAL HISTORY  Past Surgical History:   Procedure Laterality Date    CARDIAC CATHETERIZATION N/A 05/22/2018    Procedure: Left Heart Cath;  Surgeon: Aleks Velazquez MD;  Location: [facility] CATH INVASIVE LOCATION;  Service: Cardiovascular    CARDIAC CATHETERIZATION N/A 05/22/2018    Procedure: Coronary angiography;  Surgeon: Aleks VILLASEÑOR  MD Marcus;  Location: [facility] CATH INVASIVE LOCATION;  Service: Cardiovascular    CARDIAC CATHETERIZATION N/A 2018    Procedure: Left ventriculography;  Surgeon: Aleks Velazquez MD;  Location: [facility] CATH INVASIVE LOCATION;  Service: Cardiovascular    CARDIAC CATHETERIZATION N/A 2018    Procedure: Peripheral angiography;  Surgeon: Aleks Velazquez MD;  Location: [facility] CATH INVASIVE LOCATION;  Service: Cardiovascular    CATARACT EXTRACTION WITH INTRAOCULAR LENS IMPLANT Bilateral     COLONOSCOPY N/A 2023    Procedure: COLONOSCOPY to cecum/TI with cold biopsies;  Surgeon: Hamilton Francisco MD;  Location: [facility] ENDOSCOPY;  Service: Gastroenterology;  Laterality: N/A;  pre- diverticulitis  post- diverticulosis with stricture    DIAGNOSTIC LAPAROSCOPY      r/o endometriosis    ENDOSCOPY N/A 2023    Procedure: ESOPHAGOGASTRODUODENOSCOPY with cold biopsies and 52 Fr Kam Dilatation;  Surgeon: Hamilton Francisco MD;  Location: [facility] ENDOSCOPY;  Service: Gastroenterology;  Laterality: N/A;  pre- dysphagia  post- hiatal hernia, gastric polyps, esophageal ring @ 32    NECK SURGERY  2003    ACDF C6-7-Dr. Nix     RECTAL SURGERY      fistula/abscess/hemorrhoid         FAMILY HISTORY  Family History   Problem Relation Age of Onset    Alzheimer's disease Mother     Heart failure Father     Arrhythmia Father     Heart attack Father         MI at age 56,  at 63.    Diabetes Sister     Atrial fibrillation Sister     Colon cancer Maternal Grandmother     Stroke Paternal Grandmother     Malig Hyperthermia Neg Hx          SOCIAL HISTORY  Social History     Socioeconomic History    Marital status: Single    Number of children: 0    Years of education: BS    Tobacco Use    Smoking status: Never     Passive exposure: Never    Smokeless tobacco: Never    Tobacco comments:     caffeine use: TEA OCCASSIONALLY   Vaping Use    Vaping status: Never Used   Substance and Sexual Activity     Alcohol use: No    Drug use: Never    Sexual activity: Not Currently     Birth control/protection: Post-menopausal         ALLERGIES  Simvastatin, Erythromycin, and Penicillins      REVIEW OF SYSTEMS  Review of Systems  Included in HPI  All systems reviewed and negative except for those discussed in HPI.      PHYSICAL EXAM    I have reviewed the triage vital signs and nursing notes.    ED Triage Vitals   Temp Heart Rate Resp BP SpO2   01/04/25 1524 01/04/25 1522 01/04/25 1522 01/04/25 1522 01/04/25 1522   97 °F (36.1 °C) 59 18 101/58 94 %      Temp src Heart Rate Source Patient Position BP Location FiO2 (%)   -- -- -- -- --              Physical Exam  GENERAL: alert, no acute distress  SKIN: Warm, dry  HENT: Normocephalic, atraumatic  EYES: no scleral icterus  CV: regular rhythm, regular rate  RESPIRATORY: normal effort, lungs clear  ABDOMEN: soft, nontender, nondistended  MUSCULOSKELETAL: no deformity  NEURO: alert, moves all extremities, follows commands            LAB RESULTS  Recent Results (from the past 24 hours)   ECG 12 Lead Syncope    Collection Time: 01/04/25  3:43 PM   Result Value Ref Range    QT Interval 454 ms    QTC Interval 439 ms   Comprehensive Metabolic Panel    Collection Time: 01/04/25  3:46 PM    Specimen: Blood   Result Value Ref Range    Glucose 115 (H) 65 - 99 mg/dL    BUN 18 8 - 23 mg/dL    Creatinine 1.09 (H) 0.57 - 1.00 mg/dL    Sodium 139 136 - 145 mmol/L    Potassium 3.6 3.5 - 5.2 mmol/L    Chloride 105 98 - 107 mmol/L    CO2 21.0 (L) 22.0 - 29.0 mmol/L    Calcium 8.9 8.6 - 10.5 mg/dL    Total Protein 6.7 6.0 - 8.5 g/dL    Albumin 3.7 3.5 - 5.2 g/dL    ALT (SGPT) 14 1 - 33 U/L    AST (SGOT) 14 1 - 32 U/L    Alkaline Phosphatase 110 39 - 117 U/L    Total Bilirubin 0.4 0.0 - 1.2 mg/dL    Globulin 3.0 gm/dL    A/G Ratio 1.2 g/dL    BUN/Creatinine Ratio 16.5 7.0 - 25.0    Anion Gap 13.0 5.0 - 15.0 mmol/L    eGFR 54.1 (L) >60.0 mL/min/1.73   High Sensitivity Troponin T    Collection Time:  01/04/25  3:46 PM    Specimen: Blood   Result Value Ref Range    HS Troponin T 10 <14 ng/L   Magnesium    Collection Time: 01/04/25  3:46 PM    Specimen: Blood   Result Value Ref Range    Magnesium 2.0 1.6 - 2.4 mg/dL   CBC Auto Differential    Collection Time: 01/04/25  3:46 PM    Specimen: Blood   Result Value Ref Range    WBC 9.41 3.40 - 10.80 10*3/mm3    RBC 4.78 3.77 - 5.28 10*6/mm3    Hemoglobin 13.2 12.0 - 15.9 g/dL    Hematocrit 40.9 34.0 - 46.6 %    MCV 85.6 79.0 - 97.0 fL    MCH 27.6 26.6 - 33.0 pg    MCHC 32.3 31.5 - 35.7 g/dL    RDW 14.4 12.3 - 15.4 %    RDW-SD 45.3 37.0 - 54.0 fl    MPV 8.5 6.0 - 12.0 fL    Platelets 297 140 - 450 10*3/mm3    Neutrophil % 74.6 42.7 - 76.0 %    Lymphocyte % 14.3 (L) 19.6 - 45.3 %    Monocyte % 7.3 5.0 - 12.0 %    Eosinophil % 2.8 0.3 - 6.2 %    Basophil % 0.4 0.0 - 1.5 %    Immature Grans % 0.6 (H) 0.0 - 0.5 %    Neutrophils, Absolute 7.01 (H) 1.70 - 7.00 10*3/mm3    Lymphocytes, Absolute 1.35 0.70 - 3.10 10*3/mm3    Monocytes, Absolute 0.69 0.10 - 0.90 10*3/mm3    Eosinophils, Absolute 0.26 0.00 - 0.40 10*3/mm3    Basophils, Absolute 0.04 0.00 - 0.20 10*3/mm3    Immature Grans, Absolute 0.06 (H) 0.00 - 0.05 10*3/mm3    nRBC 0.0 0.0 - 0.2 /100 WBC   Gold Top - SST    Collection Time: 01/04/25  3:46 PM   Result Value Ref Range    Extra Tube Hold for add-ons.    Gray Top    Collection Time: 01/04/25  3:46 PM   Result Value Ref Range    Extra Tube Hold for add-ons.    Light Blue Top    Collection Time: 01/04/25  3:46 PM   Result Value Ref Range    Extra Tube Hold for add-ons.    High Sensitivity Troponin T 1Hr    Collection Time: 01/04/25  5:07 PM    Specimen: Blood   Result Value Ref Range    HS Troponin T 10 <14 ng/L    Troponin T Numeric Delta 0 Abnormal if >/=3 ng/L         RADIOLOGY  No Radiology Exams Resulted Within Past 24 Hours      MEDICATIONS GIVEN IN ER  Medications - No data to display      ORDERS PLACED DURING THIS VISIT:  Orders Placed This Encounter    Procedures    Comprehensive Metabolic Panel    High Sensitivity Troponin T    Magnesium    CBC Auto Differential    Leopold Draw    High Sensitivity Troponin T 1Hr    Orthostatic Vitals    ECG 12 Lead Syncope    CBC & Differential    Gold Top - SST    Gray Top    Light Blue Top         OUTPATIENT MEDICATION MANAGEMENT:  No current Epic-ordered facility-administered medications on file.     Current Outpatient Medications Ordered in Epic   Medication Sig Dispense Refill    amLODIPine (NORVASC) 5 MG tablet TAKE 1 TABLET BY MOUTH EVERY DAY 90 tablet 3    apixaban (ELIQUIS) 5 MG tablet tablet Take 1 tablet by mouth 2 (Two) Times a Day. 28 tablet 0    ARIPiprazole (ABILIFY) 5 MG tablet Take 1 tablet by mouth Daily.      carvedilol (COREG) 25 MG tablet Take 1 tablet by mouth 2 (Two) Times a Day. 180 tablet 4    escitalopram (LEXAPRO) 20 MG tablet Take 1 tablet by mouth Daily.      famotidine (PEPCID) 40 MG tablet Take 1 tablet by mouth Every Night.      hydrALAZINE (APRESOLINE) 50 MG tablet Take 1 tablet by mouth 2 (Two) Times a Day. 180 tablet 4    lamoTRIgine (LaMICtal) 100 MG tablet Take 1 tablet by mouth Every Night.      losartan (COZAAR) 100 MG tablet TAKE 1 TABLET BY MOUTH EVERY DAY 90 tablet 4    modafinil (PROVIGIL) 100 MG tablet Take 1 tablet by mouth Daily.      Nintedanib Esylate (Ofev) 100 MG capsule Take 1 capsule by mouth 2 (Two) Times a Day.      ondansetron ODT (ZOFRAN-ODT) 4 MG disintegrating tablet       pantoprazole (PROTONIX) 40 MG EC tablet Take 1 tablet by mouth Daily.      pravastatin (PRAVACHOL) 40 MG tablet Take 1 tablet by mouth every night at bedtime. 90 tablet 3    traZODone (DESYREL) 50 MG tablet Take 0.5 tablets by mouth At Night As Needed for Sleep.           PROCEDURES  Procedures            PROGRESS, DATA ANALYSIS, CONSULTS, AND MEDICAL DECISION MAKING  All labs have been independently interpreted by me.  All radiology studies have been reviewed by me. All EKG's have been independently  viewed and interpreted by me.  Discussion below represents my analysis of pertinent findings related to patient's condition, differential diagnosis, treatment plan and final disposition.    Differential diagnosis includes but is not limited to near syncope, vasovagal event, dehydration, electrolyte abnormality, ACS.    Clinical Scores:                                       ED Course as of 01/04/25 1906   Sat Jan 04, 2025   1545 EKG interpreted by me demonstrates sinus rhythm, rate of 56, no NE/QT prolongation, no ST elevation [MW]   1902 Workup in the emergency department is overall unremarkable including troponin and repeat troponin within normal limits.  Mild elevation in creatinine.  Patient given 500 cc of IV fluid.  On reassessment she demonstrates considerable improvement in symptoms.  EKG is reassuring.  Suspect vagal response.  Will have patient follow closely with primary care and cardiology.  Patient is agreeable with this plan and will call her medical team to schedule appointments for next week.  Precautions given and patient discharged in stable condition. [MW]      ED Course User Index  [MW] Lawrence Bermudez MD             AS OF 19:06 EST VITALS:    BP - 96/62  HR - 76  TEMP - 97 °F (36.1 °C)  O2 SATS - 94%    COMPLEXITY OF CARE  Admission was considered but after careful review of the patient's presentation, physical examination, diagnostic results, and response to treatment the patient may be safely discharged with outpatient follow-up.      DIAGNOSIS  Final diagnoses:   Near syncope         DISPOSITION  ED Disposition       ED Disposition   Discharge    Condition   Stable    Comment   --                Please note that portions of this document were completed with a voice recognition program.    Note Disclaimer: At [facility], we believe that sharing information builds trust and better relationships. You are receiving this note because you recently visited [facility]. It is possible you  will see health information before a provider has talked with you about it. This kind of information can be easy to misunderstand. To help you fully understand what it means for your health, we urge you to discuss this note with your provider.         Lawrence Bermudez MD  01/04/25 8316

## 2025-01-05 NOTE — DISCHARGE INSTRUCTIONS
Follow-up with primary care physician and your cardiologist early next week for repeat evaluation and further workup of potential syncope    Please return to the ER with new or worsening symptoms including but not limited to chest pain, shortness of breath, changes in mental status, the feeling that you might pass out.

## 2025-01-07 ENCOUNTER — TELEPHONE (OUTPATIENT)
Dept: CARDIOLOGY | Facility: CLINIC | Age: 75
End: 2025-01-07
Payer: MEDICARE

## 2025-01-07 NOTE — TELEPHONE ENCOUNTER
Pt went to the ER on 1/4/25.         Her BP that day was 90/70 and 80/70.    She wants to know if she is on to much BP medications? She is unsure if she didn't have enough to drink that day and was dehydrated.     She is taking:    Amlodipine 5 MG daily  Carvedilol 25 1 tab BID  Hydralazine 50 MG 1 tab BID  Losartan 100 MG daily.

## 2025-01-26 ENCOUNTER — HOSPITAL ENCOUNTER (OUTPATIENT)
Dept: MRI IMAGING | Facility: HOSPITAL | Age: 75
Discharge: HOME | End: 2025-01-26
Admitting: NURSE PRACTITIONER
Payer: MEDICARE

## 2025-01-26 DIAGNOSIS — M75.21 BICEPS TENDINITIS ON RIGHT: ICD-10-CM

## 2025-01-26 DIAGNOSIS — M67.911 DYSFUNCTION OF RIGHT ROTATOR CUFF: ICD-10-CM

## 2025-01-26 DIAGNOSIS — M25.511 ACUTE PAIN OF RIGHT SHOULDER: ICD-10-CM

## 2025-01-26 PROCEDURE — 73221 MRI JOINT UPR EXTREM W/O DYE: CPT

## 2025-01-28 ENCOUNTER — TELEPHONE (OUTPATIENT)
Dept: ORTHOPEDIC SURGERY | Facility: CLINIC | Age: 75
End: 2025-01-28
Payer: MEDICARE

## 2025-01-28 ENCOUNTER — HOSPITAL ENCOUNTER (OUTPATIENT)
Dept: CT IMAGING | Facility: HOSPITAL | Age: 75
Discharge: HOME | End: 2025-01-28
Admitting: INTERNAL MEDICINE
Payer: MEDICARE

## 2025-01-28 DIAGNOSIS — M25.511 ACUTE PAIN OF RIGHT SHOULDER: Primary | ICD-10-CM

## 2025-01-28 DIAGNOSIS — R59.1 LYMPHADENOPATHY: ICD-10-CM

## 2025-01-28 PROCEDURE — 71250 CT THORAX DX C-: CPT

## 2025-01-28 NOTE — TELEPHONE ENCOUNTER
I sent her a message though [employer]. If you could relay those results to her. I think her option would be to try one more injection into the shoulder joint itself- I am happy to do that if she is really hoping to avoid anything surgical right now. If that fails, I would recommend a consult with Dr. Han to explore other options including the potential for surgical intervention.

## 2025-01-28 NOTE — TELEPHONE ENCOUNTER
"Caller: Katelyn Sr \"Jina\"    Relationship to patient: Self    Best call back number: [phone]*    Patient is needing: PT IS CALLING TO SPEAK WITH NICKIE MISTRY ABOUT TO MRI RESULTS.. AND WHAT THE NEXT STEPS ARE.. PLEASE ADVISE..          "

## 2025-01-30 NOTE — TELEPHONE ENCOUNTER
"Hub staff attempted to follow warm transfer process and was unsuccessful     Caller: Kaetlyn Sr \"Jina\"    Relationship to patient: Self    Best call back number: [phone] (home)     Patient is needing: PATIENT RETURNING CALL   PATIENT CANNOT SEE A MESSAGE ON HER [employer] AND DID NOT NOTICE ANY CALLS COME THROUGH - PLEASE TRY TO REACH PATIENT AGAIN   "

## 2025-01-30 NOTE — TELEPHONE ENCOUNTER
I called patient to let her know what you said and she said someone told her that maybe alexander could do the ultrasound guided injection. She said she wasn't sure what she should do I told her you would be happy try injection but she said she wants see if alexander would be an option if you wasn't able. I told her I will call her tomorrow afternoon after I talk to you about it.

## 2025-01-31 NOTE — TELEPHONE ENCOUNTER
I would inject her glenohumeral joint. Not into the area of the bicep tendon like we previously discussed under ultrasound. (The bicep tendon looked pretty normal on the MRI).    I can do this without ultrasound or we can ask Dr. Clark to do it under ultrasound. Using the US would just give a little more reassurance that we are in the joint space.     It is completely up to her. I do the glenohumeral injections all the time but Dr. Clark is wonderful.

## 2025-02-03 ENCOUNTER — HOSPITAL ENCOUNTER (INPATIENT)
Facility: HOSPITAL | Age: 75
LOS: 3 days | Discharge: HOME | DRG: 193 | End: 2025-02-06
Attending: EMERGENCY MEDICINE | Admitting: INTERNAL MEDICINE
Payer: MEDICARE

## 2025-02-03 ENCOUNTER — APPOINTMENT (OUTPATIENT)
Dept: GENERAL RADIOLOGY | Facility: HOSPITAL | Age: 75
DRG: 193 | End: 2025-02-03
Payer: MEDICARE

## 2025-02-03 DIAGNOSIS — J10.1 INFLUENZA A: ICD-10-CM

## 2025-02-03 DIAGNOSIS — J96.01 ACUTE HYPOXIC RESPIRATORY FAILURE: Primary | ICD-10-CM

## 2025-02-03 LAB
ALBUMIN SERPL-MCNC: 3.7 G/DL (ref 3.5–5.2)
ALBUMIN/GLOB SERPL: 1.1 G/DL
ALP SERPL-CCNC: 102 U/L (ref 39–117)
ALT SERPL W P-5'-P-CCNC: 12 U/L (ref 1–33)
ANION GAP SERPL CALCULATED.3IONS-SCNC: 12 MMOL/L (ref 5–15)
AST SERPL-CCNC: 18 U/L (ref 1–32)
B PARAPERT DNA SPEC QL NAA+PROBE: NOT DETECTED
B PERT DNA SPEC QL NAA+PROBE: NOT DETECTED
BASOPHILS # BLD AUTO: 0.01 10*3/MM3 (ref 0–0.2)
BASOPHILS NFR BLD AUTO: 0.2 % (ref 0–1.5)
BILIRUB SERPL-MCNC: 0.5 MG/DL (ref 0–1.2)
BUN SERPL-MCNC: 11 MG/DL (ref 8–23)
BUN/CREAT SERPL: 10.9 (ref 7–25)
C PNEUM DNA NPH QL NAA+NON-PROBE: NOT DETECTED
CALCIUM SPEC-SCNC: 8.9 MG/DL (ref 8.6–10.5)
CHLORIDE SERPL-SCNC: 102 MMOL/L (ref 98–107)
CO2 SERPL-SCNC: 22 MMOL/L (ref 22–29)
CREAT SERPL-MCNC: 1.01 MG/DL (ref 0.57–1)
DEPRECATED RDW RBC AUTO: 46.9 FL (ref 37–54)
EGFRCR SERPLBLD CKD-EPI 2021: 59.3 ML/MIN/1.73
EOSINOPHIL # BLD AUTO: 0.03 10*3/MM3 (ref 0–0.4)
EOSINOPHIL NFR BLD AUTO: 0.5 % (ref 0.3–6.2)
ERYTHROCYTE [DISTWIDTH] IN BLOOD BY AUTOMATED COUNT: 15 % (ref 12.3–15.4)
FLUAV H3 RNA NPH QL NAA+PROBE: DETECTED
FLUBV RNA ISLT QL NAA+PROBE: NOT DETECTED
GLOBULIN UR ELPH-MCNC: 3.4 GM/DL
GLUCOSE SERPL-MCNC: 104 MG/DL (ref 65–99)
HADV DNA SPEC NAA+PROBE: NOT DETECTED
HCOV 229E RNA SPEC QL NAA+PROBE: NOT DETECTED
HCOV HKU1 RNA SPEC QL NAA+PROBE: NOT DETECTED
HCOV NL63 RNA SPEC QL NAA+PROBE: NOT DETECTED
HCOV OC43 RNA SPEC QL NAA+PROBE: NOT DETECTED
HCT VFR BLD AUTO: 40.8 % (ref 34–46.6)
HGB BLD-MCNC: 13.3 G/DL (ref 12–15.9)
HMPV RNA NPH QL NAA+NON-PROBE: NOT DETECTED
HPIV1 RNA ISLT QL NAA+PROBE: NOT DETECTED
HPIV2 RNA SPEC QL NAA+PROBE: NOT DETECTED
HPIV3 RNA NPH QL NAA+PROBE: NOT DETECTED
HPIV4 P GENE NPH QL NAA+PROBE: NOT DETECTED
IMM GRANULOCYTES # BLD AUTO: 0.03 10*3/MM3 (ref 0–0.05)
IMM GRANULOCYTES NFR BLD AUTO: 0.5 % (ref 0–0.5)
LIPASE SERPL-CCNC: 19 U/L (ref 13–60)
LYMPHOCYTES # BLD AUTO: 0.53 10*3/MM3 (ref 0.7–3.1)
LYMPHOCYTES NFR BLD AUTO: 8.6 % (ref 19.6–45.3)
M PNEUMO IGG SER IA-ACNC: NOT DETECTED
MCH RBC QN AUTO: 27.9 PG (ref 26.6–33)
MCHC RBC AUTO-ENTMCNC: 32.6 G/DL (ref 31.5–35.7)
MCV RBC AUTO: 85.5 FL (ref 79–97)
MONOCYTES # BLD AUTO: 0.81 10*3/MM3 (ref 0.1–0.9)
MONOCYTES NFR BLD AUTO: 13.1 % (ref 5–12)
NEUTROPHILS NFR BLD AUTO: 4.76 10*3/MM3 (ref 1.7–7)
NEUTROPHILS NFR BLD AUTO: 77.1 % (ref 42.7–76)
NRBC BLD AUTO-RTO: 0 /100 WBC (ref 0–0.2)
PLATELET # BLD AUTO: 225 10*3/MM3 (ref 140–450)
PMV BLD AUTO: 8.5 FL (ref 6–12)
POTASSIUM SERPL-SCNC: 4.1 MMOL/L (ref 3.5–5.2)
PROCALCITONIN SERPL-MCNC: 0.1 NG/ML (ref 0–0.25)
PROT SERPL-MCNC: 7.1 G/DL (ref 6–8.5)
RBC # BLD AUTO: 4.77 10*6/MM3 (ref 3.77–5.28)
RHINOVIRUS RNA SPEC NAA+PROBE: NOT DETECTED
RSV RNA NPH QL NAA+NON-PROBE: NOT DETECTED
SARS-COV-2 RNA NPH QL NAA+NON-PROBE: NOT DETECTED
SODIUM SERPL-SCNC: 136 MMOL/L (ref 136–145)
WBC NRBC COR # BLD AUTO: 6.17 10*3/MM3 (ref 3.4–10.8)

## 2025-02-03 PROCEDURE — 94799 UNLISTED PULMONARY SVC/PX: CPT

## 2025-02-03 PROCEDURE — 63710000001 ONDANSETRON ODT 4 MG TABLET DISPERSIBLE: Performed by: INTERNAL MEDICINE

## 2025-02-03 PROCEDURE — 25010000002 ONDANSETRON PER 1 MG: Performed by: EMERGENCY MEDICINE

## 2025-02-03 PROCEDURE — 85025 COMPLETE CBC W/AUTO DIFF WBC: CPT | Performed by: EMERGENCY MEDICINE

## 2025-02-03 PROCEDURE — 0202U NFCT DS 22 TRGT SARS-COV-2: CPT | Performed by: EMERGENCY MEDICINE

## 2025-02-03 PROCEDURE — 94640 AIRWAY INHALATION TREATMENT: CPT

## 2025-02-03 PROCEDURE — 80053 COMPREHEN METABOLIC PANEL: CPT | Performed by: EMERGENCY MEDICINE

## 2025-02-03 PROCEDURE — 71045 X-RAY EXAM CHEST 1 VIEW: CPT

## 2025-02-03 PROCEDURE — 99285 EMERGENCY DEPT VISIT HI MDM: CPT

## 2025-02-03 PROCEDURE — 84145 PROCALCITONIN (PCT): CPT | Performed by: EMERGENCY MEDICINE

## 2025-02-03 PROCEDURE — 94760 N-INVAS EAR/PLS OXIMETRY 1: CPT

## 2025-02-03 PROCEDURE — 83690 ASSAY OF LIPASE: CPT | Performed by: EMERGENCY MEDICINE

## 2025-02-03 PROCEDURE — 94761 N-INVAS EAR/PLS OXIMETRY MLT: CPT

## 2025-02-03 RX ORDER — HYDRALAZINE HYDROCHLORIDE 25 MG/1
50 TABLET, FILM COATED ORAL 2 TIMES DAILY
Status: DISCONTINUED | OUTPATIENT
Start: 2025-02-03 | End: 2025-02-06 | Stop reason: HOSPADM

## 2025-02-03 RX ORDER — AMLODIPINE BESYLATE 5 MG/1
5 TABLET ORAL DAILY
Status: DISCONTINUED | OUTPATIENT
Start: 2025-02-03 | End: 2025-02-06 | Stop reason: HOSPADM

## 2025-02-03 RX ORDER — ACETAMINOPHEN 160 MG/5ML
650 SOLUTION ORAL EVERY 4 HOURS PRN
Status: DISCONTINUED | OUTPATIENT
Start: 2025-02-03 | End: 2025-02-06 | Stop reason: HOSPADM

## 2025-02-03 RX ORDER — SODIUM CHLORIDE 9 MG/ML
40 INJECTION, SOLUTION INTRAVENOUS AS NEEDED
Status: DISCONTINUED | OUTPATIENT
Start: 2025-02-03 | End: 2025-02-06 | Stop reason: HOSPADM

## 2025-02-03 RX ORDER — SODIUM CHLORIDE 0.9 % (FLUSH) 0.9 %
10 SYRINGE (ML) INJECTION EVERY 12 HOURS SCHEDULED
Status: DISCONTINUED | OUTPATIENT
Start: 2025-02-03 | End: 2025-02-06 | Stop reason: HOSPADM

## 2025-02-03 RX ORDER — SODIUM CHLORIDE 0.9 % (FLUSH) 0.9 %
10 SYRINGE (ML) INJECTION AS NEEDED
Status: DISCONTINUED | OUTPATIENT
Start: 2025-02-03 | End: 2025-02-06 | Stop reason: HOSPADM

## 2025-02-03 RX ORDER — ONDANSETRON 4 MG/1
4 TABLET, ORALLY DISINTEGRATING ORAL EVERY 8 HOURS PRN
Status: DISCONTINUED | OUTPATIENT
Start: 2025-02-03 | End: 2025-02-06 | Stop reason: HOSPADM

## 2025-02-03 RX ORDER — ACETAMINOPHEN 500 MG
1000 TABLET ORAL ONCE
Status: COMPLETED | OUTPATIENT
Start: 2025-02-03 | End: 2025-02-03

## 2025-02-03 RX ORDER — LAMOTRIGINE 100 MG/1
100 TABLET ORAL NIGHTLY
Status: DISCONTINUED | OUTPATIENT
Start: 2025-02-03 | End: 2025-02-06 | Stop reason: HOSPADM

## 2025-02-03 RX ORDER — ACETAMINOPHEN 650 MG/1
650 SUPPOSITORY RECTAL EVERY 4 HOURS PRN
Status: DISCONTINUED | OUTPATIENT
Start: 2025-02-03 | End: 2025-02-06 | Stop reason: HOSPADM

## 2025-02-03 RX ORDER — IPRATROPIUM BROMIDE AND ALBUTEROL SULFATE 2.5; .5 MG/3ML; MG/3ML
3 SOLUTION RESPIRATORY (INHALATION) EVERY 6 HOURS PRN
Status: DISCONTINUED | OUTPATIENT
Start: 2025-02-03 | End: 2025-02-06 | Stop reason: HOSPADM

## 2025-02-03 RX ORDER — NITROGLYCERIN 0.4 MG/1
0.4 TABLET SUBLINGUAL
Status: DISCONTINUED | OUTPATIENT
Start: 2025-02-03 | End: 2025-02-06 | Stop reason: HOSPADM

## 2025-02-03 RX ORDER — OSELTAMIVIR PHOSPHATE 30 MG/1
30 CAPSULE ORAL EVERY 12 HOURS SCHEDULED
Status: DISCONTINUED | OUTPATIENT
Start: 2025-02-04 | End: 2025-02-06 | Stop reason: HOSPADM

## 2025-02-03 RX ORDER — PANTOPRAZOLE SODIUM 40 MG/1
40 TABLET, DELAYED RELEASE ORAL DAILY
Status: DISCONTINUED | OUTPATIENT
Start: 2025-02-03 | End: 2025-02-06 | Stop reason: HOSPADM

## 2025-02-03 RX ORDER — ONDANSETRON 2 MG/ML
4 INJECTION INTRAMUSCULAR; INTRAVENOUS ONCE
Status: COMPLETED | OUTPATIENT
Start: 2025-02-03 | End: 2025-02-03

## 2025-02-03 RX ORDER — LOSARTAN POTASSIUM 100 MG/1
100 TABLET ORAL DAILY
Status: DISCONTINUED | OUTPATIENT
Start: 2025-02-03 | End: 2025-02-06 | Stop reason: HOSPADM

## 2025-02-03 RX ORDER — PRAVASTATIN SODIUM 10 MG
40 TABLET ORAL NIGHTLY
Status: DISCONTINUED | OUTPATIENT
Start: 2025-02-03 | End: 2025-02-06 | Stop reason: HOSPADM

## 2025-02-03 RX ORDER — FAMOTIDINE 20 MG/1
40 TABLET, FILM COATED ORAL NIGHTLY
Status: DISCONTINUED | OUTPATIENT
Start: 2025-02-03 | End: 2025-02-06 | Stop reason: HOSPADM

## 2025-02-03 RX ORDER — ACETAMINOPHEN 325 MG/1
650 TABLET ORAL EVERY 4 HOURS PRN
Status: DISCONTINUED | OUTPATIENT
Start: 2025-02-03 | End: 2025-02-06 | Stop reason: HOSPADM

## 2025-02-03 RX ORDER — OSELTAMIVIR PHOSPHATE 75 MG/1
75 CAPSULE ORAL ONCE
Status: COMPLETED | OUTPATIENT
Start: 2025-02-03 | End: 2025-02-03

## 2025-02-03 RX ORDER — ESCITALOPRAM OXALATE 20 MG/1
20 TABLET ORAL DAILY
Status: DISCONTINUED | OUTPATIENT
Start: 2025-02-03 | End: 2025-02-06 | Stop reason: HOSPADM

## 2025-02-03 RX ORDER — ARIPIPRAZOLE 5 MG/1
5 TABLET ORAL DAILY
Status: DISCONTINUED | OUTPATIENT
Start: 2025-02-03 | End: 2025-02-06 | Stop reason: HOSPADM

## 2025-02-03 RX ORDER — CARVEDILOL 25 MG/1
25 TABLET ORAL 2 TIMES DAILY
Status: DISCONTINUED | OUTPATIENT
Start: 2025-02-03 | End: 2025-02-05

## 2025-02-03 RX ORDER — IPRATROPIUM BROMIDE AND ALBUTEROL SULFATE 2.5; .5 MG/3ML; MG/3ML
3 SOLUTION RESPIRATORY (INHALATION) ONCE
Status: COMPLETED | OUTPATIENT
Start: 2025-02-03 | End: 2025-02-03

## 2025-02-03 RX ADMIN — LOSARTAN POTASSIUM 100 MG: 100 TABLET, FILM COATED ORAL at 21:25

## 2025-02-03 RX ADMIN — ONDANSETRON 4 MG: 4 TABLET, ORALLY DISINTEGRATING ORAL at 23:08

## 2025-02-03 RX ADMIN — Medication 10 ML: at 22:24

## 2025-02-03 RX ADMIN — PANTOPRAZOLE SODIUM 40 MG: 40 TABLET, DELAYED RELEASE ORAL at 21:25

## 2025-02-03 RX ADMIN — HYDRALAZINE HYDROCHLORIDE 50 MG: 25 TABLET ORAL at 21:24

## 2025-02-03 RX ADMIN — CARVEDILOL 25 MG: 25 TABLET, FILM COATED ORAL at 21:23

## 2025-02-03 RX ADMIN — IPRATROPIUM BROMIDE AND ALBUTEROL SULFATE 3 ML: .5; 3 SOLUTION RESPIRATORY (INHALATION) at 13:42

## 2025-02-03 RX ADMIN — ACETAMINOPHEN 1000 MG: 500 TABLET, FILM COATED ORAL at 12:55

## 2025-02-03 RX ADMIN — ONDANSETRON 4 MG: 2 INJECTION, SOLUTION INTRAMUSCULAR; INTRAVENOUS at 15:14

## 2025-02-03 RX ADMIN — OSELTAMIVIR PHOSPHATE 75 MG: 75 CAPSULE ORAL at 22:23

## 2025-02-03 RX ADMIN — FAMOTIDINE 40 MG: 20 TABLET, FILM COATED ORAL at 21:23

## 2025-02-03 RX ADMIN — APIXABAN 2.5 MG: 2.5 TABLET, FILM COATED ORAL at 21:25

## 2025-02-03 RX ADMIN — ESCITALOPRAM 20 MG: 20 TABLET, FILM COATED ORAL at 21:23

## 2025-02-03 RX ADMIN — LAMOTRIGINE 100 MG: 100 TABLET ORAL at 22:23

## 2025-02-03 NOTE — ED NOTES
"Nursing report ED to floor  Katelyn Sr  74 y.o.  female    HPI :  HPI  Stated Reason for Visit: fever, cough, nausea    Chief Complaint  Chief Complaint   Patient presents with    Fever       Admitting doctor:   Sagar Rivera MD    Admitting diagnosis:   The primary encounter diagnosis was Acute hypoxic respiratory failure. A diagnosis of Influenza A was also pertinent to this visit.    Code status:   Current Code Status       Date Active Code Status Order ID Comments User Context       Prior            Allergies:   Simvastatin, Erythromycin, and Penicillins    Isolation:   Droplet    Intake and Output  No intake or output data in the 24 hours ending 02/03/25 1718    Weight:       02/03/25  1642   Weight: 84.4 kg (186 lb)       Most recent vitals:   Vitals:    02/03/25 1611 02/03/25 1641 02/03/25 1642 02/03/25 1711   BP: 103/61 99/63  108/68   BP Location: Left arm      Patient Position: Lying      Pulse: 69 65  74   Resp: 18      Temp:       TempSrc:       SpO2: 94% 94%  94%   Weight:   84.4 kg (186 lb)    Height:   154.9 cm (61\")        Active LDAs/IV Access:   Lines, Drains & Airways       Active LDAs       Name Placement date Placement time Site Days    Peripheral IV 02/03/25 1239 Right Antecubital 02/03/25  1239  Antecubital  less than 1                    Labs (abnormal labs have a star):   Labs Reviewed   RESPIRATORY PANEL PCR W/ COVID-19 (SARS-COV-2), NP SWAB IN UTM/VTP, 2 HR TAT - Abnormal; Notable for the following components:       Result Value    Influenza A H3 Detected (*)     All other components within normal limits    Narrative:     In the setting of a positive respiratory panel with a viral infection PLUS a negative procalcitonin without other underlying concern for bacterial infection, consider observing off antibiotics or discontinuation of antibiotics and continue supportive care. If the respiratory panel is positive for atypical bacterial infection (Bordetella pertussis, Chlamydophila " "pneumoniae, or Mycoplasma pneumoniae), consider antibiotic de-escalation to target atypical bacterial infection.   COMPREHENSIVE METABOLIC PANEL - Abnormal; Notable for the following components:    Glucose 104 (*)     Creatinine 1.01 (*)     eGFR 59.3 (*)     All other components within normal limits    Narrative:     GFR Categories in Chronic Kidney Disease (CKD)      GFR Category          GFR (mL/min/1.73)    Interpretation  G1                     90 or greater         Normal or high (1)  G2                      60-89                Mild decrease (1)  G3a                   45-59                Mild to moderate decrease  G3b                   30-44                Moderate to severe decrease  G4                    15-29                Severe decrease  G5                    14 or less           Kidney failure          (1)In the absence of evidence of kidney disease, neither GFR category G1 or G2 fulfill the criteria for CKD.    eGFR calculation 2021 CKD-EPI creatinine equation, which does not include race as a factor   CBC WITH AUTO DIFFERENTIAL - Abnormal; Notable for the following components:    Neutrophil % 77.1 (*)     Lymphocyte % 8.6 (*)     Monocyte % 13.1 (*)     Lymphocytes, Absolute 0.53 (*)     All other components within normal limits   LIPASE - Normal   PROCALCITONIN - Normal    Narrative:     As a Marker for Sepsis (Non-Neonates):    1. <0.5 ng/mL represents a low risk of severe sepsis and/or septic shock.  2. >2 ng/mL represents a high risk of severe sepsis and/or septic shock.    As a Marker for Lower Respiratory Tract Infections that require antibiotic therapy:    PCT on Admission    Antibiotic Therapy       6-12 Hrs later    >0.5                Strongly Recommended  >0.25 - <0.5        Recommended   0.1 - 0.25          Discouraged              Remeasure/reassess PCT  <0.1                Strongly Discouraged     Remeasure/reassess PCT    As 28 day mortality risk marker: \"Change in Procalcitonin " "Result\" (>80% or <=80%) if Day 0 (or Day 1) and Day 4 values are available. Refer to http://www.[facility]-pct-calculator.com    Change in PCT <=80%  A decrease of PCT levels below or equal to 80% defines a positive change in PCT test result representing a higher risk for 28-day all-cause mortality of patients diagnosed with severe sepsis for septic shock.    Change in PCT >80%  A decrease of PCT levels of more than 80% defines a negative change in PCT result representing a lower risk for 28-day all-cause mortality of patients diagnosed with severe sepsis or septic shock.      CBC AND DIFFERENTIAL    Narrative:     The following orders were created for panel order CBC & Differential.  Procedure                               Abnormality         Status                     ---------                               -----------         ------                     CBC Auto Differential[[id number]]        Abnormal            Final result                 Please view results for these tests on the individual orders.       EKG:   No orders to display       Meds given in ED:   Medications   sodium chloride 0.9 % flush 10 mL (has no administration in time range)   acetaminophen (TYLENOL) tablet 1,000 mg (1,000 mg Oral Given 2/3/25 1255)   ipratropium-albuterol (DUO-NEB) nebulizer solution 3 mL (3 mL Nebulization Given 2/3/25 1342)   ondansetron (ZOFRAN) injection 4 mg (4 mg Intravenous Given 2/3/25 1514)       Imaging results:  XR Chest 1 View    Result Date: 2/3/2025  No focal pulmonary consolidation. Cardiomegaly. Tortuous aorta. Follow-up as clinically indicated.     This report was finalized on 2/3/2025 1:22 PM by Dr. Lenny Cooper M.D on Workstation: [id number]       Ambulatory status:   - adlib    Social issues:   Social History     Socioeconomic History    Marital status: Single    Number of children: 0    Years of education: BS    Tobacco Use    Smoking status: Never     Passive exposure: Never    Smokeless tobacco: Never    " Tobacco comments:     caffeine use: TEA OCCASSIONALLY   Vaping Use    Vaping status: Never Used   Substance and Sexual Activity    Alcohol use: No    Drug use: Never    Sexual activity: Not Currently     Birth control/protection: Post-menopausal       Peripheral Neurovascular  Peripheral Neurovascular (Adult)  Peripheral Neurovascular WDL: WDL    Neuro Cognitive  Neuro Cognitive (Adult)  Cognitive/Neuro/Behavioral WDL: WDL    Learning  Learning Assessment  Learning Readiness and Ability: no barriers identified    Respiratory  Respiratory WDL  Respiratory WDL: .WDL except, cough  Cough Frequency: frequent  Cough Type: nonproductive  Breath Sounds  All Lung Fields Breath Sounds: All Fields  All Lung Fields Breath Sounds: Anterior:, Lateral:, diminished    Abdominal Pain       Pain Assessments  Pain (Adult)  (0-10) Pain Rating: Rest: 0  Response to Pain Interventions: interventions effective per patient    NIH Stroke Scale       Jovita Noe RN  02/03/25 17:18 EST

## 2025-02-03 NOTE — ED NOTES
Pt c/o cough over the last couple days. Pt reports fever starting Saturday. Pt has not taken meds for fever today.

## 2025-02-03 NOTE — H&P
Internal medicine history and physical  INTERNAL MEDICINE   [facility]       Patient Identification:  Name: Katelyn Sr  Age: 74 y.o.  Sex: female  :  1950  MRN: [id number]                   Primary Care Physician: Pierre Chaudhry Jr., MD                               Date of admission:2/3/2025    Chief Complaint: Cough shortness of breath not feeling very well for the last few days fever started Saturday night advised by primary care physician to go to the emergency room this morning.    History of Present Illness:   Patient is a 74-year-old female with history of hypersensitivity pneumonitis versus pulmonary fibrosis, history of asthma, obesity, hypothyroidism, sleep apnea, coronary artery disease, atrial fibrillation on anticoagulation therapy was in her usual state of her health until few days ago when she started having generalized bodyaches diarrhea and not feeling very well and cough.  She thought that with rest it will go away but her symptoms continued to get worse and she started feeling poorly and on Saturday night started having fever and chills and continued to have nausea and sense of ill health throughout the weekend.  She reached out to her primary care provider who suggested that given her underlying comorbidities she needs to go to the emergency room for further evaluation.  Upon arrival to the emergency room patient was noted to have oxygen saturation of 88% on room air and temperature of 100.7.  Further workup revealed respiratory viral panel positive for influenza A.  Chest x-ray did not show any acute pulmonary process and her procalcitonin was noted to be 0.10.  Patient received a dose of DuoNeb with improvement in her breathing, Tylenol and is being admitted for the management of acute influenza with hypoxia and bronchitis.  Patient has been unable to take her Ofev for her pulmonary fibrosis/pneumonitis for the last few days.  Patient follows with Dr. Gutiérrez  for her lung condition.      Past Medical History:  Past Medical History:   Diagnosis Date    Abnormal ECG 5/21/18    Done in PCP office.  Hx of Atrial fib-?2007    Arthritis of neck     Asthma 12/15/2021    Hypersensitivity Pneumonitis; under treatment at [place]    Atrial fibrillation     Cervical disc disorder 8/03    ACDF C6-7, 12/03/2003;  Dr. 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    CTS (carpal tunnel syndrome)     DDD (degenerative disc disease), lumbosacral 11/09/2018    Depression     Fibrosis of lung     Fracture, finger     Generalized anxiety disorder 07/14/2020    GERD (gastroesophageal reflux disease)     Hip arthrosis     Hyperlipidemia     Hypersensitivity pneumonitis     vs pulmonary fibrosis    Hypersensitivity pneumonitis     Hypersomnia due to another medical condition 10/28/2017    Hypertension     Knee swelling     Low back strain     Lower back pain 07/26/2019    Myocardial infarction     Neck strain     Neuroma of foot     Non-STEMI (non-ST elevated myocardial infarction) 05/21/2018    Nonsenile cataract     FUENTES on CPAP 10/19/2017    Palpitations 07/23/2019    Peripheral vestibulopathy of both ears 07/14/2020    Presence of artificial intra-ocular lens     Scoliosis     Sleep apnea     Thoracic disc disorder     Thyroid disease     Vertigo 07/14/2020    Vestibular neuronitis 07/17/2020     Past Surgical History:  Past Surgical History:   Procedure Laterality Date    CARDIAC CATHETERIZATION N/A 05/22/2018    Procedure: Left Heart Cath;  Surgeon: Aleks Velazquez MD;  Location:  [facility];  Service: Cardiovascular    CARDIAC CATHETERIZATION N/A 05/22/2018    Procedure: Coronary angiography;  Surgeon: Aleks Velazquez MD;  Location:  [facility];  Service: Cardiovascular    CARDIAC CATHETERIZATION N/A 05/22/2018    Procedure: Left ventriculography;   Surgeon: Aleks Velazquez MD;  Location:  [facility];  Service: Cardiovascular    CARDIAC CATHETERIZATION N/A 05/22/2018    Procedure: Peripheral angiography;  Surgeon: Aleks Velazquez MD;  Location:  [facility];  Service: Cardiovascular    CATARACT EXTRACTION WITH INTRAOCULAR LENS IMPLANT Bilateral     COLONOSCOPY N/A 03/08/2023    Procedure: COLONOSCOPY to cecum/TI with cold biopsies;  Surgeon: Hamilton Francisco MD;  Location:  [facility];  Service: Gastroenterology;  Laterality: N/A;  pre- diverticulitis  post- diverticulosis with stricture    DIAGNOSTIC LAPAROSCOPY  1990    r/o endometriosis    ENDOSCOPY N/A 03/08/2023    Procedure: ESOPHAGOGASTRODUODENOSCOPY with cold biopsies and 52 Fr Kam Dilatation;  Surgeon: Hamilton Francisco MD;  Location:  [facility];  Service: Gastroenterology;  Laterality: N/A;  pre- dysphagia  post- hiatal hernia, gastric polyps, esophageal ring @ 30    NECK SURGERY  12/03/2003    ACDF C6-7-Dr. Nix     RECTAL SURGERY  1992    fistula/abscess/hemorrhoid      Home Meds:  (Not in a hospital admission)    Current Meds:     Current Facility-Administered Medications:     [COMPLETED] Insert Peripheral IV, , , Once **AND** sodium chloride 0.9 % flush 10 mL, 10 mL, Intravenous, PRN, Lele Smith MD    Current Outpatient Medications:     amLODIPine (NORVASC) 5 MG tablet, TAKE 1 TABLET BY MOUTH EVERY DAY, Disp: 90 tablet, Rfl: 3    apixaban (ELIQUIS) 2.5 MG tablet tablet, Take 1 tablet by mouth 2 (Two) Times a Day., Disp: , Rfl:     ARIPiprazole (ABILIFY) 5 MG tablet, Take 1 tablet by mouth Daily., Disp: , Rfl:     carvedilol (COREG) 25 MG tablet, Take 1 tablet by mouth 2 (Two) Times a Day., Disp: 180 tablet, Rfl: 4    escitalopram (LEXAPRO) 20 MG tablet, Take 1 tablet by mouth Daily., Disp: , Rfl:     famotidine (PEPCID) 40 MG tablet, Take 1 tablet by mouth Every Night., Disp: , Rfl:     hydrALAZINE (APRESOLINE) 50 MG  tablet, Take 1 tablet by mouth 2 (Two) Times a Day., Disp: 180 tablet, Rfl: 4    lamoTRIgine (LaMICtal) 100 MG tablet, Take 1 tablet by mouth Every Night., Disp: , Rfl:     losartan (COZAAR) 100 MG tablet, TAKE 1 TABLET BY MOUTH EVERY DAY (Patient taking differently: Take 1 tablet by mouth Daily.), Disp: 90 tablet, Rfl: 4    Nintedanib Esylate (Ofev) 100 MG capsule, Take 1 capsule by mouth 2 (Two) Times a Day., Disp: , Rfl:     ondansetron ODT (ZOFRAN-ODT) 4 MG disintegrating tablet, Place 1 tablet on the tongue Every 8 (Eight) Hours As Needed., Disp: , Rfl:     pantoprazole (PROTONIX) 40 MG EC tablet, Take 1 tablet by mouth Daily., Disp: , Rfl:     pravastatin (PRAVACHOL) 40 MG tablet, Take 1 tablet by mouth every night at bedtime., Disp: 90 tablet, Rfl: 3    modafinil (PROVIGIL) 100 MG tablet, Take 1 tablet by mouth Daily. (Patient not taking: Reported on 2/3/2025), Disp: , Rfl:     traZODone (DESYREL) 50 MG tablet, Take 0.5 tablets by mouth At Night As Needed for Sleep., Disp: , Rfl:   Allergies:  Allergies   Allergen Reactions    Simvastatin Myalgia     Cramps in thighs      Erythromycin Rash    Penicillins Rash     Social History:   Social History     Tobacco Use    Smoking status: Never     Passive exposure: Never    Smokeless tobacco: Never    Tobacco comments:     caffeine use: TEA OCCASSIONALLY   Substance Use Topics    Alcohol use: No      Family History:  Family History   Problem Relation Age of Onset    Alzheimer's disease Mother     Heart failure Father     Arrhythmia Father     Heart attack Father         MI at age 56,  at 63.    Diabetes Sister     Atrial fibrillation Sister     Colon cancer Maternal Grandmother     Stroke Paternal Grandmother     Malig Hyperthermia Neg Hx           Review of Systems  See history of present illness and past medical history.  Patient denies headache, dizziness, syncope, falls, trauma, change in vision, change in hearing, change in taste, changes in weight, changes  "in appetite, focal weakness, numbness, or paresthesia.  Patient denies chest pain, palpitations, dyspnea, orthopnea, PND, cough, sinus pressure, rhinorrhea, epistaxis, hemoptysis, nausea, vomiting,hematemesis, diarrhea, constipation or hematchezia.  Denies cold or heat intolerance, polydipsia, polyuria, polyphagia. Denies hematuria, pyuria, dysuria, hesitancy, frequency or urgency. Denies consumption of raw and under cooked meats foods or change in water source.  Denies fever, chills, sweats, night sweats.  Denies missing any routine medications. Remainder of ROS is negative.      Vitals:   /68   Pulse 74   Temp 99.5 °F (37.5 °C) (Tympanic)   Resp 18   Ht 154.9 cm (61\")   Wt 84.4 kg (186 lb)   SpO2 94%   BMI 35.14 kg/m²   I/O: No intake or output data in the 24 hours ending 02/03/25 1721  Exam:  Patient is examined using the personal protective equipment as per guidelines from infection control for this particular patient as enacted.  Hand washing was performed before and after patient interaction.  General Appearance:    Alert, cooperative, no distress, appears stated age   Head:    Normocephalic, without obvious abnormality, atraumatic   Eyes:    PERRL, conjunctiva/corneas clear, EOM's intact, both eyes   Ears:    Normal external ear canals, both ears   Nose:   Nares normal, septum midline, mucosa normal, no drainage    or sinus tenderness   Throat:   Lips, tongue, gums normal; oral mucosa pink and moist   Neck:   Supple, symmetrical, trachea midline, no adenopathy;     thyroid:  no enlargement/tenderness/nodules; no carotid    bruit or JVD   Back:     Symmetric, no curvature, ROM normal, no CVA tenderness   Lungs:     Clear to auscultation bilaterally, respirations unlabored   Chest Wall:    No tenderness or deformity    Heart:    Regular rate and rhythm, S1 and S2 normal, no murmur, rub   or gallop   Abdomen:     Soft, non-tender, bowel sounds active all four quadrants,     no masses, no " hepatomegaly, no splenomegaly   Extremities:   Extremities normal, atraumatic, no cyanosis or edema   Pulses:   Pulses palpable in all extremities; symmetric all extremities   Skin:   Skin color normal, Skin is warm and dry,  no rashes or palpable lesions   Neurologic:   CNII-XII intact, motor strength grossly intact, sensation grossly intact to light touch, no focal deficits noted       Data Review:      I reviewed the patient's new clinical results.  Results from last 7 days   Lab Units 02/03/25  1240   WBC 10*3/mm3 6.17   HEMOGLOBIN g/dL 13.3   PLATELETS 10*3/mm3 225     Results from last 7 days   Lab Units 02/03/25  1240   SODIUM mmol/L 136   POTASSIUM mmol/L 4.1   CHLORIDE mmol/L 102   CO2 mmol/L 22.0   BUN mg/dL 11   CREATININE mg/dL 1.01*   CALCIUM mg/dL 8.9   GLUCOSE mg/dL 104*     XR Chest 1 View    Result Date: 2/3/2025  No focal pulmonary consolidation. Cardiomegaly. Tortuous aorta. Follow-up as clinically indicated.     This report was finalized on 2/3/2025 1:22 PM by Dr. Lenny Cooper M.D on Workstation: [id number]      Microbiology Results (last 10 days)       Procedure Component Value - Date/Time    Respiratory Panel PCR w/COVID-19(SARS-CoV-2) [facility]-House, NP Swab in UTM/VTM, 2 HR TAT - Swab, Nasopharynx [[id number]]  (Abnormal) Collected: 02/03/25 1235    Lab Status: Final result Specimen: Swab from Nasopharynx Updated: 02/03/25 1353     ADENOVIRUS, PCR Not Detected     Coronavirus 229E Not Detected     Coronavirus HKU1 Not Detected     Coronavirus NL63 Not Detected     Coronavirus OC43 Not Detected     COVID19 Not Detected     Human Metapneumovirus Not Detected     Human Rhinovirus/Enterovirus Not Detected     Influenza A H3 Detected     Influenza B PCR Not Detected     Parainfluenza Virus 1 Not Detected     Parainfluenza Virus 2 Not Detected     Parainfluenza Virus 3 Not Detected     Parainfluenza Virus 4 Not Detected     RSV, PCR Not Detected     Bordetella pertussis pcr Not  Detected     Bordetella parapertussis PCR Not Detected     Chlamydophila pneumoniae PCR Not Detected     Mycoplasma pneumo by PCR Not Detected    Narrative:      In the setting of a positive respiratory panel with a viral infection PLUS a negative procalcitonin without other underlying concern for bacterial infection, consider observing off antibiotics or discontinuation of antibiotics and continue supportive care. If the respiratory panel is positive for atypical bacterial infection (Bordetella pertussis, Chlamydophila pneumoniae, or Mycoplasma pneumoniae), consider antibiotic de-escalation to target atypical bacterial infection.          Brief Urine Lab Results       None          Telemetry Scan   Final Result          Assessment:  Active Hospital Problems    Diagnosis  POA    **Acute hypoxic respiratory failure [J96.01]  Yes    Influenza A [J10.1]  Unknown    A-fib [I48.91]  Yes    Essential hypertension [I10]  Yes    FUENTES on autoCPAP [G47.33]  Yes       Medical decision making/care plan: See admitting orders  Acute hypoxic respiratory failure with wheezing and positive respiratory viral panel for influenza A-this likely represent acute influenza A with systemic illness and associated bronchitis and exacerbation of underlying reactive airway disease.  Plan is to admit the patient continue with Tamiflu oxygen supplementation as needed and DuoNebs and monitor her clinical course.  Symptomatic relief of her fever and cough.  Atrial fibrillation with controlled ventricular response continue her home regimen and monitor.  Hypertension-continue her antihypertensive and avoid hypotensive episodes.  Obstructive sleep apnea on auto CPAP-allow her to use her CPAP device and providing her with continuous pulse ox monitoring.  Anxiety and depression-continue home regimen  History of hypersensitivity pneumonitis versus pulmonary fibrosis on Ofev-allow her to use her home Ofev and consider pulmonary consultation as per her  request in AM.  Patient sees Dr. Gutiérrez.  Sagar Rivera MD   2/3/2025  17:21 EST    Parts of this note may be an electronic transcription/translation of spoken language to printed text using the Dragon dictation system.

## 2025-02-03 NOTE — PROGRESS NOTES
Clinical Pharmacy Services: Medication History    Katelyn Sr is a 74 y.o. female presenting to [facility] for   Chief Complaint   Patient presents with    Fever       She  has a past medical history of Abnormal ECG (5/21/18), Arthritis of neck, Asthma (12/15/2021), Atrial fibrillation, Cervical disc disorder (8/03), Cervical stenosis of spinal canal (07/14/2020), Circadian rhythm sleep disorder, delayed sleep phase type (12/28/2017), CTS (carpal tunnel syndrome), DDD (degenerative disc disease), lumbosacral (11/09/2018), Depression, Fibrosis of lung, Fracture, finger, Generalized anxiety disorder (07/14/2020), GERD (gastroesophageal reflux disease), Hip arthrosis, Hyperlipidemia, Hypersensitivity pneumonitis, Hypersensitivity pneumonitis, Hypersomnia due to another medical condition (10/28/2017), Hypertension, Knee swelling, Low back strain, Lower back pain (07/26/2019), Myocardial infarction, Neck strain, Neuroma of foot, Non-STEMI (non-ST elevated myocardial infarction) (05/21/2018), Nonsenile cataract, FUENTES on CPAP (10/19/2017), Palpitations (07/23/2019), 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    Allergies as of 02/03/2025 - Reviewed 02/03/2025   Allergen Reaction Noted    Simvastatin Myalgia 09/21/2023    Erythromycin Rash 01/12/2017    Penicillins Rash 08/25/2022       Medication information was obtained from: Patient   Pharmacy and Phone Number:     Prior to Admission Medications       Prescriptions Last Dose Informant Patient Reported? Taking?    amLODIPine (NORVASC) 5 MG tablet Past Week Pharmacy, Self No Yes    TAKE 1 TABLET BY MOUTH EVERY DAY    apixaban (ELIQUIS) 2.5 MG tablet tablet Past Week Self Yes Yes    Take 1 tablet by mouth 2 (Two) Times a Day.    escitalopram (LEXAPRO) 20 MG tablet Past Week Self,  Pharmacy Yes Yes    Take 1 tablet by mouth Daily.    hydrALAZINE (APRESOLINE) 50 MG tablet Past Week Pharmacy, Self No Yes    Take 1 tablet by mouth 2 (Two) Times a Day.    lamoTRIgine (LaMICtal) 100 MG tablet Past Week Self, Pharmacy Yes Yes    Take 1 tablet by mouth Every Night.    losartan (COZAAR) 100 MG tablet Past Week Pharmacy, Self No Yes    TAKE 1 TABLET BY MOUTH EVERY DAY    Patient taking differently:  Take 1 tablet by mouth Daily.    modafinil (PROVIGIL) 100 MG tablet Not Taking Pharmacy Yes No    Take 1 tablet by mouth Daily.    Patient not taking:  Reported on 2/3/2025    pravastatin (PRAVACHOL) 40 MG tablet Past Week Pharmacy, Self No Yes    Take 1 tablet by mouth every night at bedtime.    ARIPiprazole (ABILIFY) 5 MG tablet Past Month Self Yes Yes    Take 1 tablet by mouth Daily.    carvedilol (COREG) 25 MG tablet Past Week Self No Yes    Take 1 tablet by mouth 2 (Two) Times a Day.    famotidine (PEPCID) 40 MG tablet Past Week Self Yes Yes    Take 1 tablet by mouth Every Night.    Nintedanib Esylate (Ofev) 100 MG capsule Past Week Self Yes Yes    Take 1 capsule by mouth 2 (Two) Times a Day.    ondansetron ODT (ZOFRAN-ODT) 4 MG disintegrating tablet 2/2/2025 Self Yes Yes    Place 1 tablet on the tongue Every 8 (Eight) Hours As Needed.    pantoprazole (PROTONIX) 40 MG EC tablet Past Week Self Yes Yes    Take 1 tablet by mouth Daily.    traZODone (DESYREL) 50 MG tablet More than a month Self Yes No    Take 0.5 tablets by mouth At Night As Needed for Sleep.              Medication notes:     This medication list is complete to the best of my knowledge as of 2/3/2025    Please call if questions.    Albert Mcdonald  Medication History Technician  [phone]    2/3/2025 14:07 EST

## 2025-02-03 NOTE — ED PROVIDER NOTES
EMERGENCY DEPARTMENT ENCOUNTER  Room Number:  39/39  PCP: Pierre Chaudhry Jr., MD  Independent Historians: Patient      HPI:  Chief Complaint: had concerns including Fever.     A complete HPI/ROS/PMH/PSH/SH/FH are unobtainable due to: Nothing      Context: Katelyn Sr is a 74 y.o. female with a medical history of FUENTES, CAD, A-fib who presents to the ED c/o acute diarrhea, fever and chills, cough.  She reports that the diarrhea began last Thursday.  She has had some associated nausea and chills throughout the weekend.  She has had a cough and she can hear herself wheezing.  She has had some production of phlegm.  She denies chest pain.  She does not wear home oxygen.  O2 saturations on arrival were 84% on room air.      Review of prior external notes (non-ED) -and- Review of prior external test results outside of this encounter: I reviewed orthopedic surgery progress note from 12/30/2024 where patient was evaluated for right shoulder pain.        PAST MEDICAL HISTORY  Active Ambulatory Problems     Diagnosis Date Noted    FUENTES on autoCPAP 10/19/2017    Circadian rhythm sleep disorder, delayed sleep phase type 10/28/2017    Hypersomnia due to another medical condition 10/28/2017    NSTEMI (non-ST elevated myocardial infarction) 05/21/2018    Essential hypertension 06/29/2018    Degeneration of cervical intervertebral disc 11/09/2018    DDD (degenerative disc disease), thoracic 11/09/2018    Disc degeneration, lumbar 11/27/2018    Mixed hyperlipidemia 01/10/2019    Palpitations 07/23/2019    Extremity pain 07/26/2019    LBP (low back pain) 07/26/2019    DDD (degenerative disc disease), lumbar 07/26/2019    Atypical chest pain 10/30/2019    Vertigo 07/14/2020    Depression 07/14/2020    Generalized anxiety disorder 07/14/2020    Peripheral vestibulopathy of both ears 07/14/2020    Cervical stenosis of spinal canal 07/14/2020    Vestibular neuronitis 07/17/2020    Hammer toe of right foot 08/11/2022    Arthritis  of foot 08/11/2022    Arthritis of first metatarsophalangeal (MTP) joint of right foot 08/11/2022    Chronic dyspnea 09/21/2023    Hallux valgus of right foot 11/29/2023    Lumbar facet arthropathy 01/12/2024    A-fib 01/24/2024    Elevated troponin 01/24/2024    Sacroiliitis 02/01/2024    Syncope 09/25/2024     Resolved Ambulatory Problems     Diagnosis Date Noted    No Resolved Ambulatory Problems     Past Medical History:   Diagnosis Date    Abnormal ECG 5/21/18    Arthritis of neck     Asthma 12/15/2021    Atrial fibrillation     Cervical disc disorder 8/03    CTS (carpal tunnel syndrome)     DDD (degenerative disc disease), lumbosacral 11/09/2018    Fibrosis of lung     Fracture, finger     GERD (gastroesophageal reflux disease)     Hip arthrosis     Hyperlipidemia     Hypersensitivity pneumonitis     Hypersensitivity pneumonitis     Hypertension     Knee swelling     Low back strain     Lower back pain 07/26/2019    Myocardial infarction     Neck strain     Neuroma of foot     Non-STEMI (non-ST elevated myocardial infarction) 05/21/2018    Nonsenile cataract     Presence of artificial intra-ocular lens     Scoliosis     Sleep apnea     Thoracic disc disorder     Thyroid disease          PAST SURGICAL HISTORY  Past Surgical History:   Procedure Laterality Date    CARDIAC CATHETERIZATION N/A 05/22/2018    Procedure: Left Heart Cath;  Surgeon: Aleks Velazquez MD;  Location: [facility] CATH INVASIVE LOCATION;  Service: Cardiovascular    CARDIAC CATHETERIZATION N/A 05/22/2018    Procedure: Coronary angiography;  Surgeon: Aleks Velazquez MD;  Location: [facility] CATH INVASIVE LOCATION;  Service: Cardiovascular    CARDIAC CATHETERIZATION N/A 05/22/2018    Procedure: Left ventriculography;  Surgeon: Aleks Velazquez MD;  Location: [facility] CATH INVASIVE LOCATION;  Service: Cardiovascular    CARDIAC CATHETERIZATION N/A 05/22/2018    Procedure: Peripheral angiography;  Surgeon: Aleks Velazquez MD;   Location:  [facility] CATH INVASIVE LOCATION;  Service: Cardiovascular    CATARACT EXTRACTION WITH INTRAOCULAR LENS IMPLANT Bilateral     COLONOSCOPY N/A 2023    Procedure: COLONOSCOPY to cecum/TI with cold biopsies;  Surgeon: Hamilton Francisco MD;  Location:  [facility];  Service: Gastroenterology;  Laterality: N/A;  pre- diverticulitis  post- diverticulosis with stricture    DIAGNOSTIC LAPAROSCOPY      r/o endometriosis    ENDOSCOPY N/A 2023    Procedure: ESOPHAGOGASTRODUODENOSCOPY with cold biopsies and 52 Fr Kam Dilatation;  Surgeon: Hamilton Francisco MD;  Location:  [facility];  Service: Gastroenterology;  Laterality: N/A;  pre- dysphagia  post- hiatal hernia, gastric polyps, esophageal ring @ 30    NECK SURGERY  2003    ACDF C6-7-Dr. Nix     RECTAL SURGERY      fistula/abscess/hemorrhoid         FAMILY HISTORY  Family History   Problem Relation Age of Onset    Alzheimer's disease Mother     Heart failure Father     Arrhythmia Father     Heart attack Father         MI at age 56,  at 63.    Diabetes Sister     Atrial fibrillation Sister     Colon cancer Maternal Grandmother     Stroke Paternal Grandmother     Malig Hyperthermia Neg Hx          SOCIAL HISTORY  Social History     Socioeconomic History    Marital status: Single    Number of children: 0    Years of education: BS    Tobacco Use    Smoking status: Never     Passive exposure: Never    Smokeless tobacco: Never    Tobacco comments:     caffeine use: TEA OCCASSIONALLY   Vaping Use    Vaping status: Never Used   Substance and Sexual Activity    Alcohol use: No    Drug use: Never    Sexual activity: Not Currently     Birth control/protection: Post-menopausal         ALLERGIES  Simvastatin, Erythromycin, and Penicillins      REVIEW OF SYSTEMS  Review of all 14 systems is negative other than stated in the HPI above.      PHYSICAL EXAM    I have reviewed the triage vital signs and nursing notes.    ED Triage Vitals    Temp Heart Rate Resp BP SpO2   02/03/25 1226 02/03/25 1226 02/03/25 1226 02/03/25 1234 02/03/25 1226   (!) 100.7 °F (38.2 °C) 86 18 134/70 (!) 88 %      Temp src Heart Rate Source Patient Position BP Location FiO2 (%)   02/03/25 1226 02/03/25 1226 -- -- --   Tympanic Monitor            GENERAL: awake and alert, no acute distress  HENT: Normocephalic, atraumatic  EYES: no scleral icterus  CV: regular rhythm, regular rate  RESPIRATORY: normal effort, mild rhonchi present bilaterally, no active wheezing  ABDOMEN: soft, nondistended, nontender throughout  MUSCULOSKELETAL: no deformity  NEURO: alert, moves all extremities, follows commands  PSYCH: calm, cooperative  SKIN: Warm, dry          LAB RESULTS  Recent Results (from the past 24 hours)   Respiratory Panel PCR w/COVID-19(SARS-CoV-2) [facility] In-House, NP Swab in UTM/VTM, 2 HR TAT - Swab, Nasopharynx    Collection Time: 02/03/25 12:35 PM    Specimen: Nasopharynx; Swab   Result Value Ref Range    ADENOVIRUS, PCR Not Detected Not Detected    Coronavirus 229E Not Detected Not Detected    Coronavirus HKU1 Not Detected Not Detected    Coronavirus NL63 Not Detected Not Detected    Coronavirus OC43 Not Detected Not Detected    COVID19 Not Detected Not Detected - Ref. Range    Human Metapneumovirus Not Detected Not Detected    Human Rhinovirus/Enterovirus Not Detected Not Detected    Influenza A H3 Detected (A) Not Detected    Influenza B PCR Not Detected Not Detected    Parainfluenza Virus 1 Not Detected Not Detected    Parainfluenza Virus 2 Not Detected Not Detected    Parainfluenza Virus 3 Not Detected Not Detected    Parainfluenza Virus 4 Not Detected Not Detected    RSV, PCR Not Detected Not Detected    Bordetella pertussis pcr Not Detected Not Detected    Bordetella parapertussis PCR Not Detected Not Detected    Chlamydophila pneumoniae PCR Not Detected Not Detected    Mycoplasma pneumo by PCR Not Detected Not Detected   Comprehensive Metabolic Panel     Collection Time: 02/03/25 12:40 PM    Specimen: Blood   Result Value Ref Range    Glucose 104 (H) 65 - 99 mg/dL    BUN 11 8 - 23 mg/dL    Creatinine 1.01 (H) 0.57 - 1.00 mg/dL    Sodium 136 136 - 145 mmol/L    Potassium 4.1 3.5 - 5.2 mmol/L    Chloride 102 98 - 107 mmol/L    CO2 22.0 22.0 - 29.0 mmol/L    Calcium 8.9 8.6 - 10.5 mg/dL    Total Protein 7.1 6.0 - 8.5 g/dL    Albumin 3.7 3.5 - 5.2 g/dL    ALT (SGPT) 12 1 - 33 U/L    AST (SGOT) 18 1 - 32 U/L    Alkaline Phosphatase 102 39 - 117 U/L    Total Bilirubin 0.5 0.0 - 1.2 mg/dL    Globulin 3.4 gm/dL    A/G Ratio 1.1 g/dL    BUN/Creatinine Ratio 10.9 7.0 - 25.0    Anion Gap 12.0 5.0 - 15.0 mmol/L    eGFR 59.3 (L) >60.0 mL/min/1.73   Lipase    Collection Time: 02/03/25 12:40 PM    Specimen: Blood   Result Value Ref Range    Lipase 19 13 - 60 U/L   Procalcitonin    Collection Time: 02/03/25 12:40 PM    Specimen: Blood   Result Value Ref Range    Procalcitonin 0.10 0.00 - 0.25 ng/mL   CBC Auto Differential    Collection Time: 02/03/25 12:40 PM    Specimen: Blood   Result Value Ref Range    WBC 6.17 3.40 - 10.80 10*3/mm3    RBC 4.77 3.77 - 5.28 10*6/mm3    Hemoglobin 13.3 12.0 - 15.9 g/dL    Hematocrit 40.8 34.0 - 46.6 %    MCV 85.5 79.0 - 97.0 fL    MCH 27.9 26.6 - 33.0 pg    MCHC 32.6 31.5 - 35.7 g/dL    RDW 15.0 12.3 - 15.4 %    RDW-SD 46.9 37.0 - 54.0 fl    MPV 8.5 6.0 - 12.0 fL    Platelets 225 140 - 450 10*3/mm3    Neutrophil % 77.1 (H) 42.7 - 76.0 %    Lymphocyte % 8.6 (L) 19.6 - 45.3 %    Monocyte % 13.1 (H) 5.0 - 12.0 %    Eosinophil % 0.5 0.3 - 6.2 %    Basophil % 0.2 0.0 - 1.5 %    Immature Grans % 0.5 0.0 - 0.5 %    Neutrophils, Absolute 4.76 1.70 - 7.00 10*3/mm3    Lymphocytes, Absolute 0.53 (L) 0.70 - 3.10 10*3/mm3    Monocytes, Absolute 0.81 0.10 - 0.90 10*3/mm3    Eosinophils, Absolute 0.03 0.00 - 0.40 10*3/mm3    Basophils, Absolute 0.01 0.00 - 0.20 10*3/mm3    Immature Grans, Absolute 0.03 0.00 - 0.05 10*3/mm3    nRBC 0.0 0.0 - 0.2 /100 WBC        The above labs were ordered by me and independently reviewed by me.     RADIOLOGY  XR Chest 1 View    Result Date: 2/3/2025  XR CHEST 1 VW-  HISTORY: Female who is 74 years-old, fever, cough  TECHNIQUE: Frontal view of the chest  COMPARISON:  image from CT from 1/28/2025  FINDINGS: The heart is enlarged. Aorta is tortuous. Pulmonary vasculature is unremarkable. No focal pulmonary consolidation, pleural effusion, or pneumothorax. No acute osseous process.      No focal pulmonary consolidation. Cardiomegaly. Tortuous aorta. Follow-up as clinically indicated.     This report was finalized on 2/3/2025 1:22 PM by Dr. Lenny Cooper M.D on Workstation: [id number]       The above radiology studies were ordered by me.  See ED course for independent interpretations.     MEDICATIONS GIVEN IN ER  Medications   sodium chloride 0.9 % flush 10 mL (has no administration in time range)   acetaminophen (TYLENOL) tablet 1,000 mg (1,000 mg Oral Given 2/3/25 1255)   ipratropium-albuterol (DUO-NEB) nebulizer solution 3 mL (3 mL Nebulization Given 2/3/25 1342)         ORDERS PLACED DURING THIS VISIT:  Orders Placed This Encounter   Procedures    Respiratory Panel PCR w/COVID-19(SARS-CoV-2) [facility] In-[facility], NP Swab in UTM/VTM, 2 HR TAT - Swab, Nasopharynx    XR Chest 1 View    Comprehensive Metabolic Panel    Lipase    Procalcitonin    CBC Auto Differential    Continuous Pulse Oximetry    Monitor Blood Pressure    [facility] (on-call MD unless specified) Details    Insert Peripheral IV    Inpatient Admission    CBC & Differential         OUTPATIENT MEDICATION MANAGEMENT:  Current Facility-Administered Medications Ordered in Epic   Medication Dose Route Frequency Provider Last Rate Last Admin    sodium chloride 0.9 % flush 10 mL  10 mL Intravenous PRN Lele Smith MD         Current Outpatient Medications Ordered in Epic   Medication Sig Dispense Refill    amLODIPine (NORVASC) 5 MG tablet TAKE 1 TABLET BY  MOUTH EVERY DAY 90 tablet 3    escitalopram (LEXAPRO) 20 MG tablet Take 1 tablet by mouth Daily.      hydrALAZINE (APRESOLINE) 50 MG tablet Take 1 tablet by mouth 2 (Two) Times a Day. 180 tablet 4    lamoTRIgine (LaMICtal) 100 MG tablet Take 1 tablet by mouth Every Night.      losartan (COZAAR) 100 MG tablet TAKE 1 TABLET BY MOUTH EVERY DAY (Patient taking differently: Take 1 tablet by mouth Daily.) 90 tablet 4    modafinil (PROVIGIL) 100 MG tablet Take 1 tablet by mouth Daily.      pravastatin (PRAVACHOL) 40 MG tablet Take 1 tablet by mouth every night at bedtime. 90 tablet 3    apixaban (ELIQUIS) 5 MG tablet tablet Take 1 tablet by mouth 2 (Two) Times a Day. 28 tablet 0    ARIPiprazole (ABILIFY) 5 MG tablet Take 1 tablet by mouth Daily.      carvedilol (COREG) 25 MG tablet Take 1 tablet by mouth 2 (Two) Times a Day. 180 tablet 4    famotidine (PEPCID) 40 MG tablet Take 1 tablet by mouth Every Night.      Nintedanib Esylate (Ofev) 100 MG capsule Take 1 capsule by mouth 2 (Two) Times a Day.      ondansetron ODT (ZOFRAN-ODT) 4 MG disintegrating tablet       pantoprazole (PROTONIX) 40 MG EC tablet Take 1 tablet by mouth Daily.      traZODone (DESYREL) 50 MG tablet Take 0.5 tablets by mouth At Night As Needed for Sleep.           PROCEDURES  Procedures            PROGRESS, DATA ANALYSIS, CONSULTS, AND MEDICAL DECISION MAKING  All labs have been independently interpreted by me.  All radiology studies have been reviewed by me. All EKG's have been independently viewed and interpreted by me.  Discussion below represents my analysis of pertinent findings related to patient's condition, differential diagnosis, treatment plan and final disposition.    Differential diagnosis includes but is not limited to:  Acute bronchitis  Pulmonary edema  Pneumonia  Pulmonary embolism      Clinical Scores:                  ED Course as of 02/03/25 1405   Mon Feb 03, 2025   1337 Procalcitonin: 0.10 [JR]   1338 Chest x-ray dependently  interpreted PACS.  Lung fields are mostly clear bilaterally.  There has been prior cervical fusion. [JR]   1404 Discussed with Dr. Rivera, [facility] hospitalist, who agrees to admit for further management of patient's hypoxia secondary to influenza A infection. [JR]      ED Course User Index  [JR] Lele Smith MD             AS OF 14:05 EST VITALS:    BP - 117/70  HR - 76  TEMP - (!) 101.6 °F (38.7 °C) (Tympanic)  O2 SATS - 96%    COMPLEXITY OF CARE  The patient requires admission.      Chronic or social conditions impacting patient care (Social Determinants of Health):     DIAGNOSIS  Final diagnoses:   Acute hypoxic respiratory failure   Influenza A           DISPOSITION  ADMIT      Prescription drug monitoring program review:           Please note that portions of this document were completed with a voice recognition program.    Note Disclaimer: At [facility], we believe that sharing information builds trust and better relationships. You are receiving this note because you recently visited [facility]. It is possible you will see health information before a provider has talked with you about it. This kind of information can be easy to misunderstand. To help you fully understand what it means for your health, we urge you to discuss this note with your provider.         Lele Smith MD  02/03/25 7825

## 2025-02-04 LAB
ALBUMIN SERPL-MCNC: 3.1 G/DL (ref 3.5–5.2)
ALBUMIN/GLOB SERPL: 1.1 G/DL
ALP SERPL-CCNC: 81 U/L (ref 39–117)
ALT SERPL W P-5'-P-CCNC: 11 U/L (ref 1–33)
ANION GAP SERPL CALCULATED.3IONS-SCNC: 11 MMOL/L (ref 5–15)
AST SERPL-CCNC: 22 U/L (ref 1–32)
BASOPHILS # BLD AUTO: 0.02 10*3/MM3 (ref 0–0.2)
BASOPHILS NFR BLD AUTO: 0.4 % (ref 0–1.5)
BILIRUB SERPL-MCNC: 0.4 MG/DL (ref 0–1.2)
BUN SERPL-MCNC: 15 MG/DL (ref 8–23)
BUN/CREAT SERPL: 15.5 (ref 7–25)
CALCIUM SPEC-SCNC: 8.2 MG/DL (ref 8.6–10.5)
CHLORIDE SERPL-SCNC: 103 MMOL/L (ref 98–107)
CO2 SERPL-SCNC: 21 MMOL/L (ref 22–29)
CREAT SERPL-MCNC: 0.97 MG/DL (ref 0.57–1)
D DIMER PPP FEU-MCNC: <0.27 MCGFEU/ML (ref 0–0.72)
DEPRECATED RDW RBC AUTO: 47.3 FL (ref 37–54)
EGFRCR SERPLBLD CKD-EPI 2021: 62.2 ML/MIN/1.73
EOSINOPHIL # BLD AUTO: 0.04 10*3/MM3 (ref 0–0.4)
EOSINOPHIL NFR BLD AUTO: 0.8 % (ref 0.3–6.2)
ERYTHROCYTE [DISTWIDTH] IN BLOOD BY AUTOMATED COUNT: 15.2 % (ref 12.3–15.4)
GLOBULIN UR ELPH-MCNC: 2.9 GM/DL
GLUCOSE SERPL-MCNC: 84 MG/DL (ref 65–99)
HCT VFR BLD AUTO: 36.2 % (ref 34–46.6)
HGB BLD-MCNC: 11.8 G/DL (ref 12–15.9)
IMM GRANULOCYTES # BLD AUTO: 0.03 10*3/MM3 (ref 0–0.05)
IMM GRANULOCYTES NFR BLD AUTO: 0.6 % (ref 0–0.5)
LYMPHOCYTES # BLD AUTO: 0.54 10*3/MM3 (ref 0.7–3.1)
LYMPHOCYTES NFR BLD AUTO: 11.4 % (ref 19.6–45.3)
MCH RBC QN AUTO: 28 PG (ref 26.6–33)
MCHC RBC AUTO-ENTMCNC: 32.6 G/DL (ref 31.5–35.7)
MCV RBC AUTO: 86 FL (ref 79–97)
MONOCYTES # BLD AUTO: 0.75 10*3/MM3 (ref 0.1–0.9)
MONOCYTES NFR BLD AUTO: 15.9 % (ref 5–12)
NEUTROPHILS NFR BLD AUTO: 3.35 10*3/MM3 (ref 1.7–7)
NEUTROPHILS NFR BLD AUTO: 70.9 % (ref 42.7–76)
NRBC BLD AUTO-RTO: 0 /100 WBC (ref 0–0.2)
PLATELET # BLD AUTO: 189 10*3/MM3 (ref 140–450)
PMV BLD AUTO: 9.1 FL (ref 6–12)
POTASSIUM SERPL-SCNC: 4 MMOL/L (ref 3.5–5.2)
PROT SERPL-MCNC: 6 G/DL (ref 6–8.5)
RBC # BLD AUTO: 4.21 10*6/MM3 (ref 3.77–5.28)
SODIUM SERPL-SCNC: 135 MMOL/L (ref 136–145)
WBC NRBC COR # BLD AUTO: 4.73 10*3/MM3 (ref 3.4–10.8)

## 2025-02-04 PROCEDURE — 94799 UNLISTED PULMONARY SVC/PX: CPT

## 2025-02-04 PROCEDURE — 85379 FIBRIN DEGRADATION QUANT: CPT | Performed by: STUDENT IN AN ORGANIZED HEALTH CARE EDUCATION/TRAINING PROGRAM

## 2025-02-04 PROCEDURE — 85025 COMPLETE CBC W/AUTO DIFF WBC: CPT | Performed by: INTERNAL MEDICINE

## 2025-02-04 PROCEDURE — 80053 COMPREHEN METABOLIC PANEL: CPT | Performed by: INTERNAL MEDICINE

## 2025-02-04 PROCEDURE — 94761 N-INVAS EAR/PLS OXIMETRY MLT: CPT

## 2025-02-04 RX ORDER — ONDANSETRON 2 MG/ML
4 INJECTION INTRAMUSCULAR; INTRAVENOUS EVERY 6 HOURS PRN
Status: DISCONTINUED | OUTPATIENT
Start: 2025-02-04 | End: 2025-02-06 | Stop reason: HOSPADM

## 2025-02-04 RX ADMIN — HYDRALAZINE HYDROCHLORIDE 50 MG: 25 TABLET ORAL at 08:22

## 2025-02-04 RX ADMIN — APIXABAN 2.5 MG: 2.5 TABLET, FILM COATED ORAL at 08:22

## 2025-02-04 RX ADMIN — PRAVASTATIN SODIUM 40 MG: 10 TABLET ORAL at 20:26

## 2025-02-04 RX ADMIN — IPRATROPIUM BROMIDE AND ALBUTEROL SULFATE 3 ML: .5; 3 SOLUTION RESPIRATORY (INHALATION) at 00:15

## 2025-02-04 RX ADMIN — ARIPIPRAZOLE 5 MG: 5 TABLET ORAL at 08:22

## 2025-02-04 RX ADMIN — HYDRALAZINE HYDROCHLORIDE 50 MG: 25 TABLET ORAL at 20:26

## 2025-02-04 RX ADMIN — OSELTAMIVIR PHOSPHATE 30 MG: 30 CAPSULE ORAL at 08:22

## 2025-02-04 RX ADMIN — APIXABAN 2.5 MG: 2.5 TABLET, FILM COATED ORAL at 20:26

## 2025-02-04 RX ADMIN — PANTOPRAZOLE SODIUM 40 MG: 40 TABLET, DELAYED RELEASE ORAL at 08:22

## 2025-02-04 RX ADMIN — CARVEDILOL 25 MG: 25 TABLET, FILM COATED ORAL at 20:26

## 2025-02-04 RX ADMIN — CARVEDILOL 25 MG: 25 TABLET, FILM COATED ORAL at 08:22

## 2025-02-04 RX ADMIN — OSELTAMIVIR PHOSPHATE 30 MG: 30 CAPSULE ORAL at 20:28

## 2025-02-04 RX ADMIN — AMLODIPINE BESYLATE 5 MG: 5 TABLET ORAL at 08:22

## 2025-02-04 RX ADMIN — ESCITALOPRAM 20 MG: 20 TABLET, FILM COATED ORAL at 08:22

## 2025-02-04 RX ADMIN — LOSARTAN POTASSIUM 100 MG: 100 TABLET, FILM COATED ORAL at 08:22

## 2025-02-04 RX ADMIN — FAMOTIDINE 40 MG: 20 TABLET, FILM COATED ORAL at 20:26

## 2025-02-04 RX ADMIN — LAMOTRIGINE 100 MG: 100 TABLET ORAL at 20:26

## 2025-02-04 RX ADMIN — Medication 10 ML: at 08:23

## 2025-02-04 NOTE — PROGRESS NOTES
[facility] Clinical [facility] Services: Oseltamivir Consult    Pt Name: Katelyn Sr   : 1950  Weight: 84.4 kg (186 lb)      Relevant clinical data and objective history reviewed:    Past Medical History:   Diagnosis Date    Abnormal ECG 18    Done in PCP office.  Hx of Atrial fib-?    Arthritis of neck     Asthma 12/15/2021    Hypersensitivity Pneumonitis; under treatment at [facility]    Atrial fibrillation     Cervical disc disorder     ACDF C6-7, 2003;  Dr. Tr Nix    Cervical stenosis of spinal canal 2020    Circadian rhythm sleep disorder, delayed sleep phase type 2017    CTS (carpal tunnel syndrome)     DDD (degenerative disc disease), lumbosacral 2018    Depression     Fibrosis of lung     Fracture, finger     Generalized anxiety disorder 2020    GERD (gastroesophageal reflux disease)     Hip arthrosis     Hyperlipidemia     Hypersensitivity pneumonitis     vs pulmonary fibrosis    Hypersensitivity pneumonitis     Hypersomnia due to another medical condition 10/28/2017    Hypertension     Knee swelling     Low back strain     Lower back pain 2019    Myocardial infarction     Neck strain     Neuroma of foot     Non-STEMI (non-ST elevated myocardial infarction) 2018    Nonsenile cataract     FUENTES on CPAP 10/19/2017    Palpitations 2019    Peripheral vestibulopathy of both ears 2020    Presence of artificial intra-ocular lens     Scoliosis     Sleep apnea     Thoracic disc disorder     Thyroid disease     Vertigo 2020    Vestibular neuronitis 2020     Creatinine   Date Value Ref Range Status   2025 1.01 (H) 0.57 - 1.00 mg/dL Final   2025 1.09 (H) 0.57 - 1.00 mg/dL Final   2024 0.84 0.57 - 1.00 mg/dL Final   2023 1.10 0.60 - 1.30 mg/dL Final     Comment:     Serial Number: [id number]:  [id number]   2022 0.90 0.60 - 1.30 mg/dL Final     Comment:     Serial Number: [id number]:   [id number]   07/21/2021 1.00 0.60 - 1.30 mg/dL Final     Comment:     Serial Number: [id number]:  [id number]     BUN   Date Value Ref Range Status   02/03/2025 11 8 - 23 mg/dL Final     Estimated Creatinine Clearance: 49.6 mL/min (A) (by C-G formula based on SCr of 1.01 mg/dL (H)).  Temp Readings from Last 3 Encounters:   02/03/25 98.6 °F (37 °C) (Oral)   01/04/25 97 °F (36.1 °C)   12/30/24 97.8 °F (36.6 °C) (Temporal)        Due to the national shortage of oseltamivir (Tamiflu) restriction criteria on inpatient and ED use has been implemented in order to preserve stock for those who are at the highest risk of experiencing poor outcomes secondary to influenza    Restriction criteria   -(+) influenza PCR  -Symptom onset within 48 hrs  -Has one or more of the health and age risk factors that are known to increase a person’s risk of getting serious flu complications as outlined by the CDC    -Adults 65 years and older  -Children younger than 2 years old1  -Asthma  -Neurologic and neurodevelopment conditions  -Blood disorders (such as sickle cell disease)  -Chronic lung disease (such as chronic obstructive pulmonary disease [COPD] and  cystic fibrosis)  -Endocrine disorders (such as diabetes mellitus)  -Heart disease (such as congenital heart disease, congestive heart failure and coronary artery disease)  -Kidney diseases  -Liver disorders  -Metabolic disorders (such as inherited metabolic disorders and mitochondrial disorders)  -People who are obese with a body mass index [BMI] of 40 or higher  -People younger than 19 years old on long-term aspirin- or salicylate-containing medications.  -People with a weakened immune system due to disease (such as people with HIV or AIDS, or some cancers such as leukemia) or medications (such as those receiving chemotherapy or radiation treatment for cancer, or persons with chronic conditions requiring chronic corticosteroids or other drugs that suppress the immune system)  -People who  have had a stroke  -Pregnancy     Per ordering providers consult, Katelyn Sr meets criteria for receiving Oseltamivir for the treatment of influenza     Assessment/Plan    Ordered Oseltamivir 30 mg every 12 hrs for a total of 5 days. Will monitor and adjust if culture data or pertinent lab values indicate this is best for the patient.     Pharmacy will discontinue this consult but will continue to follow for any necessary dose adjustments. Please contact pharmacy with any questions or concerns.     Sukhjinder Altman III, [facility]  Clinical Pharmacist

## 2025-02-04 NOTE — CONSULTS
[facility]  [phone]  Dr. Toan Mcmahan      Subjective   LOS: 1 day     74-year-old female who follows with Dr. Rohith Gutiérrez in office.  She has chronic fibrosing hypersensitivity pneumonitis.  She is currently on Ofev but only takes about 50% of the doses.  She has been advised supplemental oxygen but declined.  She has obstructive sleep apnea and is on a CPAP machine managed by Dr. Sukhjinder Connelly in the sleep center.  She has mediastinal lymphadenopathy that is being monitored with CT chest and last 1 was performed on 1/28/2025.  She has esophageal stricture with hiatal hernia and GERD.  Additionally she has paroxysmal A-fib for which she is on Eliquis.  Underlying coronary artery disease, hypertension, hypothyroidism.    She came into the hospital with cough, generalized bodyaches and was found to positive for influenza A infection.  She is currently requiring 2 L oxygen.  She started this episode of her diarrhea.  She then developed a cough and then some fevers.     Katelyn Sr  reports no history of alcohol use.,  reports that she has never smoked. She has never been exposed to tobacco smoke. She has never used smokeless tobacco.     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), CTS (carpal tunnel syndrome), DDD (degenerative disc disease), lumbosacral (11/09/2018), Depression, Fibrosis of lung, Fracture, finger, Generalized anxiety disorder (07/14/2020), GERD (gastroesophageal reflux disease), Hip arthrosis, Hyperlipidemia, Hypersensitivity pneumonitis, Hypersensitivity pneumonitis, Hypersomnia due to another medical condition (10/28/2017), Hypertension, Knee swelling, Low back strain, Lower back pain (07/26/2019), Myocardial infarction, Neck strain, Neuroma  of foot, Non-STEMI (non-ST elevated myocardial infarction) (05/21/2018), Nonsenile cataract, FUENTES on CPAP (10/19/2017), Palpitations (07/23/2019), 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  Surg Hx:  has a past surgical history that includes Diagnostic laparoscopy (1990); Rectal surgery (1992); Cardiac catheterization (N/A, 05/22/2018); Cardiac catheterization (N/A, 05/22/2018); Cardiac catheterization (N/A, 05/22/2018); Cardiac catheterization (N/A, 05/22/2018); Neck surgery (12/03/2003); Cataract extraction w/  intraocular lens implant (Bilateral); Esophagogastroduodenoscopy (N/A, 03/08/2023); and Colonoscopy (N/A, 03/08/2023).  FH: family history includes Alzheimer's disease in her mother; Arrhythmia in her father; Atrial fibrillation in her sister; Colon cancer in her maternal grandmother; Diabetes in her sister; Heart attack in her father; Heart failure in her father; Stroke in her paternal grandmother.  SH:  reports that she has never smoked. She has never been exposed to tobacco smoke. She has never used smokeless tobacco. She reports that she does not drink alcohol and does not use drugs.    Medications Prior to Admission   Medication Sig Dispense Refill Last Dose/Taking    amLODIPine (NORVASC) 5 MG tablet TAKE 1 TABLET BY MOUTH EVERY DAY 90 tablet 3 Past Week    apixaban (ELIQUIS) 2.5 MG tablet tablet Take 1 tablet by mouth 2 (Two) Times a Day.   Past Week    ARIPiprazole (ABILIFY) 5 MG tablet Take 1 tablet by mouth Daily.   Past Month    carvedilol (COREG) 25 MG tablet Take 1 tablet by mouth 2 (Two) Times a Day. 180 tablet 4 Past Week    escitalopram (LEXAPRO) 20 MG tablet Take 1 tablet by mouth Daily.   Past Week    famotidine (PEPCID) 40 MG tablet Take 1 tablet by mouth Every Night.   Past Week    hydrALAZINE (APRESOLINE) 50 MG  tablet Take 1 tablet by mouth 2 (Two) Times a Day. 180 tablet 4 Past Week    lamoTRIgine (LaMICtal) 100 MG tablet Take 1 tablet by mouth Every Night.   Past Week    losartan (COZAAR) 100 MG tablet TAKE 1 TABLET BY MOUTH EVERY DAY (Patient taking differently: Take 1 tablet by mouth Daily.) 90 tablet 4 Past Week    Nintedanib Esylate (Ofev) 100 MG capsule Take 1 capsule by mouth 2 (Two) Times a Day.   Past Week    ondansetron ODT (ZOFRAN-ODT) 4 MG disintegrating tablet Place 1 tablet on the tongue Every 8 (Eight) Hours As Needed.   2025    pantoprazole (PROTONIX) 40 MG EC tablet Take 1 tablet by mouth Daily.   Past Week    pravastatin (PRAVACHOL) 40 MG tablet Take 1 tablet by mouth every night at bedtime. 90 tablet 3 Past Week    modafinil (PROVIGIL) 100 MG tablet Take 1 tablet by mouth Daily. (Patient not taking: Reported on 2/3/2025)   Not Taking    traZODone (DESYREL) 50 MG tablet Take 0.5 tablets by mouth At Night As Needed for Sleep.   More than a month     Allergies   Allergen Reactions    Simvastatin Myalgia     Cramps in thighs      Erythromycin Rash    Penicillins Rash       Review of Systems   Constitutional:  Positive for chills and fever.   HENT:  Positive for congestion. Negative for sore throat.    Respiratory:  Positive for cough and shortness of breath.    Cardiovascular:  Negative for chest pain and leg swelling.   Gastrointestinal:  Positive for diarrhea. Negative for abdominal pain, nausea and vomiting.   Genitourinary:  Negative for dysuria and hematuria.   Neurological:  Negative for seizures and headaches.   Psychiatric/Behavioral:  Negative for agitation and confusion.        Vital Signs past 24hrs  BP range: BP: (108-135)/(56-81) 111/56  Pulse range: Heart Rate:  [57-74] 57  Resp rate range: Resp:  [16-18] 16  Temp range: Temp (24hrs), Av.6 °F (37 °C), Min:98 °F (36.7 °C), Max:99 °F (37.2 °C)    Oxygen range: SpO2:  [91 %-99 %] 94 %; Flow (L/min) (Oxygen Therapy):  [1.5-3] 1.5;   Device  (Oxygen Therapy): nasal cannula  84.4 kg (186 lb); Body mass index is 35.14 kg/m².  Net IO Since Admission: 690 mL [02/04/25 1646]      Mechanical Ventilator:     Adult female sitting on the recliner.  Pupils equal and react light.  Oropharynx moist.  JVP not elevated though neck is large.  Trachea midline.  Lungs reveal bilateral air entry.  Few crackles in the lung bases.  No wheeze.  Percussion note resonant chest expansion equal no chest wall deformity or tenderness.  Heart examination S1-S2 present rhythm regular no murmurs.  No edema lower extremities.  Abdomen is obese soft nontender bowel sounds present no liver spleen enlargement.  No peripheral cyanosis clubbing.  Moves all 4 extremities sensorimotor intact.  No cervical, axillary, inguinal adenopathy.    Results Review:    I have reviewed the laboratory and imaging data from current admission. My annotations are as noted in assessment and plan.  Result Review:  I have personally reviewed the results from the time of this admission to 2/4/2025 16:46 EST and agree with these findings:  [x]  Laboratory list / accordion  [x]  Microbiology  [x]  Radiology  []  EKG/Telemetry   []  Cardiology/Vascular   []  Pathology  []  Old records  []  Other:        Medication Review:  I have reviewed the current MAR. My annotations are as noted in assessment and plan.       Lines, Drains & Airways       Active LDAs       Name Placement date Placement time Site Days    Peripheral IV 02/03/25 1239 Right Antecubital 02/03/25  1239  Antecubital  1                  Isolation status: Droplet    Dietary Orders (From admission, onward)       Start     Ordered    02/1950  Diet: Cardiac; Healthy Heart (2-3 Na+); Fluid Consistency: Thin (IDDSI 0)  Diet Effective Now        References:    Diet Order Definitions   Question Answer Comment   Diets: Cardiac    Cardiac Diet: Healthy Heart (2-3 Na+)    Fluid Consistency: Thin (IDDSI 0)        02/03/25 1951                     Isolation:  Droplet    PCCM Problems  Acute influenza A infection  Hypoxia  Chronic fibrosing hypersensitivity pneumonitis on Ofev  Mediastinal adenopathy  Esophageal stricture  GERD  Afib on anticoagulation  CAD  FUENTES on CPAP  Obesity      Plan of Treatment  Supportive treatment for influenza A infection.    Continue supplemental oxygen.  Can check a walking oximetry tomorrow to see if she again/still qualifies for oxygen on discharge.    Chronic fibrosing hypersensitivity pneumonitis and on Ofev.  Patient only takes about 50% of her doses since she has to take it with meals.    Mediastinal adenopathy stable on most recent CT chest.    Esophageal stricture with hiatal hernia and GERD could be contributing to her lung findings.    Uses CPAP regularly at home.    Will reschedule her appointment with Dr. Gutiérrez that she missed.    Electronically signed by Toan Mcmahan MD, 02/04/25, 4:46 PM EST.      Part of this note may be an electronic transcription/translation of spoken language to printed text using the Dragon Dictation System.

## 2025-02-04 NOTE — PLAN OF CARE
Goal Outcome Evaluation:         Pt resting in bed , Aox4 , 2-3 L NC. C/O nausea medicated per order. VSS. Plan of care is ongoing.

## 2025-02-04 NOTE — PROGRESS NOTES
Name: Katelyn Sr ADMIT: 2/3/2025   : 1950  PCP: Pierre Chaudhry Jr., MD    MRN: [id number] LOS: 1 days   AGE/SEX: 74 y.o. female  ROOM: [facility]     Subjective   Subjective   No acute events overnight.  Patient states she is feeling a bit better.  She is currently on 2 L nasal cannula.  She denies chest pain.  Eating and drinking well.  Shortness of breath is improving.    Objective   Objective     Vital Signs  Temp:  [98 °F (36.7 °C)-101.6 °F (38.7 °C)] 98.9 °F (37.2 °C)  Heart Rate:  [57-78] 57  Resp:  [16-20] 16  BP: ()/(50-81) 111/56  SpO2:  [90 %-99 %] 94 %  on  Flow (L/min) (Oxygen Therapy):  [2-3] 2;   Device (Oxygen Therapy): nasal cannula  Body mass index is 35.14 kg/m².    Physical Exam  General: Alert, no acute distress.  Sitting up in bed.   ENT: No conjunctival injection or scleral icterus. Moist mucous membranes.  Neuro: Eyes open and moving in all directions, generalized weakness, face symmetric, no focal deficits.   Lungs: Diminished aeration, occasional crackle.  No wheezing.  Nasal cannula in place.  No distress.  Heart: RRR, no murmurs. No edema.  Abdomen: Soft, non-tender, non-distended. Normal bowel sounds.  Ext: Warm and well-perfused. No edema.   Skin: No rashes or lesions. IV site without swelling or erythema.     Results Review     I reviewed the patient's new clinical results:  Results from last 7 days   Lab Units 25  0525  1240   WBC 10*3/mm3 4.73 6.17   HEMOGLOBIN g/dL 11.8* 13.3   PLATELETS 10*3/mm3 189 225     Results from last 7 days   Lab Units 25  0522 25  1240   SODIUM mmol/L 135* 136   POTASSIUM mmol/L 4.0 4.1   CHLORIDE mmol/L 103 102   CO2 mmol/L 21.0* 22.0   BUN mg/dL 15 11   CREATININE mg/dL 0.97 1.01*   GLUCOSE mg/dL 84 104*   EGFR mL/min/1.73 62.2 59.3*     Results from last 7 days   Lab Units 25  0522 25  1240   ALBUMIN g/dL 3.1* 3.7   BILIRUBIN mg/dL 0.4 0.5   ALK PHOS U/L 81 102   AST (SGOT) U/L 22 18   ALT  "(SGPT) U/L 11 12     Results from last 7 days   Lab Units 02/04/25  0522 02/03/25  1240   CALCIUM mg/dL 8.2* 8.9   ALBUMIN g/dL 3.1* 3.7     Results from last 7 days   Lab Units 02/03/25  1240   PROCALCITONIN ng/mL 0.10     No results found for: \"HGBA1C\", \"POCGLU\"    XR Chest 1 View    Result Date: 2/3/2025  No focal pulmonary consolidation. Cardiomegaly. Tortuous aorta. Follow-up as clinically indicated.     This report was finalized on 2/3/2025 1:22 PM by Dr. Lenny Cooper M.D on Workstation: [id number]       I have personally reviewed all medications:  Scheduled Medications  amLODIPine, 5 mg, Oral, Daily  apixaban, 2.5 mg, Oral, BID  ARIPiprazole, 5 mg, Oral, Daily  carvedilol, 25 mg, Oral, BID  escitalopram, 20 mg, Oral, Daily  famotidine, 40 mg, Oral, Nightly  hydrALAZINE, 50 mg, Oral, BID  lamoTRIgine, 100 mg, Oral, Nightly  losartan, 100 mg, Oral, Daily  Nintedanib Esylate, 100 mg, Oral, BID  oseltamivir, 30 mg, Oral, Q12H  pantoprazole, 40 mg, Oral, Daily  Pharmacy to Dose Oseltamivir (TAMIFLU), , Not Applicable, BID  pravastatin, 40 mg, Oral, Nightly  sodium chloride, 10 mL, Intravenous, Q12H    Infusions   Diet  Diet: Cardiac; Healthy Heart (2-3 Na+); Fluid Consistency: Thin (IDDSI 0)      Intake/Output Summary (Last 24 hours) at 2/4/2025 1326  Last data filed at 2/4/2025 0820  Gross per 24 hour   Intake 480 ml   Output --   Net 480 ml       Assessment/Plan     Active Hospital Problems    Diagnosis  POA    **Acute hypoxic respiratory failure [J96.01]  Yes    Influenza A [J10.1]  Unknown    A-fib [I48.91]  Yes    Essential hypertension [I10]  Yes    FUENTES on autoCPAP [G47.33]  Yes      Resolved Hospital Problems   No resolved problems to display.       74 y.o. female with Acute hypoxic respiratory failure.    Acute hypoxic respiratory failure  Influenza A  Fever  -CXR with no evidence of infiltrate or consolidation  -WBC and procalcitonin negative  -Tamiflu  -No indication for steroids, no wheezing on " exam  -Continue DuoNebs  -Check D-dimer.  If elevated, check CT angiogram chest.  -Pulmonology consulted as below  -Currently on 2 L nasal cannula, wean as able.  Goal sats greater than 90%    Hypertension  Atrial fibrillation  -RC: Coreg  -AC: Eliquis  -Continue amlodipine, losartan, and hydralazine  -Titrate meds as needed based on BP trends    History of hypersensitivity pneumonitis versus pulmonary fibrosis  -Continue home meds  -Pulmonology consulted, evaluation pending    Eliquis (home med) for DVT prophylaxis.  Full code.  Discussed with patient and nursing staff.  Anticipate discharge home timing yet to be determined.      Christine Tapia MD  [facility]  02/04/25  13:26 EST

## 2025-02-04 NOTE — CASE MANAGEMENT/SOCIAL WORK
Continued Stay Note  [facility]     Patient Name: Katelyn Sr  MRN: [id number]  Today's Date: 2/4/2025    Admit Date: 2/3/2025    Plan: Plan home.   JAVIER Gregorio RN   Discharge Plan       Row Name 02/04/25 0822       Plan    Plan Plan home.   JAVIER Gregorio RN    Patient/Family in Agreement with Plan yes    Plan Comments FACE SHEET VERIFIED/ IM LETTER SIGNED.  Spoke with pt at bedside. Pt's PCP is Dr. Pierre Chaudhry.  Pt lives alone in a second floor apartment. Pt is independent with ADLs.  Pt has a C PAP.  Pt gets her prescriptions from [facility]  ([address]).  Pt denies any issues affording medications. Pt is not current with HH.  Pt has not been in SNF.  Pt denies any discharge needs at present. Pt states if O2 needed her DME provider of choice is [facility].  Pt's friends will assist her at home if needed and will transport her home.  Plan home.  JAVIER Gregorio RN                   Discharge Codes    No documentation.                 Expected Discharge Date and Time       Expected Discharge Date Expected Discharge Time    Feb 7, 2025               Saniya Gregorio RN

## 2025-02-04 NOTE — CASE MANAGEMENT/SOCIAL WORK
Discharge Planning Assessment  [facility]     Patient Name: Katelyn Sr  MRN: [id number]  Today's Date: 2/4/2025    Admit Date: 2/3/2025    Plan: Plan home.   JAVIER Gregorio RN   Discharge Needs Assessment       Row Name 02/04/25 0820       Living Environment    People in Home alone    Current Living Arrangements apartment    Potentially Unsafe Housing Conditions none    In the past 12 months has the electric, gas, oil, or water company threatened to shut off services in your home? No    Primary Care Provided by self    Provides Primary Care For no one    Family Caregiver if Needed friend(s)    Family Caregiver Names Friends    Quality of Family Relationships unable to assess    Able to Return to Prior Arrangements yes    Living Arrangement Comments Pt lives alone in a second floor apartment.       Resource/Environmental Concerns    Resource/Environmental Concerns none    Transportation Concerns none       Transportation Needs    In the past 12 months, has lack of transportation kept you from medical appointments or from getting medications? no    In the past 12 months, has lack of transportation kept you from meetings, work, or from getting things needed for daily living? No       Food Insecurity    Within the past 12 months, you worried that your food would run out before you got the money to buy more. Never true    Within the past 12 months, the food you bought just didn't last and you didn't have money to get more. Never true       Transition Planning    Patient/Family Anticipates Transition to home    Patient/Family Anticipated Services at Transition none    Transportation Anticipated family or friend will provide       Discharge Needs Assessment    Readmission Within the Last 30 Days no previous admission in last 30 days    Equipment Currently Used at Home cpap    Concerns to be Addressed no discharge needs identified;denies needs/concerns at this time    Anticipated Changes Related to Illness none     Equipment Needed After Discharge cpap                   Discharge Plan       Row Name 02/04/25 0822       Plan    Plan Plan home.   JAVIER Gregorio RN    Patient/Family in Agreement with Plan yes    Plan Comments FACE SHEET VERIFIED/ IM LETTER SIGNED.  Spoke with pt at bedside. Pt's PCP is Dr. Pierre Chaudhry.  Pt lives alone in a second floor apartment. Pt is independent with ADLs.  Pt has a C PAP.  Pt gets her prescriptions from [facility]  ([address]).  Pt denies any issues affording medications. Pt is not current with HH.  Pt has not been in SNF.  Pt denies any discharge needs at present. Pt states if O2 needed her DME provider of choice is [facility].  Pt's friends will assist her at home if needed and will transport her home.  Plan home.  JAVIER Gregorio RN                  Continued Care and Services - Admitted Since 2/3/2025    No active coordination exists for this encounter.       Expected Discharge Date and Time       Expected Discharge Date Expected Discharge Time    Feb 7, 2025            Demographic Summary       Row Name 02/04/25 0819       General Information    Admission Type inpatient    Arrived From emergency department    Required Notices Provided Important Message from Medicare    Referral Source admission list    Reason for Consult discharge planning    Preferred Language English                   Functional Status       Row Name 02/04/25 0820       Functional Status    Usual Activity Tolerance good    Current Activity Tolerance moderate       Functional Status, IADL    Medications independent    Meal Preparation independent    Housekeeping independent    Shopping independent       Mental Status    General Appearance WDL WDL                   Psychosocial    No documentation.                  Abuse/Neglect    No documentation.                  Legal    No documentation.                  Substance Abuse    No documentation.                  Patient Forms    No documentation.                      Saniya Gregorio, RN

## 2025-02-04 NOTE — PLAN OF CARE
Goal Outcome Evaluation:  Plan of Care Reviewed With: patient        Progress: no change  Outcome Evaluation: Pt AOx4. On 1.5L NC. NSR-SB on monitor. Dizziness/nausea at lunch time- better after zofran. Remains on tamiflu. Awaiting pulm consult. Pt not in acute distress. D-dimer pending. Plan of care ongoing      Sat up in chair for a while this evening. Encouraging IS use. Pulm saw this evening

## 2025-02-05 LAB
ANION GAP SERPL CALCULATED.3IONS-SCNC: 11.9 MMOL/L (ref 5–15)
BUN SERPL-MCNC: 25 MG/DL (ref 8–23)
BUN/CREAT SERPL: 17 (ref 7–25)
CALCIUM SPEC-SCNC: 8.2 MG/DL (ref 8.6–10.5)
CHLORIDE SERPL-SCNC: 104 MMOL/L (ref 98–107)
CO2 SERPL-SCNC: 22.1 MMOL/L (ref 22–29)
CREAT SERPL-MCNC: 1.47 MG/DL (ref 0.57–1)
DEPRECATED RDW RBC AUTO: 47.3 FL (ref 37–54)
EGFRCR SERPLBLD CKD-EPI 2021: 37.8 ML/MIN/1.73
ERYTHROCYTE [DISTWIDTH] IN BLOOD BY AUTOMATED COUNT: 15.2 % (ref 12.3–15.4)
GLUCOSE SERPL-MCNC: 101 MG/DL (ref 65–99)
HCT VFR BLD AUTO: 36.6 % (ref 34–46.6)
HGB BLD-MCNC: 11.7 G/DL (ref 12–15.9)
MCH RBC QN AUTO: 27.5 PG (ref 26.6–33)
MCHC RBC AUTO-ENTMCNC: 32 G/DL (ref 31.5–35.7)
MCV RBC AUTO: 86.1 FL (ref 79–97)
PLATELET # BLD AUTO: 203 10*3/MM3 (ref 140–450)
PMV BLD AUTO: 9.2 FL (ref 6–12)
POTASSIUM SERPL-SCNC: 4 MMOL/L (ref 3.5–5.2)
RBC # BLD AUTO: 4.25 10*6/MM3 (ref 3.77–5.28)
SODIUM SERPL-SCNC: 138 MMOL/L (ref 136–145)
WBC NRBC COR # BLD AUTO: 3.67 10*3/MM3 (ref 3.4–10.8)

## 2025-02-05 PROCEDURE — 97162 PT EVAL MOD COMPLEX 30 MIN: CPT

## 2025-02-05 PROCEDURE — 25810000003 SODIUM CHLORIDE 0.9 % SOLUTION: Performed by: STUDENT IN AN ORGANIZED HEALTH CARE EDUCATION/TRAINING PROGRAM

## 2025-02-05 PROCEDURE — 80048 BASIC METABOLIC PNL TOTAL CA: CPT | Performed by: STUDENT IN AN ORGANIZED HEALTH CARE EDUCATION/TRAINING PROGRAM

## 2025-02-05 PROCEDURE — 97530 THERAPEUTIC ACTIVITIES: CPT

## 2025-02-05 PROCEDURE — 93005 ELECTROCARDIOGRAM TRACING: CPT | Performed by: STUDENT IN AN ORGANIZED HEALTH CARE EDUCATION/TRAINING PROGRAM

## 2025-02-05 PROCEDURE — 85027 COMPLETE CBC AUTOMATED: CPT | Performed by: STUDENT IN AN ORGANIZED HEALTH CARE EDUCATION/TRAINING PROGRAM

## 2025-02-05 PROCEDURE — 93010 ELECTROCARDIOGRAM REPORT: CPT | Performed by: INTERNAL MEDICINE

## 2025-02-05 RX ORDER — DEXTROMETHORPHAN POLISTIREX 30 MG/5ML
60 SUSPENSION ORAL EVERY 12 HOURS PRN
Status: DISCONTINUED | OUTPATIENT
Start: 2025-02-05 | End: 2025-02-06 | Stop reason: HOSPADM

## 2025-02-05 RX ORDER — CARVEDILOL 12.5 MG/1
12.5 TABLET ORAL 2 TIMES DAILY
Status: DISCONTINUED | OUTPATIENT
Start: 2025-02-05 | End: 2025-02-06 | Stop reason: HOSPADM

## 2025-02-05 RX ORDER — SODIUM CHLORIDE 9 MG/ML
100 INJECTION, SOLUTION INTRAVENOUS CONTINUOUS
Status: DISCONTINUED | OUTPATIENT
Start: 2025-02-05 | End: 2025-02-06

## 2025-02-05 RX ADMIN — PRAVASTATIN SODIUM 40 MG: 10 TABLET ORAL at 21:01

## 2025-02-05 RX ADMIN — APIXABAN 2.5 MG: 2.5 TABLET, FILM COATED ORAL at 20:50

## 2025-02-05 RX ADMIN — SODIUM CHLORIDE 100 ML/HR: 9 INJECTION, SOLUTION INTRAVENOUS at 15:14

## 2025-02-05 RX ADMIN — PANTOPRAZOLE SODIUM 40 MG: 40 TABLET, DELAYED RELEASE ORAL at 09:21

## 2025-02-05 RX ADMIN — APIXABAN 2.5 MG: 2.5 TABLET, FILM COATED ORAL at 09:21

## 2025-02-05 RX ADMIN — CARVEDILOL 12.5 MG: 12.5 TABLET, FILM COATED ORAL at 20:53

## 2025-02-05 RX ADMIN — Medication 10 ML: at 09:22

## 2025-02-05 RX ADMIN — FAMOTIDINE 40 MG: 20 TABLET, FILM COATED ORAL at 20:50

## 2025-02-05 RX ADMIN — LOSARTAN POTASSIUM 100 MG: 100 TABLET, FILM COATED ORAL at 09:22

## 2025-02-05 RX ADMIN — ARIPIPRAZOLE 5 MG: 5 TABLET ORAL at 09:22

## 2025-02-05 RX ADMIN — OSELTAMIVIR PHOSPHATE 30 MG: 30 CAPSULE ORAL at 09:22

## 2025-02-05 RX ADMIN — LAMOTRIGINE 100 MG: 100 TABLET ORAL at 20:50

## 2025-02-05 RX ADMIN — OSELTAMIVIR PHOSPHATE 30 MG: 30 CAPSULE ORAL at 20:50

## 2025-02-05 RX ADMIN — AMLODIPINE BESYLATE 5 MG: 5 TABLET ORAL at 09:21

## 2025-02-05 RX ADMIN — HYDRALAZINE HYDROCHLORIDE 50 MG: 25 TABLET ORAL at 09:22

## 2025-02-05 RX ADMIN — CARVEDILOL 25 MG: 25 TABLET, FILM COATED ORAL at 09:21

## 2025-02-05 RX ADMIN — ESCITALOPRAM 20 MG: 20 TABLET, FILM COATED ORAL at 09:21

## 2025-02-05 NOTE — PLAN OF CARE
Goal Outcome Evaluation:  Plan of Care Reviewed With: patient        Progress: no change  Outcome Evaluation: Pt AOx4. On 1L NC. NSR-SB on monitor. Creat/BUN up today- MD aware. Pt got dizzy when getting up to BR- Said she doesnt take all BP meds together at home like we are giving her here. BP on lower side. Encouraging IS and getting up to chair. Pt denies pain. Pt not in acute distress. Plan of care ongoing     Got EKG for bradycardia. MD aware of results. Pt asking for cough meds

## 2025-02-05 NOTE — PLAN OF CARE
Goal Outcome Evaluation:   Pt resting in bed. No c/o voiced. VSS No s/s of distress. Plan of care ongoing.

## 2025-02-05 NOTE — PROGRESS NOTES
Name: Katelyn Sr ADMIT: 2/3/2025   : 1950  PCP: Pierre Chaudhry Jr., MD    MRN: [id number] LOS: 2 days   AGE/SEX: 74 y.o. female  ROOM: [facility]     Subjective   Subjective   No acute events overnight.  Patient had initially weaned to room air today but she is back on 1 L nasal cannula.  She denies chest pain or shortness of breath.  Has been eating and drinking pretty well.    Objective   Objective     Vital Signs  Temp:  [98.4 °F (36.9 °C)-98.7 °F (37.1 °C)] 98.4 °F (36.9 °C)  Heart Rate:  [] 63  Resp:  [16-18] 17  BP: (100-108)/(55-62) 100/55  SpO2:  [87 %-95 %] 95 %  on  Flow (L/min) (Oxygen Therapy):  [1-1.5] 1;   Device (Oxygen Therapy): nasal cannula  Body mass index is 35.14 kg/m².    Physical Exam  General: Alert, no acute distress.  Sitting up in bed.   ENT: No conjunctival injection or scleral icterus. Moist mucous membranes.  Neuro: Eyes open and moving in all directions, generalized weakness, face symmetric, no focal deficits.   Lungs: Diminished aeration, occasional crackle.  No wheezing.  Nasal cannula in place.  No distress.  Heart: RRR, no murmurs. No edema.  Abdomen: Soft, non-tender, non-distended. Normal bowel sounds.  Ext: Warm and well-perfused. No edema.   Skin: No rashes or lesions. IV site without swelling or erythema.     Results Review     I reviewed the patient's new clinical results:  Results from last 7 days   Lab Units 25  0547 25  0522 25  1240   WBC 10*3/mm3 3.67 4.73 6.17   HEMOGLOBIN g/dL 11.7* 11.8* 13.3   PLATELETS 10*3/mm3 203 189 225     Results from last 7 days   Lab Units 25  0547 25  0522 25  1240   SODIUM mmol/L 138 135* 136   POTASSIUM mmol/L 4.0 4.0 4.1   CHLORIDE mmol/L 104 103 102   CO2 mmol/L 22.1 21.0* 22.0   BUN mg/dL 25* 15 11   CREATININE mg/dL 1.47* 0.97 1.01*   GLUCOSE mg/dL 101* 84 104*   EGFR mL/min/1.73 37.8* 62.2 59.3*     Results from last 7 days   Lab Units 25  0522 25  1240   ALBUMIN  "g/dL 3.1* 3.7   BILIRUBIN mg/dL 0.4 0.5   ALK PHOS U/L 81 102   AST (SGOT) U/L 22 18   ALT (SGPT) U/L 11 12     Results from last 7 days   Lab Units 02/05/25  0547 02/04/25  0522 02/03/25  1240   CALCIUM mg/dL 8.2* 8.2* 8.9   ALBUMIN g/dL  --  3.1* 3.7     Results from last 7 days   Lab Units 02/03/25  1240   PROCALCITONIN ng/mL 0.10     No results found for: \"HGBA1C\", \"POCGLU\"    No radiology results for the last day    I have personally reviewed all medications:  Scheduled Medications  amLODIPine, 5 mg, Oral, Daily  apixaban, 2.5 mg, Oral, BID  ARIPiprazole, 5 mg, Oral, Daily  carvedilol, 25 mg, Oral, BID  escitalopram, 20 mg, Oral, Daily  famotidine, 40 mg, Oral, Nightly  hydrALAZINE, 50 mg, Oral, BID  lamoTRIgine, 100 mg, Oral, Nightly  [Held by provider] losartan, 100 mg, Oral, Daily  Nintedanib Esylate, 100 mg, Oral, BID  oseltamivir, 30 mg, Oral, Q12H  pantoprazole, 40 mg, Oral, Daily  Pharmacy to Dose Oseltamivir (TAMIFLU), , Not Applicable, BID  pravastatin, 40 mg, Oral, Nightly  sodium chloride, 10 mL, Intravenous, Q12H    Infusions  sodium chloride, 100 mL/hr    Diet  Diet: Cardiac; Healthy Heart (2-3 Na+); Fluid Consistency: Thin (IDDSI 0)      Intake/Output Summary (Last 24 hours) at 2/5/2025 1531  Last data filed at 2/5/2025 0925  Gross per 24 hour   Intake 690 ml   Output --   Net 690 ml       Assessment/Plan     Active Hospital Problems    Diagnosis  POA    **Acute hypoxic respiratory failure [J96.01]  Yes    Influenza A [J10.1]  Unknown    A-fib [I48.91]  Yes    Essential hypertension [I10]  Yes    FUENTES on autoCPAP [G47.33]  Yes      Resolved Hospital Problems   No resolved problems to display.       74 y.o. female with Acute hypoxic respiratory failure.    Acute hypoxic respiratory failure  Influenza A  Fever  -CXR with no evidence of infiltrate or consolidation  -WBC and procalcitonin negative  -Tamiflu  -No indication for steroids, no wheezing on exam  -Continue DuoNebs  -D-dimer negative, oxygen " requirements improving  -Pulmonology consulted as below  -Currently on 1 L nasal cannula, wean as able.  Goal sats greater than 90%  -Walking ox prior to discharge home    Hypertension  Atrial fibrillation  -RC: Coreg  -AC: Eliquis  -Continue amlodipine, losartan, and hydralazine  -Titrate meds as needed based on BP trends    JACINDA  -Creatinine elevated today to 1.47  -Hold losartan  -Gentle IV fluids  -Avoid nephrotoxic agents including NSAIDs and contrast dyes  -Repeat BMP with morning labs    History of hypersensitivity pneumonitis versus pulmonary fibrosis  -Continue home meds  -Pulmonology consulted  -Walking ox prior to discharge home  -Mediastinal adenopathy on most recent CT, will follow-up with pulmonology in 1 month for repeat CT    Eliquis (home med) for DVT prophylaxis.  Full code.  Discussed with patient and nursing staff.  Anticipate discharge home timing yet to be determined.      Christine Tapia MD  [facility]  02/05/25  15:31 EST

## 2025-02-05 NOTE — PROGRESS NOTES
[facility]  [phone]  Dr. Toan Mcmahan    Subjective:  LOS: 2    Chief Complaint: Influenza infection    Feels better without supplemental oxygen.  Today attempted off supplemental O2 and her sats dropped to the 85% range at rest.  Therefore on 1 L oxygen for now.    Objective   Vital Signs past 24hrs  Temp range: Temp (24hrs), Av.5 °F (36.9 °C), Min:98.4 °F (36.9 °C), Max:98.7 °F (37.1 °C)    BP range: BP: (102-108)/(57-62) 104/57  Pulse range: Heart Rate:  [] 63  Resp rate range: Resp:  [16-18] 17  Device (Oxygen Therapy): nasal cannulaFlow (L/min) (Oxygen Therapy):  [1-1.5] 1  Oxygen range:SpO2:  [87 %-95 %] 95 %   Mechanical Ventilator:     Physical Exam  Constitutional:       Appearance: She is obese.   Eyes:      Pupils: Pupils are equal, round, and reactive to light.   Cardiovascular:      Rate and Rhythm: Normal rate and regular rhythm.      Heart sounds: No murmur heard.  Pulmonary:      Effort: Pulmonary effort is normal.      Breath sounds: Rales (posteriorly) present.   Abdominal:      General: Bowel sounds are normal.      Palpations: Abdomen is soft. There is no mass.      Tenderness: There is no abdominal tenderness.   Musculoskeletal:         General: No swelling.   Neurological:      Mental Status: She is alert.       Results Review:    I have reviewed the laboratory and imaging data since the last note by [facility] physician.  My annotations are noted in assessment and plan.      Result Review:  I have personally reviewed the results from last note by [facility] physician to 2025 13:58 EST and agree with these findings:  [x]  Laboratory list / accordion  [x]  Microbiology  [x]  Radiology  []  EKG/Telemetry   []  Cardiology/Vascular   []  Pathology  []  Old records  []  Other:      Medication Review:  I have reviewed the current MAR.  My annotations are noted in assessment and plan.    amLODIPine, 5 mg, Oral, Daily  apixaban, 2.5 mg, Oral, BID  ARIPiprazole, 5 mg, Oral,  Daily  carvedilol, 25 mg, Oral, BID  escitalopram, 20 mg, Oral, Daily  famotidine, 40 mg, Oral, Nightly  hydrALAZINE, 50 mg, Oral, BID  lamoTRIgine, 100 mg, Oral, Nightly  losartan, 100 mg, Oral, Daily  Nintedanib Esylate, 100 mg, Oral, BID  oseltamivir, 30 mg, Oral, Q12H  pantoprazole, 40 mg, Oral, Daily  Pharmacy to Dose Oseltamivir (TAMIFLU), , Not Applicable, BID  pravastatin, 40 mg, Oral, Nightly  sodium chloride, 10 mL, Intravenous, Q12H           Lines, Drains & Airways       Active LDAs       Name Placement date Placement time Site Days    Peripheral IV 02/03/25 1239 Right Antecubital 02/03/25  1239  Antecubital  2                    PCCM Problems  Acute influenza A infection  Hypoxia  Chronic fibrosing hypersensitivity pneumonitis on Ofev  Mediastinal adenopathy  Esophageal stricture  GERD  Afib on anticoagulation  CAD  FUENTES on CPAP  Obesity      Plan of Treatment    Continue supportive treatment for influenza A.    Requires oxygen on discharge.  Patient appears agreeable to use now.    Chronic fibrosing HP and on Ofev.  She only takes about 50% of her doses.    Mediastinal adenopathy stable on most recent CT chest.  Will reschedule her appointment with Dr. Gutiérrez to 1 month.    Esophageal stricture with hiatal hernia and GERD could be contributing to her lung findings.     Uses CPAP regularly at home.    No objections to discharge.      Toan Mcmahan MD  02/05/25  13:58 EST    Isolation status: Droplet    Dietary Orders (From admission, onward)       Start     Ordered    02/1950  Diet: Cardiac; Healthy Heart (2-3 Na+); Fluid Consistency: Thin (IDDSI 0)  Diet Effective Now        References:    Diet Order Definitions   Question Answer Comment   Diets: Cardiac    Cardiac Diet: Healthy Heart (2-3 Na+)    Fluid Consistency: Thin (IDDSI 0)        02/03/25 1951                    Part of this note may be an electronic transcription/translation of spoken language to printed text using the Dragon Dictation  System.

## 2025-02-05 NOTE — PAYOR COMM NOTE
"Francisca Sr \"Jina\" (74 y.o. Female)        PLEASE SEE ATTACHED FOR INPT AUTH.     REF#[id number]    PLEASE CALL  OR  [phone] WITH INPT AUTH.     THANK YOU    FAHAD HESS [facility]   Date of Birth   1950    Social Security Number       Address   [address] [place] [address]    Home Phone   [phone]    MRN   [id number]       [facility]    Marital Status   Single                            Admission Date   2/3/25    Admission Type   Emergency    Admitting Provider   Sagar Rivera MD    Attending Provider   Christine Tapia MD    Department, Room/Bed   [address], E651/1       Discharge Date       Discharge Disposition       Discharge Destination                                 Attending Provider: Christine Tapia MD    Allergies: Simvastatin, Erythromycin, Penicillins    Isolation: Droplet   Infection: Influenza (02/03/25)   Code Status: CPR    Ht: 154.9 cm (61\")   Wt: 84.4 kg (186 lb)    Admission Cmt: None   Principal Problem: Acute hypoxic respiratory failure [J96.01]                   Active Insurance as of 2/3/2025       Primary Coverage       Payor Plan Insurance Group Employer/Plan Group    AETNA MEDICARE REPLACEMENT AETNA MEDICARE REPLACEMENT [id number]       Payor Plan Address Payor Plan Phone Number Payor Plan Fax Number Effective Dates    [address] [phone]  1/1/2024 - None Entered    [facility] [id number]         Subscriber Name Subscriber Birth Date Member ID       FRANCISCA SR 1950 [id number]                     Emergency Contacts        (Rel.) Home Phone Work Phone Mobile Phone    Bhavesh Nascimento(nephew) (Relative) [phone] -- [phone]    Tiffanie Cortes (Friend) [phone] -- [phone]    ANAID CARNEY (Friend) -- -- [phone]              [employer]: NPI [id number]  Tax ID [id number]     History & Physical        Sagar Rivera MD at 02/03/25 1721          Internal " medicine history and physical  INTERNAL MEDICINE   [facility]       Patient Identification:  Name: Katelyn Sr  Age: 74 y.o.  Sex: female  :  1950  MRN: [id number]                   Primary Care Physician: Pierre Chaudhry Jr., MD                               Date of admission:2/3/2025    Chief Complaint: Cough shortness of breath not feeling very well for the last few days fever started Saturday night advised by primary care physician to go to the emergency room this morning.    History of Present Illness:   Patient is a 74-year-old female with history of hypersensitivity pneumonitis versus pulmonary fibrosis, history of asthma, obesity, hypothyroidism, sleep apnea, coronary artery disease, atrial fibrillation on anticoagulation therapy was in her usual state of her health until few days ago when she started having generalized bodyaches diarrhea and not feeling very well and cough.  She thought that with rest it will go away but her symptoms continued to get worse and she started feeling poorly and on Saturday night started having fever and chills and continued to have nausea and sense of ill health throughout the weekend.  She reached out to her primary care provider who suggested that given her underlying comorbidities she needs to go to the emergency room for further evaluation.  Upon arrival to the emergency room patient was noted to have oxygen saturation of 88% on room air and temperature of 100.7.  Further workup revealed respiratory viral panel positive for influenza A.  Chest x-ray did not show any acute pulmonary process and her procalcitonin was noted to be 0.10.  Patient received a dose of DuoNeb with improvement in her breathing, Tylenol and is being admitted for the management of acute influenza with hypoxia and bronchitis.  Patient has been unable to take her Ofev for her pulmonary fibrosis/pneumonitis for the last few days.  Patient follows with Dr. Gutiérrez for her  lung condition.      Past Medical History:  Past Medical History:   Diagnosis Date    Abnormal ECG 5/21/18    Done in PCP office.  Hx of Atrial fib-?2007    Arthritis of neck     Asthma 12/15/2021    Hypersensitivity Pneumonitis; under treatment at [place]    Atrial fibrillation     Cervical disc disorder 8/03    ACDF C6-7, 12/03/2003;  Dr. 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    CTS (carpal tunnel syndrome)     DDD (degenerative disc disease), lumbosacral 11/09/2018    Depression     Fibrosis of lung     Fracture, finger     Generalized anxiety disorder 07/14/2020    GERD (gastroesophageal reflux disease)     Hip arthrosis     Hyperlipidemia     Hypersensitivity pneumonitis     vs pulmonary fibrosis    Hypersensitivity pneumonitis     Hypersomnia due to another medical condition 10/28/2017    Hypertension     Knee swelling     Low back strain     Lower back pain 07/26/2019    Myocardial infarction     Neck strain     Neuroma of foot     Non-STEMI (non-ST elevated myocardial infarction) 05/21/2018    Nonsenile cataract     FUENTES on CPAP 10/19/2017    Palpitations 07/23/2019    Peripheral vestibulopathy of both ears 07/14/2020    Presence of artificial intra-ocular lens     Scoliosis     Sleep apnea     Thoracic disc disorder     Thyroid disease     Vertigo 07/14/2020    Vestibular neuronitis 07/17/2020     Past Surgical History:  Past Surgical History:   Procedure Laterality Date    CARDIAC CATHETERIZATION N/A 05/22/2018    Procedure: Left Heart Cath;  Surgeon: Aleks Velazquez MD;  Location: [facility] CATH INVASIVE LOCATION;  Service: Cardiovascular    CARDIAC CATHETERIZATION N/A 05/22/2018    Procedure: Coronary angiography;  Surgeon: Aleks Velazquez MD;  Location: [facility] CATH INVASIVE LOCATION;  Service: Cardiovascular    CARDIAC CATHETERIZATION N/A 05/22/2018    Procedure: Left ventriculography;  Surgeon:  Aleks Velazquez MD;  Location:  [facility] CATH INVASIVE LOCATION;  Service: Cardiovascular    CARDIAC CATHETERIZATION N/A 05/22/2018    Procedure: Peripheral angiography;  Surgeon: Aleks Velazquez MD;  Location:  [facility] CATH INVASIVE LOCATION;  Service: Cardiovascular    CATARACT EXTRACTION WITH INTRAOCULAR LENS IMPLANT Bilateral     COLONOSCOPY N/A 03/08/2023    Procedure: COLONOSCOPY to cecum/TI with cold biopsies;  Surgeon: Hamilton Francisco MD;  Location:  [facility];  Service: Gastroenterology;  Laterality: N/A;  pre- diverticulitis  post- diverticulosis with stricture    DIAGNOSTIC LAPAROSCOPY  1990    r/o endometriosis    ENDOSCOPY N/A 03/08/2023    Procedure: ESOPHAGOGASTRODUODENOSCOPY with cold biopsies and 52 Fr Kam Dilatation;  Surgeon: Hamilton Francisco MD;  Location:  [facility];  Service: Gastroenterology;  Laterality: N/A;  pre- dysphagia  post- hiatal hernia, gastric polyps, esophageal ring @ 30    NECK SURGERY  12/03/2003    ACDF C6-7-Dr. Nix     RECTAL SURGERY  1992    fistula/abscess/hemorrhoid      Home Meds:  (Not in a hospital admission)    Current Meds:     Current Facility-Administered Medications:     [COMPLETED] Insert Peripheral IV, , , Once **AND** sodium chloride 0.9 % flush 10 mL, 10 mL, Intravenous, PRN, Lele Smith MD    Current Outpatient Medications:     amLODIPine (NORVASC) 5 MG tablet, TAKE 1 TABLET BY MOUTH EVERY DAY, Disp: 90 tablet, Rfl: 3    apixaban (ELIQUIS) 2.5 MG tablet tablet, Take 1 tablet by mouth 2 (Two) Times a Day., Disp: , Rfl:     ARIPiprazole (ABILIFY) 5 MG tablet, Take 1 tablet by mouth Daily., Disp: , Rfl:     carvedilol (COREG) 25 MG tablet, Take 1 tablet by mouth 2 (Two) Times a Day., Disp: 180 tablet, Rfl: 4    escitalopram (LEXAPRO) 20 MG tablet, Take 1 tablet by mouth Daily., Disp: , Rfl:     famotidine (PEPCID) 40 MG tablet, Take 1 tablet by mouth Every Night., Disp: , Rfl:     hydrALAZINE (APRESOLINE) 50 MG tablet,  Take 1 tablet by mouth 2 (Two) Times a Day., Disp: 180 tablet, Rfl: 4    lamoTRIgine (LaMICtal) 100 MG tablet, Take 1 tablet by mouth Every Night., Disp: , Rfl:     losartan (COZAAR) 100 MG tablet, TAKE 1 TABLET BY MOUTH EVERY DAY (Patient taking differently: Take 1 tablet by mouth Daily.), Disp: 90 tablet, Rfl: 4    Nintedanib Esylate (Ofev) 100 MG capsule, Take 1 capsule by mouth 2 (Two) Times a Day., Disp: , Rfl:     ondansetron ODT (ZOFRAN-ODT) 4 MG disintegrating tablet, Place 1 tablet on the tongue Every 8 (Eight) Hours As Needed., Disp: , Rfl:     pantoprazole (PROTONIX) 40 MG EC tablet, Take 1 tablet by mouth Daily., Disp: , Rfl:     pravastatin (PRAVACHOL) 40 MG tablet, Take 1 tablet by mouth every night at bedtime., Disp: 90 tablet, Rfl: 3    modafinil (PROVIGIL) 100 MG tablet, Take 1 tablet by mouth Daily. (Patient not taking: Reported on 2/3/2025), Disp: , Rfl:     traZODone (DESYREL) 50 MG tablet, Take 0.5 tablets by mouth At Night As Needed for Sleep., Disp: , Rfl:   Allergies:  Allergies   Allergen Reactions    Simvastatin Myalgia     Cramps in thighs      Erythromycin Rash    Penicillins Rash     Social History:   Social History     Tobacco Use    Smoking status: Never     Passive exposure: Never    Smokeless tobacco: Never    Tobacco comments:     caffeine use: TEA OCCASSIONALLY   Substance Use Topics    Alcohol use: No      Family History:  Family History   Problem Relation Age of Onset    Alzheimer's disease Mother     Heart failure Father     Arrhythmia Father     Heart attack Father         MI at age 56,  at 63.    Diabetes Sister     Atrial fibrillation Sister     Colon cancer Maternal Grandmother     Stroke Paternal Grandmother     Malig Hyperthermia Neg Hx           Review of Systems  See history of present illness and past medical history.  Patient denies headache, dizziness, syncope, falls, trauma, change in vision, change in hearing, change in taste, changes in weight, changes in  "appetite, focal weakness, numbness, or paresthesia.  Patient denies chest pain, palpitations, dyspnea, orthopnea, PND, cough, sinus pressure, rhinorrhea, epistaxis, hemoptysis, nausea, vomiting,hematemesis, diarrhea, constipation or hematchezia.  Denies cold or heat intolerance, polydipsia, polyuria, polyphagia. Denies hematuria, pyuria, dysuria, hesitancy, frequency or urgency. Denies consumption of raw and under cooked meats foods or change in water source.  Denies fever, chills, sweats, night sweats.  Denies missing any routine medications. Remainder of ROS is negative.      Vitals:   /68   Pulse 74   Temp 99.5 °F (37.5 °C) (Tympanic)   Resp 18   Ht 154.9 cm (61\")   Wt 84.4 kg (186 lb)   SpO2 94%   BMI 35.14 kg/m²   I/O: No intake or output data in the 24 hours ending 02/03/25 1721  Exam:  Patient is examined using the personal protective equipment as per guidelines from infection control for this particular patient as enacted.  Hand washing was performed before and after patient interaction.  General Appearance:    Alert, cooperative, no distress, appears stated age   Head:    Normocephalic, without obvious abnormality, atraumatic   Eyes:    PERRL, conjunctiva/corneas clear, EOM's intact, both eyes   Ears:    Normal external ear canals, both ears   Nose:   Nares normal, septum midline, mucosa normal, no drainage    or sinus tenderness   Throat:   Lips, tongue, gums normal; oral mucosa pink and moist   Neck:   Supple, symmetrical, trachea midline, no adenopathy;     thyroid:  no enlargement/tenderness/nodules; no carotid    bruit or JVD   Back:     Symmetric, no curvature, ROM normal, no CVA tenderness   Lungs:     Clear to auscultation bilaterally, respirations unlabored   Chest Wall:    No tenderness or deformity    Heart:    Regular rate and rhythm, S1 and S2 normal, no murmur, rub   or gallop   Abdomen:     Soft, non-tender, bowel sounds active all four quadrants,     no masses, no hepatomegaly, " no splenomegaly   Extremities:   Extremities normal, atraumatic, no cyanosis or edema   Pulses:   Pulses palpable in all extremities; symmetric all extremities   Skin:   Skin color normal, Skin is warm and dry,  no rashes or palpable lesions   Neurologic:   CNII-XII intact, motor strength grossly intact, sensation grossly intact to light touch, no focal deficits noted       Data Review:      I reviewed the patient's new clinical results.  Results from last 7 days   Lab Units 02/03/25  1240   WBC 10*3/mm3 6.17   HEMOGLOBIN g/dL 13.3   PLATELETS 10*3/mm3 225     Results from last 7 days   Lab Units 02/03/25  1240   SODIUM mmol/L 136   POTASSIUM mmol/L 4.1   CHLORIDE mmol/L 102   CO2 mmol/L 22.0   BUN mg/dL 11   CREATININE mg/dL 1.01*   CALCIUM mg/dL 8.9   GLUCOSE mg/dL 104*     XR Chest 1 View    Result Date: 2/3/2025  No focal pulmonary consolidation. Cardiomegaly. Tortuous aorta. Follow-up as clinically indicated.     This report was finalized on 2/3/2025 1:22 PM by Dr. Lenny Cooper M.D on Workstation: [id number]      Microbiology Results (last 10 days)       Procedure Component Value - Date/Time    Respiratory Panel PCR w/COVID-19(SARS-CoV-2) [facility] In-House, NP Swab in UTM/VTM, 2 HR TAT - Swab, Nasopharynx [[id number]]  (Abnormal) Collected: 02/03/25 1235    Lab Status: Final result Specimen: Swab from Nasopharynx Updated: 02/03/25 1353     ADENOVIRUS, PCR Not Detected     Coronavirus 229E Not Detected     Coronavirus HKU1 Not Detected     Coronavirus NL63 Not Detected     Coronavirus OC43 Not Detected     COVID19 Not Detected     Human Metapneumovirus Not Detected     Human Rhinovirus/Enterovirus Not Detected     Influenza A H3 Detected     Influenza B PCR Not Detected     Parainfluenza Virus 1 Not Detected     Parainfluenza Virus 2 Not Detected     Parainfluenza Virus 3 Not Detected     Parainfluenza Virus 4 Not Detected     RSV, PCR Not Detected     Bordetella pertussis pcr Not Detected      Bordetella parapertussis PCR Not Detected     Chlamydophila pneumoniae PCR Not Detected     Mycoplasma pneumo by PCR Not Detected    Narrative:      In the setting of a positive respiratory panel with a viral infection PLUS a negative procalcitonin without other underlying concern for bacterial infection, consider observing off antibiotics or discontinuation of antibiotics and continue supportive care. If the respiratory panel is positive for atypical bacterial infection (Bordetella pertussis, Chlamydophila pneumoniae, or Mycoplasma pneumoniae), consider antibiotic de-escalation to target atypical bacterial infection.          Brief Urine Lab Results       None          Telemetry Scan   Final Result          Assessment:  Active Hospital Problems    Diagnosis  POA    **Acute hypoxic respiratory failure [J96.01]  Yes    Influenza A [J10.1]  Unknown    A-fib [I48.91]  Yes    Essential hypertension [I10]  Yes    FUENTES on autoCPAP [G47.33]  Yes       Medical decision making/care plan: See admitting orders  Acute hypoxic respiratory failure with wheezing and positive respiratory viral panel for influenza A-this likely represent acute influenza A with systemic illness and associated bronchitis and exacerbation of underlying reactive airway disease.  Plan is to admit the patient continue with Tamiflu oxygen supplementation as needed and DuoNebs and monitor her clinical course.  Symptomatic relief of her fever and cough.  Atrial fibrillation with controlled ventricular response continue her home regimen and monitor.  Hypertension-continue her antihypertensive and avoid hypotensive episodes.  Obstructive sleep apnea on auto CPAP-allow her to use her CPAP device and providing her with continuous pulse ox monitoring.  Anxiety and depression-continue home regimen  History of hypersensitivity pneumonitis versus pulmonary fibrosis on Ofev-allow her to use her home Ofev and consider pulmonary consultation as per her request in AM.   "Patient sees Dr. Gutiérrez.  Sagar Rivera MD   2/3/2025  17:21 EST    Parts of this note may be an electronic transcription/translation of spoken language to printed text using the Dragon dictation system.      Electronically signed by Sagar Rivera MD at 02/04/25 0508          Emergency Department Notes        Jovita Noe, RN at 02/03/25 1718          Nursing report ED to floor  Katelyn Sr  74 y.o.  female    HPI :  HPI  Stated Reason for Visit: fever, cough, nausea    Chief Complaint  Chief Complaint   Patient presents with    Fever       Admitting doctor:   Sagar Rivera MD    Admitting diagnosis:   The primary encounter diagnosis was Acute hypoxic respiratory failure. A diagnosis of Influenza A was also pertinent to this visit.    Code status:   Current Code Status       Date Active Code Status Order ID Comments User Context       Prior            Allergies:   Simvastatin, Erythromycin, and Penicillins    Isolation:   Droplet    Intake and Output  No intake or output data in the 24 hours ending 02/03/25 1718    Weight:       02/03/25  1642   Weight: 84.4 kg (186 lb)       Most recent vitals:   Vitals:    02/03/25 1611 02/03/25 1641 02/03/25 1642 02/03/25 1711   BP: 103/61 99/63  108/68   BP Location: Left arm      Patient Position: Lying      Pulse: 69 65  74   Resp: 18      Temp:       TempSrc:       SpO2: 94% 94%  94%   Weight:   84.4 kg (186 lb)    Height:   154.9 cm (61\")        Active LDAs/IV Access:   Lines, Drains & Airways       Active LDAs       Name Placement date Placement time Site Days    Peripheral IV 02/03/25 1239 Right Antecubital 02/03/25  1239  Antecubital  less than 1                    Labs (abnormal labs have a star):   Labs Reviewed   RESPIRATORY PANEL PCR W/ COVID-19 (SARS-COV-2), NP SWAB IN UTM/VTP, 2 HR TAT - Abnormal; Notable for the following components:       Result Value    Influenza A H3 Detected (*)     All other components within normal limits    Narrative:     In the " setting of a positive respiratory panel with a viral infection PLUS a negative procalcitonin without other underlying concern for bacterial infection, consider observing off antibiotics or discontinuation of antibiotics and continue supportive care. If the respiratory panel is positive for atypical bacterial infection (Bordetella pertussis, Chlamydophila pneumoniae, or Mycoplasma pneumoniae), consider antibiotic de-escalation to target atypical bacterial infection.   COMPREHENSIVE METABOLIC PANEL - Abnormal; Notable for the following components:    Glucose 104 (*)     Creatinine 1.01 (*)     eGFR 59.3 (*)     All other components within normal limits    Narrative:     GFR Categories in Chronic Kidney Disease (CKD)      GFR Category          GFR (mL/min/1.73)    Interpretation  G1                     90 or greater         Normal or high (1)  G2                      60-89                Mild decrease (1)  G3a                   45-59                Mild to moderate decrease  G3b                   30-44                Moderate to severe decrease  G4                    15-29                Severe decrease  G5                    14 or less           Kidney failure          (1)In the absence of evidence of kidney disease, neither GFR category G1 or G2 fulfill the criteria for CKD.    eGFR calculation 2021 CKD-EPI creatinine equation, which does not include race as a factor   CBC WITH AUTO DIFFERENTIAL - Abnormal; Notable for the following components:    Neutrophil % 77.1 (*)     Lymphocyte % 8.6 (*)     Monocyte % 13.1 (*)     Lymphocytes, Absolute 0.53 (*)     All other components within normal limits   LIPASE - Normal   PROCALCITONIN - Normal    Narrative:     As a Marker for Sepsis (Non-Neonates):    1. <0.5 ng/mL represents a low risk of severe sepsis and/or septic shock.  2. >2 ng/mL represents a high risk of severe sepsis and/or septic shock.    As a Marker for Lower Respiratory Tract Infections that require  "antibiotic therapy:    PCT on Admission    Antibiotic Therapy       6-12 Hrs later    >0.5                Strongly Recommended  >0.25 - <0.5        Recommended   0.1 - 0.25          Discouraged              Remeasure/reassess PCT  <0.1                Strongly Discouraged     Remeasure/reassess PCT    As 28 day mortality risk marker: \"Change in Procalcitonin Result\" (>80% or <=80%) if Day 0 (or Day 1) and Day 4 values are available. Refer to http://www.[facility]-pct-calculator.com    Change in PCT <=80%  A decrease of PCT levels below or equal to 80% defines a positive change in PCT test result representing a higher risk for 28-day all-cause mortality of patients diagnosed with severe sepsis for septic shock.    Change in PCT >80%  A decrease of PCT levels of more than 80% defines a negative change in PCT result representing a lower risk for 28-day all-cause mortality of patients diagnosed with severe sepsis or septic shock.      CBC AND DIFFERENTIAL    Narrative:     The following orders were created for panel order CBC & Differential.  Procedure                               Abnormality         Status                     ---------                               -----------         ------                     CBC Auto Differential[[id number]]        Abnormal            Final result                 Please view results for these tests on the individual orders.       EKG:   No orders to display       Meds given in ED:   Medications   sodium chloride 0.9 % flush 10 mL (has no administration in time range)   acetaminophen (TYLENOL) tablet 1,000 mg (1,000 mg Oral Given 2/3/25 1255)   ipratropium-albuterol (DUO-NEB) nebulizer solution 3 mL (3 mL Nebulization Given 2/3/25 1342)   ondansetron (ZOFRAN) injection 4 mg (4 mg Intravenous Given 2/3/25 1514)       Imaging results:  XR Chest 1 View    Result Date: 2/3/2025  No focal pulmonary consolidation. Cardiomegaly. Tortuous aorta. Follow-up as clinically indicated.     This report " was finalized on 2/3/2025 1:22 PM by Dr. Lenny Cooper M.D on Workstation: [id number]       Ambulatory status:   - adlib    Social issues:   Social History     Socioeconomic History    Marital status: Single    Number of children: 0    Years of education: BS    Tobacco Use    Smoking status: Never     Passive exposure: Never    Smokeless tobacco: Never    Tobacco comments:     caffeine use: TEA OCCASSIONALLY   Vaping Use    Vaping status: Never Used   Substance and Sexual Activity    Alcohol use: No    Drug use: Never    Sexual activity: Not Currently     Birth control/protection: Post-menopausal       Peripheral Neurovascular  Peripheral Neurovascular (Adult)  Peripheral Neurovascular WDL: WDL    Neuro Cognitive  Neuro Cognitive (Adult)  Cognitive/Neuro/Behavioral WDL: WDL    Learning  Learning Assessment  Learning Readiness and Ability: no barriers identified    Respiratory  Respiratory WDL  Respiratory WDL: .WDL except, cough  Cough Frequency: frequent  Cough Type: nonproductive  Breath Sounds  All Lung Fields Breath Sounds: All Fields  All Lung Fields Breath Sounds: Anterior:, Lateral:, diminished    Abdominal Pain       Pain Assessments  Pain (Adult)  (0-10) Pain Rating: Rest: 0  Response to Pain Interventions: interventions effective per patient    NIH Stroke Scale       Jovita Noe RN  02/03/25 17:18 EST      Electronically signed by Jovita Noe RN at 02/03/25 1718       Lele Smith MD at 02/03/25 1339           EMERGENCY DEPARTMENT ENCOUNTER  Room Number:  39/39  PCP: Pierre Chaudhry Jr., MD  Independent Historians: Patient      HPI:  Chief Complaint: had concerns including Fever.     A complete HPI/ROS/PMH/PSH/SH/FH are unobtainable due to: Nothing      Context: Katelyn Sr is a 74 y.o. female with a medical history of FUENTES, CAD, A-fib who presents to the ED c/o acute diarrhea, fever and chills, cough.  She reports that the diarrhea began last Thursday.  She has had  some associated nausea and chills throughout the weekend.  She has had a cough and she can hear herself wheezing.  She has had some production of phlegm.  She denies chest pain.  She does not wear home oxygen.  O2 saturations on arrival were 84% on room air.      Review of prior external notes (non-ED) -and- Review of prior external test results outside of this encounter: I reviewed orthopedic surgery progress note from 12/30/2024 where patient was evaluated for right shoulder pain.        PAST MEDICAL HISTORY  Active Ambulatory Problems     Diagnosis Date Noted    FUENTES on autoCPAP 10/19/2017    Circadian rhythm sleep disorder, delayed sleep phase type 10/28/2017    Hypersomnia due to another medical condition 10/28/2017    NSTEMI (non-ST elevated myocardial infarction) 05/21/2018    Essential hypertension 06/29/2018    Degeneration of cervical intervertebral disc 11/09/2018    DDD (degenerative disc disease), thoracic 11/09/2018    Disc degeneration, lumbar 11/27/2018    Mixed hyperlipidemia 01/10/2019    Palpitations 07/23/2019    Extremity pain 07/26/2019    LBP (low back pain) 07/26/2019    DDD (degenerative disc disease), lumbar 07/26/2019    Atypical chest pain 10/30/2019    Vertigo 07/14/2020    Depression 07/14/2020    Generalized anxiety disorder 07/14/2020    Peripheral vestibulopathy of both ears 07/14/2020    Cervical stenosis of spinal canal 07/14/2020    Vestibular neuronitis 07/17/2020    Hammer toe of right foot 08/11/2022    Arthritis of foot 08/11/2022    Arthritis of first metatarsophalangeal (MTP) joint of right foot 08/11/2022    Chronic dyspnea 09/21/2023    Hallux valgus of right foot 11/29/2023    Lumbar facet arthropathy 01/12/2024    A-fib 01/24/2024    Elevated troponin 01/24/2024    Sacroiliitis 02/01/2024    Syncope 09/25/2024     Resolved Ambulatory Problems     Diagnosis Date Noted    No Resolved Ambulatory Problems     Past Medical History:   Diagnosis Date    Abnormal ECG 5/21/18     Arthritis of neck     Asthma 12/15/2021    Atrial fibrillation     Cervical disc disorder 8/03    CTS (carpal tunnel syndrome)     DDD (degenerative disc disease), lumbosacral 11/09/2018    Fibrosis of lung     Fracture, finger     GERD (gastroesophageal reflux disease)     Hip arthrosis     Hyperlipidemia     Hypersensitivity pneumonitis     Hypersensitivity pneumonitis     Hypertension     Knee swelling     Low back strain     Lower back pain 07/26/2019    Myocardial infarction     Neck strain     Neuroma of foot     Non-STEMI (non-ST elevated myocardial infarction) 05/21/2018    Nonsenile cataract     Presence of artificial intra-ocular lens     Scoliosis     Sleep apnea     Thoracic disc disorder     Thyroid disease          PAST SURGICAL HISTORY  Past Surgical History:   Procedure Laterality Date    CARDIAC CATHETERIZATION N/A 05/22/2018    Procedure: Left Heart Cath;  Surgeon: Aleks Velazquez MD;  Location: [facility] CATH INVASIVE LOCATION;  Service: Cardiovascular    CARDIAC CATHETERIZATION N/A 05/22/2018    Procedure: Coronary angiography;  Surgeon: Aleks Velazquez MD;  Location:  [facility] CATH INVASIVE LOCATION;  Service: Cardiovascular    CARDIAC CATHETERIZATION N/A 05/22/2018    Procedure: Left ventriculography;  Surgeon: Aleks Velazquez MD;  Location: [facility] CATH INVASIVE LOCATION;  Service: Cardiovascular    CARDIAC CATHETERIZATION N/A 05/22/2018    Procedure: Peripheral angiography;  Surgeon: Aleks Velazquez MD;  Location: [facility] CATH INVASIVE LOCATION;  Service: Cardiovascular    CATARACT EXTRACTION WITH INTRAOCULAR LENS IMPLANT Bilateral     COLONOSCOPY N/A 03/08/2023    Procedure: COLONOSCOPY to cecum/TI with cold biopsies;  Surgeon: Hamilton Francisco MD;  Location: [facility] ENDOSCOPY;  Service: Gastroenterology;  Laterality: N/A;  pre- diverticulitis  post- diverticulosis with stricture    DIAGNOSTIC LAPAROSCOPY  1990    r/o endometriosis    ENDOSCOPY N/A 03/08/2023    Procedure:  ESOPHAGOGASTRODUODENOSCOPY with cold biopsies and 52 Fr Kam Dilatation;  Surgeon: Hamilton Francisco MD;  Location: [facility] ENDOSCOPY;  Service: Gastroenterology;  Laterality: N/A;  pre- dysphagia  post- hiatal hernia, gastric polyps, esophageal ring @ 32    NECK SURGERY  2003    ACDF C6-7-Dr. Nix     RECTAL SURGERY      fistula/abscess/hemorrhoid         FAMILY HISTORY  Family History   Problem Relation Age of Onset    Alzheimer's disease Mother     Heart failure Father     Arrhythmia Father     Heart attack Father         MI at age 56,  at 63.    Diabetes Sister     Atrial fibrillation Sister     Colon cancer Maternal Grandmother     Stroke Paternal Grandmother     Malig Hyperthermia Neg Hx          SOCIAL HISTORY  Social History     Socioeconomic History    Marital status: Single    Number of children: 0    Years of education: BS    Tobacco Use    Smoking status: Never     Passive exposure: Never    Smokeless tobacco: Never    Tobacco comments:     caffeine use: TEA OCCASSIONALLY   Vaping Use    Vaping status: Never Used   Substance and Sexual Activity    Alcohol use: No    Drug use: Never    Sexual activity: Not Currently     Birth control/protection: Post-menopausal         ALLERGIES  Simvastatin, Erythromycin, and Penicillins      REVIEW OF SYSTEMS  Review of all 14 systems is negative other than stated in the HPI above.      PHYSICAL EXAM    I have reviewed the triage vital signs and nursing notes.    ED Triage Vitals   Temp Heart Rate Resp BP SpO2   25 1226 25 1226 25 1226 25 1234 25 1226   (!) 100.7 °F (38.2 °C) 86 18 134/70 (!) 88 %      Temp src Heart Rate Source Patient Position BP Location FiO2 (%)   25 1226 25 1226 -- -- --   Tympanic Monitor            GENERAL: awake and alert, no acute distress  HENT: Normocephalic, atraumatic  EYES: no scleral icterus  CV: regular rhythm, regular rate  RESPIRATORY: normal effort, mild rhonchi present  bilaterally, no active wheezing  ABDOMEN: soft, nondistended, nontender throughout  MUSCULOSKELETAL: no deformity  NEURO: alert, moves all extremities, follows commands  PSYCH: calm, cooperative  SKIN: Warm, dry          LAB RESULTS  Recent Results (from the past 24 hours)   Respiratory Panel PCR w/COVID-19(SARS-CoV-2) [facility] In-House, NP Swab in UTM/VTM, 2 HR TAT - Swab, Nasopharynx    Collection Time: 02/03/25 12:35 PM    Specimen: Nasopharynx; Swab   Result Value Ref Range    ADENOVIRUS, PCR Not Detected Not Detected    Coronavirus 229E Not Detected Not Detected    Coronavirus HKU1 Not Detected Not Detected    Coronavirus NL63 Not Detected Not Detected    Coronavirus OC43 Not Detected Not Detected    COVID19 Not Detected Not Detected - Ref. Range    Human Metapneumovirus Not Detected Not Detected    Human Rhinovirus/Enterovirus Not Detected Not Detected    Influenza A H3 Detected (A) Not Detected    Influenza B PCR Not Detected Not Detected    Parainfluenza Virus 1 Not Detected Not Detected    Parainfluenza Virus 2 Not Detected Not Detected    Parainfluenza Virus 3 Not Detected Not Detected    Parainfluenza Virus 4 Not Detected Not Detected    RSV, PCR Not Detected Not Detected    Bordetella pertussis pcr Not Detected Not Detected    Bordetella parapertussis PCR Not Detected Not Detected    Chlamydophila pneumoniae PCR Not Detected Not Detected    Mycoplasma pneumo by PCR Not Detected Not Detected   Comprehensive Metabolic Panel    Collection Time: 02/03/25 12:40 PM    Specimen: Blood   Result Value Ref Range    Glucose 104 (H) 65 - 99 mg/dL    BUN 11 8 - 23 mg/dL    Creatinine 1.01 (H) 0.57 - 1.00 mg/dL    Sodium 136 136 - 145 mmol/L    Potassium 4.1 3.5 - 5.2 mmol/L    Chloride 102 98 - 107 mmol/L    CO2 22.0 22.0 - 29.0 mmol/L    Calcium 8.9 8.6 - 10.5 mg/dL    Total Protein 7.1 6.0 - 8.5 g/dL    Albumin 3.7 3.5 - 5.2 g/dL    ALT (SGPT) 12 1 - 33 U/L    AST (SGOT) 18 1 - 32 U/L    Alkaline  Phosphatase 102 39 - 117 U/L    Total Bilirubin 0.5 0.0 - 1.2 mg/dL    Globulin 3.4 gm/dL    A/G Ratio 1.1 g/dL    BUN/Creatinine Ratio 10.9 7.0 - 25.0    Anion Gap 12.0 5.0 - 15.0 mmol/L    eGFR 59.3 (L) >60.0 mL/min/1.73   Lipase    Collection Time: 02/03/25 12:40 PM    Specimen: Blood   Result Value Ref Range    Lipase 19 13 - 60 U/L   Procalcitonin    Collection Time: 02/03/25 12:40 PM    Specimen: Blood   Result Value Ref Range    Procalcitonin 0.10 0.00 - 0.25 ng/mL   CBC Auto Differential    Collection Time: 02/03/25 12:40 PM    Specimen: Blood   Result Value Ref Range    WBC 6.17 3.40 - 10.80 10*3/mm3    RBC 4.77 3.77 - 5.28 10*6/mm3    Hemoglobin 13.3 12.0 - 15.9 g/dL    Hematocrit 40.8 34.0 - 46.6 %    MCV 85.5 79.0 - 97.0 fL    MCH 27.9 26.6 - 33.0 pg    MCHC 32.6 31.5 - 35.7 g/dL    RDW 15.0 12.3 - 15.4 %    RDW-SD 46.9 37.0 - 54.0 fl    MPV 8.5 6.0 - 12.0 fL    Platelets 225 140 - 450 10*3/mm3    Neutrophil % 77.1 (H) 42.7 - 76.0 %    Lymphocyte % 8.6 (L) 19.6 - 45.3 %    Monocyte % 13.1 (H) 5.0 - 12.0 %    Eosinophil % 0.5 0.3 - 6.2 %    Basophil % 0.2 0.0 - 1.5 %    Immature Grans % 0.5 0.0 - 0.5 %    Neutrophils, Absolute 4.76 1.70 - 7.00 10*3/mm3    Lymphocytes, Absolute 0.53 (L) 0.70 - 3.10 10*3/mm3    Monocytes, Absolute 0.81 0.10 - 0.90 10*3/mm3    Eosinophils, Absolute 0.03 0.00 - 0.40 10*3/mm3    Basophils, Absolute 0.01 0.00 - 0.20 10*3/mm3    Immature Grans, Absolute 0.03 0.00 - 0.05 10*3/mm3    nRBC 0.0 0.0 - 0.2 /100 WBC       The above labs were ordered by me and independently reviewed by me.     RADIOLOGY  XR Chest 1 View    Result Date: 2/3/2025  XR CHEST 1 VW-  HISTORY: Female who is 74 years-old, fever, cough  TECHNIQUE: Frontal view of the chest  COMPARISON:  image from CT from 1/28/2025  FINDINGS: The heart is enlarged. Aorta is tortuous. Pulmonary vasculature is unremarkable. No focal pulmonary consolidation, pleural effusion, or pneumothorax. No acute osseous process.      No  focal pulmonary consolidation. Cardiomegaly. Tortuous aorta. Follow-up as clinically indicated.     This report was finalized on 2/3/2025 1:22 PM by Dr. Lenny Cooper M.D on Workstation: [id number]       The above radiology studies were ordered by me.  See ED course for independent interpretations.     MEDICATIONS GIVEN IN ER  Medications   sodium chloride 0.9 % flush 10 mL (has no administration in time range)   acetaminophen (TYLENOL) tablet 1,000 mg (1,000 mg Oral Given 2/3/25 1255)   ipratropium-albuterol (DUO-NEB) nebulizer solution 3 mL (3 mL Nebulization Given 2/3/25 1342)         ORDERS PLACED DURING THIS VISIT:  Orders Placed This Encounter   Procedures    Respiratory Panel PCR w/COVID-19(SARS-CoV-2) [facility], NP Swab in UTM/VTM, 2 HR TAT - Swab, Nasopharynx    XR Chest 1 View    Comprehensive Metabolic Panel    Lipase    Procalcitonin    CBC Auto Differential    Continuous Pulse Oximetry    Monitor Blood Pressure    [facility] (on-call MD unless specified) Details    Insert Peripheral IV    Inpatient Admission    CBC & Differential         OUTPATIENT MEDICATION MANAGEMENT:  Current Facility-Administered Medications Ordered in Epic   Medication Dose Route Frequency Provider Last Rate Last Admin    sodium chloride 0.9 % flush 10 mL  10 mL Intravenous PRN Lele Smith MD         Current Outpatient Medications Ordered in Epic   Medication Sig Dispense Refill    amLODIPine (NORVASC) 5 MG tablet TAKE 1 TABLET BY MOUTH EVERY DAY 90 tablet 3    escitalopram (LEXAPRO) 20 MG tablet Take 1 tablet by mouth Daily.      hydrALAZINE (APRESOLINE) 50 MG tablet Take 1 tablet by mouth 2 (Two) Times a Day. 180 tablet 4    lamoTRIgine (LaMICtal) 100 MG tablet Take 1 tablet by mouth Every Night.      losartan (COZAAR) 100 MG tablet TAKE 1 TABLET BY MOUTH EVERY DAY (Patient taking differently: Take 1 tablet by mouth Daily.) 90 tablet 4    modafinil (PROVIGIL) 100 MG tablet Take 1 tablet by mouth  Daily.      pravastatin (PRAVACHOL) 40 MG tablet Take 1 tablet by mouth every night at bedtime. 90 tablet 3    apixaban (ELIQUIS) 5 MG tablet tablet Take 1 tablet by mouth 2 (Two) Times a Day. 28 tablet 0    ARIPiprazole (ABILIFY) 5 MG tablet Take 1 tablet by mouth Daily.      carvedilol (COREG) 25 MG tablet Take 1 tablet by mouth 2 (Two) Times a Day. 180 tablet 4    famotidine (PEPCID) 40 MG tablet Take 1 tablet by mouth Every Night.      Nintedanib Esylate (Ofev) 100 MG capsule Take 1 capsule by mouth 2 (Two) Times a Day.      ondansetron ODT (ZOFRAN-ODT) 4 MG disintegrating tablet       pantoprazole (PROTONIX) 40 MG EC tablet Take 1 tablet by mouth Daily.      traZODone (DESYREL) 50 MG tablet Take 0.5 tablets by mouth At Night As Needed for Sleep.           PROCEDURES  Procedures            PROGRESS, DATA ANALYSIS, CONSULTS, AND MEDICAL DECISION MAKING  All labs have been independently interpreted by me.  All radiology studies have been reviewed by me. All EKG's have been independently viewed and interpreted by me.  Discussion below represents my analysis of pertinent findings related to patient's condition, differential diagnosis, treatment plan and final disposition.    Differential diagnosis includes but is not limited to:  Acute bronchitis  Pulmonary edema  Pneumonia  Pulmonary embolism      Clinical Scores:                  ED Course as of 02/03/25 1405   Mon Feb 03, 2025   1337 Procalcitonin: 0.10 [JR]   1338 Chest x-ray dependently interpreted PACS.  Lung fields are mostly clear bilaterally.  There has been prior cervical fusion. [JR]   1404 Discussed with Dr. Rivera, [facility] hospitalist, who agrees to admit for further management of patient's hypoxia secondary to influenza A infection. [JR]      ED Course User Index  [JR] Lele Smith MD             AS OF 14:05 EST VITALS:    BP - 117/70  HR - 76  TEMP - (!) 101.6 °F (38.7 °C) (Tympanic)  O2 SATS - 96%    COMPLEXITY OF CARE  The patient requires  admission.      Chronic or social conditions impacting patient care (Social Determinants of Health):     DIAGNOSIS  Final diagnoses:   Acute hypoxic respiratory failure   Influenza A           DISPOSITION  ADMIT      Prescription drug monitoring program review:           Please note that portions of this document were completed with a voice recognition program.    Note Disclaimer: At [facility], we believe that sharing information builds trust and better relationships. You are receiving this note because you recently visited [facility]. It is possible you will see health information before a provider has talked with you about it. This kind of information can be easy to misunderstand. To help you fully understand what it means for your health, we urge you to discuss this note with your provider.         Lele Smith MD  02/03/25 1405      Electronically signed by Lele Smith MD at 02/03/25 1405       Jovita Noe, RN at 02/03/25 1231          Pt c/o cough over the last couple days. Pt reports fever starting Saturday. Pt has not taken meds for fever today.     Electronically signed by Jovita Noe, RN at 02/03/25 1233       Oxygen Therapy (since admission)       Date/Time SpO2 Device (Oxygen Therapy) Flow (L/min) (Oxygen Therapy) Oxygen Concentration (%) ETCO2 (mmHg)    02/05/25 0721 -- nasal cannula -- -- --    02/04/25 2355 95 nasal cannula 1.5 -- --    02/04/25 1947 95 nasal cannula 1.5 -- --    02/04/25 1408 -- nasal cannula 1.5 -- --    02/04/25 1202 94 nasal cannula 2 -- --    02/04/25 0820 -- nasal cannula 2 -- --    02/04/25 0737 96 nasal cannula 2 -- --    02/04/25 0020 99 -- -- -- --    02/04/25 0015 96 nasal cannula 3 -- --    02/04/25 0011 96 nasal cannula 3 -- --    02/03/25 2012 96 nasal cannula 2 -- --    02/03/25 1840 91 -- -- -- --    02/03/25 1810 92 -- -- -- --    02/03/25 1741 93 -- -- -- --    02/03/25 1711 94 -- -- -- --    02/03/25 1641 94 -- -- -- --     25 1611 94 nasal cannula 2 -- --    25 1541 90 -- -- -- --    25 1511 92 -- -- -- --    25 1509 92 -- -- -- --    25 1439 91 -- -- -- --    25 1415 92 -- -- -- --    25 1342 96 nasal cannula 2 -- --    25 1341 95 nasal cannula 2 -- --    25 1311 97 -- -- -- --    25 1258 87 room air -- -- --    25 1245 94 nasal cannula 2 -- --    25 1241 95 -- -- -- --    25 1235 90 -- -- -- --    25 1226 88 room air -- -- --          Intake & Output (last 3 days)          07 07 0701   0700  0701   0700  0701   0700    P.O.  240 900     Total Intake(mL/kg)  240 (2.8) 900 (10.7)     Net  +240 +900             Urine Unmeasured Occurrence   3 x            Lines, Drains & Airways       Active LDAs       Name Placement date Placement time Site Days    Peripheral IV 25 1239 Right Antecubital 25  1239  Antecubital  1              Inactive LDAs       None                  Operative/Procedure Notes (all)    No notes of this type exist for this encounter.          Physician Progress Notes (all)        Christine Tapia MD at 25 1326              Name: Katelyn Sr ADMIT: 2/3/2025   : 1950  PCP: Pierre Chaudhry Jr., MD    MRN: [id number] LOS: 1 days   AGE/SEX: 74 y.o. female  ROOM: [facility]     Subjective   Subjective   No acute events overnight.  Patient states she is feeling a bit better.  She is currently on 2 L nasal cannula.  She denies chest pain.  Eating and drinking well.  Shortness of breath is improving.    Objective   Objective     Vital Signs  Temp:  [98 °F (36.7 °C)-101.6 °F (38.7 °C)] 98.9 °F (37.2 °C)  Heart Rate:  [57-78] 57  Resp:  [16-20] 16  BP: ()/(50-81) 111/56  SpO2:  [90 %-99 %] 94 %  on  Flow (L/min) (Oxygen Therapy):  [2-3] 2;   Device (Oxygen Therapy): nasal cannula  Body mass index is 35.14 kg/m².    Physical Exam  General: Alert, no acute distress.   "Sitting up in bed.   ENT: No conjunctival injection or scleral icterus. Moist mucous membranes.  Neuro: Eyes open and moving in all directions, generalized weakness, face symmetric, no focal deficits.   Lungs: Diminished aeration, occasional crackle.  No wheezing.  Nasal cannula in place.  No distress.  Heart: RRR, no murmurs. No edema.  Abdomen: Soft, non-tender, non-distended. Normal bowel sounds.  Ext: Warm and well-perfused. No edema.   Skin: No rashes or lesions. IV site without swelling or erythema.     Results Review     I reviewed the patient's new clinical results:  Results from last 7 days   Lab Units 02/04/25  0522 02/03/25  1240   WBC 10*3/mm3 4.73 6.17   HEMOGLOBIN g/dL 11.8* 13.3   PLATELETS 10*3/mm3 189 225     Results from last 7 days   Lab Units 02/04/25  0522 02/03/25  1240   SODIUM mmol/L 135* 136   POTASSIUM mmol/L 4.0 4.1   CHLORIDE mmol/L 103 102   CO2 mmol/L 21.0* 22.0   BUN mg/dL 15 11   CREATININE mg/dL 0.97 1.01*   GLUCOSE mg/dL 84 104*   EGFR mL/min/1.73 62.2 59.3*     Results from last 7 days   Lab Units 02/04/25  0522 02/03/25  1240   ALBUMIN g/dL 3.1* 3.7   BILIRUBIN mg/dL 0.4 0.5   ALK PHOS U/L 81 102   AST (SGOT) U/L 22 18   ALT (SGPT) U/L 11 12     Results from last 7 days   Lab Units 02/04/25  0522 02/03/25  1240   CALCIUM mg/dL 8.2* 8.9   ALBUMIN g/dL 3.1* 3.7     Results from last 7 days   Lab Units 02/03/25  1240   PROCALCITONIN ng/mL 0.10     No results found for: \"HGBA1C\", \"POCGLU\"    XR Chest 1 View    Result Date: 2/3/2025  No focal pulmonary consolidation. Cardiomegaly. Tortuous aorta. Follow-up as clinically indicated.     This report was finalized on 2/3/2025 1:22 PM by Dr. Lenny Cooper M.D on Workstation: [id number]       I have personally reviewed all medications:  Scheduled Medications  amLODIPine, 5 mg, Oral, Daily  apixaban, 2.5 mg, Oral, BID  ARIPiprazole, 5 mg, Oral, Daily  carvedilol, 25 mg, Oral, BID  escitalopram, 20 mg, Oral, Daily  famotidine, 40 mg, Oral, " Nightly  hydrALAZINE, 50 mg, Oral, BID  lamoTRIgine, 100 mg, Oral, Nightly  losartan, 100 mg, Oral, Daily  Nintedanib Esylate, 100 mg, Oral, BID  oseltamivir, 30 mg, Oral, Q12H  pantoprazole, 40 mg, Oral, Daily  Pharmacy to Dose Oseltamivir (TAMIFLU), , Not Applicable, BID  pravastatin, 40 mg, Oral, Nightly  sodium chloride, 10 mL, Intravenous, Q12H    Infusions   Diet  Diet: Cardiac; Healthy Heart (2-3 Na+); Fluid Consistency: Thin (IDDSI 0)      Intake/Output Summary (Last 24 hours) at 2/4/2025 1326  Last data filed at 2/4/2025 0820  Gross per 24 hour   Intake 480 ml   Output --   Net 480 ml       Assessment/Plan     Active Hospital Problems    Diagnosis  POA    **Acute hypoxic respiratory failure [J96.01]  Yes    Influenza A [J10.1]  Unknown    A-fib [I48.91]  Yes    Essential hypertension [I10]  Yes    FUENTES on autoCPAP [G47.33]  Yes      Resolved Hospital Problems   No resolved problems to display.       74 y.o. female with Acute hypoxic respiratory failure.    Acute hypoxic respiratory failure  Influenza A  Fever  -CXR with no evidence of infiltrate or consolidation  -WBC and procalcitonin negative  -Tamiflu  -No indication for steroids, no wheezing on exam  -Continue DuoNebs  -Check D-dimer.  If elevated, check CT angiogram chest.  -Pulmonology consulted as below  -Currently on 2 L nasal cannula, wean as able.  Goal sats greater than 90%    Hypertension  Atrial fibrillation  -RC: Coreg  -AC: Eliquis  -Continue amlodipine, losartan, and hydralazine  -Titrate meds as needed based on BP trends    History of hypersensitivity pneumonitis versus pulmonary fibrosis  -Continue home meds  -Pulmonology consulted, evaluation pending    Eliquis (home med) for DVT prophylaxis.  Full code.  Discussed with patient and nursing staff.  Anticipate discharge home timing yet to be determined.      Christine Tapia MD  [facility]  02/04/25  13:26 EST      Electronically signed by Christine Tapia MD at 02/04/25  1331          Consult Notes (all)        Toan Mcmahan MD at 02/04/25 1646        Consult Orders    1. Inpatient Pulmonology Consult [[id number]] ordered by Sagar Rivera MD at 02/04/25 2325                     [facility]  [phone]  Dr. Toan Mcmahan      Subjective   LOS: 1 day     74-year-old female who follows with Dr. Rohith Gutiérrez in office.  She has chronic fibrosing hypersensitivity pneumonitis.  She is currently on Ofev but only takes about 50% of the doses.  She has been advised supplemental oxygen but declined.  She has obstructive sleep apnea and is on a CPAP machine managed by Dr. Sukhjinder Connelly in the sleep center.  She has mediastinal lymphadenopathy that is being monitored with CT chest and last 1 was performed on 1/28/2025.  She has esophageal stricture with hiatal hernia and GERD.  Additionally she has paroxysmal A-fib for which she is on Eliquis.  Underlying coronary artery disease, hypertension, hypothyroidism.    She came into the hospital with cough, generalized bodyaches and was found to positive for influenza A infection.  She is currently requiring 2 L oxygen.  She started this episode of her diarrhea.  She then developed a cough and then some fevers.     Katelyn Sr  reports no history of alcohol use.,  reports that she has never smoked. She has never been exposed to tobacco smoke. She has never used smokeless tobacco.  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), CTS (carpal tunnel syndrome), DDD (degenerative disc disease), lumbosacral (11/09/2018), Depression, Fibrosis of lung, Fracture, finger, Generalized anxiety disorder (07/14/2020), GERD (gastroesophageal reflux disease), Hip arthrosis, Hyperlipidemia, Hypersensitivity  pneumonitis, Hypersensitivity pneumonitis, Hypersomnia due to another medical condition (10/28/2017), Hypertension, Knee swelling, Low back strain, Lower back pain (07/26/2019), Myocardial infarction, Neck strain, Neuroma of foot, Non-STEMI (non-ST elevated myocardial infarction) (05/21/2018), Nonsenile cataract, FUENTES on CPAP (10/19/2017), Palpitations (07/23/2019), 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  Surg Hx:  has a past surgical history that includes Diagnostic laparoscopy (1990); Rectal surgery (1992); Cardiac catheterization (N/A, 05/22/2018); Cardiac catheterization (N/A, 05/22/2018); Cardiac catheterization (N/A, 05/22/2018); Cardiac catheterization (N/A, 05/22/2018); Neck surgery (12/03/2003); Cataract extraction w/  intraocular lens implant (Bilateral); Esophagogastroduodenoscopy (N/A, 03/08/2023); and Colonoscopy (N/A, 03/08/2023).  FH: family history includes Alzheimer's disease in her mother; Arrhythmia in her father; Atrial fibrillation in her sister; Colon cancer in her maternal grandmother; Diabetes in her sister; Heart attack in her father; Heart failure in her father; Stroke in her paternal grandmother.  SH:  reports that she has never smoked. She has never been exposed to tobacco smoke. She has never used smokeless tobacco. She reports that she does not drink alcohol and does not use drugs.    Medications Prior to Admission   Medication Sig Dispense Refill Last Dose/Taking    amLODIPine (NORVASC) 5 MG tablet TAKE 1 TABLET BY MOUTH EVERY DAY 90 tablet 3 Past Week    apixaban (ELIQUIS) 2.5 MG tablet tablet Take 1 tablet by mouth 2 (Two) Times a Day.   Past Week    ARIPiprazole (ABILIFY) 5 MG tablet Take 1 tablet by mouth Daily.   Past Month    carvedilol (COREG) 25 MG tablet Take 1 tablet by mouth 2 (Two) Times a Day.  180 tablet 4 Past Week    escitalopram (LEXAPRO) 20 MG tablet Take 1 tablet by mouth Daily.   Past Week    famotidine (PEPCID) 40 MG tablet Take 1 tablet by mouth Every Night.   Past Week    hydrALAZINE (APRESOLINE) 50 MG tablet Take 1 tablet by mouth 2 (Two) Times a Day. 180 tablet 4 Past Week    lamoTRIgine (LaMICtal) 100 MG tablet Take 1 tablet by mouth Every Night.   Past Week    losartan (COZAAR) 100 MG tablet TAKE 1 TABLET BY MOUTH EVERY DAY (Patient taking differently: Take 1 tablet by mouth Daily.) 90 tablet 4 Past Week    Nintedanib Esylate (Ofev) 100 MG capsule Take 1 capsule by mouth 2 (Two) Times a Day.   Past Week    ondansetron ODT (ZOFRAN-ODT) 4 MG disintegrating tablet Place 1 tablet on the tongue Every 8 (Eight) Hours As Needed.   2/2/2025    pantoprazole (PROTONIX) 40 MG EC tablet Take 1 tablet by mouth Daily.   Past Week    pravastatin (PRAVACHOL) 40 MG tablet Take 1 tablet by mouth every night at bedtime. 90 tablet 3 Past Week    modafinil (PROVIGIL) 100 MG tablet Take 1 tablet by mouth Daily. (Patient not taking: Reported on 2/3/2025)   Not Taking    traZODone (DESYREL) 50 MG tablet Take 0.5 tablets by mouth At Night As Needed for Sleep.   More than a month     Allergies   Allergen Reactions    Simvastatin Myalgia     Cramps in thighs      Erythromycin Rash    Penicillins Rash       Review of Systems   Constitutional:  Positive for chills and fever.   HENT:  Positive for congestion. Negative for sore throat.    Respiratory:  Positive for cough and shortness of breath.    Cardiovascular:  Negative for chest pain and leg swelling.   Gastrointestinal:  Positive for diarrhea. Negative for abdominal pain, nausea and vomiting.   Genitourinary:  Negative for dysuria and hematuria.   Neurological:  Negative for seizures and headaches.   Psychiatric/Behavioral:  Negative for agitation and confusion.        Vital Signs past 24hrs  BP range: BP: (108-135)/(56-81) 111/56  Pulse range: Heart Rate:  [57-74]  57  Resp rate range: Resp:  [16-18] 16  Temp range: Temp (24hrs), Av.6 °F (37 °C), Min:98 °F (36.7 °C), Max:99 °F (37.2 °C)    Oxygen range: SpO2:  [91 %-99 %] 94 %; Flow (L/min) (Oxygen Therapy):  [1.5-3] 1.5;   Device (Oxygen Therapy): nasal cannula  84.4 kg (186 lb); Body mass index is 35.14 kg/m².  Net IO Since Admission: 690 mL [25 1646]      Mechanical Ventilator:     Adult female sitting on the recliner.  Pupils equal and react light.  Oropharynx moist.  JVP not elevated though neck is large.  Trachea midline.  Lungs reveal bilateral air entry.  Few crackles in the lung bases.  No wheeze.  Percussion note resonant chest expansion equal no chest wall deformity or tenderness.  Heart examination S1-S2 present rhythm regular no murmurs.  No edema lower extremities.  Abdomen is obese soft nontender bowel sounds present no liver spleen enlargement.  No peripheral cyanosis clubbing.  Moves all 4 extremities sensorimotor intact.  No cervical, axillary, inguinal adenopathy.    Results Review:    I have reviewed the laboratory and imaging data from current admission. My annotations are as noted in assessment and plan.  Result Review:  I have personally reviewed the results from the time of this admission to 2025 16:46 EST and agree with these findings:  [x]  Laboratory list / accordion  [x]  Microbiology  [x]  Radiology  []  EKG/Telemetry   []  Cardiology/Vascular   []  Pathology  []  Old records  []  Other:        Medication Review:  I have reviewed the current MAR. My annotations are as noted in assessment and plan.       Lines, Drains & Airways       Active LDAs       Name Placement date Placement time Site Days    Peripheral IV 25 1239 Right Antecubital 25  1239  Antecubital  1                  Isolation status: Droplet    Dietary Orders (From admission, onward)       Start     Ordered    25  Diet: Cardiac; Healthy Heart (2-3 Na+); Fluid Consistency: Thin (IDDSI 0)  Diet  Effective Now        References:    Diet Order Definitions   Question Answer Comment   Diets: Cardiac    Cardiac Diet: Healthy Heart (2-3 Na+)    Fluid Consistency: Thin (IDDSI 0)        02/03/25 1951                    Isolation:  Droplet    PCCM Problems  Acute influenza A infection  Hypoxia  Chronic fibrosing hypersensitivity pneumonitis on Ofev  Mediastinal adenopathy  Esophageal stricture  GERD  Afib on anticoagulation  CAD  FUENTES on CPAP  Obesity      Plan of Treatment  Supportive treatment for influenza A infection.    Continue supplemental oxygen.  Can check a walking oximetry tomorrow to see if she again/still qualifies for oxygen on discharge.    Chronic fibrosing hypersensitivity pneumonitis and on Ofev.  Patient only takes about 50% of her doses since she has to take it with meals.    Mediastinal adenopathy stable on most recent CT chest.    Esophageal stricture with hiatal hernia and GERD could be contributing to her lung findings.    Uses CPAP regularly at home.    Will reschedule her appointment with Dr. Gutiérrez that she missed.    Electronically signed by Toan Mcmahan MD, 02/04/25, 4:46 PM EST.      Part of this note may be an electronic transcription/translation of spoken language to printed text using the Dragon Dictation System.      Electronically signed by Toan Mcmahan MD at 02/04/25 3099

## 2025-02-05 NOTE — THERAPY EVALUATION
Patient Name: Katelyn Sr  : 1950    MRN: [id number]                              Today's Date: 2025       Admit Date: 2/3/2025    Visit Dx:     ICD-10-CM ICD-9-CM   1. Acute hypoxic respiratory failure  J96.01 518.81   2. Influenza A  J10.1 487.1     Patient Active Problem List   Diagnosis    FUENTES on autoCPAP    Circadian rhythm sleep disorder, delayed sleep phase type    Hypersomnia due to another medical condition    NSTEMI (non-ST elevated myocardial infarction)    Essential hypertension    Degeneration of cervical intervertebral disc    DDD (degenerative disc disease), thoracic    Disc degeneration, lumbar    Mixed hyperlipidemia    Palpitations    Extremity pain    LBP (low back pain)    DDD (degenerative disc disease), lumbar    Atypical chest pain    Vertigo    Depression    Generalized anxiety disorder    Peripheral vestibulopathy of both ears    Cervical stenosis of spinal canal    Vestibular neuronitis    Hammer toe of right foot    Arthritis of foot    Arthritis of first metatarsophalangeal (MTP) joint of right foot    Chronic dyspnea    Hallux valgus of right foot    Lumbar facet arthropathy    A-fib    Elevated troponin    Sacroiliitis    Syncope    Acute hypoxic respiratory failure    Influenza A     Past Medical History:   Diagnosis Date    Abnormal ECG 18    Done in PCP office.  Hx of Atrial fib-?    Arthritis of neck     Asthma 12/15/2021    Hypersensitivity Pneumonitis; under treatment at [facility]    Atrial fibrillation     Cervical disc disorder     ACDF C6-7, 2003;  Dr. Tr Nix    Cervical stenosis of spinal canal 2020    Circadian rhythm sleep disorder, delayed sleep phase type 2017    CTS (carpal tunnel syndrome)     DDD (degenerative disc disease), lumbosacral 2018    Depression     Fibrosis of lung     Fracture, finger     Generalized anxiety disorder 2020    GERD (gastroesophageal reflux disease)     Hip arthrosis      Hyperlipidemia     Hypersensitivity pneumonitis     vs pulmonary fibrosis    Hypersensitivity pneumonitis     Hypersomnia due to another medical condition 10/28/2017    Hypertension     Knee swelling     Low back strain     Lower back pain 07/26/2019    Myocardial infarction     Neck strain     Neuroma of foot     Non-STEMI (non-ST elevated myocardial infarction) 05/21/2018    Nonsenile cataract     FUENTES on CPAP 10/19/2017    Palpitations 07/23/2019    Peripheral vestibulopathy of both ears 07/14/2020    Presence of artificial intra-ocular lens     Scoliosis     Sleep apnea     Thoracic disc disorder     Thyroid disease     Vertigo 07/14/2020    Vestibular neuronitis 07/17/2020     Past Surgical History:   Procedure Laterality Date    CARDIAC CATHETERIZATION N/A 05/22/2018    Procedure: Left Heart Cath;  Surgeon: Aleks Velazquez MD;  Location: [facility] CATH INVASIVE LOCATION;  Service: Cardiovascular    CARDIAC CATHETERIZATION N/A 05/22/2018    Procedure: Coronary angiography;  Surgeon: Aleks Velazquez MD;  Location:  [facility] CATH INVASIVE LOCATION;  Service: Cardiovascular    CARDIAC CATHETERIZATION N/A 05/22/2018    Procedure: Left ventriculography;  Surgeon: Aleks Velazquez MD;  Location: [facility] CATH INVASIVE LOCATION;  Service: Cardiovascular    CARDIAC CATHETERIZATION N/A 05/22/2018    Procedure: Peripheral angiography;  Surgeon: Aleks Velazquez MD;  Location: [facility] CATH INVASIVE LOCATION;  Service: Cardiovascular    CATARACT EXTRACTION WITH INTRAOCULAR LENS IMPLANT Bilateral     COLONOSCOPY N/A 03/08/2023    Procedure: COLONOSCOPY to cecum/TI with cold biopsies;  Surgeon: Hamilton Francisco MD;  Location: [facility] ENDOSCOPY;  Service: Gastroenterology;  Laterality: N/A;  pre- diverticulitis  post- diverticulosis with stricture    DIAGNOSTIC LAPAROSCOPY  1990    r/o endometriosis    ENDOSCOPY N/A 03/08/2023    Procedure: ESOPHAGOGASTRODUODENOSCOPY with cold biopsies and 52 Fr Kam Dilatation;   Surgeon: Hamilton Francisco MD;  Location: [facility] ENDOSCOPY;  Service: Gastroenterology;  Laterality: N/A;  pre- dysphagia  post- hiatal hernia, gastric polyps, esophageal ring @ 32    NECK SURGERY  12/03/2003    ACDF C6-7-Dr. Nix     RECTAL SURGERY  1992    fistula/abscess/hemorrhoid      General Information       Row Name 02/05/25 1300          Physical Therapy Time and Intention    Document Type evaluation  -MG     Mode of Treatment individual therapy;physical therapy  -MG       Row Name 02/05/25 1300          General Information    Patient Profile Reviewed yes  -MG     Prior Level of Function independent:  No AD/DME. No O2 at home. No recent falls.  -MG     Existing Precautions/Restrictions fall  -MG     Barriers to Rehab none identified  -MG       Row Name 02/05/25 1300          Living Environment    People in Home alone  -MG       Row Name 02/05/25 1300          Home Main Entrance    Number of Stairs, Main Entrance none  -MG       Row Name 02/05/25 1300          Stairs Within Home, Primary    Number of Stairs, Within Home, Primary none  -MG       Row Name 02/05/25 1300          Cognition    Orientation Status (Cognition) oriented x 4  -MG       Row Name 02/05/25 1300          Safety Issues/Impairments Affecting Functional Mobility    Impairments Affecting Function (Mobility) endurance/activity tolerance;shortness of breath  -MG     Comment, Safety Issues/Impairments (Mobility) Gait belt and non-skid socks donned.  -MG               User Key  (r) = Recorded By, (t) = Taken By, (c) = Cosigned By      Initials Name Provider Type    MG Mary Euceda PT Physical Therapist                   Mobility       Row Name 02/05/25 1301          Bed Mobility    Comment, (Bed Mobility) Sitting EOB on arrival, independently, with PCT and requesting to use the bathroom.  -MG       Row Name 02/05/25 1301          Sit-Stand Transfer    Sit-Stand [place] (Transfers) supervision;modified independence  -MG     Comment,  (Sit-Stand Transfer) x1 EOB w/ SV. x1 commode w/ grab bar and Mod-I. No dizziness on standing.  -MG       Row Name 02/05/25 1301          Gait/Stairs (Locomotion)    [place] Level (Gait) standby assist  -MG     Assistive Device (Gait) other (see comments)  No AD  -MG     Distance in Feet (Gait) 20  -MG     Deviations/Abnormal Patterns (Gait) gait speed decreased;stride length decreased  -MG     Comment, (Gait/Stairs) No buckling, LOB or dizziness. Mild SOB. Amb on room air desatting from 95% to 90%. With three PLB pt was quickly up to 94%. Reports mobility near her baseline, but slower.  -MG               User Key  (r) = Recorded By, (t) = Taken By, (c) = Cosigned By      Initials Name Provider Type    Mary Kay, PT Physical Therapist                   Obj/Interventions       Row Name 02/05/25 1302          Range of Motion Comprehensive    General Range of Motion no range of motion deficits identified  -MG       Row Name 02/05/25 1302          Strength Comprehensive (MMT)    General Manual Muscle Testing (MMT) Assessment other (see comments)  -MG     Comment, General Manual Muscle Testing (MMT) Assessment Generalized weakness.  -MG       Row Name 02/05/25 1302          Balance    Comment, Balance Independent with alaina-hygiene, LE dressing and grooming at the sink. SBA for ambulating without AD. (I) w/ grab bar for static standing while in the bathroom. No overt LOB or buckling.  -MG       Row Name 02/05/25 1302          Sensory Assessment (Somatosensory)    Sensory Assessment (Somatosensory) sensation intact  -MG               User Key  (r) = Recorded By, (t) = Taken By, (c) = Cosigned By      Initials Name Provider Type    Mary Kay, PT Physical Therapist                   Goals/Plan       Row Name 02/05/25 1305          Bed Mobility Goal 1 (PT)    Activity/Assistive Device (Bed Mobility Goal 1, PT) bed mobility activities, all  -MG     [place] Level/Cues Needed (Bed Mobility Goal 1, PT)  modified independence  -MG     Time Frame (Bed Mobility Goal 1, PT) 1 week  -MG       Row Name 02/05/25 130          Transfer Goal 1 (PT)    Activity/Assistive Device (Transfer Goal 1, PT) transfers, all  -MG     [place] Level/Cues Needed (Transfer Goal 1, PT) modified independence  -MG     Time Frame (Transfer Goal 1, PT) 1 week  -MG       Row Name 02/05/25 1308          Gait Training Goal 1 (PT)    Activity/Assistive Device (Gait Training Goal 1, PT) gait (walking locomotion)  -MG     [place] Level (Gait Training Goal 1, PT) modified independence  -MG     Distance (Gait Training Goal 1, PT) 80  -MG     Time Frame (Gait Training Goal 1, PT) 1 week  -MG       Row Name 02/05/25 1308          Therapy Assessment/Plan (PT)    Planned Therapy Interventions (PT) balance training;bed mobility training;gait training;home exercise program;patient/family education;stretching;strengthening;neuromuscular re-education;ROM (range of motion);postural re-education;transfer training  -MG               User Key  (r) = Recorded By, (t) = Taken By, (c) = Cosigned By      Initials Name Provider Type    MG Mary Euceda, PT Physical Therapist                   Clinical Impression       Row Name 02/05/25 1307          Pain    Pain Location back  -MG     Pain Side/Orientation generalized  -MG     Pre/Posttreatment Pain Comment Reports chronic back and neck pain. Does not rate.  -MG       Row Name 02/05/25 1301          Plan of Care Review    Plan of Care Reviewed With patient  -MG     Progress improving  -MG     Outcome Evaluation Pt is a 74 y.o. female with h/o FUENTES, CAD, and a-fib who was admitted to [facility] on 2/3/2025 with c/o diarrhea, fever, chills, and cough; dx Influenza A. Patient is (I) at baseline, without AD/DME and lives alone in a 2nd floor apt. Today, pt was sitting EOB independently on arrival, preparing to walk to bathroom w/ PCT when this PT took over. Pt required SV/Mod-I for transfers without an AD, (I) for  alaina-hygiene, LE dressing and grooming at the sink, and ambulated 20' without AD and SBA. Pt ambulated on room air desatting from 95% to 90%, and with PLB she quickly recovered to 94%. No buckling, LOB or dizziness, but was fatigued and mildly SOB. Encouraged pt to cont ambulating w/ nsg to the bathroom, into the hallway as able and to sit UIC for all meals; pt v/u. Pt may benefit from skilled PT services acutely to address their impaired strength, balance and endurance, as well as, improve their level of independence prior to discharge. KRISTA w/ RN. Rec home upon DC.  -MG       Row Name 02/05/25 1304          Therapy Assessment/Plan (PT)    Rehab Potential (PT) good  -MG     Criteria for Skilled Interventions Met (PT) yes;meets criteria  -MG     Therapy Frequency (PT) 5 times/wk  -MG       Row Name 02/05/25 1304          Vital Signs    Pre SpO2 (%) 95  -MG     O2 Delivery Pre Treatment room air  -MG     Intra SpO2 (%) 90  -MG     O2 Delivery Intra Treatment room air  -MG     Post SpO2 (%) 94  -MG     O2 Delivery Post Treatment room air  -MG     Pre Patient Position Sitting  -MG     Intra Patient Position Standing  -MG     Post Patient Position Sitting  -MG       Row Name 02/05/25 1304          Positioning and Restraints    Pre-Treatment Position in bed  Sitting EOB  -MG     Post Treatment Position chair  -MG     In Chair notified nsg;reclined;call light within reach;encouraged to call for assist;exit alarm on;legs elevated  -MG               User Key  (r) = Recorded By, (t) = Taken By, (c) = Cosigned By      Initials Name Provider Type    MG Mary Euceda, PT Physical Therapist                   Outcome Measures       Row Name 02/05/25 1305 02/05/25 0937       How much help from another person do you currently need...    Turning from your back to your side while in flat bed without using bedrails? 4  -MG 4  -PB    Moving from lying on back to sitting on the side of a flat bed without bedrails? 4  -MG 4  -PB     Moving to and from a bed to a chair (including a wheelchair)? 3  -MG 4  -PB    Standing up from a chair using your arms (e.g., wheelchair, bedside chair)? 4  -MG 3  -PB    Climbing 3-5 steps with a railing? 3  -MG 3  -PB    To walk in hospital room? 3  -MG 3  -PB    AM-PAC 6 Clicks Score (PT) 21  -MG 21  -PB              User Key  (r) = Recorded By, (t) = Taken By, (c) = Cosigned By      Initials Name Provider Type    MG Mary Euceda, PT Physical Therapist    PB Niurka Cabrera RN Registered Nurse                                 Physical Therapy Education       Title: PT OT SLP Therapies (In Progress)       Topic: Physical Therapy (In Progress)       Point: Mobility training (Not Started)       Learner Progress:  Not documented in this visit.              Point: Home exercise program (Not Started)       Learner Progress:  Not documented in this visit.              Point: Body mechanics (Done)       Learning Progress Summary            Patient Acceptance, E,TB, VU by  at 2/5/2025 1306                      Point: Precautions (Done)       Learning Progress Summary            Patient Acceptance, E,TB, VU by  at 2/5/2025 1306                                      User Key       Initials Effective Dates Name Provider Type Discipline     05/24/22 -  Mary Euceda, PT Physical Therapist PT                  PT Recommendation and Plan  Planned Therapy Interventions (PT): balance training, bed mobility training, gait training, home exercise program, patient/family education, stretching, strengthening, neuromuscular re-education, ROM (range of motion), postural re-education, transfer training  Progress: improving  Outcome Evaluation: Pt is a 74 y.o. female with h/o FUENTES, CAD, and a-fib who was admitted to [facility] on 2/3/2025 with c/o diarrhea, fever, chills, and cough; dx Influenza A. Patient is (I) at baseline, without AD/DME and lives alone in a 2nd floor apt. Today, pt was sitting EOB independently on arrival, preparing  to walk to bathroom w/ PCT when this PT took over. Pt required SV/Mod-I for transfers without an AD, (I) for alaina-hygiene, LE dressing and grooming at the sink, and ambulated 20' without AD and SBA. Pt ambulated on room air desatting from 95% to 90%, and with PLB she quickly recovered to 94%. No buckling, LOB or dizziness, but was fatigued and mildly SOB. Encouraged pt to cont ambulating w/ nsg to the bathroom, into the hallway as able and to sit UIC for all meals; pt v/u. Pt may benefit from skilled PT services acutely to address their impaired strength, balance and endurance, as well as, improve their level of independence prior to discharge. SBAR w/ RN. Rec home upon DC.     Time Calculation:         PT Charges       Row Name 02/05/25 1306             Time Calculation    Start Time 1035  -MG      Stop Time 1052  -MG      Time Calculation (min) 17 min  -MG      PT Received On 02/05/25  -MG      PT - Next Appointment 02/06/25  -MG      PT Goal Re-Cert Due Date 02/19/25  -MG         Time Calculation- PT    Total Timed Code Minutes- PT 10 minute(s)  -MG                User Key  (r) = Recorded By, (t) = Taken By, (c) = Cosigned By      Initials Name Provider Type    MG Mary Euceda, PT Physical Therapist                  Therapy Charges for Today       Code Description Service Date Service Provider Modifiers Qty    [id number]  PT EVAL MOD COMPLEXITY 3 2/5/2025 Mary Euceda, PT GP 1    [id number] HC PT THERAPEUTIC ACT EA 15 MIN 2/5/2025 Mary Euceda, PT GP 1            PT G-Codes  AM-PAC 6 Clicks Score (PT): 21  PT Discharge Summary  Anticipated Discharge Disposition (PT): home with assist, home    Mary Euceda PT  2/5/2025

## 2025-02-05 NOTE — PLAN OF CARE
Goal Outcome Evaluation:  Plan of Care Reviewed With: patient        Progress: improving     Pt is a 74 y.o. female with h/o FUENTES, CAD, and a-fib who was admitted to [facility] on 2/3/2025 with c/o diarrhea, fever, chills, and cough; dx Influenza A. Patient is (I) at baseline, without AD/DME and lives alone in a 2nd floor apt. Today, pt was sitting EOB independently on arrival, preparing to walk to bathroom w/ PCT when this PT took over. Pt required SV/Mod-I for transfers without an AD, (I) for alaina-hygiene, LE dressing and grooming at the sink, and ambulated 20' without AD and SBA. Pt ambulated on room air desatting from 95% to 90%, and with PLB she quickly recovered to 94%. No buckling, LOB or dizziness, but was fatigued and mildly SOB. Encouraged pt to cont ambulating w/ nsg to the bathroom, into the hallway as able and to sit UIC for all meals; pt v/u. Pt may benefit from skilled PT services acutely to address their impaired strength, balance and endurance, as well as, improve their level of independence prior to discharge. KRISTA w/ RN. Rec home upon DC.      Anticipated Discharge Disposition (PT): home with assist, home

## 2025-02-06 ENCOUNTER — READMISSION MANAGEMENT (OUTPATIENT)
Dept: CALL CENTER | Facility: HOSPITAL | Age: 75
End: 2025-02-06
Payer: MEDICARE

## 2025-02-06 VITALS
RESPIRATION RATE: 16 BRPM | TEMPERATURE: 98 F | SYSTOLIC BLOOD PRESSURE: 127 MMHG | DIASTOLIC BLOOD PRESSURE: 78 MMHG | OXYGEN SATURATION: 91 % | HEIGHT: 61 IN | WEIGHT: 186 LBS | HEART RATE: 61 BPM | BODY MASS INDEX: 35.12 KG/M2

## 2025-02-06 LAB
ANION GAP SERPL CALCULATED.3IONS-SCNC: 6.2 MMOL/L (ref 5–15)
BUN SERPL-MCNC: 19 MG/DL (ref 8–23)
BUN/CREAT SERPL: 16.4 (ref 7–25)
CALCIUM SPEC-SCNC: 8.2 MG/DL (ref 8.6–10.5)
CHLORIDE SERPL-SCNC: 111 MMOL/L (ref 98–107)
CO2 SERPL-SCNC: 23.8 MMOL/L (ref 22–29)
CREAT SERPL-MCNC: 1.16 MG/DL (ref 0.57–1)
DEPRECATED RDW RBC AUTO: 45.3 FL (ref 37–54)
EGFRCR SERPLBLD CKD-EPI 2021: 50.2 ML/MIN/1.73
ERYTHROCYTE [DISTWIDTH] IN BLOOD BY AUTOMATED COUNT: 14.8 % (ref 12.3–15.4)
GLUCOSE SERPL-MCNC: 94 MG/DL (ref 65–99)
HCT VFR BLD AUTO: 34.3 % (ref 34–46.6)
HGB BLD-MCNC: 11.3 G/DL (ref 12–15.9)
MCH RBC QN AUTO: 27.9 PG (ref 26.6–33)
MCHC RBC AUTO-ENTMCNC: 32.9 G/DL (ref 31.5–35.7)
MCV RBC AUTO: 84.7 FL (ref 79–97)
PLATELET # BLD AUTO: 214 10*3/MM3 (ref 140–450)
PMV BLD AUTO: 8.9 FL (ref 6–12)
POTASSIUM SERPL-SCNC: 4.1 MMOL/L (ref 3.5–5.2)
QT INTERVAL: 466 MS
QTC INTERVAL: 444 MS
RBC # BLD AUTO: 4.05 10*6/MM3 (ref 3.77–5.28)
SODIUM SERPL-SCNC: 141 MMOL/L (ref 136–145)
WBC NRBC COR # BLD AUTO: 3.55 10*3/MM3 (ref 3.4–10.8)

## 2025-02-06 PROCEDURE — 94618 PULMONARY STRESS TESTING: CPT

## 2025-02-06 PROCEDURE — 85027 COMPLETE CBC AUTOMATED: CPT | Performed by: STUDENT IN AN ORGANIZED HEALTH CARE EDUCATION/TRAINING PROGRAM

## 2025-02-06 PROCEDURE — 80048 BASIC METABOLIC PNL TOTAL CA: CPT | Performed by: STUDENT IN AN ORGANIZED HEALTH CARE EDUCATION/TRAINING PROGRAM

## 2025-02-06 PROCEDURE — 94799 UNLISTED PULMONARY SVC/PX: CPT

## 2025-02-06 PROCEDURE — 94761 N-INVAS EAR/PLS OXIMETRY MLT: CPT

## 2025-02-06 RX ORDER — GUAIFENESIN 600 MG/1
1200 TABLET, EXTENDED RELEASE ORAL EVERY 12 HOURS SCHEDULED
Qty: 28 TABLET | Refills: 0 | Status: SHIPPED | OUTPATIENT
Start: 2025-02-06 | End: 2025-02-13

## 2025-02-06 RX ORDER — GUAIFENESIN 600 MG/1
1200 TABLET, EXTENDED RELEASE ORAL EVERY 12 HOURS SCHEDULED
Status: DISCONTINUED | OUTPATIENT
Start: 2025-02-06 | End: 2025-02-06 | Stop reason: HOSPADM

## 2025-02-06 RX ORDER — DEXTROMETHORPHAN POLISTIREX 30 MG/5ML
60 SUSPENSION ORAL EVERY 12 HOURS PRN
Qty: 178 ML | Refills: 0 | Status: SHIPPED | OUTPATIENT
Start: 2025-02-06

## 2025-02-06 RX ORDER — CARVEDILOL 12.5 MG/1
12.5 TABLET ORAL 2 TIMES DAILY
Qty: 60 TABLET | Refills: 0 | Status: SHIPPED | OUTPATIENT
Start: 2025-02-06 | End: 2025-03-08

## 2025-02-06 RX ORDER — OSELTAMIVIR PHOSPHATE 30 MG/1
30 CAPSULE ORAL EVERY 12 HOURS SCHEDULED
Qty: 4 CAPSULE | Refills: 0 | Status: SHIPPED | OUTPATIENT
Start: 2025-02-06 | End: 2025-02-08

## 2025-02-06 RX ADMIN — PANTOPRAZOLE SODIUM 40 MG: 40 TABLET, DELAYED RELEASE ORAL at 09:09

## 2025-02-06 RX ADMIN — AMLODIPINE BESYLATE 5 MG: 5 TABLET ORAL at 09:09

## 2025-02-06 RX ADMIN — GUAIFENESIN 1200 MG: 600 TABLET ORAL at 09:37

## 2025-02-06 RX ADMIN — OSELTAMIVIR PHOSPHATE 30 MG: 30 CAPSULE ORAL at 09:09

## 2025-02-06 RX ADMIN — DEXTROMETHORPHAN POLISTIREX 60 MG: 30 SUSPENSION ORAL at 00:05

## 2025-02-06 RX ADMIN — Medication 10 ML: at 09:10

## 2025-02-06 RX ADMIN — ESCITALOPRAM 20 MG: 20 TABLET, FILM COATED ORAL at 09:09

## 2025-02-06 RX ADMIN — APIXABAN 2.5 MG: 2.5 TABLET, FILM COATED ORAL at 09:09

## 2025-02-06 RX ADMIN — ARIPIPRAZOLE 5 MG: 5 TABLET ORAL at 09:09

## 2025-02-06 RX ADMIN — HYDRALAZINE HYDROCHLORIDE 50 MG: 25 TABLET ORAL at 09:08

## 2025-02-06 RX ADMIN — CARVEDILOL 12.5 MG: 12.5 TABLET, FILM COATED ORAL at 09:09

## 2025-02-06 NOTE — PROGRESS NOTES
"Exercise Oximetry    Patient Name:Katelyn Sr   MRN: [id number]   Date: 02/06/25             ROOM AIR BASELINE   SpO2% 91   Heart Rate 66   Blood Pressure      EXERCISE ON ROOM AIR SpO2% EXERCISE ON O2 @  LPM SpO2%   1 MINUTE 90 1 MINUTE                      2 MINUTES 90 2 MINUTES                    3 MINUTES 89 3 MINUTES                     4 MINUTES 88 4 MINUTES                      5 MINUTES 90 5 MINUTES                      6 MINUTES 90 6 MINUTES                                  Distance Walked   Distance Walked   Dyspnea ([place] Scale)   Dyspnea ([place] Scale)   Fatigue ([place] Scale)   Fatigue ([place] Scale)   SpO2% Post Exercise  89-90 SpO2% Post Exercise   HR Post Exercise   HR Post Exercise   Time to Recovery   Time to Recovery     Comments: pt states she \"has pulmonary fibrosis and is seen every 3 months, completing 6 min walks during these visits and has been told that an reading of at least 90% is acceptable for her.\" Pt states she hasn't been up walking and feels unsteady. Pt declined use of assistive devices during walk.       "

## 2025-02-06 NOTE — DISCHARGE SUMMARY
Date of Admission: 2/3/2025  Date of Discharge:  2/6/2025  Primary Care Physician: Pierre Chaudhry Jr., MD     Discharge Diagnosis:  Active Hospital Problems    Diagnosis  POA    **Acute hypoxic respiratory failure [J96.01]  Yes    Influenza A [J10.1]  Unknown    A-fib [I48.91]  Yes    Essential hypertension [I10]  Yes    FUENTES on autoCPAP [G47.33]  Yes      Resolved Hospital Problems   No resolved problems to display.       DETAILS OF HOSPITAL STAY     Pertinent Test Results and Procedures Performed    Chest x-ray:  No focal pulmonary consolidation. Cardiomegaly. Tortuous   aorta. Follow-up as clinically indicated.     Hospital Course  This is a 74-year-old female with history of pulmonary fibrosis who presented to the emergency room with cough, shortness of breath, and general sense of ill health along with fever.  Please see H&P for full details of her presentation.  She was found to have influenza A and acute hypoxic respiratory failure.  She was started on Tamiflu and supportive measures.  She was evaluated by pulmonology.  She is now on room air and walking oximetry did not indicate any need for oxygen at home.  She will continue to use her CPAP at night when she is discharged.  She is now medically stable, pulmonology has signed off and she will be released home.  She will follow-up with her primary care physician in 1 week and with her pulmonologist in 1 month.      Physical Exam at Discharge:  General: No acute distress, AAOx3  HEENT: EOMI, PERRL  Cardiovascular: +s1 and s2, RRR  Lungs: No rhonchi or wheezing  Abdomen: soft, nontender    Consults:   Consults       Date and Time Order Name Status Description    2/4/2025  5:11 AM Inpatient Pulmonology Consult Completed     2/3/2025  1:42 PM [facility] (on-call MD unless specified) Details                Condition on Discharge: Stable, improved    Discharge Disposition  Home or Self Care    Discharge Medications     Discharge Medications        New Medications         Instructions Start Date   dextromethorphan polistirex ER 30 MG/5ML Suspension Extended Release oral suspension  Commonly known as: DELSYM   60 mg, Oral, Every 12 Hours PRN      guaiFENesin 600 MG 12 hr tablet  Commonly known as: MUCINEX   1,200 mg, Oral, Every 12 Hours Scheduled      oseltamivir 30 MG capsule  Commonly known as: TAMIFLU   30 mg, Oral, Every 12 Hours Scheduled             Changes to Medications        Instructions Start Date   carvedilol 12.5 MG tablet  Commonly known as: COREG  What changed:   medication strength  how much to take   12.5 mg, Oral, 2 Times Daily             Continue These Medications        Instructions Start Date   amLODIPine 5 MG tablet  Commonly known as: NORVASC   5 mg, Oral, Daily      apixaban 2.5 MG tablet tablet  Commonly known as: ELIQUIS   2.5 mg, 2 Times Daily      ARIPiprazole 5 MG tablet  Commonly known as: ABILIFY   5 mg, Daily      escitalopram 20 MG tablet  Commonly known as: LEXAPRO   20 mg, Daily      famotidine 40 MG tablet  Commonly known as: PEPCID   40 mg, Nightly      hydrALAZINE 50 MG tablet  Commonly known as: APRESOLINE   50 mg, Oral, 2 Times Daily      lamoTRIgine 100 MG tablet  Commonly known as: LaMICtal   100 mg, Nightly      losartan 100 MG tablet  Commonly known as: COZAAR   TAKE 1 TABLET BY MOUTH EVERY DAY      Ofev 100 MG capsule  Generic drug: Nintedanib Esylate   100 mg, 2 Times Daily      ondansetron ODT 4 MG disintegrating tablet  Commonly known as: ZOFRAN-ODT   Place 1 tablet on the tongue Every 8 (Eight) Hours As Needed.      pantoprazole 40 MG EC tablet  Commonly known as: PROTONIX   40 mg, Daily      pravastatin 40 MG tablet  Commonly known as: PRAVACHOL   40 mg, Oral, Every Night at Bedtime             Stop These Medications      modafinil 100 MG tablet  Commonly known as: PROVIGIL     traZODone 50 MG tablet  Commonly known as: DESYREL              Discharge Diet:   Diet Instructions       Diet: Regular/House Diet, Cardiac Diets;  Healthy Heart (2-3 Na+); Regular (IDDSI 7); Thin (IDDSI 0)      Discharge Diet:  Regular/House Diet  Cardiac Diets       Cardiac Diet: Healthy Heart (2-3 Na+)    Texture: Regular (IDDSI 7)    Fluid Consistency: Thin (IDDSI 0)            Activity at Discharge:   Activity Instructions       Activity as Tolerated              Follow-up Appointments  Future Appointments   Date Time Provider Department Center   2/19/2025  1:00 PM Mai Clark DO [facility]   11/10/2025  3:00 PM Aleks Velazquez MD [id number] None     Additional Instructions for the Follow-ups that You Need to Schedule       Discharge Follow-up with PCP   As directed       Currently Documented PCP:    Pierre Chaudhry Jr., MD    PCP Phone Number:    [phone]     Follow Up Details: 1 week        Discharge Follow-up with Specified Provider: Dr. Gutiérrez of pulmonology in 1 month   As directed      To: Dr. Gutiérrez of pulmonology in 1 month                I have examined and discussed discharge planning with the patient today.    Julio Cesar Caruso MD  02/06/25  12:37 EST    Time: Discharge greater than 30 min

## 2025-02-06 NOTE — PLAN OF CARE
Goal Outcome Evaluation:  Plan of Care Reviewed With: patient        Progress: improving  Outcome Evaluation: Pt AOx4. ON RA. No walking o2 needed. D/c orders in place. Pt not in acute distress. Meds to bed. Plan of care ongoing until d/c

## 2025-02-06 NOTE — CASE MANAGEMENT/SOCIAL WORK
Continued Stay Note  [facility]     Patient Name: Katelyn Sr  MRN: [id number]  Today's Date: 2/6/2025    Admit Date: 2/3/2025    Plan: Plan home.  JAVIER Gregorio RN   Discharge Plan       Row Name 02/06/25 1238       Plan    Plan Plan home.  JAVIER Gregorio RN    Patient/Family in Agreement with Plan yes    Plan Comments Spoke with pt at bedside.  Pt did not qualify for home O2.  Pt is calling friends for transportation home  Pt to inform RN if she needs a cab voucher home.  Plan home.  JAVIER Gregorio RN                   Discharge Codes    No documentation.                 Expected Discharge Date and Time       Expected Discharge Date Expected Discharge Time    Feb 6, 2025               Saniya Gregorio, RN

## 2025-02-06 NOTE — PLAN OF CARE
Goal Outcome Evaluation:   Pt resting in bed. No c/o voiced. VSS No s/s pf distress. Plan of care ongoing

## 2025-02-06 NOTE — PAYOR COMM NOTE
"Francisca Sr \"Jina\" (74 y.o. Female)      PATIENT DISCHARGED 02/06/2025:  REF# [id number]     [phone]    [facility]: NPI [id number] TIIN# [id number]    JOSLYN ROMO RN,CCP       Date of Birth   1950    Social Security Number       Address   [address] [place] [address]    Home Phone   [phone]    MRN   [id number]       [facility]    Marital Status   Single                            Admission Date   2/3/25    Admission Type   Emergency    Admitting Provider   Sagar Rivera MD    Attending Provider       Department, Room/Bed   [address], E[address]/1       Discharge Date   2/6/2025    Discharge Disposition   Home or Self Care    Discharge Destination                                 Attending Provider: (none)   Allergies: Simvastatin, Erythromycin, Penicillins    Isolation: Droplet   Infection: Influenza (02/03/25)   Code Status: CPR    Ht: 154.9 cm (61\")   Wt: 84.4 kg (186 lb)    Admission Cmt: None   Principal Problem: Acute hypoxic respiratory failure [J96.01]                   Active Insurance as of 2/3/2025       Primary Coverage       Payor Plan Insurance Group Employer/Plan Group    AETNA MEDICARE REPLACEMENT AETNA MEDICARE REPLACEMENT [id number]       Payor Plan Address Payor Plan Phone Number Payor Plan Fax Number Effective Dates    [address] [phone]  1/1/2024 - None Entered    [facility] [id number]         Subscriber Name Subscriber Birth Date Member ID       FRANCISCA SR 1950 [id number]                     Emergency Contacts        (Rel.) Home Phone Work Phone Mobile Phone    Bhavesh Nascimento(nephew) (Relative) [phone] -- [phone]    Tiffanie Cortes (Friend) [phone] -- [phone]    ANAID CARNEY (Friend) -- -- [phone]                 Discharge Summary        Julio Cesar Caruso MD at 02/06/25 1237            Date of Admission: 2/3/2025  Date of " Discharge:  2/6/2025  Primary Care Physician: Pierre Chaudhry Jr., MD     Discharge Diagnosis:  Active Hospital Problems    Diagnosis  POA    **Acute hypoxic respiratory failure [J96.01]  Yes    Influenza A [J10.1]  Unknown    A-fib [I48.91]  Yes    Essential hypertension [I10]  Yes    FUENTES on autoCPAP [G47.33]  Yes      Resolved Hospital Problems   No resolved problems to display.       DETAILS OF HOSPITAL STAY     Pertinent Test Results and Procedures Performed    Chest x-ray:  No focal pulmonary consolidation. Cardiomegaly. Tortuous   aorta. Follow-up as clinically indicated.     Hospital Course  This is a 74-year-old female with history of pulmonary fibrosis who presented to the emergency room with cough, shortness of breath, and general sense of ill health along with fever.  Please see H&P for full details of her presentation.  She was found to have influenza A and acute hypoxic respiratory failure.  She was started on Tamiflu and supportive measures.  She was evaluated by pulmonology.  She is now on room air and walking oximetry did not indicate any need for oxygen at home.  She will continue to use her CPAP at night when she is discharged.  She is now medically stable, pulmonology has signed off and she will be released home.  She will follow-up with her primary care physician in 1 week and with her pulmonologist in 1 month.      Physical Exam at Discharge:  General: No acute distress, AAOx3  HEENT: EOMI, PERRL  Cardiovascular: +s1 and s2, RRR  Lungs: No rhonchi or wheezing  Abdomen: soft, nontender    Consults:   Consults       Date and Time Order Name Status Description    2/4/2025  5:11 AM Inpatient Pulmonology Consult Completed     2/3/2025  1:42 PM [facility] (on-call MD unless specified) Details                Condition on Discharge: Stable, improved    Discharge Disposition  Home or Self Care    Discharge Medications     Discharge Medications        New Medications        Instructions Start Date    dextromethorphan polistirex ER 30 MG/5ML Suspension Extended Release oral suspension  Commonly known as: DELSYM   60 mg, Oral, Every 12 Hours PRN      guaiFENesin 600 MG 12 hr tablet  Commonly known as: MUCINEX   1,200 mg, Oral, Every 12 Hours Scheduled      oseltamivir 30 MG capsule  Commonly known as: TAMIFLU   30 mg, Oral, Every 12 Hours Scheduled             Changes to Medications        Instructions Start Date   carvedilol 12.5 MG tablet  Commonly known as: COREG  What changed:   medication strength  how much to take   12.5 mg, Oral, 2 Times Daily             Continue These Medications        Instructions Start Date   amLODIPine 5 MG tablet  Commonly known as: NORVASC   5 mg, Oral, Daily      apixaban 2.5 MG tablet tablet  Commonly known as: ELIQUIS   2.5 mg, 2 Times Daily      ARIPiprazole 5 MG tablet  Commonly known as: ABILIFY   5 mg, Daily      escitalopram 20 MG tablet  Commonly known as: LEXAPRO   20 mg, Daily      famotidine 40 MG tablet  Commonly known as: PEPCID   40 mg, Nightly      hydrALAZINE 50 MG tablet  Commonly known as: APRESOLINE   50 mg, Oral, 2 Times Daily      lamoTRIgine 100 MG tablet  Commonly known as: LaMICtal   100 mg, Nightly      losartan 100 MG tablet  Commonly known as: COZAAR   TAKE 1 TABLET BY MOUTH EVERY DAY      Ofev 100 MG capsule  Generic drug: Nintedanib Esylate   100 mg, 2 Times Daily      ondansetron ODT 4 MG disintegrating tablet  Commonly known as: ZOFRAN-ODT   Place 1 tablet on the tongue Every 8 (Eight) Hours As Needed.      pantoprazole 40 MG EC tablet  Commonly known as: PROTONIX   40 mg, Daily      pravastatin 40 MG tablet  Commonly known as: PRAVACHOL   40 mg, Oral, Every Night at Bedtime             Stop These Medications      modafinil 100 MG tablet  Commonly known as: PROVIGIL     traZODone 50 MG tablet  Commonly known as: DESYREL              Discharge Diet:   Diet Instructions       Diet: Regular/House Diet, Cardiac Diets; Healthy Heart (2-3 Na+); Regular  (IDDSI 7); Thin (IDDSI 0)      Discharge Diet:  Regular/House Diet  Cardiac Diets       Cardiac Diet: Healthy Heart (2-3 Na+)    Texture: Regular (IDDSI 7)    Fluid Consistency: Thin (IDDSI 0)            Activity at Discharge:   Activity Instructions       Activity as Tolerated              Follow-up Appointments  Future Appointments   Date Time Provider Department Center   2/19/2025  1:00 PM Mai Clark DO [facility]   11/10/2025  3:00 PM Aleks Velazquez MD [id number] None     Additional Instructions for the Follow-ups that You Need to Schedule       Discharge Follow-up with PCP   As directed       Currently Documented PCP:    Pierre Chaudhry Jr., MD    PCP Phone Number:    [phone]     Follow Up Details: 1 week        Discharge Follow-up with Specified Provider: Dr. Gutiérrez of pulmonology in 1 month   As directed      To: Dr. Gutiérrez of pulmonology in 1 month                I have examined and discussed discharge planning with the patient today.    Tyler Emerson MD  02/06/25  12:37 EST    Time: Discharge greater than 30 min            Electronically signed by Tyler Emerson MD at 02/06/25 1239       Discharge Order (From admission, onward)       Start     Ordered    02/06/25 1234  Discharge patient  Once        Expected Discharge Date: 02/06/25   Discharge Disposition: Home or Self Care   Physician of Record for Attribution - Please select from Treatment Team: TYLER EMERSON [[id number]]   Review needed by CMO to determine Physician of Record: No      Question Answer Comment   Physician of Record for Attribution - Please select from Treatment Team TYLER EMERSON    Review needed by CMO to determine Physician of Record No        02/06/25 1237

## 2025-02-07 NOTE — CASE MANAGEMENT/SOCIAL WORK
Case Management Discharge Note      Final Note: Pt discharged home on 2/6.  JAVIER Gregorio RN         Selected Continued Care - Discharged on 2/6/2025 Admission date: 2/3/2025 - Discharge disposition: Home or Self Care      Destination    No services have been selected for the patient.                Durable Medical Equipment    No services have been selected for the patient.                Dialysis/Infusion    No services have been selected for the patient.                Home Medical Care    No services have been selected for the patient.                Therapy    No services have been selected for the patient.                Community Resources    No services have been selected for the patient.                Community & DME    No services have been selected for the patient.                    Transportation Services  Private: Car    Final Discharge Disposition Code: 01 - home or self-care

## 2025-02-07 NOTE — PROGRESS NOTES
Enter Query Response Below      Query Response: Acute respiratory failure was supported with additional clinical indicators:  SPO2 88% on room air (2/3 1226).  SPO2 94% on 2L via nasal cannula (2/3 1245).  SPO2 87% on room air (2/3 1258).  SPO2 95% on 2L via nasal cannula (2/3 1341).  SPO2 91% on room air (2/6 1236).  Treatment included: Supplemental oxygen up to 2L via nasal cannula.             If applicable, please update the problem list.

## 2025-02-07 NOTE — OUTREACH NOTE
Prep Survey      Flowsheet Row Responses   [facility] facility patient discharged from? [place]   Is LACE score < 7 ? No   Eligibility Readm Mgmt   Discharge diagnosis Acute hypoxic respiratory failure-Influenza A   Does the patient have one of the following disease processes/diagnoses(primary or secondary)? Other   Does the patient have Home health ordered? No   Is there a DME ordered? No   Prep survey completed? Yes            AMELIA CERVANTES - Registered Nurse

## 2025-02-10 ENCOUNTER — READMISSION MANAGEMENT (OUTPATIENT)
Dept: CALL CENTER | Facility: HOSPITAL | Age: 75
End: 2025-02-10
Payer: MEDICARE

## 2025-02-10 NOTE — OUTREACH NOTE
Medical Week 1 Survey      Flowsheet Row Responses   [facility] patient discharged from? [place]   Does the patient have one of the following disease processes/diagnoses(primary or secondary)? Other   Week 1 attempt successful? No   Unsuccessful attempts Attempt 1            Yuly Jones Registered Nurse

## 2025-02-13 ENCOUNTER — READMISSION MANAGEMENT (OUTPATIENT)
Dept: CALL CENTER | Facility: HOSPITAL | Age: 75
End: 2025-02-13
Payer: MEDICARE

## 2025-02-13 NOTE — OUTREACH NOTE
Medical Week 1 Survey      Flowsheet Row Responses   [facility] patient discharged from? [place]   Does the patient have one of the following disease processes/diagnoses(primary or secondary)? Other   Week 1 attempt successful? No   Unsuccessful attempts Attempt 2            Alejandra Jones Registered Nurse

## 2025-02-18 ENCOUNTER — READMISSION MANAGEMENT (OUTPATIENT)
Dept: CALL CENTER | Facility: HOSPITAL | Age: 75
End: 2025-02-18
Payer: MEDICARE

## 2025-02-18 NOTE — OUTREACH NOTE
Medical Week 1 Survey      Flowsheet Row Responses   [facility] patient discharged from? [place]   Does the patient have one of the following disease processes/diagnoses(primary or secondary)? Other   Week 1 attempt successful? Yes   Call start time 0954   Call end time 1015   Discharge diagnosis Acute hypoxic respiratory failure-Influenza A   Person spoke with today (if not patient) and relationship pt   Meds reviewed with patient/caregiver? Yes   Is the patient having any side effects they believe may be caused by any medication additions or changes? No   Does the patient have all medications ordered at discharge? Yes   Is the patient taking all medications as directed (includes completed medication regime)? Yes   Does the patient have a primary care provider?  Yes   Does the patient have an appointment with their PCP within 7 days of discharge? Yes   Has the patient kept scheduled appointments due by today? Yes   Psychosocial issues? No   Did the patient receive a copy of their discharge instructions? Yes   Nursing interventions Reviewed instructions with patient   What is the patient's perception of their health status since discharge? Improving   Is the patient/caregiver able to teach back signs and symptoms related to disease process for when to call PCP? Yes   Is the patient/caregiver able to teach back signs and symptoms related to disease process for when to call 911? Yes   Is the patient/caregiver able to teach back the hierarchy of who to call/visit for symptoms/problems? PCP, Specialist, Home health nurse, Urgent Care, ED, 911 Yes   If the patient is a current smoker, are they able to teach back resources for cessation? Not a smoker   Week 1 call completed? Yes   Would this patient benefit from a Referral to Amb Social Work? No   Is the patient interested in additional calls from an ambulatory ? No   Wrap up additional comments Pt states she is doing better, but still has a  productive cough, and intermittinet cough attacks. Reviewed breathing exercises with pt, and advised pt to take mucinex 2 tabs in AM, and 2 tabs at bedtime for 7 days as pt had just took only 1 a day since hospital dc. Pt does have a pulmonary fu appt this friday, 2/21/25.   Call end time 1015            Yuly COREA - Registered Nurse

## 2025-02-24 ENCOUNTER — TRANSCRIBE ORDERS (OUTPATIENT)
Dept: ADMINISTRATIVE | Facility: HOSPITAL | Age: 75
End: 2025-02-24
Payer: MEDICARE

## 2025-02-24 DIAGNOSIS — J84.10 PULMONARY FIBROSIS: Primary | ICD-10-CM

## 2025-02-27 ENCOUNTER — HOSPITAL ENCOUNTER (OUTPATIENT)
Dept: CT IMAGING | Facility: HOSPITAL | Age: 75
Discharge: HOME | End: 2025-02-27
Admitting: INTERNAL MEDICINE
Payer: MEDICARE

## 2025-02-27 ENCOUNTER — READMISSION MANAGEMENT (OUTPATIENT)
Dept: CALL CENTER | Facility: HOSPITAL | Age: 75
End: 2025-02-27
Payer: MEDICARE

## 2025-02-27 ENCOUNTER — TRANSCRIBE ORDERS (OUTPATIENT)
Dept: ADMINISTRATIVE | Facility: HOSPITAL | Age: 75
End: 2025-02-27
Payer: MEDICARE

## 2025-02-27 DIAGNOSIS — R10.30 LOWER ABDOMINAL PAIN: ICD-10-CM

## 2025-02-27 DIAGNOSIS — R10.30 LOWER ABDOMINAL PAIN: Primary | ICD-10-CM

## 2025-02-27 PROCEDURE — 25510000001 IOPAMIDOL 61 % SOLUTION: Performed by: INTERNAL MEDICINE

## 2025-02-27 PROCEDURE — 74177 CT ABD & PELVIS W/CONTRAST: CPT

## 2025-02-27 RX ORDER — IOPAMIDOL 612 MG/ML
100 INJECTION, SOLUTION INTRAVASCULAR
Status: COMPLETED | OUTPATIENT
Start: 2025-02-27 | End: 2025-02-27

## 2025-02-27 RX ADMIN — IOPAMIDOL 85 ML: 612 INJECTION, SOLUTION INTRAVENOUS at 13:32

## 2025-02-27 NOTE — OUTREACH NOTE
Medical Week 2 Survey      Flowsheet Row Responses   [facility] patient discharged from? [place]   Does the patient have one of the following disease processes/diagnoses(primary or secondary)? Other   Week 2 attempt successful? Yes   Call start time 1632   Discharge diagnosis Acute hypoxic respiratory failure-Influenza A   Call end time 1637   Person spoke with today (if not patient) and relationship Patient   Medication alerts for this patient Delsym, Mucinex, Tamiflu   Meds reviewed with patient/caregiver? Yes   Is the patient having any side effects they believe may be caused by any medication additions or changes? No   Does the patient have all medications ordered at discharge? Yes   Prescription comments No questions or concerns with medications.   Is the patient taking all medications as directed (includes completed medication regime)? Yes   Comments regarding appointments patient had a CT scan of abdomen completed today.   Does the patient have a primary care provider?  Yes   Comments regarding PCP PCP--Dr. Pierre Chaudhry---patient saw PCP today for f/u.   Has the patient kept scheduled appointments due by today? Yes   Has home health visited the patient within 72 hours of discharge? N/A   Psychosocial issues? No   Comments Patient states she has still not felt well since leaving the hospital. She is having abdominal pain, cramping, nausea and diarrhea. She states she saw her PCP today and CT was completed, but is wnl. She has decreased appetite and still with cough post flu a. Advised patient to touch base with PCP again regarding constant cough and get final results of CT imaging.   Did the patient receive a copy of their discharge instructions? Yes   Nursing interventions Reviewed instructions with patient   What is the patient's perception of their health status since discharge? New symptoms unrelated to diagnosis   Is the patient/caregiver able to teach back signs and symptoms related to  disease process for when to call PCP? Yes   Is the patient/caregiver able to teach back signs and symptoms related to disease process for when to call 911? Yes   Is the patient/caregiver able to teach back the hierarchy of who to call/visit for symptoms/problems? PCP, Specialist, Home health nurse, Urgent Care, ED, 911 Yes   If the patient is a current smoker, are they able to teach back resources for cessation? Not a smoker   Week 2 Call Completed? Yes   Is the patient interested in additional calls from an ambulatory ? No   Would this patient benefit from a Referral to [facility] Social Work? No   Wrap up additional comments Answered all questions, no needs at this time.   Call end time 7695            Lore GALVEZ - Registered Nurse      Lore GALVEZ - Registered Nurse

## 2025-02-28 ENCOUNTER — TRANSCRIBE ORDERS (OUTPATIENT)
Dept: ADMINISTRATIVE | Facility: HOSPITAL | Age: 75
End: 2025-02-28
Payer: MEDICARE

## 2025-02-28 DIAGNOSIS — R10.9 ABDOMINAL PAIN, UNSPECIFIED ABDOMINAL LOCATION: Primary | ICD-10-CM

## 2025-03-11 NOTE — TELEPHONE ENCOUNTER
Dr. Velazquez,    Pt is wanting to let you know that she was in Afib on , which last 1 1/2 hours before she went back into sinus rhythm. Her HR is running in the 60's, she was diagnosed with the flu in February, and still has a persistent cough.  Can you please d/c current rx for Carvedilol 12.5mg bid and send a new one to [facility]? The current rx has .          Pt called for Eliquis 2.5mg samples and refills on Carvedilol 12.5mg bid - [facility] (SHE WILL NEED A NEW RX)  Which I put on 6th floor and she is aware.  2 bxs  LOT [id number] EXP 3/26     Thanks!    Cecilia

## 2025-04-04 ENCOUNTER — OFFICE (OUTPATIENT)
Dept: URBAN - METROPOLITAN AREA CLINIC 65 | Facility: CLINIC | Age: 75
End: 2025-04-04

## 2025-04-04 VITALS
DIASTOLIC BLOOD PRESSURE: 70 MMHG | OXYGEN SATURATION: 95 % | WEIGHT: 174 LBS | HEART RATE: 70 BPM | HEIGHT: 62 IN | SYSTOLIC BLOOD PRESSURE: 118 MMHG

## 2025-04-04 DIAGNOSIS — K21.9 GASTRO-ESOPHAGEAL REFLUX DISEASE WITHOUT ESOPHAGITIS: ICD-10-CM

## 2025-04-04 DIAGNOSIS — K58.0 IRRITABLE BOWEL SYNDROME WITH DIARRHEA: ICD-10-CM

## 2025-04-04 PROCEDURE — 99214 OFFICE O/P EST MOD 30 MIN: CPT | Performed by: INTERNAL MEDICINE

## 2025-04-04 RX ORDER — HYOSCYAMINE SULFATE 0.12 MG/1
0.38 TABLET ORAL; SUBLINGUAL
Qty: 90 | Refills: 6 | Status: ACTIVE
Start: 2025-04-04

## 2025-04-16 ENCOUNTER — TRANSCRIBE ORDERS (OUTPATIENT)
Dept: ADMINISTRATIVE | Facility: HOSPITAL | Age: 75
End: 2025-04-16
Payer: MEDICARE

## 2025-04-16 DIAGNOSIS — J84.9 ILD (INTERSTITIAL LUNG DISEASE): Primary | ICD-10-CM

## 2025-05-22 ENCOUNTER — HOSPITAL ENCOUNTER (OUTPATIENT)
Dept: PET IMAGING | Facility: HOSPITAL | Age: 75
Discharge: HOME | End: 2025-05-22
Admitting: INTERNAL MEDICINE
Payer: MEDICARE

## 2025-05-22 DIAGNOSIS — J84.9 ILD (INTERSTITIAL LUNG DISEASE): ICD-10-CM

## 2025-05-22 PROCEDURE — 71250 CT THORAX DX C-: CPT

## 2025-05-23 ENCOUNTER — TRANSCRIBE ORDERS (OUTPATIENT)
Dept: ADMINISTRATIVE | Facility: HOSPITAL | Age: 75
End: 2025-05-23
Payer: MEDICARE

## 2025-05-23 DIAGNOSIS — R60.0 LOWER EXTREMITY EDEMA: Primary | ICD-10-CM

## 2025-05-28 ENCOUNTER — HOSPITAL ENCOUNTER (OUTPATIENT)
Dept: CARDIOLOGY | Facility: HOSPITAL | Age: 75
Discharge: HOME | End: 2025-05-28
Admitting: INTERNAL MEDICINE
Payer: MEDICARE

## 2025-05-28 DIAGNOSIS — R60.0 LOWER EXTREMITY EDEMA: ICD-10-CM

## 2025-05-28 LAB
BH CV LOWER VASCULAR LEFT COMMON FEMORAL AUGMENT: NORMAL
BH CV LOWER VASCULAR LEFT COMMON FEMORAL COMPETENT: NORMAL
BH CV LOWER VASCULAR LEFT COMMON FEMORAL COMPRESS: NORMAL
BH CV LOWER VASCULAR LEFT COMMON FEMORAL PHASIC: NORMAL
BH CV LOWER VASCULAR LEFT COMMON FEMORAL SPONT: NORMAL
BH CV LOWER VASCULAR RIGHT COMMON FEMORAL AUGMENT: NORMAL
BH CV LOWER VASCULAR RIGHT COMMON FEMORAL COMPETENT: NORMAL
BH CV LOWER VASCULAR RIGHT COMMON FEMORAL COMPRESS: NORMAL
BH CV LOWER VASCULAR RIGHT COMMON FEMORAL PHASIC: NORMAL
BH CV LOWER VASCULAR RIGHT COMMON FEMORAL SPONT: NORMAL
BH CV LOWER VASCULAR RIGHT DISTAL FEMORAL COMPRESS: NORMAL
BH CV LOWER VASCULAR RIGHT GASTRONEMIUS COMPRESS: NORMAL
BH CV LOWER VASCULAR RIGHT GREATER SAPH AK COMPRESS: NORMAL
BH CV LOWER VASCULAR RIGHT GREATER SAPH BK COMPRESS: NORMAL
BH CV LOWER VASCULAR RIGHT LESSER SAPH COMPRESS: NORMAL
BH CV LOWER VASCULAR RIGHT MID FEMORAL AUGMENT: NORMAL
BH CV LOWER VASCULAR RIGHT MID FEMORAL COMPETENT: NORMAL
BH CV LOWER VASCULAR RIGHT MID FEMORAL COMPRESS: NORMAL
BH CV LOWER VASCULAR RIGHT MID FEMORAL PHASIC: NORMAL
BH CV LOWER VASCULAR RIGHT MID FEMORAL SPONT: NORMAL
BH CV LOWER VASCULAR RIGHT PERONEAL COMPRESS: NORMAL
BH CV LOWER VASCULAR RIGHT POPLITEAL AUGMENT: NORMAL
BH CV LOWER VASCULAR RIGHT POPLITEAL COMPETENT: NORMAL
BH CV LOWER VASCULAR RIGHT POPLITEAL COMPRESS: NORMAL
BH CV LOWER VASCULAR RIGHT POPLITEAL PHASIC: NORMAL
BH CV LOWER VASCULAR RIGHT POPLITEAL SPONT: NORMAL
BH CV LOWER VASCULAR RIGHT POSTERIOR TIBIAL COMPRESS: NORMAL
BH CV LOWER VASCULAR RIGHT PROFUNDA FEMORAL COMPRESS: NORMAL
BH CV LOWER VASCULAR RIGHT PROXIMAL FEMORAL COMPRESS: NORMAL
BH CV LOWER VASCULAR RIGHT SAPHENOFEMORAL JUNCTION COMPRESS: NORMAL

## 2025-05-28 PROCEDURE — 93971 EXTREMITY STUDY: CPT

## 2025-06-02 RX ORDER — LOSARTAN POTASSIUM 100 MG/1
100 TABLET ORAL DAILY
Qty: 90 TABLET | Refills: 4 | Status: SHIPPED | OUTPATIENT
Start: 2025-06-02

## 2025-07-07 RX ORDER — HYDRALAZINE HYDROCHLORIDE 50 MG/1
50 TABLET, FILM COATED ORAL 2 TIMES DAILY
Qty: 180 TABLET | Refills: 1 | Status: SHIPPED | OUTPATIENT
Start: 2025-07-07

## 2025-07-10 ENCOUNTER — TELEPHONE (OUTPATIENT)
Dept: CARDIOLOGY | Age: 75
End: 2025-07-10
Payer: MEDICARE

## 2025-07-10 NOTE — TELEPHONE ENCOUNTER
Pt called in today because she was woken up at noon today by being in AF.  She says she tried checking her pulse but it's difficult because it's irregular- she was guess its above 100.  She tried a couple times to check her BP but the machine isn't reading.  She says she had an episode about 3 months ago where she was in AF for about 1.5 hours before she converted.  She endorses JULES and an uncomfortable sensation in her chest.  She mentions she did forget to take a dose of her carvedilol yesterday.  She's on apixaban.    Do you have any recommendations for this patient?    Gilma YUAN RN  [facility] Triage  07/10/25  12:33 EDT

## 2025-07-10 NOTE — TELEPHONE ENCOUNTER
"I spoke with Katelyn Sr and gave them message from the provider.  They verbalized understanding.    Pt told me that she converted about 30 minutes ago but she \"feels terrible\" and she's having a pain in her back.  She then goes on to say \"I feel like I'm going to pass out.  I'm gonna have to call you back, I'm sorry\" and then she disconnected the call.  I tried calling patient back two times but there was no answer.    Since she said she might have LOC, I called 911 and requested a wellness check.  I s/w  Galo.  She's going to dispatch someone to go check on patient.    Gilma YUAN RN  Triage [facility]  07/10/25 13:50 EDT    " General

## 2025-07-10 NOTE — TELEPHONE ENCOUNTER
Tell her to double her dose of carvedilol today and if she still in atrial fibrillation tomorrow we need to work on scheduling her an appointment

## 2025-07-10 NOTE — TELEPHONE ENCOUNTER
I called Katelyn back and apologized about the delay in getting back to her on her feet swelling, she called Monday this week.     She also mentioned that Dr. Gutiérrez has had her on prednisone lately and she is being weaned back right now. She is currently on 15mg daily.     She has had previous episodes where she has syncope/presyncope and then vomited afterward, she has been told in the past they were vasovagal related. She also mentioned that she forgot her medications yesterday as well, and her episode happened shortly after taking her medications, but she did not vomit them back up. She feels fine now. She was already back out of Afib by the time she took her meds.    I put her on to see Dr. Velazquez next Wed at 3p.    Karyn

## 2025-07-10 NOTE — TELEPHONE ENCOUNTER
"Pt ended up returning my call at 13:55.  She told me she had vomited after we spoke but now she's feeling better.  I told her about the wellness check and she didn't think it was necessary so I called 911 back and cancelled this (I s/w  [facility]).    Pt tells me that she used her pulse ox earlier while in AF & HR was 126.  When she was in AF, she finally obtained 2 BP readings- 130/106 and 150/119.  After she converted & had emesis episode, BP was 131/89, HR 68.    She now mentions that she'd called either late last week or earlier this week on 2 occasions and hadn't heard anything back.  She was calling about the RLE edema that's been going on for a couple of months now.  She says it gets better by the morning time.  It's mostly in the R ankle and R foot.  Doppler in May negative for DVT.  She tells me she's \"always short of breath\" due to her lung conditions.  She's unsure if she's had any weight gain as she doesn't routinely weigh herself.  She has compression socks but hadn't considered using them.    Do you have any recommendations for this patient?    Gilma YUAN RN  [facility] – [place], [place] Triage  07/10/25  14:09 EDT        "

## 2025-07-16 ENCOUNTER — OFFICE VISIT (OUTPATIENT)
Age: 75
End: 2025-07-16
Payer: MEDICARE

## 2025-07-16 VITALS
HEART RATE: 78 BPM | DIASTOLIC BLOOD PRESSURE: 86 MMHG | SYSTOLIC BLOOD PRESSURE: 128 MMHG | WEIGHT: 165 LBS | BODY MASS INDEX: 31.15 KG/M2 | HEIGHT: 61 IN

## 2025-07-16 DIAGNOSIS — E78.2 MIXED HYPERLIPIDEMIA: ICD-10-CM

## 2025-07-16 DIAGNOSIS — I10 ESSENTIAL HYPERTENSION: Primary | ICD-10-CM

## 2025-07-16 DIAGNOSIS — E78.00 HYPERCHOLESTEROLEMIA: ICD-10-CM

## 2025-07-16 PROCEDURE — 93000 ELECTROCARDIOGRAM COMPLETE: CPT | Performed by: INTERNAL MEDICINE

## 2025-07-16 PROCEDURE — 3079F DIAST BP 80-89 MM HG: CPT | Performed by: INTERNAL MEDICINE

## 2025-07-16 PROCEDURE — 3074F SYST BP LT 130 MM HG: CPT | Performed by: INTERNAL MEDICINE

## 2025-07-16 PROCEDURE — 99214 OFFICE O/P EST MOD 30 MIN: CPT | Performed by: INTERNAL MEDICINE

## 2025-07-16 RX ORDER — PREDNISONE 10 MG/1
15 TABLET ORAL DAILY
COMMUNITY
Start: 2025-05-23

## 2025-07-16 RX ORDER — MYCOPHENOLATE MOFETIL 500 MG/1
500 TABLET ORAL DAILY
COMMUNITY
Start: 2025-06-28

## 2025-07-16 NOTE — PROGRESS NOTES
Katelyn Sr  1950  Date of Office Visit: 07/16/25  Encounter Provider: Aleks Velazquez MD  Place of Service: [facility] CARDIOLOGY      CHIEF COMPLAINT:  Paroxysmal atrial fibrillation  Essential hypertension      HISTORY OF PRESENT ILLNESS:  74-year-old female with a medical history of essential hypertension, hyperlipidemia, interstitial lung disease, and chronic dyspnea who presented Feb 2023 with report of shortness of breath that was worsened along with palpitations and an irregular heartbeat.  She reported several episodes of nausea and vomiting over the past several days prior to presenting to the ER.  Per her report the symptoms started around 7 PM. Unfortunately secondary to her nausea she was not been taking her p.o. carvedilol therapy.  High-sensitivity troponin was 11 and then 16.  X-ray of the chest look normal.  Initial EKG showed atrial fibrillation with a rapid ventricular rate.  Repeat EKG showed conversion to sinus rhythm.    She was in the hospital in February 2025 with acute hypoxic respiratory failure and influenza A.  She was started on Tamiflu and monitored.  She subsequently reported atrial fibrillation after she got out.  She has had a couple episodes of atrial fibrillation that lasted an hour or 2.  She is not having them very frequently.  She has had some lightheadedness and has stopped her losartan therapy.  Her blood pressure off of the losartan is still completely normal.  It certainly is possible that she has had some hypotensive episodes at home.  She also complains of dysphagia and has previously had an esophageal dilation.  I encouraged her to reach out to the GI team    Review of Systems   Constitutional: Negative for fever and malaise/fatigue.   HENT:  Negative for nosebleeds and sore throat.    Eyes:  Negative for blurred vision and double vision.   Cardiovascular:  Negative for chest pain, claudication, palpitations and syncope.    Respiratory:  Negative for cough, shortness of breath and snoring.    Endocrine: Negative for cold intolerance, heat intolerance and polydipsia.   Skin:  Negative for itching, poor wound healing and rash.   Musculoskeletal:  Negative for joint pain, joint swelling, muscle weakness and myalgias.   Gastrointestinal:  Negative for abdominal pain, melena, nausea and vomiting.   Neurological:  Negative for light-headedness, loss of balance, seizures, vertigo and weakness.   Psychiatric/Behavioral:  Negative for altered mental status and depression.        Past Medical History:   Diagnosis Date    Abnormal ECG 5/21/18    Done in PCP office.  Hx of Atrial fib-?2007    Allergic     Emycin; pcn    Arthritis of neck     ACDF C6-7 -2003    Asthma 12/15/2021    Hypersensitivity Pneumonitis; under treatment at [place]    Atrial fibrillation     Bursitis of hip     Few yrs ago, bilat lateral hip joints    Cervical disc disorder 8/03    ACDF C6-7, 12/03/2003;  Dr. 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    CTS (carpal tunnel syndrome)     Years ago.    DDD (degenerative disc disease), lumbosacral 11/09/2018    Depression In my 20's    On meds for years    Fibrosis of lung     Fracture, finger     LEFT 5th metacarpal    Generalized anxiety disorder 07/14/2020    GERD (gastroesophageal reflux disease)     Hip arthrosis     Hyperlipidemia     Changed to    Hypersensitivity pneumonitis     vs pulmonary fibrosis    Hypersensitivity pneumonitis     Hypersomnia due to another medical condition 10/28/2017    Hypertension     225/116 in ER, 5/21/18    Knee swelling     Low back strain     Lower back pain 07/26/2019    Lumbosacral disc disease     Chronic DDD    Myocardial infarction     Troponin elevated on 5/21/18    Neck strain     Neuroma of foot     Non-STEMI (non-ST elevated myocardial infarction) 05/21/2018    Nonsenile cataract      FUENTES on CPAP 10/19/2017    Osteopenia     Palpitations 07/23/2019    Peripheral vestibulopathy of both ears 07/14/2020    Presence of artificial intra-ocular lens     Scoliosis     Shortness of breath Jan, 2020    Sinusitis     Sleep apnea     CPAP, per Dr. CHUN Connelly    Thoracic disc disorder     Thyroid disease     Vertigo 07/14/2020    Vestibular neuronitis 07/17/2020       The following portions of the patient's history were reviewed and updated as appropriate: Social history , Family history, and Surgical history     Current Outpatient Medications on File Prior to Visit   Medication Sig Dispense Refill    carvedilol (COREG) 12.5 MG tablet Take 1 tablet by mouth 2 (Two) Times a Day for 30 days. 60 tablet 0    mycophenolate (CELLCEPT) 500 MG tablet Take 1 tablet by mouth Daily. Please see attached for detailed directions      predniSONE (DELTASONE) 10 MG tablet Take 1.5 tablets by mouth Daily.      amLODIPine (NORVASC) 5 MG tablet TAKE 1 TABLET BY MOUTH EVERY DAY 90 tablet 3    apixaban (ELIQUIS) 2.5 MG tablet tablet Take 1 tablet by mouth 2 (Two) Times a Day. Indications: LOT [id number] EXP 3/26 28 tablet 0    ARIPiprazole (ABILIFY) 5 MG tablet Take 1 tablet by mouth Daily.      dextromethorphan polistirex ER (DELSYM) 30 MG/5ML Suspension Extended Release oral suspension Take 10 mL by mouth Every 12 (Twelve) Hours As Needed (for cough). 178 mL 0    escitalopram (LEXAPRO) 20 MG tablet Take 1 tablet by mouth Daily.      famotidine (PEPCID) 40 MG tablet Take 1 tablet by mouth Every Night.      hydrALAZINE (APRESOLINE) 50 MG tablet TAKE 1 TABLET BY MOUTH TWICE A  tablet 1    lamoTRIgine (LaMICtal) 100 MG tablet Take 1 tablet by mouth Every Night.      Nintedanib Esylate (Ofev) 100 MG capsule Take 1 capsule by mouth 2 (Two) Times a Day.      ondansetron ODT (ZOFRAN-ODT) 4 MG disintegrating tablet Place 1 tablet on the tongue Every 8 (Eight) Hours As Needed.      pantoprazole (PROTONIX) 40 MG EC tablet Take 1  "tablet by mouth Daily.      pravastatin (PRAVACHOL) 40 MG tablet Take 1 tablet by mouth every night at bedtime. 90 tablet 3    traZODone (DESYREL) 50 MG tablet Take 0.5 tablets by mouth At Night As Needed for Sleep.      [DISCONTINUED] losartan (COZAAR) 100 MG tablet TAKE 1 TABLET BY MOUTH EVERY DAY 90 tablet 4     No current facility-administered medications on file prior to visit.       Allergies   Allergen Reactions    Simvastatin Myalgia     Cramps in thighs      Erythromycin Rash    Penicillins Rash       Vitals:    07/16/25 1505   BP: 128/86   Pulse: 78   Weight: 74.8 kg (165 lb)   Height: 154.9 cm (61\")     Body mass index is 31.18 kg/m².   Constitutional:       Appearance: Well-developed.   Eyes:      General: No scleral icterus.     Conjunctiva/sclera: Conjunctivae normal.   HENT:      Head: Normocephalic and atraumatic.   Neck:      Thyroid: No thyromegaly.      Vascular: Normal carotid pulses. No carotid bruit, hepatojugular reflux or JVD.      Trachea: No tracheal deviation.   Pulmonary:      Effort: No respiratory distress.      Breath sounds: Normal breath sounds. No decreased breath sounds. No wheezing. No rhonchi. No rales.   Chest:      Chest wall: Not tender to palpatation.   Cardiovascular:      Normal rate. Regular rhythm.      No gallop.    Pulses:     Carotid: 2+ bilaterally.     Radial: 2+ bilaterally.     Femoral: 2+ bilaterally.     Dorsalis pedis: 2+ bilaterally.     Posterior tibial: 2+ bilaterally.  Edema:     Peripheral edema absent.   Abdominal:      General: Bowel sounds are normal. There is no distension.      Palpations: Abdomen is soft.      Tenderness: There is no abdominal tenderness.   Musculoskeletal:         General: No deformity.      Cervical back: Normal range of motion and neck supple. Skin:     Findings: No erythema or rash.   Neurological:      Mental Status: Alert and oriented to person, place, and time.      Sensory: No sensory deficit.   Psychiatric:         Behavior: " Behavior normal.         Lab Results   Component Value Date    WBC 3.55 02/06/2025    HGB 11.3 (L) 02/06/2025    HCT 34.3 02/06/2025    MCV 84.7 02/06/2025     02/06/2025       Lab Results   Component Value Date    GLUCOSE 94 02/06/2025    BUN 19 02/06/2025    CREATININE 1.16 (H) 02/06/2025     02/06/2025    K 4.1 02/06/2025     (H) 02/06/2025    CALCIUM 8.2 (L) 02/06/2025    PROTEINTOT 6.0 02/04/2025    ALBUMIN 3.1 (L) 02/04/2025    ALT 11 02/04/2025    AST 22 02/04/2025    ALKPHOS 81 02/04/2025    BILITOT 0.4 02/04/2025    GLOB 2.9 02/04/2025    AGRATIO 1.1 02/04/2025    BCR 16.4 02/06/2025    ANIONGAP 6.2 02/06/2025    EGFR 50.2 (L) 02/06/2025       Lab Results   Component Value Date    GLUCOSE 94 02/06/2025    CALCIUM 8.2 (L) 02/06/2025     02/06/2025    K 4.1 02/06/2025    CO2 23.8 02/06/2025     (H) 02/06/2025    BUN 19 02/06/2025    CREATININE 1.16 (H) 02/06/2025    EGFR 50.2 (L) 02/06/2025    BCR 16.4 02/06/2025    ANIONGAP 6.2 02/06/2025       Lab Results   Component Value Date    CHOL 200 06/24/2024    TRIG 184 (H) 06/24/2024    HDL 49 06/24/2024     (H) 06/24/2024         ECG 12 Lead    Date/Time: 7/16/2025 3:35 PM  Performed by: Aleks Velazquez MD    Authorized by: Aleks Velazquez MD  Comparison: compared with previous ECG from 2/5/2025  Similar to previous ECG  Rhythm: sinus rhythm  Rate: normal  Conduction: left anterior fascicular block             Results for orders placed during the hospital encounter of 09/21/23    Adult Transthoracic Echo Complete W/ Cont if Necessary Per Protocol 09/21/2023  2:34 PM    Interpretation Summary    Left ventricular systolic function is normal. Calculated left ventricular EF = 63.2%    Left ventricular diastolic function was normal.      DISCUSSION/SUMMARY  74-year-old with a medical history of essential hypertension, hyperlipidemia, interstitial lung disease, chronic dyspnea, and paroxysmal atrial fibrillation who  presents back to me in follow-up.  She previously underwent coronary angiography in the setting of dyspnea and chest discomfort in 2018.  She had normal coronaries at that time.  She has reported atrial fibrillation episodes but only a couple over the past few months.  They tend to last an hour to 2 and go away on their own.  She has also reported lightheadedness and has stopped her losartan therapy.  Her blood pressure still completely normal off of the losartan which was a high dose at 100 mg p.o. daily.  Recommended that she stay off that.    1.  Paroxysmal atrial fibrillation: Currently sinus.  I do not think her episodes are frequent enough at this point in time to start a 1C agent but I do think that is an option for us.  - Continue carvedilol and Eliquis therapy at current dose.  She has not had any recent bleeding complications    2.  Interstitial lung disease: Follows with Dr. Gutiérrez.  On CellCept.    3.  Essential hypertension: Blood pressure is actually completely normal off of the losartan that she previously had been on.  This was a high dose.  I would recommend staying off the losartan and continuing her current regimen.

## 2025-08-11 ENCOUNTER — HOSPITAL ENCOUNTER (OUTPATIENT)
Dept: CT IMAGING | Facility: HOSPITAL | Age: 75
Discharge: HOME | End: 2025-08-11
Admitting: INTERNAL MEDICINE
Payer: MEDICARE

## 2025-08-11 DIAGNOSIS — J84.10 PULMONARY FIBROSIS: ICD-10-CM

## 2025-08-11 PROCEDURE — 71250 CT THORAX DX C-: CPT

## 2025-08-15 ENCOUNTER — OFFICE (OUTPATIENT)
Dept: URBAN - METROPOLITAN AREA CLINIC 65 | Facility: CLINIC | Age: 75
End: 2025-08-15

## 2025-08-15 VITALS
WEIGHT: 167 LBS | SYSTOLIC BLOOD PRESSURE: 126 MMHG | HEIGHT: 62 IN | OXYGEN SATURATION: 93 % | HEART RATE: 84 BPM | DIASTOLIC BLOOD PRESSURE: 78 MMHG

## 2025-08-15 DIAGNOSIS — K21.9 GASTRO-ESOPHAGEAL REFLUX DISEASE WITHOUT ESOPHAGITIS: ICD-10-CM

## 2025-08-15 DIAGNOSIS — K58.9 IRRITABLE BOWEL SYNDROME, UNSPECIFIED: ICD-10-CM

## 2025-08-15 PROCEDURE — 99214 OFFICE O/P EST MOD 30 MIN: CPT | Performed by: INTERNAL MEDICINE

## 2025-08-15 RX ORDER — DEXLANSOPRAZOLE 60 MG/1
60 CAPSULE, DELAYED RELEASE ORAL
Qty: 30 | Refills: 11 | Status: ACTIVE
Start: 2025-08-15

## 2025-08-15 RX ORDER — FAMOTIDINE 40 MG/1
TABLET, FILM COATED ORAL
Qty: 180 | Refills: 3 | Status: ACTIVE
Start: 2021-10-12

## (undated) DEVICE — GW EMR FIX EXCHG J STD .035 3MM 260CM

## (undated) DEVICE — INTRO SHEATH ART/FEM ENGAGE .035 5F12CM

## (undated) DEVICE — MSK ENDO PORT O2 POM ELITE CURAPLEX A/

## (undated) DEVICE — BITEBLOCK OMNI BLOC

## (undated) DEVICE — CATH DIAG IMPULSE FL3.5 5F 100CM

## (undated) DEVICE — Device

## (undated) DEVICE — TUBING, SUCTION, 1/4" X 10', STRAIGHT: Brand: MEDLINE

## (undated) DEVICE — ANGIO-SEAL VIP VASCULAR CLOSURE DEVICE: Brand: ANGIO-SEAL

## (undated) DEVICE — SENSR O2 OXIMAX FNGR A/ 18IN NONSTR

## (undated) DEVICE — LN SMPL CO2 SHTRM SD STREAM W/M LUER

## (undated) DEVICE — CATH DIAG IMPULSE PIG 5F 100CM

## (undated) DEVICE — KT ORCA ORCAPOD DISP STRL

## (undated) DEVICE — GLIDESHEATH BASIC HYDROPHILIC COATED INTRODUCER SHEATH: Brand: GLIDESHEATH

## (undated) DEVICE — SINGLE-USE BIOPSY FORCEPS: Brand: RADIAL JAW 4

## (undated) DEVICE — RUNTHROUGH NS EXTRA FLOPPY PTCA GUIDEWIRE: Brand: RUNTHROUGH

## (undated) DEVICE — CATH DIAG IMPULSE FR5 5F 100CM

## (undated) DEVICE — ADAPT CLN BIOGUARD AIR/H2O DISP